# Patient Record
Sex: FEMALE | Race: WHITE | NOT HISPANIC OR LATINO | Employment: FULL TIME | URBAN - METROPOLITAN AREA
[De-identification: names, ages, dates, MRNs, and addresses within clinical notes are randomized per-mention and may not be internally consistent; named-entity substitution may affect disease eponyms.]

---

## 2017-01-30 ENCOUNTER — ALLSCRIPTS OFFICE VISIT (OUTPATIENT)
Dept: OTHER | Facility: OTHER | Age: 32
End: 2017-01-30

## 2017-03-13 ENCOUNTER — OFFICE VISIT (OUTPATIENT)
Dept: URGENT CARE | Facility: CLINIC | Age: 32
End: 2017-03-13
Payer: COMMERCIAL

## 2017-03-13 ENCOUNTER — TRANSCRIBE ORDERS (OUTPATIENT)
Dept: URGENT CARE | Facility: CLINIC | Age: 32
End: 2017-03-13

## 2017-03-13 ENCOUNTER — OFFICE VISIT (OUTPATIENT)
Dept: URGENT CARE | Facility: CLINIC | Age: 32
End: 2017-03-13
Attending: EMERGENCY MEDICINE
Payer: COMMERCIAL

## 2017-03-13 DIAGNOSIS — R10.9 ABDOMINAL PAIN, UNSPECIFIED SITE: ICD-10-CM

## 2017-03-13 DIAGNOSIS — R10.9 ABDOMINAL PAIN, UNSPECIFIED SITE: Primary | ICD-10-CM

## 2017-03-13 PROCEDURE — 93005 ELECTROCARDIOGRAM TRACING: CPT

## 2017-03-13 PROCEDURE — 99214 OFFICE O/P EST MOD 30 MIN: CPT

## 2017-03-14 LAB
ATRIAL RATE: 105 BPM
P AXIS: 44 DEGREES
PR INTERVAL: 148 MS
QRS AXIS: 70 DEGREES
QRSD INTERVAL: 96 MS
QT INTERVAL: 378 MS
QTC INTERVAL: 499 MS
T WAVE AXIS: -74 DEGREES
VENTRICULAR RATE: 105 BPM

## 2017-03-22 ENCOUNTER — GENERIC CONVERSION - ENCOUNTER (OUTPATIENT)
Dept: OTHER | Facility: OTHER | Age: 32
End: 2017-03-22

## 2017-05-12 ENCOUNTER — ALLSCRIPTS OFFICE VISIT (OUTPATIENT)
Dept: OTHER | Facility: OTHER | Age: 32
End: 2017-05-12

## 2017-05-12 DIAGNOSIS — M70.52 OTHER BURSITIS OF KNEE, LEFT KNEE: ICD-10-CM

## 2017-05-12 DIAGNOSIS — M70.42 PREPATELLAR BURSITIS OF LEFT KNEE: ICD-10-CM

## 2017-09-06 ENCOUNTER — ALLSCRIPTS OFFICE VISIT (OUTPATIENT)
Dept: OTHER | Facility: OTHER | Age: 32
End: 2017-09-06

## 2017-09-06 DIAGNOSIS — R06.02 SHORTNESS OF BREATH: ICD-10-CM

## 2017-09-06 DIAGNOSIS — R07.89 OTHER CHEST PAIN: ICD-10-CM

## 2017-09-06 DIAGNOSIS — R79.89 OTHER SPECIFIED ABNORMAL FINDINGS OF BLOOD CHEMISTRY: ICD-10-CM

## 2017-09-06 DIAGNOSIS — R06.00 DYSPNEA: ICD-10-CM

## 2017-09-06 DIAGNOSIS — R06.01 ORTHOPNEA: ICD-10-CM

## 2017-09-06 DIAGNOSIS — I10 ESSENTIAL (PRIMARY) HYPERTENSION: ICD-10-CM

## 2017-09-07 ENCOUNTER — GENERIC CONVERSION - ENCOUNTER (OUTPATIENT)
Dept: OTHER | Facility: OTHER | Age: 32
End: 2017-09-07

## 2017-09-15 ENCOUNTER — GENERIC CONVERSION - ENCOUNTER (OUTPATIENT)
Dept: OTHER | Facility: OTHER | Age: 32
End: 2017-09-15

## 2017-10-04 ENCOUNTER — GENERIC CONVERSION - ENCOUNTER (OUTPATIENT)
Dept: OTHER | Facility: OTHER | Age: 32
End: 2017-10-04

## 2017-12-15 ENCOUNTER — ALLSCRIPTS OFFICE VISIT (OUTPATIENT)
Dept: OTHER | Facility: OTHER | Age: 32
End: 2017-12-15

## 2017-12-15 DIAGNOSIS — R50.9 FEVER: ICD-10-CM

## 2017-12-15 DIAGNOSIS — R53.83 OTHER FATIGUE: ICD-10-CM

## 2018-01-13 VITALS
BODY MASS INDEX: 38.24 KG/M2 | SYSTOLIC BLOOD PRESSURE: 138 MMHG | TEMPERATURE: 98.6 F | DIASTOLIC BLOOD PRESSURE: 90 MMHG | RESPIRATION RATE: 18 BRPM | WEIGHT: 224 LBS | HEIGHT: 64 IN | HEART RATE: 90 BPM

## 2018-01-13 VITALS
WEIGHT: 241 LBS | SYSTOLIC BLOOD PRESSURE: 138 MMHG | HEIGHT: 64 IN | HEART RATE: 90 BPM | BODY MASS INDEX: 41.15 KG/M2 | DIASTOLIC BLOOD PRESSURE: 84 MMHG | RESPIRATION RATE: 18 BRPM | TEMPERATURE: 98.2 F

## 2018-01-14 VITALS
HEART RATE: 88 BPM | DIASTOLIC BLOOD PRESSURE: 116 MMHG | SYSTOLIC BLOOD PRESSURE: 162 MMHG | RESPIRATION RATE: 18 BRPM | HEIGHT: 64 IN | TEMPERATURE: 98.1 F | WEIGHT: 226 LBS | BODY MASS INDEX: 38.58 KG/M2

## 2018-01-15 ENCOUNTER — ANESTHESIA EVENT (INPATIENT)
Dept: PERIOP | Facility: HOSPITAL | Age: 33
DRG: 694 | End: 2018-01-15
Payer: COMMERCIAL

## 2018-01-15 ENCOUNTER — ANESTHESIA EVENT (OUTPATIENT)
Dept: SURGERY | Facility: HOSPITAL | Age: 33
End: 2018-01-15

## 2018-01-15 ENCOUNTER — HOSPITAL ENCOUNTER (INPATIENT)
Facility: HOSPITAL | Age: 33
LOS: 1 days | Discharge: HOME | DRG: 694 | End: 2018-01-16
Attending: INTERNAL MEDICINE | Admitting: INTERNAL MEDICINE
Payer: COMMERCIAL

## 2018-01-15 ENCOUNTER — ANESTHESIA (OUTPATIENT)
Dept: SURGERY | Facility: HOSPITAL | Age: 33
End: 2018-01-15

## 2018-01-15 ENCOUNTER — APPOINTMENT (INPATIENT)
Dept: RADIOLOGY | Facility: HOSPITAL | Age: 33
DRG: 694 | End: 2018-01-15
Payer: COMMERCIAL

## 2018-01-15 ENCOUNTER — ANESTHESIA (INPATIENT)
Dept: PERIOP | Facility: HOSPITAL | Age: 33
DRG: 694 | End: 2018-01-15
Payer: COMMERCIAL

## 2018-01-15 DIAGNOSIS — N13.2 HYDRONEPHROSIS WITH URINARY OBSTRUCTION DUE TO URETERAL CALCULUS: ICD-10-CM

## 2018-01-15 DIAGNOSIS — N20.0 KIDNEY STONE: Primary | ICD-10-CM

## 2018-01-15 PROBLEM — Z86.79 HISTORY OF CHF (CONGESTIVE HEART FAILURE): Status: ACTIVE | Noted: 2018-01-15

## 2018-01-15 PROBLEM — I10 HTN (HYPERTENSION): Status: ACTIVE | Noted: 2018-01-15

## 2018-01-15 PROBLEM — E87.6 HYPOKALEMIA: Status: ACTIVE | Noted: 2018-01-15

## 2018-01-15 PROBLEM — N13.30 HYDRONEPHROSIS: Status: ACTIVE | Noted: 2018-01-15

## 2018-01-15 LAB
ANION GAP SERPL CALCULATED.3IONS-SCNC: 9 MMOL/L (ref 4–13)
ATRIAL RATE: 93 BPM
BUN SERPL-MCNC: 15 MG/DL (ref 5–25)
CALCIUM SERPL-MCNC: 8.2 MG/DL (ref 8.3–10.1)
CHLORIDE SERPL-SCNC: 104 MMOL/L (ref 100–108)
CO2 SERPL-SCNC: 23 MMOL/L (ref 21–32)
CREAT SERPL-MCNC: 1.2 MG/DL (ref 0.6–1.3)
ERYTHROCYTE [DISTWIDTH] IN BLOOD BY AUTOMATED COUNT: 12.6 % (ref 11.6–15.1)
GFR SERPL CREATININE-BSD FRML MDRD: 60 ML/MIN/1.73SQ M
GLUCOSE SERPL-MCNC: 135 MG/DL (ref 65–140)
HCT VFR BLD AUTO: 29.5 % (ref 34.8–46.1)
HGB BLD-MCNC: 10.1 G/DL (ref 11.5–15.4)
MCH RBC QN AUTO: 28.3 PG (ref 26.8–34.3)
MCHC RBC AUTO-ENTMCNC: 34.2 G/DL (ref 31.4–37.4)
MCV RBC AUTO: 83 FL (ref 82–98)
P AXIS: 11 DEGREES
PLATELET # BLD AUTO: 208 THOUSANDS/UL (ref 149–390)
PMV BLD AUTO: 9.9 FL (ref 8.9–12.7)
POTASSIUM SERPL-SCNC: 3.9 MMOL/L (ref 3.5–5.3)
PR INTERVAL: 138 MS
QRS AXIS: 35 DEGREES
QRSD INTERVAL: 94 MS
QT INTERVAL: 408 MS
QTC INTERVAL: 507 MS
RBC # BLD AUTO: 3.57 MILLION/UL (ref 3.81–5.12)
SODIUM SERPL-SCNC: 136 MMOL/L (ref 136–145)
T WAVE AXIS: 28 DEGREES
VENTRICULAR RATE: 93 BPM
WBC # BLD AUTO: 11.02 THOUSAND/UL (ref 4.31–10.16)

## 2018-01-15 PROCEDURE — C1769 GUIDE WIRE: HCPCS | Performed by: UROLOGY

## 2018-01-15 PROCEDURE — 85027 COMPLETE CBC AUTOMATED: CPT | Performed by: INTERNAL MEDICINE

## 2018-01-15 PROCEDURE — BT1D1ZZ FLUOROSCOPY OF RIGHT KIDNEY, URETER AND BLADDER USING LOW OSMOLAR CONTRAST: ICD-10-PCS | Performed by: UROLOGY

## 2018-01-15 PROCEDURE — C2617 STENT, NON-COR, TEM W/O DEL: HCPCS | Performed by: UROLOGY

## 2018-01-15 PROCEDURE — 87086 URINE CULTURE/COLONY COUNT: CPT | Performed by: UROLOGY

## 2018-01-15 PROCEDURE — 93005 ELECTROCARDIOGRAM TRACING: CPT | Performed by: PHYSICIAN ASSISTANT

## 2018-01-15 PROCEDURE — 80048 BASIC METABOLIC PNL TOTAL CA: CPT | Performed by: INTERNAL MEDICINE

## 2018-01-15 PROCEDURE — 0T768DZ DILATION OF RIGHT URETER WITH INTRALUMINAL DEVICE, VIA NATURAL OR ARTIFICIAL OPENING ENDOSCOPIC: ICD-10-PCS | Performed by: UROLOGY

## 2018-01-15 PROCEDURE — 74450 X-RAY URETHRA/BLADDER: CPT

## 2018-01-15 DEVICE — STENT URETERAL 6 FR 26CM INLAY OPTIMA: Type: IMPLANTABLE DEVICE | Site: URETER | Status: FUNCTIONAL

## 2018-01-15 RX ORDER — SODIUM CHLORIDE 9 MG/ML
125 INJECTION, SOLUTION INTRAVENOUS CONTINUOUS
Status: DISCONTINUED | OUTPATIENT
Start: 2018-01-15 | End: 2018-01-15

## 2018-01-15 RX ORDER — MIDAZOLAM HYDROCHLORIDE 1 MG/ML
INJECTION INTRAMUSCULAR; INTRAVENOUS AS NEEDED
Status: DISCONTINUED | OUTPATIENT
Start: 2018-01-15 | End: 2018-01-15 | Stop reason: SURG

## 2018-01-15 RX ORDER — METOPROLOL TARTRATE 50 MG/1
50 TABLET, FILM COATED ORAL 2 TIMES DAILY
COMMUNITY
End: 2018-01-31 | Stop reason: SDUPTHER

## 2018-01-15 RX ORDER — ONDANSETRON 2 MG/ML
4 INJECTION INTRAMUSCULAR; INTRAVENOUS EVERY 6 HOURS PRN
Status: DISCONTINUED | OUTPATIENT
Start: 2018-01-15 | End: 2018-01-16 | Stop reason: HOSPADM

## 2018-01-15 RX ORDER — KETOROLAC TROMETHAMINE 30 MG/ML
INJECTION, SOLUTION INTRAMUSCULAR; INTRAVENOUS AS NEEDED
Status: DISCONTINUED | OUTPATIENT
Start: 2018-01-15 | End: 2018-01-15 | Stop reason: SURG

## 2018-01-15 RX ORDER — LISINOPRIL 20 MG/1
20 TABLET ORAL DAILY
COMMUNITY
End: 2018-01-31 | Stop reason: SDUPTHER

## 2018-01-15 RX ORDER — MORPHINE SULFATE 4 MG/ML
4 INJECTION, SOLUTION INTRAMUSCULAR; INTRAVENOUS EVERY 4 HOURS PRN
Status: DISCONTINUED | OUTPATIENT
Start: 2018-01-15 | End: 2018-01-16 | Stop reason: HOSPADM

## 2018-01-15 RX ORDER — LABETALOL HYDROCHLORIDE 5 MG/ML
10 INJECTION, SOLUTION INTRAVENOUS
Status: DISCONTINUED | OUTPATIENT
Start: 2018-01-15 | End: 2018-01-15 | Stop reason: HOSPADM

## 2018-01-15 RX ORDER — SODIUM CHLORIDE AND POTASSIUM CHLORIDE .9; .15 G/100ML; G/100ML
125 SOLUTION INTRAVENOUS CONTINUOUS
Status: DISCONTINUED | OUTPATIENT
Start: 2018-01-15 | End: 2018-01-16

## 2018-01-15 RX ORDER — SODIUM CHLORIDE 9 MG/ML
125 INJECTION, SOLUTION INTRAVENOUS CONTINUOUS
Status: DISCONTINUED | OUTPATIENT
Start: 2018-01-15 | End: 2018-01-16

## 2018-01-15 RX ORDER — PROMETHAZINE HYDROCHLORIDE 25 MG/ML
25 INJECTION, SOLUTION INTRAMUSCULAR; INTRAVENOUS ONCE AS NEEDED
Status: DISCONTINUED | OUTPATIENT
Start: 2018-01-15 | End: 2018-01-15 | Stop reason: HOSPADM

## 2018-01-15 RX ORDER — FUROSEMIDE 20 MG/1
TABLET ORAL AS NEEDED
COMMUNITY
End: 2019-03-24 | Stop reason: HOSPADM

## 2018-01-15 RX ORDER — ACETAMINOPHEN 325 MG/1
650 TABLET ORAL EVERY 6 HOURS PRN
Status: DISCONTINUED | OUTPATIENT
Start: 2018-01-15 | End: 2018-01-16 | Stop reason: HOSPADM

## 2018-01-15 RX ORDER — ONDANSETRON 2 MG/ML
INJECTION INTRAMUSCULAR; INTRAVENOUS AS NEEDED
Status: DISCONTINUED | OUTPATIENT
Start: 2018-01-15 | End: 2018-01-15 | Stop reason: SURG

## 2018-01-15 RX ORDER — FENTANYL CITRATE 50 UG/ML
INJECTION, SOLUTION INTRAMUSCULAR; INTRAVENOUS AS NEEDED
Status: DISCONTINUED | OUTPATIENT
Start: 2018-01-15 | End: 2018-01-15 | Stop reason: SURG

## 2018-01-15 RX ORDER — PROPOFOL 10 MG/ML
INJECTION, EMULSION INTRAVENOUS AS NEEDED
Status: DISCONTINUED | OUTPATIENT
Start: 2018-01-15 | End: 2018-01-15 | Stop reason: SURG

## 2018-01-15 RX ORDER — LISINOPRIL 20 MG/1
20 TABLET ORAL DAILY
Status: DISCONTINUED | OUTPATIENT
Start: 2018-01-15 | End: 2018-01-16 | Stop reason: HOSPADM

## 2018-01-15 RX ORDER — OXYCODONE HYDROCHLORIDE 5 MG/1
5 TABLET ORAL EVERY 4 HOURS PRN
Status: DISCONTINUED | OUTPATIENT
Start: 2018-01-15 | End: 2018-01-16 | Stop reason: HOSPADM

## 2018-01-15 RX ORDER — METOPROLOL TARTRATE 50 MG/1
50 TABLET, FILM COATED ORAL 2 TIMES DAILY
Status: DISCONTINUED | OUTPATIENT
Start: 2018-01-15 | End: 2018-01-16 | Stop reason: HOSPADM

## 2018-01-15 RX ORDER — ONDANSETRON 2 MG/ML
4 INJECTION INTRAMUSCULAR; INTRAVENOUS ONCE AS NEEDED
Status: DISCONTINUED | OUTPATIENT
Start: 2018-01-15 | End: 2018-01-15 | Stop reason: HOSPADM

## 2018-01-15 RX ORDER — CLONIDINE HYDROCHLORIDE 0.1 MG/1
0.1 TABLET ORAL 2 TIMES DAILY
COMMUNITY
End: 2019-03-24 | Stop reason: HOSPADM

## 2018-01-15 RX ORDER — SODIUM CHLORIDE 9 MG/ML
INJECTION, SOLUTION INTRAVENOUS CONTINUOUS PRN
Status: DISCONTINUED | OUTPATIENT
Start: 2018-01-15 | End: 2018-01-15 | Stop reason: SURG

## 2018-01-15 RX ORDER — MAGNESIUM HYDROXIDE 1200 MG/15ML
LIQUID ORAL AS NEEDED
Status: DISCONTINUED | OUTPATIENT
Start: 2018-01-15 | End: 2018-01-15 | Stop reason: HOSPADM

## 2018-01-15 RX ORDER — LIDOCAINE HYDROCHLORIDE 10 MG/ML
INJECTION, SOLUTION INFILTRATION; PERINEURAL AS NEEDED
Status: DISCONTINUED | OUTPATIENT
Start: 2018-01-15 | End: 2018-01-15 | Stop reason: SURG

## 2018-01-15 RX ORDER — CLONIDINE HYDROCHLORIDE 0.1 MG/1
0.1 TABLET ORAL 2 TIMES DAILY
Status: DISCONTINUED | OUTPATIENT
Start: 2018-01-15 | End: 2018-01-16 | Stop reason: HOSPADM

## 2018-01-15 RX ORDER — TAMSULOSIN HYDROCHLORIDE 0.4 MG/1
0.4 CAPSULE ORAL
Status: DISCONTINUED | OUTPATIENT
Start: 2018-01-15 | End: 2018-01-16 | Stop reason: HOSPADM

## 2018-01-15 RX ADMIN — DEXAMETHASONE SODIUM PHOSPHATE 4 MG: 10 INJECTION INTRAMUSCULAR; INTRAVENOUS at 03:33

## 2018-01-15 RX ADMIN — ONDANSETRON HYDROCHLORIDE 4 MG: 2 INJECTION, SOLUTION INTRAVENOUS at 03:33

## 2018-01-15 RX ADMIN — LISINOPRIL 20 MG: 20 TABLET ORAL at 09:21

## 2018-01-15 RX ADMIN — METOPROLOL TARTRATE 50 MG: 50 TABLET ORAL at 17:49

## 2018-01-15 RX ADMIN — SODIUM CHLORIDE AND POTASSIUM CHLORIDE 125 ML/HR: .9; .15 SOLUTION INTRAVENOUS at 15:18

## 2018-01-15 RX ADMIN — CIPROFLOXACIN 400 MG: 2 INJECTION INTRAVENOUS at 03:28

## 2018-01-15 RX ADMIN — SODIUM CHLORIDE: 0.9 INJECTION, SOLUTION INTRAVENOUS at 03:17

## 2018-01-15 RX ADMIN — OXYCODONE HYDROCHLORIDE 5 MG: 5 TABLET ORAL at 22:46

## 2018-01-15 RX ADMIN — LIDOCAINE HYDROCHLORIDE 100 MG: 10 INJECTION, SOLUTION INFILTRATION; PERINEURAL at 03:22

## 2018-01-15 RX ADMIN — OXYCODONE HYDROCHLORIDE 5 MG: 5 TABLET ORAL at 05:47

## 2018-01-15 RX ADMIN — KETOROLAC TROMETHAMINE 30 MG: 30 INJECTION, SOLUTION INTRAMUSCULAR at 03:39

## 2018-01-15 RX ADMIN — Medication 1000 MG: at 05:48

## 2018-01-15 RX ADMIN — METOPROLOL TARTRATE 50 MG: 50 TABLET ORAL at 09:21

## 2018-01-15 RX ADMIN — FENTANYL CITRATE 50 MCG: 50 INJECTION INTRAMUSCULAR; INTRAVENOUS at 03:46

## 2018-01-15 RX ADMIN — FENTANYL CITRATE 50 MCG: 50 INJECTION INTRAMUSCULAR; INTRAVENOUS at 03:18

## 2018-01-15 RX ADMIN — SODIUM CHLORIDE AND POTASSIUM CHLORIDE 125 ML/HR: .9; .15 SOLUTION INTRAVENOUS at 04:57

## 2018-01-15 RX ADMIN — CLONIDINE HYDROCHLORIDE 0.1 MG: 0.1 TABLET ORAL at 09:21

## 2018-01-15 RX ADMIN — CLONIDINE HYDROCHLORIDE 0.1 MG: 0.1 TABLET ORAL at 17:49

## 2018-01-15 RX ADMIN — OXYCODONE HYDROCHLORIDE 5 MG: 5 TABLET ORAL at 15:18

## 2018-01-15 RX ADMIN — PROPOFOL 200 MG: 10 INJECTION, EMULSION INTRAVENOUS at 03:22

## 2018-01-15 RX ADMIN — MIDAZOLAM HYDROCHLORIDE 2 MG: 1 INJECTION, SOLUTION INTRAMUSCULAR; INTRAVENOUS at 03:18

## 2018-01-15 RX ADMIN — TAMSULOSIN HYDROCHLORIDE 0.4 MG: 0.4 CAPSULE ORAL at 17:49

## 2018-01-15 RX ADMIN — OXYCODONE HYDROCHLORIDE 5 MG: 5 TABLET ORAL at 10:19

## 2018-01-15 RX ADMIN — ACETAMINOPHEN 650 MG: 325 TABLET, FILM COATED ORAL at 05:02

## 2018-01-15 NOTE — CASE MANAGEMENT
Initial Clinical Review    Admission: Date/Time/Statement: 1/15/18 @ 0155     Orders Placed This Encounter   Procedures    Inpatient Admission     Standing Status:   Standing     Number of Occurrences:   1     Order Specific Question:   Admitting Physician     Answer:   Aruna Nevarez [[id number]]     Order Specific Question:   Level of Care     Answer:   Med Surg [[id number]]     Order Specific Question:   Estimated length of stay     Answer:   More than 2 Midnights     Order Specific Question:   Certification     Answer:   I certify that inpatient services are medically necessary for this patient for a duration of greater than two midnights  See H&P and MD Progress Notes for additional information about the patient's course of treatment  ED: Date/Time/Mode of Arrival:   ED Arrival Information     Patient not seen in ED                       Chief Complaint: No chief complaint on file  History of Illness:    Gertrude Duke is a 33 y o  female who presents with right flank pain since 7am  She does have a history of kidney stone 6 years ago when she was pregnant  She required stenting at that time  She developed right flank pain this morning that got progressively worse  She did have vomiting earlier this morning   She had a fever         ED Vital Signs:   ED Triage Vitals   Temperature Pulse Respirations Blood Pressure SpO2   01/15/18 0158 01/15/18 0158 01/15/18 0158 01/15/18 0158 01/15/18 0158   (!) 96 9 °F (36 1 °C) 81 20 165/80 98 %      Temp Source Heart Rate Source Patient Position - Orthostatic VS BP Location FiO2 (%)   01/15/18 0158 -- 01/15/18 0758 01/15/18 0758 --   Tympanic  Lying Left arm       Pain Score       01/15/18 0212       6        Wt Readings from Last 1 Encounters:   01/15/18 96 9 kg (213 lb 10 oz)       Vital Signs (abnormal):    above    Abnormal Labs/Diagnostic Test Results:   H/H    10 1/29 5  WBC   11 02    ED Treatment:   Medication Administration - No Administrations Displayed (No Start Event Found)     None          Past Medical/Surgical History: Active Ambulatory Problems     Diagnosis Date Noted    No Active Ambulatory Problems     Resolved Ambulatory Problems     Diagnosis Date Noted    No Resolved Ambulatory Problems     Past Medical History:   Diagnosis Date    CHF (congestive heart failure) ([facility] [address] )     Hypertension        Admitting Diagnosis: Hydronephrosis [N13 30]    Age/Sex: 33 y o  female    · Assessment/Plan:    Kidney stone with hydronephrosis  ? Admit to med/surg  Urology planning stent tonight  Patient received levaquin in ED at 11:35 PM  Consult urology  NPO  Patient medically stable for planned procedure     Plan for Additional Problems:   · HTN- continue home blood pressure medications  · Hx CHF- takes lasix PRN at home but none recently  · Hypokalemia- IV fluids with potassium supplementation  Recheck labs in AM     VTE Prophylaxis: Pharmacologic VTE Prophylaxis contraindicated due to surgery  / sequential compression device   Code Status: full code  POLST: There is no POLST form on file for this patient (pre-hospital)    Per  Urology  Consult:  33 y o  old female with  an 8 mm right proximal ureteral calculus diagnosed on CT stent at an outside hospital she  She presented with a fever to 103  She e presented with significant hypertension          I recommended proceeding to the operating room urgently for cystoscopy and right retrograde pyelogram with ureteral stent placement  The goals of the surgery to obtain source control, bypassed the stone, and palliate her symptoms    Discussed the risks of the procedure which include but are not limited to hematuria, worsening UTI, possible ICU requirement, inability to place the stent requiring nephrostomy tube, damage to the ureter, kidney, upper urinary tract      Discussed with the patient that she will need a secondary procedure to remove the stone via ureteroscopy when she has recovered from this acute septic event        PLAN:      - we will proceed to the operating room emergently for cystoscopy, right retrograde pyelogram and ureteral stent placement  - informed consent has been obtained     right hydronephrosis  2) right ureteral calculus  3) fever  4) right subcapsular hematoma     POST-OP DIAGNOSIS:   1) right hydronephrosis  2) right ureteral calculus  3) fever  4) right subcapsular hematoma     PROCEDURES PERFORMED:  1) Cystoscopy  2) right retrograde pyelography with fluoroscopic interpretation  3) right ureteral stent placement        Anticipated Length of Stay:  Patient will be admitted on an Inpatient basis with an anticipated length of stay of  Greater than 2 midnights  Justification for Hospital Stay: patient requires urology surgery    Admission Orders:    IP    1/15  @    0208  Scheduled Meds:   cefTRIAXone 1,000 mg Intravenous Q24H   cloNIDine 0 1 mg Oral BID   lisinopril 20 mg Oral Daily   metoprolol tartrate 50 mg Oral BID   tamsulosin 0 4 mg Oral Daily With Dinner     Continuous Infusions:   sodium chloride 125 mL/hr Last Rate: Stopped (01/15/18 0436)   sodium chloride 0 9 % with KCl 20 mEq/L 125 mL/hr Last Rate: 125 mL/hr (01/15/18 0618)     PRN Meds:   acetaminophen    morphine injection    ondansetron    oxyCODONE       Cons urology  Reg diet  IV  MSO4    (  X2   1/15  Thus far)    Thank you,  [facility] in the [facility] by [facility]ach for 2017  Network Utilization Review Department  Phone: [phone]; Fax [phone]  ATTENTION: The Network Utilization Review Department is now centralized for our 7 Facilities  Make a note that we have a new phone and fax numbers for our Department  Please call with any questions or concerns to [phone] and carefully follow the prompts so that you are directed to the right person   All voicemails are confidential  Fax any determinations, approvals, denials, and requests for initial or continue stay review clinical to [phone]  Due to HIGH CALL volume, it would be easier if you could please send faxed requests to expedite your requests and in part, help us provide discharge notifications faster

## 2018-01-15 NOTE — PLAN OF CARE
DISCHARGE PLANNING     Discharge to home or other facility with appropriate resources Progressing        INFECTION - ADULT     Absence or prevention of progression during hospitalization Progressing     Absence of fever/infection during neutropenic period Progressing        Knowledge Deficit     Patient/family/caregiver demonstrates understanding of disease process, treatment plan, medications, and discharge instructions Progressing        PAIN - ADULT     Verbalizes/displays adequate comfort level or baseline comfort level Progressing        Potential for Falls     Patient will remain free of falls Progressing

## 2018-01-15 NOTE — OP NOTE
Operative Note     PATIENT:  Carter Ellis (MRN [id number])    DATE OF PROCEDURE:   1/15/2018    PRE-OP DIAGNOSIS:   1) right hydronephrosis  2) right ureteral calculus  3) fever  4) right subcapsular hematoma    POST-OP DIAGNOSIS:   1) right hydronephrosis  2) right ureteral calculus  3) fever  4) right subcapsular hematoma    PROCEDURES PERFORMED:  1) Cystoscopy  2) right retrograde pyelography with fluoroscopic interpretation  3) right ureteral stent placement     SURGEON:  Verner Squires, MD    ASSISTANTS:      NOTE:  There were no qualified teaching residents to assist with this case    ANESTHESIA: General     COMPLICATIONS:   None    ANTIBIOTICS:  Cephazolin    INTRAOPERATIVE THROMBOEMBOLISM PROPHYLAXIS:  Pneumatic compression stockings       OPERATIVE FINDINGS:    1  Successful placement of right-sided ureteral stent    INDICATIONS FOR PROCEDURE:  Carter Ellis is an 33 y o  old female who presented to an outside facility with a 4 mm right proximal ureteral calculus, hydronephrosis, a subcapsular hematoma, and fever to 103  After discussing the options, the patient elected to undergo ureteral stent placement  We discussed the procedure in detail, the alternatives, and the risks, and they signed informed consent to proceed  PROCEDURE IN DETAIL:   The patient was identified and brought to the OR  Antibiotic prophylaxis and DVT prophylaxis were administered  They were placed in the comfortable dorsal lithotomy position with care to pad all pressure points  They were prepped and draped in the usual sterile fashion using hibiclens  A surgical time out was performed with all in the room in agreement with the correct patient, procedure, indications, and laterality  A 21-Georgian rigid cystoscope was used to enter the bladder  The bladder was inspected in its entirety and there were no lesions noted  The ureteral orifices were identified in their normal orthotopic positions       The right ureteral orifice was identified and a 5 Fr open ended catheter was placed into the ureteral orifice  The stone was not visible on  fluoroscopic guidance  A retrograde pyelogram was performed with injection of 50/50 Isovue which demonstrated moderate hydroureteronephrosis  A Sensor wire was up to the kidney under fluoroscopic guidance  A  JJ stent was then passed up the wire  under fluoroscopic guidance into the   kidney with a good curl noted in the kidney and in the bladder  The bladder was drained  The patient was placed back supine, awakened from general anesthesia and brought to recovery room in stable condition  SPECIMENS:   Urine culture, right renal pelvis    IMPLANTS:     Implant Name Type Inv  Item Serial No   Lot No  LRB No  Used   URETERAL STENT 6 FR X 26 CM OPTIMA INLAY - [id number]   URETERAL STENT 6 FR X 26 CM OPTIMA INLAY   [employer] [id number] 1        COMPLICATIONS:  None    DISPOSITION: PACU     PLAN:   - patient will return to the medical haq for continued broad-spectrum antibiotics and management of febrile urinary tract infection  - I had ordered IV Rocephin empirically  - please follow up on urine cultures submitted from the operating room, and tailor antibiotics appropriately  - patient will require a 14 day course of culture directed antibiotics  - patient understands that she will require secondary procedure in the form of ureteroscopy to extract her stone once she has completed her course of antibiotics    This will be coordinated as an outpatient and I have requested follow-up with 1 of my partners in our [facility] and [address] office to coordinate this

## 2018-01-15 NOTE — ANESTHESIA PREPROCEDURE EVALUATION
Review of Systems/Medical History  Patient summary reviewed        Cardiovascular  Hypertension , CHF ,   Comment: CHF secondary to uncontrolled HTN and is now resolved ,  Pulmonary  Negative pulmonary ROS ,        GI/Hepatic  Negative GI/hepatic ROS          Kidney stones,        Endo/Other  Negative endo/other ROS      GYN  Negative gynecology ROS          Hematology  Negative hematology ROS      Musculoskeletal  Obesity ,        Neurology  Negative neurology ROS      Psychology   Negative psychology ROS            Physical Exam    Airway    Mallampati score: II  TM Distance: >3 FB  Neck ROM: full     Dental   No notable dental hx     Cardiovascular  Rhythm: regular, Rate: normal, Cardiovascular exam normal    Pulmonary  Pulmonary exam normal Breath sounds clear to auscultation,     Other Findings        Anesthesia Plan  ASA Score- 2 Emergent    Anesthesia Type- general with ASA Monitors  Additional Monitors:   Airway Plan: LMA  Plan Factors-    Induction- intravenous  Postoperative Plan- Plan for postoperative opioid use  Informed Consent- Anesthetic plan and risks discussed with patient

## 2018-01-15 NOTE — CONSULTS
UROLOGY CONSULTATION NOTE     Patient Identifiers: Donnie Le (MRN [id number])  Service Requesting Consultation:  Internal Medicine  Service Providing Consultation:  Urology, Doris Ramirez MD    Date of Service: 1/15/2018    Reason for Consultation:  Obstructing ureteral stone with fever      ASSESSMENT:     33 y o  old female with  an 8 mm right proximal ureteral calculus diagnosed on CT stent at an outside hospital she  She presented with a fever to 103  She e presented with significant hypertension         I recommended proceeding to the operating room urgently for cystoscopy and right retrograde pyelogram with ureteral stent placement  The goals of the surgery to obtain source control, bypassed the stone, and palliate her symptoms  Discussed the risks of the procedure which include but are not limited to hematuria, worsening UTI, possible ICU requirement, inability to place the stent requiring nephrostomy tube, damage to the ureter, kidney, upper urinary tract  Discussed with the patient that she will need a secondary procedure to remove the stone via ureteroscopy when she has recovered from this acute septic event      PLAN:     - we will proceed to the operating room emergently for cystoscopy, right retrograde pyelogram and ureteral stent placement  - informed consent has been obtained    I coordinated care with Dr Sharma of [facility], THE Emergency Department, [facility] transfer center internal Medicine, anesthesia, operating room, in management of this acute surgical patient    History of Present Illness:     Donnie Le is a 33 y o  old with a history of congestive heart failure, hypertension, prior history of nephrolithiasis during a pregnancy  She presented to [facility] emergency department acute right-sided flank pain  CT scan reveals a 4 mm right proximal ureteral calculus, mild hydronephrosis, perinephric stranding, as well as a 4 cm subcapsular hematoma      She was febrile to 103 as well as hypertension with systolic blood pressure of up to 200 on presentation to the hospital   I was contacted to discuss a transfer as no urologist was reported to be on-call for that facility  Accepted patient for transfer to our facility, along with internal Medicine to provide medical clearance and hospital admission given the patient's hypertension  On evaluation she remains symptomatic with right-sided flank pain  She is nontoxic in appearance    Past Medical, Past Surgical History:   No past medical history on file :    No past surgical history on file :    Medications, Allergies:     Current Facility-Administered Medications:     acetaminophen (TYLENOL) tablet 650 mg, 650 mg, Oral, Q6H PRN, Debbie Ramírez PA-C    morphine (PF) 4 mg/mL injection 4 mg, 4 mg, Intravenous, Q4H PRN, NIKA Lennon-MILY    ondansetron [facility]) injection 4 mg, 4 mg, Intravenous, Q6H PRN, Debbie Ramírez, PA-C    sodium chloride 0 9 % with KCl 20 mEq/L infusion (premix), 125 mL/hr, Intravenous, Continuous, Debbie Ramírez PA-C    Allergies:  No Known Allergies:    Social and Family History:   Social History:   Social History   Substance Use Topics    Smoking status: Not on file    Smokeless tobacco: Not on file    Alcohol use Not on file     History   Smoking Status    Not on file   Smokeless Tobacco    Not on file       Family History:  No family history on file :     Review of Systems:     General: Fever, chills, or night sweats: positive  Cardiac: Negative for chest pain  Pulmonary: Negative for shortness of breath  Gastrointestinal: Abdominal pain positive  Nausea, vomiting, or diarrhea positive,  Genitourinary: See HPI above  Patient does not have hematuria  All other systems queried were negative  Physical Exam:   General: Patient is pleasant and in NAD   Awake and alert  /80   Pulse 81   Temp (!) 96 9 °F (36 1 °C) (Tympanic)   Resp 20   Ht 5' 6" (1 676 m)   Wt 96 9 kg (213 lb 10 oz)   SpO2 98%   BMI 34 48 kg/m²   Cardiac: Peripheral edema: negative  Pulmonary: Non-labored breathing  Abdomen: Soft, non-tender, non-distended  No surgical scars  No masses, tenderness, hernias noted  Genitourinary: Negative CVA tenderness, negative suprapubic tenderness  Labs:     Lab Results   Component Value Date    HGB 14 6 09/26/2013    HCT 43 1 09/26/2013    WBC 6 3 09/26/2013     09/26/2013   ]    No results found for: NA, K, CL, CO2, BUN, CREATININE, CALCIUM, GLUCOSE]    Imaging:   I personally reviewed the images and report of the following studies, and reviewed them with the patient:    I have reviewed the report of a CT scan as described above    Thank you for allowing me to participate in this patients care  Please do not hesitate to call with any additional questions    Noel Caballero MD

## 2018-01-15 NOTE — PLAN OF CARE
Problem: Potential for Falls  Goal: Patient will remain free of falls  INTERVENTIONS:  - Assess patient frequently for physical needs  -  Identify cognitive and physical deficits and behaviors that affect risk of falls    -  [place] fall precautions as indicated by assessment   - Educate patient/family on patient safety including physical limitations  - Instruct patient to call for assistance with activity based on assessment  - Modify environment to reduce risk of injury  - Consider OT/PT consult to assist with strengthening/mobility   Outcome: Progressing      Problem: PAIN - ADULT  Goal: Verbalizes/displays adequate comfort level or baseline comfort level  Interventions:  - Encourage patient to monitor pain and request assistance  - Assess pain using appropriate pain scale  - Administer analgesics based on type and severity of pain and evaluate response  - Implement non-pharmacological measures as appropriate and evaluate response  - Consider cultural and social influences on pain and pain management  - Notify physician/advanced practitioner if interventions unsuccessful or patient reports new pain  Outcome: Progressing      Problem: INFECTION - ADULT  Goal: Absence or prevention of progression during hospitalization  INTERVENTIONS:  - Assess and monitor for signs and symptoms of infection  - Monitor lab/diagnostic results  - Monitor all insertion sites, i e  indwelling lines, tubes, and drains  - Monitor endotracheal (as able) and nasal secretions for changes in amount and color  - [place] appropriate cooling/warming therapies per order  - Administer medications as ordered  - Instruct and encourage patient and family to use good hand hygiene technique  - Identify and instruct in appropriate isolation precautions for identified infection/condition  Outcome: Progressing    Goal: Absence of fever/infection during neutropenic period  INTERVENTIONS:  - Monitor WBC    Outcome: Progressing      Problem: DISCHARGE PLANNING  Goal: Discharge to home or other facility with appropriate resources  INTERVENTIONS:  - Identify barriers to discharge w/patient and caregiver  - Arrange for needed discharge resources and transportation as appropriate  - Identify discharge learning needs (meds, wound care, etc )  - Arrange for interpretive services to assist at discharge as needed  - Refer to Case Management Department for coordinating discharge planning if the patient needs post-hospital services based on physician/advanced practitioner order or complex needs related to functional status, cognitive ability, or social support system  Outcome: Progressing      Problem: Knowledge Deficit  Goal: Patient/family/caregiver demonstrates understanding of disease process, treatment plan, medications, and discharge instructions  Complete learning assessment and assess knowledge base    Interventions:  - Provide teaching at level of understanding  - Provide teaching via preferred learning methods  Outcome: Progressing

## 2018-01-15 NOTE — H&P
History and Physical - [facility] Internal Medicine    Patient Information: Adeel Morfin 33 y o  female MRN: [id number]  Unit/Bed#: E2 -01 Encounter: [id number]  Admitting Physician: Peter Lawler PA-C  PCP: Kathleen Romo DO  Date of Admission:  01/15/18    Assessment/Plan:    Hospital Problem List:     Principal Problem:    Hydronephrosis  Active Problems:    Kidney stone    HTN (hypertension)    History of CHF (congestive heart failure)    Hypokalemia      Plan for the Primary Problem(s):  · Kidney stone with hydronephrosis  · Admit to med/surg  Urology planning stent tonight  Patient received levaquin in ED at 11:35 PM  Consult urology  NPO  Patient medically stable for planned procedure    Plan for Additional Problems:   · HTN- continue home blood pressure medications  · Hx CHF- takes lasix PRN at home but none recently  · Hypokalemia- IV fluids with potassium supplementation  Recheck labs in AM    VTE Prophylaxis: Pharmacologic VTE Prophylaxis contraindicated due to surgery  / sequential compression device   Code Status: full code  POLST: There is no POLST form on file for this patient (pre-hospital)    Anticipated Length of Stay:  Patient will be admitted on an Inpatient basis with an anticipated length of stay of  Greater than 2 midnights  Justification for Hospital Stay: patient requires urology surgery    Total Time for Visit, including Counseling / Coordination of Care: 45 minutes  Greater than 50% of this total time spent on direct patient counseling and coordination of care  Chief Complaint:   Right flank pain    History of Present Illness:    Adeel Morfin is a 33 y o  female who presents with right flank pain since 7am  She does have a history of kidney stone 6 years ago when she was pregnant  She required stenting at that time  She developed right flank pain this morning that got progressively worse  She did have vomiting earlier this morning  She had a fever       Review of Systems:    Review of Systems   Constitutional: Positive for appetite change and fever  HENT: Negative  Eyes: Negative  Respiratory: Negative  Cardiovascular: Negative  Gastrointestinal: Positive for nausea and vomiting  Endocrine: Negative  Genitourinary: Positive for flank pain  Skin: Negative  Allergic/Immunologic: Negative  Neurological: Negative  Hematological: Negative  Psychiatric/Behavioral: Negative  Past Medical and Surgical History:     No past medical history on file  No past surgical history on file  Meds/Allergies:    Prior to Admission medications    Not on File     I have reviewed home medications with patient personally  Allergies: Allergies not on file    Social History:     Marital Status: /Civil Union   Occupation: CNA  Patient Pre-hospital Living Situation: lives with  and kids  Patient Pre-hospital Level of Mobility: no restrictions  Patient Pre-hospital Diet Restrictions: none  Substance Use History:   History   Alcohol use Not on file     History   Smoking Status    Not on file   Smokeless Tobacco    Not on file     History   Drug use: Unknown       Family History:    non-contributory    Physical Exam:     Vitals:   Blood Pressure: 165/80 (01/15/18 0158)  Pulse: 81 (01/15/18 0158)  Temperature: (!) 96 9 °F (36 1 °C) (01/15/18 0158)  Temp Source: Tympanic (01/15/18 0158)  Respirations: 20 (01/15/18 0158)  Height: 5' 6" (167 6 cm) (01/15/18 0158)  Weight - Scale: 96 9 kg (213 lb 10 oz) (01/15/18 0158)  SpO2: 98 % (01/15/18 0158)    Physical Exam   Constitutional: She is oriented to person, place, and time  She appears well-developed and well-nourished  No distress  HENT:   Head: Normocephalic and atraumatic  Eyes: Conjunctivae are normal  Pupils are equal, round, and reactive to light  Neck: Normal range of motion  Neck supple  No JVD present  No tracheal deviation present  No thyromegaly present     Cardiovascular: Normal rate and regular rhythm  Pulmonary/Chest: Effort normal and breath sounds normal  No respiratory distress  She has no wheezes  Abdominal: Soft  Bowel sounds are normal  She exhibits no distension and no mass  There is no tenderness  There is no rebound and no guarding  Genitourinary:   Genitourinary Comments: Right flank pain   Musculoskeletal: Normal range of motion  She exhibits no edema, tenderness or deformity  Lymphadenopathy:     She has no cervical adenopathy  Neurological: She is alert and oriented to person, place, and time  Skin: Skin is warm and dry  No rash noted  She is not diaphoretic  No erythema  No pallor  Psychiatric: She has a normal mood and affect  Her behavior is normal    Vitals reviewed  Additional Data:     Lab Results: I have personally reviewed pertinent reports  Creatinine 1 08  K 3 3            Imaging: I have personally reviewed pertinent reports  CT right obstructing proximal ureteral stone with hydronephrosis      EKG, Pathology, and Other Studies Reviewed on Admission:   · EKG: pending    Allscripts / Epic Records Reviewed: Yes     ** Please Note: This note has been constructed using a voice recognition system   **

## 2018-01-16 VITALS
SYSTOLIC BLOOD PRESSURE: 177 MMHG | WEIGHT: 213.63 LBS | BODY MASS INDEX: 34.33 KG/M2 | TEMPERATURE: 98.6 F | OXYGEN SATURATION: 100 % | HEIGHT: 66 IN | DIASTOLIC BLOOD PRESSURE: 94 MMHG | RESPIRATION RATE: 18 BRPM | HEART RATE: 70 BPM

## 2018-01-16 PROBLEM — N13.30 HYDRONEPHROSIS: Status: RESOLVED | Noted: 2018-01-15 | Resolved: 2018-01-16

## 2018-01-16 PROBLEM — E87.6 HYPOKALEMIA: Status: RESOLVED | Noted: 2018-01-15 | Resolved: 2018-01-16

## 2018-01-16 PROBLEM — N13.2 HYDRONEPHROSIS WITH URINARY OBSTRUCTION DUE TO URETERAL CALCULUS: Status: RESOLVED | Noted: 2018-01-15 | Resolved: 2018-01-16

## 2018-01-16 PROBLEM — N20.0 KIDNEY STONE: Status: RESOLVED | Noted: 2018-01-15 | Resolved: 2018-01-16

## 2018-01-16 LAB
ANION GAP SERPL CALCULATED.3IONS-SCNC: 6 MMOL/L (ref 4–13)
BACTERIA UR CULT: NORMAL
BUN SERPL-MCNC: 15 MG/DL (ref 5–25)
CALCIUM SERPL-MCNC: 7.6 MG/DL (ref 8.3–10.1)
CHLORIDE SERPL-SCNC: 110 MMOL/L (ref 100–108)
CO2 SERPL-SCNC: 23 MMOL/L (ref 21–32)
CREAT SERPL-MCNC: 1.05 MG/DL (ref 0.6–1.3)
GFR SERPL CREATININE-BSD FRML MDRD: 71 ML/MIN/1.73SQ M
GLUCOSE SERPL-MCNC: 104 MG/DL (ref 65–140)
POTASSIUM SERPL-SCNC: 4.3 MMOL/L (ref 3.5–5.3)
SODIUM SERPL-SCNC: 139 MMOL/L (ref 136–145)

## 2018-01-16 PROCEDURE — 80048 BASIC METABOLIC PNL TOTAL CA: CPT | Performed by: INTERNAL MEDICINE

## 2018-01-16 RX ORDER — CEFUROXIME AXETIL 500 MG/1
500 TABLET ORAL EVERY 12 HOURS SCHEDULED
Qty: 14 TABLET | Refills: 0 | Status: SHIPPED | OUTPATIENT
Start: 2018-01-16 | End: 2018-01-16 | Stop reason: HOSPADM

## 2018-01-16 RX ORDER — OXYCODONE HYDROCHLORIDE AND ACETAMINOPHEN 5; 325 MG/1; MG/1
1 TABLET ORAL EVERY 6 HOURS PRN
Qty: 12 TABLET | Refills: 0 | Status: SHIPPED | OUTPATIENT
Start: 2018-01-16 | End: 2018-01-26

## 2018-01-16 RX ORDER — CEFUROXIME AXETIL 500 MG/1
500 TABLET ORAL EVERY 12 HOURS SCHEDULED
Qty: 24 TABLET | Refills: 0 | Status: SHIPPED | OUTPATIENT
Start: 2018-01-16 | End: 2018-01-28

## 2018-01-16 RX ADMIN — METOPROLOL TARTRATE 50 MG: 50 TABLET ORAL at 08:02

## 2018-01-16 RX ADMIN — SODIUM CHLORIDE AND POTASSIUM CHLORIDE 125 ML/HR: .9; .15 SOLUTION INTRAVENOUS at 00:32

## 2018-01-16 RX ADMIN — LISINOPRIL 20 MG: 20 TABLET ORAL at 08:02

## 2018-01-16 RX ADMIN — CLONIDINE HYDROCHLORIDE 0.1 MG: 0.1 TABLET ORAL at 08:02

## 2018-01-16 RX ADMIN — Medication 1000 MG: at 05:06

## 2018-01-16 RX ADMIN — OXYCODONE HYDROCHLORIDE 5 MG: 5 TABLET ORAL at 08:02

## 2018-01-16 RX ADMIN — OXYCODONE HYDROCHLORIDE 5 MG: 5 TABLET ORAL at 13:24

## 2018-01-16 NOTE — PROGRESS NOTES
Tolerating her stent quite well  No fever the past 12 hours  Cultures negative  Plan patient would like to get her stone treatment soon  We will look at the OR schedule the next week or so see if we can get this on, ureteroscopy stone laser and stent replacement

## 2018-01-16 NOTE — DISCHARGE SUMMARY
Transition of Care Discharge Summary - [facility] Internal Medicine    Patient Information: Nury Ramires 33 y o  female MRN: [id number]  Unit/Bed#: E2 -01 Encounter: [id number]    Discharging Physician / Practitioner: Jo Shepherd MD  PCP: Bryn Mondragon DO  Admission Date: 1/15/2018  Discharge Date: 01/16/18    Disposition:      Other: home    For Discharges to [facility]:   · Not Applicable to this Patient - Not Applicable to this Patient    Reason for Admission:  Right flank pain    Discharge Diagnoses:     Principal Problem (Resolved):    Hydronephrosis  Active Problems:    HTN (hypertension)    History of CHF (congestive heart failure)  Resolved Problems:    Kidney stone    Hypokalemia    Hydronephrosis with urinary obstruction due to ureteral calculus      Consultations During Hospital Stay:  · Urology    Procedures Performed:     · Cystoscopy with right ureteral stent placement    Medication Adjustments and Discharge Medications:  · Summary of Medication Adjustments made as a result of this hospitalization: none  · Medication Dosing Tapers - Please refer to Discharge Medication List for details on any medication dosing tapers (if applicable to patient)  · Medications being temporarily held (include recommended restart time): none  · Discharge Medication List: See after visit summary for reconciled discharge medications  Wound Care Recommendations:  When applicable, please see wound care section of After Visit Summary  Diet Recommendations at Discharge:  Diet -        Diet Orders            Start     Ordered    01/15/18 0456  Diet Regular; Regular House  Diet effective now     Question Answer Comment   Diet Type Regular    Regular Regular House    RD to adjust diet per protocol?  Yes        01/15/18 0456          Instructions for any Catheters / Lines Present at Discharge (including removal date, if applicable): none    Significant Findings / Test Results: · none    Incidental Findings:   · none     Test Results Pending at Discharge (will require follow up):   · none     Outpatient Tests Requested:  · none    Complications:  none    Hospital Course:     Curt Ro is a 33 y o  female patient who originally presented to the hospital on 1/15/2018 due to right flank pain  Patient has history of kidney stone 6 years ago at which point she required a stent placement  She began having right flank pain on the morning of admission and had subjective fever at home  Urology was consulted  Patient had an outpatient CT scan which revealed an 8 mm right proximal ureteral calculus  Patient was sent for cystoscopy and right ureteral stent was placed  Patient started on empiric antibiotics for her fever  She currently maintains afebrile, vitals are within normal limits, labs are within normal limits  Urine culture is contaminant  I will discharge her home on Ceftin for 12 days to complete total of 14 days as per Urology recommendation  Patient is to follow with Urology outpatient for stent removal   Patient is still complaining of pain there for will send her home on Percocet as needed  PDMP reviewed  Condition at Discharge: good     Discharge Day Visit / Exam:     Subjective:  Patient seen examined at bedside, denies any complaints  Denies any hematuria, dysuria, urinary retention  Denies any chest pain  Vitals: Blood Pressure: (!) 177/94 (01/16/18 0751)  Pulse: 70 (01/16/18 0751)  Temperature: 98 6 °F (37 °C) (01/16/18 0751)  Temp Source: Temporal (01/16/18 0751)  Respirations: 18 (01/16/18 0751)  Height: 5' 6" (167 6 cm) (01/15/18 0158)  Weight - Scale: 96 9 kg (213 lb 10 oz) (01/15/18 0158)  SpO2: 100 % (01/16/18 0751)  Exam:   Physical Exam   Constitutional: She is oriented to person, place, and time  She appears well-developed and well-nourished  HENT:   Head: Normocephalic and atraumatic     Eyes: EOM are normal  Pupils are equal, round, and reactive to light  Neck: Normal range of motion  Neck supple  Cardiovascular: Normal rate, regular rhythm and normal heart sounds  Pulmonary/Chest: Effort normal and breath sounds normal    Abdominal: Soft  Bowel sounds are normal    Musculoskeletal: Normal range of motion  Neurological: She is alert and oriented to person, place, and time  She has normal reflexes  Skin: Skin is warm and dry  Discussion with Family: yes    Goals of Care Discussions:  · Code Status at Discharge: Level 1 - Full Code  · Were there any Goals of Care Discussions during Hospitalization?: Yes  · Results of any General Goals of Care Discussions:    · POLST Completed: No   · If POLST Completed, Summary of POLST Agreement Provided Here:    · OK to Rehospitalize if Needed? Yes    Discharge instructions/Information to patient and family:   See after visit summary section titled Discharge Instructions for information provided to patient and family  Planned Readmission: no      Discharge Statement:  I spent 30 minutes discharging the patient  This time was spent on the day of discharge  I had direct contact with the patient on the day of discharge  Greater than 50% of the total time was spent examining patient, answering all patient questions, arranging and discussing plan of care with patient as well as directly providing post-discharge instructions  Additional time then spent on discharge activities      ** Please Note: This note has been constructed using a voice recognition system **

## 2018-01-16 NOTE — TREATMENT PLAN
[facility]   [facility], [address] [place] [address]  (t) [phone] (f) [phone]  18      RE:  Leigh Ann Friend 33 y o  female  : 1985    To whom it may concern,     Leigh Ann Friend was hospitalized under my care from 1/15/2018 to 18  Please excuse from all appointments and/or work and school related activities during this interim  Patient may return to work at previous activity level on/or after 18  Feel free to contact me with any questions if necessary  Sincerely,                 JAMILAH [facility]       Diplomate, [employer] Board of Internal Medicine

## 2018-01-19 ENCOUNTER — ALLSCRIPTS OFFICE VISIT (OUTPATIENT)
Dept: OTHER | Facility: OTHER | Age: 33
End: 2018-01-19

## 2018-01-19 DIAGNOSIS — R31.0 GROSS HEMATURIA: ICD-10-CM

## 2018-01-19 NOTE — H&P
HISTORY AND PHYSICAL  ? ? Patient Name: Adeel Morfin  Patient MRN: [id number]  Attending Provider: Roma Orozco MD  Service: Urology  Chief Complaint   RIGHT URETERAL STONE   HPI   Adeel Morfin is a 33 y o  female with  recent stent placement for 5 mm right ureteral stone  I plan  cysto, right ureteroscopy, laser fragmentation, stent replacement  Potential risks and complications discussed, and informed consent was given by the patient  Medications  Meds/Allergies     No current facility-administered medications for this encounter  Cannot display prior to admission medications because the patient has not been admitted in this contact  No current facility-administered medications for this encounter       Current Outpatient Prescriptions:     cefuroxime (CEFTIN) 500 mg tablet, Take 1 tablet by mouth every 12 (twelve) hours for 12 days, Disp: 24 tablet, Rfl: 0    cloNIDine (CATAPRES) 0 1 mg tablet, Take 0 1 mg by mouth 2 (two) times a day, Disp: , Rfl:     furosemide (LASIX) 20 mg tablet, Take by mouth as needed (Pt not sure of the dose amount she takes), Disp: , Rfl:     lisinopril (ZESTRIL) 20 mg tablet, Take 20 mg by mouth daily, Disp: , Rfl:     metoprolol tartrate (LOPRESSOR) 50 mg tablet, Take 50 mg by mouth 2 (two) times a day, Disp: , Rfl:     oxyCODONE-acetaminophen (PERCOCET) 5-325 mg per tablet, Take 1 tablet by mouth every 6 (six) hours as needed for moderate pain for up to 10 days Max Daily Amount: 4 tablets, Disp: 12 tablet, Rfl: 0  Review of Systems  10 point review of systems negative except as noted in HPI  Allergies  No Known Allergies  PMH  Past Medical History:   Diagnosis Date    [facility]congestive heart failure) ([facility] [address] )     Hypertension      Past surgical history  Past Surgical History:   Procedure Laterality Date    HI CYSTOURETHROSCOPY,URETER CATHETER Right 1/15/2018    Procedure: CYSTOSCOPY RETROGRADE PYELOGRAM WITH INSERTION STENT URETERAL;  Surgeon: Elena Rios MD;  Location: [facility];  Service: Urology     Social history  Social History   Substance Use Topics    Smoking status: Former Smoker     Packs/day: 0 25    Smokeless tobacco: Former User      Comment: "soc ial smoker"    Alcohol use Yes      Comment: socially     ?   Physical Exam  General appearance: alert and oriented, in no acute distress  Head: Normocephalic, without obvious abnormality, atraumatic  Neck: no adenopathy, no carotid bruit, no JVD, supple, symmetrical, trachea midline and thyroid not enlarged, symmetric, no tenderness/mass/nodules  Lungs: clear to auscultation bilaterally  Heart: regular rate and rhythm, S1, S2 normal, no murmur, click, rub or gallop  Abdomen: soft, non-tender; bowel sounds normal; no masses,  no organomegaly  Extremities: extremities normal, warm and well-perfused; no cyanosis, clubbing, or edema  Pulses: 2+ and symmetric  Neurologic: Grossly normal  Mita Bethea MD

## 2018-01-19 NOTE — CASE MANAGEMENT
Notification of Discharge  This is a Notification of Discharge from our facility [address]  Please be advised that this patient has been discharge from our facility  Below you will find the admission and discharge date and time including the patients disposition  PRESENTATION DATE: 1/15/2018  1:55 AM  IP ADMISSION DATE: 1/15/18 0155  DISCHARGE DATE: 1/16/2018  2:31 PM  DISPOSITION: Home/Self Care    [address] [facility] in the [facility] by William Roach for 2017  Network Utilization Review Department  Phone: [phone]; Fax [phone]  ATTENTION: The Network Utilization Review Department is now centralized for our 7 Facilities  Make a note that we have a new phone and fax numbers for our Department  Please call with any questions or concerns to [phone] and carefully follow the prompts so that you are directed to the right person  All voicemails are confidential  Fax any determinations, approvals, denials, and requests for initial or continue stay review clinical to [phone]  Due to HIGH CALL volume, it would be easier if you could please send faxed requests to expedite your requests and in part, help us provide discharge notifications faster

## 2018-01-22 ENCOUNTER — ANESTHESIA EVENT (OUTPATIENT)
Dept: PERIOP | Facility: HOSPITAL | Age: 33
End: 2018-01-22
Payer: COMMERCIAL

## 2018-01-22 VITALS
HEART RATE: 88 BPM | TEMPERATURE: 98.1 F | BODY MASS INDEX: 38.76 KG/M2 | WEIGHT: 227 LBS | HEIGHT: 64 IN | RESPIRATION RATE: 18 BRPM | SYSTOLIC BLOOD PRESSURE: 136 MMHG | DIASTOLIC BLOOD PRESSURE: 82 MMHG

## 2018-01-22 VITALS
BODY MASS INDEX: 37.56 KG/M2 | HEIGHT: 64 IN | TEMPERATURE: 98 F | DIASTOLIC BLOOD PRESSURE: 104 MMHG | WEIGHT: 220 LBS | SYSTOLIC BLOOD PRESSURE: 152 MMHG | RESPIRATION RATE: 20 BRPM | HEART RATE: 96 BPM

## 2018-01-22 VITALS
RESPIRATION RATE: 18 BRPM | SYSTOLIC BLOOD PRESSURE: 132 MMHG | TEMPERATURE: 98.9 F | WEIGHT: 218 LBS | HEIGHT: 64 IN | OXYGEN SATURATION: 98 % | BODY MASS INDEX: 37.22 KG/M2 | HEART RATE: 76 BPM | DIASTOLIC BLOOD PRESSURE: 80 MMHG

## 2018-01-22 NOTE — H&P
HISTORY AND PHYSICAL  ? ? Patient Name: Tawanna Grace  Patient MRN: [id number]  Attending Provider: Nemo Rios MD  Service: Urology  Chief Complaint   right ureteral calculus  HPI   Tawanna Grace is a 33 y o  female with  stent placed last week for symptomatic right ureteral stone, with hydro, suspicion of infection due to fever of 103 degrees  Culture was negative  I plan  cystoscopy, right ureteroscopy, laser fragmentation stone, stent replacement  Potential risks and complications discussed, and informed consent was given by the patient  Medications  Meds/Allergies     No current facility-administered medications for this encounter  Cannot display prior to admission medications because the patient has not been admitted in this contact  No current facility-administered medications for this encounter       Current Outpatient Prescriptions:     cefuroxime (CEFTIN) 500 mg tablet, Take 1 tablet by mouth every 12 (twelve) hours for 12 days, Disp: 24 tablet, Rfl: 0    cloNIDine (CATAPRES) 0 1 mg tablet, Take 0 1 mg by mouth 2 (two) times a day, Disp: , Rfl:     furosemide (LASIX) 20 mg tablet, Take by mouth as needed (Pt not sure of the dose amount she takes), Disp: , Rfl:     lisinopril (ZESTRIL) 20 mg tablet, Take 20 mg by mouth daily, Disp: , Rfl:     metoprolol tartrate (LOPRESSOR) 50 mg tablet, Take 50 mg by mouth 2 (two) times a day, Disp: , Rfl:     oxyCODONE-acetaminophen (PERCOCET) 5-325 mg per tablet, Take 1 tablet by mouth every 6 (six) hours as needed for moderate pain for up to 10 days Max Daily Amount: 4 tablets, Disp: 12 tablet, Rfl: 0  Review of Systems  10 point review of systems negative except as noted in HPI  Allergies  No Known Allergies  PMH  Past Medical History:   Diagnosis Date    [facility]congestive heart failure) ([facility] [address] )     Hypertension      Past surgical history  Past Surgical History:   Procedure Laterality Date    [place] CYSTOURETHROSCOPY,URETER CATHETER Right 1/15/2018    Procedure: CYSTOSCOPY RETROGRADE PYELOGRAM WITH INSERTION STENT URETERAL;  Surgeon: Rory Mar MD;  Location: [facility];  Service: Urology     Social history  Social History   Substance Use Topics    Smoking status: Former Smoker     Packs/day: 0 25    Smokeless tobacco: Former User      Comment: "soc ial smoker"    Alcohol use Yes      Comment: socially     ? Physical Exam  General appearance: alert and oriented, in no acute distress  Head: Normocephalic, without obvious abnormality, atraumatic  Neck: no adenopathy, no carotid bruit, no JVD, supple, symmetrical, trachea midline and thyroid not enlarged, symmetric, no tenderness/mass/nodules  Back: symmetric, no curvature  ROM normal  No CVA tenderness    Lungs: clear to auscultation bilaterally  Heart: regular rate and rhythm, S1, S2 normal, no murmur, click, rub or gallop  Abdomen: soft, non-tender; bowel sounds normal; no masses,  no organomegaly  Extremities: extremities normal, warm and well-perfused; no cyanosis, clubbing, or edema  Pulses: 2+ and symmetric  Lymph nodes: Cervical, supraclavicular, and axillary nodes normal   Neurologic: Grossly normal  Adrain Phalen, MD

## 2018-01-23 ENCOUNTER — ANESTHESIA (OUTPATIENT)
Dept: PERIOP | Facility: HOSPITAL | Age: 33
End: 2018-01-23
Payer: COMMERCIAL

## 2018-01-23 ENCOUNTER — HOSPITAL ENCOUNTER (OUTPATIENT)
Dept: RADIOLOGY | Facility: HOSPITAL | Age: 33
Setting detail: OUTPATIENT SURGERY
Discharge: HOME | End: 2018-01-23
Payer: COMMERCIAL

## 2018-01-23 ENCOUNTER — GENERIC CONVERSION - ENCOUNTER (OUTPATIENT)
Dept: PERIOP | Facility: HOSPITAL | Age: 33
End: 2018-01-23

## 2018-01-23 ENCOUNTER — HOSPITAL ENCOUNTER (OUTPATIENT)
Facility: HOSPITAL | Age: 33
Setting detail: OUTPATIENT SURGERY
Discharge: HOME | End: 2018-01-23
Attending: UROLOGY | Admitting: UROLOGY
Payer: COMMERCIAL

## 2018-01-23 VITALS
BODY MASS INDEX: 34.23 KG/M2 | OXYGEN SATURATION: 98 % | HEART RATE: 73 BPM | RESPIRATION RATE: 16 BRPM | HEIGHT: 66 IN | SYSTOLIC BLOOD PRESSURE: 152 MMHG | TEMPERATURE: 97.6 F | DIASTOLIC BLOOD PRESSURE: 78 MMHG | WEIGHT: 213 LBS

## 2018-01-23 VITALS
TEMPERATURE: 98.2 F | DIASTOLIC BLOOD PRESSURE: 86 MMHG | BODY MASS INDEX: 36.37 KG/M2 | HEART RATE: 80 BPM | HEIGHT: 64 IN | SYSTOLIC BLOOD PRESSURE: 146 MMHG | WEIGHT: 213 LBS | RESPIRATION RATE: 16 BRPM

## 2018-01-23 DIAGNOSIS — N20.1 CALCULUS OF URETER: ICD-10-CM

## 2018-01-23 LAB — EXT PREGNANCY TEST URINE: NEGATIVE

## 2018-01-23 PROCEDURE — 87086 URINE CULTURE/COLONY COUNT: CPT | Performed by: UROLOGY

## 2018-01-23 PROCEDURE — C2625 STENT, NON-COR, TEM W/DEL SY: HCPCS | Performed by: UROLOGY

## 2018-01-23 PROCEDURE — 82360 CALCULUS ASSAY QUANT: CPT | Performed by: UROLOGY

## 2018-01-23 PROCEDURE — 81025 URINE PREGNANCY TEST: CPT | Performed by: ANESTHESIOLOGY

## 2018-01-23 PROCEDURE — C1726 CATH, BAL DIL, NON-VASCULAR: HCPCS | Performed by: UROLOGY

## 2018-01-23 PROCEDURE — 76000 FLUOROSCOPY <1 HR PHYS/QHP: CPT

## 2018-01-23 PROCEDURE — C1769 GUIDE WIRE: HCPCS | Performed by: UROLOGY

## 2018-01-23 DEVICE — KWART RETRO-INJECT URETERAL STENT SET
Type: IMPLANTABLE DEVICE | Site: URETER | Status: FUNCTIONAL
Brand: KWART

## 2018-01-23 RX ORDER — FENTANYL CITRATE/PF 50 MCG/ML
50 SYRINGE (ML) INJECTION
Status: COMPLETED | OUTPATIENT
Start: 2018-01-23 | End: 2018-01-23

## 2018-01-23 RX ORDER — PROPOFOL 10 MG/ML
INJECTION, EMULSION INTRAVENOUS AS NEEDED
Status: DISCONTINUED | OUTPATIENT
Start: 2018-01-23 | End: 2018-01-23 | Stop reason: SURG

## 2018-01-23 RX ORDER — INDOMETHACIN 50 MG/1
50 CAPSULE ORAL
COMMUNITY
End: 2018-08-22 | Stop reason: ALTCHOICE

## 2018-01-23 RX ORDER — MORPHINE SULFATE 4 MG/ML
4 INJECTION, SOLUTION INTRAMUSCULAR; INTRAVENOUS EVERY 2 HOUR PRN
Status: DISCONTINUED | OUTPATIENT
Start: 2018-01-23 | End: 2018-01-23 | Stop reason: HOSPADM

## 2018-01-23 RX ORDER — DEXAMETHASONE SODIUM PHOSPHATE 4 MG/ML
INJECTION, SOLUTION INTRA-ARTICULAR; INTRALESIONAL; INTRAMUSCULAR; INTRAVENOUS; SOFT TISSUE AS NEEDED
Status: DISCONTINUED | OUTPATIENT
Start: 2018-01-23 | End: 2018-01-23 | Stop reason: SURG

## 2018-01-23 RX ORDER — ONDANSETRON 2 MG/ML
INJECTION INTRAMUSCULAR; INTRAVENOUS AS NEEDED
Status: DISCONTINUED | OUTPATIENT
Start: 2018-01-23 | End: 2018-01-23 | Stop reason: SURG

## 2018-01-23 RX ORDER — ONDANSETRON 2 MG/ML
4 INJECTION INTRAMUSCULAR; INTRAVENOUS ONCE AS NEEDED
Status: DISCONTINUED | OUTPATIENT
Start: 2018-01-23 | End: 2018-01-23 | Stop reason: HOSPADM

## 2018-01-23 RX ORDER — SODIUM CHLORIDE 9 MG/ML
125 INJECTION, SOLUTION INTRAVENOUS CONTINUOUS
Status: DISCONTINUED | OUTPATIENT
Start: 2018-01-23 | End: 2018-01-23 | Stop reason: HOSPADM

## 2018-01-23 RX ORDER — OXYCODONE HYDROCHLORIDE AND ACETAMINOPHEN 5; 325 MG/1; MG/1
2 TABLET ORAL EVERY 4 HOURS PRN
Status: DISCONTINUED | OUTPATIENT
Start: 2018-01-23 | End: 2018-01-23 | Stop reason: HOSPADM

## 2018-01-23 RX ORDER — FENTANYL CITRATE 50 UG/ML
INJECTION, SOLUTION INTRAMUSCULAR; INTRAVENOUS AS NEEDED
Status: DISCONTINUED | OUTPATIENT
Start: 2018-01-23 | End: 2018-01-23 | Stop reason: SURG

## 2018-01-23 RX ORDER — FENTANYL CITRATE/PF 50 MCG/ML
25 SYRINGE (ML) INJECTION
Status: DISCONTINUED | OUTPATIENT
Start: 2018-01-23 | End: 2018-01-23 | Stop reason: HOSPADM

## 2018-01-23 RX ORDER — OXYCODONE HYDROCHLORIDE AND ACETAMINOPHEN 5; 325 MG/1; MG/1
1 TABLET ORAL EVERY 4 HOURS PRN
Status: DISCONTINUED | OUTPATIENT
Start: 2018-01-23 | End: 2018-01-23 | Stop reason: HOSPADM

## 2018-01-23 RX ORDER — ACETAMINOPHEN 325 MG/1
650 TABLET ORAL EVERY 6 HOURS PRN
Status: DISCONTINUED | OUTPATIENT
Start: 2018-01-23 | End: 2018-01-23 | Stop reason: HOSPADM

## 2018-01-23 RX ORDER — MIDAZOLAM HYDROCHLORIDE 1 MG/ML
INJECTION INTRAMUSCULAR; INTRAVENOUS AS NEEDED
Status: DISCONTINUED | OUTPATIENT
Start: 2018-01-23 | End: 2018-01-23 | Stop reason: SURG

## 2018-01-23 RX ADMIN — FENTANYL CITRATE 50 MCG: 50 INJECTION, SOLUTION INTRAMUSCULAR; INTRAVENOUS at 15:08

## 2018-01-23 RX ADMIN — SODIUM CHLORIDE 125 ML/HR: 0.9 INJECTION, SOLUTION INTRAVENOUS at 15:17

## 2018-01-23 RX ADMIN — HYDROMORPHONE HYDROCHLORIDE 0.5 MG: 1 INJECTION, SOLUTION INTRAMUSCULAR; INTRAVENOUS; SUBCUTANEOUS at 15:39

## 2018-01-23 RX ADMIN — ONDANSETRON HYDROCHLORIDE 4 MG: 2 INJECTION, SOLUTION INTRAVENOUS at 14:44

## 2018-01-23 RX ADMIN — OXYCODONE HYDROCHLORIDE AND ACETAMINOPHEN 1 TABLET: 5; 325 TABLET ORAL at 16:44

## 2018-01-23 RX ADMIN — LIDOCAINE HYDROCHLORIDE 100 MG: 20 INJECTION, SOLUTION INTRAVENOUS at 14:18

## 2018-01-23 RX ADMIN — FENTANYL CITRATE 50 MCG: 50 INJECTION INTRAMUSCULAR; INTRAVENOUS at 14:27

## 2018-01-23 RX ADMIN — CEFAZOLIN SODIUM 2000 MG: 2 SOLUTION INTRAVENOUS at 14:12

## 2018-01-23 RX ADMIN — PROPOFOL 200 MG: 10 INJECTION, EMULSION INTRAVENOUS at 14:18

## 2018-01-23 RX ADMIN — MIDAZOLAM HYDROCHLORIDE 2 MG: 1 INJECTION, SOLUTION INTRAMUSCULAR; INTRAVENOUS at 14:11

## 2018-01-23 RX ADMIN — DEXAMETHASONE SODIUM PHOSPHATE 4 MG: 4 INJECTION, SOLUTION INTRAMUSCULAR; INTRAVENOUS at 14:35

## 2018-01-23 RX ADMIN — FENTANYL CITRATE 50 MCG: 50 INJECTION, SOLUTION INTRAMUSCULAR; INTRAVENOUS at 15:14

## 2018-01-23 RX ADMIN — SODIUM CHLORIDE 125 ML/HR: 0.9 INJECTION, SOLUTION INTRAVENOUS at 11:58

## 2018-01-23 NOTE — ANESTHESIA PREPROCEDURE EVALUATION
Review of Systems/Medical History  Patient summary reviewed  Chart reviewed  No history of anesthetic complications     Cardiovascular  Hypertension on > 1 medication, CHF (was secondary to acute HTN exac  nothing recurrent) ,    Pulmonary  Smoker cigarette smoker and ex-smoker  ,        GI/Hepatic  Negative GI/hepatic ROS          Kidney stones,        Endo/Other    Obesity    GYN  Negative gynecology ROS          Hematology  Negative hematology ROS      Musculoskeletal  Negative musculoskeletal ROS        Neurology  Negative neurology ROS      Psychology   Negative psychology ROS              Physical Exam    Airway    Mallampati score: II  TM Distance: >3 FB  Neck ROM: full     Dental   Comment: Poor  Broken in back,     Cardiovascular  Cardiovascular exam normal    Pulmonary  Pulmonary exam normal     Other Findings        Anesthesia Plan  ASA Score- 2     Anesthesia Type- general with ASA Monitors  Additional Monitors:   Airway Plan:         Plan Factors-    Induction- intravenous  Postoperative Plan-     Informed Consent- Anesthetic plan and risks discussed with patient

## 2018-01-23 NOTE — MISCELLANEOUS
Message  Return to work or school:   Misty Otto is under my professional care  She was seen in my office on 12/15/2017   She is able to return to work on  12/18/2017      Please excuse her for 12/12/17, 12/13/17, 12/14/17 and 12/15/17          Signatures   Electronically signed by : Loyce Libman; Dec 15 2017  3:28PM EST                       (Author)

## 2018-01-23 NOTE — PRE-PROCEDURE INSTRUCTIONS
Pt instructed to take po ceftin, clonidine, metoprolol, and indomethacin in am 1/23/18 per anesthesia

## 2018-01-23 NOTE — OP NOTE
OPERATIVE REPORT  PATIENT NAME: Aminata Payne    :  1985  MRN: [id number]  Pt Location: [facility] OR ROOM 03    SURGERY DATE: 2018    Surgeon(s) and Role:     * Thierno Farmer MD - Primary    Preop Diagnosis:  Calculus of ureter [N20 1]    Post-Op Diagnosis Codes:     * Calculus of ureter [N20 1]    Procedure(s) (LRB):  CYSTOSCOPY URETEROSCOPY, RETROGRADE PYELOGRAM AND INSERTION STENT URETERAL, RIGHT STONE EXTRACTION (Right)  Stent exchange    Specimen(s):  ID Type Source Tests Collected by Time Destination   A : RIGHT URETERAL CALCULUS Calculus Ureter, Right STONE ANALYSIS Thierno Farmer MD 2018 1443    B : URINE CULTURE Urine Urine, Clean Catch URINE CULTURE Thierno Farmer MD 2018 1448        Estimated Blood Loss:   Minimal    Drains:       Anesthesia Type:   General    Operative Indications:  Calculus of ureter [N20 1]      Operative Findings:  4 mm stone upper right ureter    Complications:   None    Procedure and Technique:    PLAN FOR STENT: REMOVE BY NURSE THIS COMING     The patient was brought into the OR, properly identified and positioned on the table  General anesthesia was induced, and she was prepped using betadine solution and draped in lithotomy position  The 22F scope was introduced in the urethra, which showed no strictures  The bladder was inspected and had no lesions, stones, or tumors  The stent in the right ureteral orifice was withdrawn and a wire placed up the ureter  The rigid ureteral  scope was introduced and carefully advanced up to the stone, which was in the upper portion of the ureter  A basket was placed thru the scope and carefully engaged the stone  The scope was withdrawn, bringing the stone out, with no trauma to the ureter  The scope was removed from the ureter, and the cystoscope was used to place a 6F Kwart stent in the ureter, with the upper coils in the renal pelvis, and the distal coil in the bladder   Retrograde pyelogram on that side confirmed good position and no extravasation of contrast      The patient was awaken from anesthesia and taken to the PACU in good condition       I was present for the entire procedure    Patient Disposition:  PACU     SIGNATURE: Gina Howe MD  DATE: January 23, 2018  TIME: 3:06 PM

## 2018-01-23 NOTE — MISCELLANEOUS
Message  Return to work or school:   Eileen Stafford is under my professional care   She was seen in my office on 1-19-18   She is able to return to work on  1-25-18            Signatures   Electronically signed by : JAMILAH Cheng ; Jan 19 2018  9:06AM EST

## 2018-01-23 NOTE — MISCELLANEOUS
Assessment    1  Nephrolithiasis (592 0) (N20 0)   2  S/P cystoscopy (V45 89) (Z98 890)   3  History of ureter stent (V45 89) (Z96 0)   4  Gross hematuria (599 71) (R31 0)   5  Hydronephrosis of right kidney (591) (N13 30)    Plan  Gross hematuria    · (1) CBC/PLT/DIFF; Status:Active; Requested VI:[id number]; Perform:[facility]; TWN:[id number];EVZODNT; For:Gross hematuria; Ordered By:Jordan Figueroa;   · (1) COMPREHENSIVE METABOLIC PANEL; Status:Active; Requested GFJ:[id number]; Perform:[facility]; GGQ:[id number];OCALQRM; For:Gross hematuria; Ordered By:Ruperto Figueroa Rued;  Gross hematuria, History of ureter stent, Hydronephrosis of right kidney, Nephrolithiasis,  S/P cystoscopy    · Indomethacin 50 MG Oral Capsule; TAKE 1 CAPSULE 3 TIMES DAILY WITH FOOD  AS NEEDED   Rx By: Tracey Shook; Dispense: 10 Days ; #:30 Capsule; Refill: 0; For: Gross hematuria, History of ureter stent, Hydronephrosis of right kidney, Nephrolithiasis, S/P cystoscopy; NICOL = N; Verified Transmission to [facility]; Last Updated By: System, SureScript[employer]; 1/19/2018 8:57:28 AM   · TraMADol HCl - 50 MG Oral Tablet; TAKE 1 TO 2 TABLETS EVERY 6 HOURS AS  NEEDED FOR PAIN   Rx By: Tracey Shook; Dispense: 4 Days ; #:30 Tablet; Refill: 0; For: Gross hematuria, History of ureter stent, Hydronephrosis of right kidney, Nephrolithiasis, S/P cystoscopy; NICOL = N; Print Rx  Hypertension    · CloNIDine HCl - 0 1 MG Oral Tablet; TAKE 1 TABLET TWICE DAILY   Rx By: Tracey Shook; Dispense: 90 Days ; #:180 Tablet; Refill: 3; For: Hypertension; NICOL = N; Sent To: [facility]; Last Updated By: Saurav Engle; 1/19/2018 8:33:05 AM    Discussion/Summary  Discussion Summary:   Stable will check labs  Will add indomethacin and ultram for pain  Keep hydrated  Finish abx  Keep f/u with urology  Keep scheduled routine     Medication SE Review and Pt Understands Tx: Possible side effects of new medications were reviewed with the patient/guardian today  The treatment plan was reviewed with the patient/guardian  The patient/guardian understands and agrees with the treatment plan      Chief Complaint  Chief Complaint Free Text Note Form: [address] [facility]  [place] Pt still c/o RLQ pain, tenderness, and slight vaginal bleeding post urination  History of Present Illness  TCM Communication [facility]: The patient is being contacted for follow-up after hospitalization and 01/19/2018  Hospital records were not available  She was hospitalized at and [facility]  The dates of hospitalization:, date of admission: 01/15/2018, date of discharge: 01/16/2018  Diagnosis: KIDNEY STONE  She was discharged to home  She scheduled a follow up appointment  Communication performed and completed by  01/18/2018   HPI: CHRIS presents for recent admission for acute onset right flank pain and fever  Found to have right hydronephrosis due to an obstructing stone  Underwent stent placement  Doing ok,but having a lot of pain and gross hematuria  No n/v, fever, or chills  Still on an abx  Had renal stone in the distant past  Has appt for lithotripsy next week  Active Problems    1  Abnormal EKG (794 31) (R94 31)   2  Atypical chest pain (786 59) (R07 89)   3  Congestive heart failure (428 0) (I50 9)   4  Elevated brain natriuretic peptide (BNP) level (790 99) (R79 89)   5  Fatigue (780 79) (R53 83)   6  Fever, unspecified fever cause (780 60) (R50 9)   7  Generalized anxiety disorder (300 02) (F41 1)   8  Headache (784 0) (R51)   9  Hypertension (401 9) (I10)   10  Infrapatellar bursitis of left knee (726 69) (M70 52)   11  Nephrolithiasis (592 0) (N20 0)   12  Noncompliance with medications (V15 81) (Z91 14)   13  Obesity (278 00) (E66 9)   14  Orthopnea (786 02) (R06 01)   15  Pain of left forearm (729 5) (M79 632)   16  Paroxysmal nocturnal dyspnea (786 09) (R06 00)   17  Pes anserinus bursitis of left knee (726 61) (M70 52)   18   Pre-eclampsia, delivered (642 41) (O14 94)   19  Prepatellar bursitis of left knee (726 65) (M70 42)   20  Proteinuria (791 0) (R80 9)   21  Shortness of breath (786 05) (R06 02)   22  Strain of left forearm, initial encounter (841 9) (S56 912A)   23  Strain of wrist, left (842 00) (S66 912A)   24  Uncontrolled hypertension (401 9) (I10)   25  Urinary tract infection (599 0) (N39 0)    Past Medical History    1  History of Abdominal pain in female (789 00) (R10 9)   2  History of Acute frontal sinusitis (461 1) (J01 10)    Surgical History    1  History of  Section   2  History of Kidney Surgery   3  History of Tubal Ligation  Surgical History Reviewed: The surgical history was reviewed and updated today  Family History  Mother    1  Family history of Hypertension (V17 49)   2  Family history of Thyroid Cancer  Father    3  Family history of Hypertension (V17 49)  Family History Reviewed: The family history was reviewed and updated today  Social History    · Being A Social Drinker   · Former smoker (Q96 95) (T19 425)   · Marital History - Single   · Tobacco use (305 1) (Z72 0)  Social History Reviewed: The social history was reviewed and updated today  The social history was reviewed and is unchanged  Current Meds   1  Cefuroxime Axetil 500 MG Oral Tablet; TAKE 1 TABLET EVERY 12 HOURS DAILY; Therapy: 36GCB7615 to (Evaluate:2018) Recorded   2  CloNIDine HCl - 0 1 MG Oral Tablet; TAKE 1 TABLET TWICE DAILY  Requested for:   10WLC0128; Last Rx:2017 Ordered   3  Furosemide 20 MG Oral Tablet; TAKE ONE TABLET BY MOUTH ONCE DAILY; Therapy: 20BIB9903 to (Josee Salas)  Requested for: 70Url5471; Last   Rx:33Nfx2890; Status: [address] [address] [facility] - Awaiting Verification   Ordered   4  Lisinopril 20 MG Oral Tablet; TAKE 1 TABLET DAILY; Therapy: 65OAG8745 to (Pallavi Farley)  Requested for: 20FLS8200; Last   Rx:89Rvo1665 Ordered   5   Metoprolol Tartrate 50 MG Oral Tablet; TAKE 1 TABLET TWICE DAILY; Therapy: 68Meq3508 to (Evaluate:71Mly9379)  Requested for: 12Fiq5492; Last   Rx:86Ady0326 Ordered   6  Naproxen 500 MG Oral Tablet; take 1 tablet by mouth twice a day; Therapy: 12JOJ5181 to (Evaluate:08Nov2017)  Requested for: 09WCM5062; Last   Rx:74Bdv9488 Ordered   7  OxyCODONE HCl - 5 MG Oral Tablet; Therapy: 92GKL2215 to Recorded   8  Potassium Chloride Sabrina ER 10 MEQ Oral Tablet Extended Release; take 1 tablet by   mouth once daily; Therapy: 34KRY8024 to (Winston Jeff)  Requested for: 41Tjr7003; Last   Rx:75Mte6389 Ordered  Medication List Reviewed: The medication list was reviewed and updated today  Allergies    1  No Known Drug Allergies    Vitals  Signs   Recorded: 37ELZ5005 08:33AM   Temperature: 98 2 F  Heart Rate: 80  Respiration: 16  Systolic: 198  Diastolic: 86  Height: 5 ft 4 in  Weight: 213 lb   BMI Calculated: 36 56  BSA Calculated: 2 01    Physical Exam    Constitutional   General appearance: No acute distress, well appearing and well nourished  Eyes   Conjunctiva and lids: No swelling, erythema or discharge  Pupils and irises: Equal, round and reactive to light  Ears, Nose, Mouth, and Throat   Oropharynx: Normal with no erythema, edema, exudate or lesions  Pulmonary   Respiratory effort: No increased work of breathing or signs of respiratory distress  Auscultation of lungs: Clear to auscultation  Cardiovascular   Auscultation of heart: Normal rate and rhythm, normal S1 and S2, without murmurs  Examination of extremities for edema and/or varicosities: Normal     Abdomen   Abdomen: Non-tender, no masses  Lymphatic   Palpation of lymph nodes in neck: No lymphadenopathy  Musculoskeletal   Gait and station: Normal     Psychiatric   Orientation to person, place, and time: Normal     Mood and affect: Normal          Future Appointments    Date/Time Provider Specialty Site   01/23/2018 08:15 AM JAMILAH Kaur   Internal Medicine [address] [facility]   01/23/2018 01:00 PM Keisha Veras MD Urology [address] [facility]   Electronically signed by : JAMILAH Lucero ; Jan 19 2018  9:03AM EST                       (Author)

## 2018-01-23 NOTE — DISCHARGE INSTRUCTIONS
Cystoscopy   WHAT YOU NEED TO KNOW:   A cystoscopy is a procedure to look inside of your urethra and bladder using a cystoscope  A cystoscope is a small tube with a light and magnifying camera on the end  The procedure is used to diagnose and treat conditions of the bladder, urethra, and prostate  The procedure is also done to remove stones or blood clots from the urethra or bladder  Your healthcare provider may do other tests, such as ureteroscopy, during a cystoscopy  DISCHARGE INSTRUCTIONS:   Call 911 if:   · You suddenly have chest pain or trouble breathing  Seek care immediately if:   · Your urine turns from pink to red, or you have clots in your urine  · You cannot urinate and your bladder feels full  · Your pain or burning becomes worse or lasts longer than 2 days  Contact your healthcare provider or urologist if:   · Your urine stays pink for longer than 3 days  · You urinate less than normal, or still feel like you have to urinate after you use the bathroom  · Your skin is itchy, swollen, or has a new rash  · You have a fever and chills  · You have questions or concerns about your condition or care  Medicines: You may  be given any of the following:  · Antibiotics  help treat or prevent a bacterial infection  · Acetaminophen  decreases pain and fever  It is available without a doctor's order  Ask how much to take and how often to take it  Follow directions  Read the labels of all other medicines you are using to see if they also contain acetaminophen, or ask your doctor or pharmacist  Acetaminophen can cause liver damage if not taken correctly  Do not use more than 4 grams (4,000 milligrams) total of acetaminophen in one day  · Take your medicine as directed  Contact your healthcare provider if you think your medicine is not helping or if you have side effects  Tell him or her if you are allergic to any medicine   Keep a list of the medicines, vitamins, and herbs you take  Include the amounts, and when and why you take them  Bring the list or the pill bottles to follow-up visits  Carry your medicine list with you in case of an emergency  Follow up with your healthcare provider as directed: You may need to have another cystoscopy  Write down your questions so you remember to ask them during your visits  Self-care:   · Drink at least 3 to 4 glasses of water daily for 2 days after your procedure  Do not drink acidic juices such as orange juice and lemonade  Drink water to help prevent blood clots from forming  It can also help decrease the amount of acid in your urine  Acid in your urine may increase the burning feeling when you urinate  · Sit in a warm tub of water  Warm water may relieve pain and bladder spasms  · Do not have sex  until your healthcare provider tells you it is okay  Sex may increase your risk for a urinary tract infection  © 2017 [address] [employer] Information is for End User's use only and may not be sold, redistributed or otherwise used for commercial purposes  All illustrations and images included in CareNotes® are the copyrighted property of [employer]  or [address]  The above information is an  only  It is not intended as medical advice for individual conditions or treatments  Talk to your doctor, nurse or pharmacist before following any medical regimen to see if it is safe and effective for you  Ureteral Stent Placement   WHAT YOU NEED TO KNOW:   Ureteral stent placement is a procedure to open a blocked or narrow ureter  The ureter is the tube that carries urine from your kidney into your bladder  A stent is a thin hollow plastic tube used to hold your ureter open and allow urine to flow  The stent may stay in for several weeks  DISCHARGE INSTRUCTIONS:   Medicines:   · Pain medicine  may be given to take away or decrease pain   Do not wait until the pain is severe before you take your medicine  · Antibiotics  help prevent infections  Your healthcare provider may prescribe these for you while your stent remains in  · Take your medicine as directed  Contact your healthcare provider if you think your medicine is not helping or if you have side effects  Tell him or her if you are allergic to any medicine  Keep a list of the medicines, vitamins, and herbs you take  Include the amounts, and when and why you take them  Bring the list or the pill bottles to follow-up visits  Carry your medicine list with you in case of an emergency  Follow up with your urologist as directed: You will need regular follow-up visits with your urologist as long as the stent remains in  He will check to make sure the stent is working properly  He may do urine cultures to check for infection  Write down your questions so you remember to ask them during your visits  Self-care:   · Drink liquids  as directed  Ask your healthcare provider how much liquid to drink each day and which liquids are best for you  Fluids such as cranberry or apple juice may be especially helpful to prevent urinary infections  · Return to normal activities  the day after your stent placement or as directed by your healthcare provider  · You may take a shower  the day after your stent placement if your healthcare provider says it is okay  Contact your healthcare provider or urologist if:   · You have a fever or chills  · You feel like you need to urinate often  · You have pain when you urinate or pain around your bladder or kidney  · You see blood in your urine or it looks cloudy  · You have questions or concerns about your condition or care  Seek care immediately or call 911 if:   · You urinate little or not at all  · You have severe pain in your abdomen  © 2017 [employer] Information is for End User's use only and may not be sold, redistributed or otherwise used for commercial purposes   All illustrations and images included in CareNotes® are the copyrighted property of [employer]  or [address]  The above information is an  only  It is not intended as medical advice for individual conditions or treatments  Talk to your doctor, nurse or pharmacist before following any medical regimen to see if it is safe and effective for you

## 2018-01-23 NOTE — DISCHARGE SUMMARY
Discharge Summary - Guerita Otto 33 y o  female MRN: [id number]    Admission Date: 1/23/2018     Admitting Diagnosis: Calculus of ureter [N20 1]    Procedures Performed:  Right ureteroscopy stone extraction stent exchange    Patient underwent planned outpt surgery and recovered without complication  Discharged in good condition  Medications were prescribed  Pt knows to call the office for followup in three days

## 2018-01-23 NOTE — DISCHARGE INSTR - AVS FIRST PAGE
ALL YOUR  PREVIOUS MEDS ARE THE SAME  BE CAREFUL NOT TO PULL THE STRING  EXPECT SOME BLOOD IN URINE, BURNING, FREQUENT URINATION

## 2018-01-24 LAB — BACTERIA UR CULT: NORMAL

## 2018-01-24 NOTE — MISCELLANEOUS
Provider Comments  Provider Comments:   NO SHOW NO CALL APPT        Signatures   Electronically signed by : Sarah Ruiz, ; Jan 23 2018  8:32AM EST                       (Author)

## 2018-01-26 ENCOUNTER — DOCUMENTATION (OUTPATIENT)
Dept: UROLOGY | Facility: MEDICAL CENTER | Age: 33
End: 2018-01-26

## 2018-01-26 ENCOUNTER — CLINICAL SUPPORT (OUTPATIENT)
Dept: UROLOGY | Facility: MEDICAL CENTER | Age: 33
End: 2018-01-26
Payer: COMMERCIAL

## 2018-01-26 VITALS
DIASTOLIC BLOOD PRESSURE: 80 MMHG | HEIGHT: 66 IN | SYSTOLIC BLOOD PRESSURE: 126 MMHG | WEIGHT: 216 LBS | BODY MASS INDEX: 34.72 KG/M2

## 2018-01-26 DIAGNOSIS — N20.1 CALCULUS OF URETER: Primary | ICD-10-CM

## 2018-01-26 PROCEDURE — 99211 OFF/OP EST MAY X REQ PHY/QHP: CPT | Performed by: UROLOGY

## 2018-01-26 PROCEDURE — 50384 REMOVE URETER STENT PERCUT: CPT

## 2018-01-26 RX ORDER — FUROSEMIDE 20 MG/1
1 TABLET ORAL DAILY
COMMUNITY
Start: 2017-09-06 | End: 2018-01-31 | Stop reason: SDUPTHER

## 2018-01-26 RX ORDER — LISINOPRIL 20 MG/1
1 TABLET ORAL DAILY
COMMUNITY
Start: 2017-09-06 | End: 2018-08-31 | Stop reason: SDUPTHER

## 2018-01-26 RX ORDER — OXYCODONE HYDROCHLORIDE AND ACETAMINOPHEN 5; 325 MG/1; MG/1
TABLET ORAL
COMMUNITY
Start: 2018-01-19 | End: 2018-06-23 | Stop reason: HOSPADM

## 2018-01-26 RX ORDER — METOPROLOL TARTRATE 50 MG/1
1 TABLET, FILM COATED ORAL 2 TIMES DAILY
Status: ON HOLD | COMMUNITY
Start: 2017-09-15 | End: 2018-06-23

## 2018-01-26 RX ORDER — INDOMETHACIN 50 MG/1
1 CAPSULE ORAL EVERY 8 HOURS
COMMUNITY
Start: 2018-01-19 | End: 2018-06-23 | Stop reason: HOSPADM

## 2018-01-26 RX ORDER — TRAMADOL HYDROCHLORIDE 50 MG/1
2 TABLET ORAL EVERY 6 HOURS PRN
COMMUNITY
Start: 2018-01-19 | End: 2018-06-23 | Stop reason: HOSPADM

## 2018-01-26 RX ORDER — CEFUROXIME AXETIL 500 MG/1
1 TABLET ORAL EVERY 12 HOURS
COMMUNITY
Start: 2018-01-19 | End: 2018-06-23 | Stop reason: HOSPADM

## 2018-01-26 RX ORDER — POTASSIUM CHLORIDE 750 MG/1
1 TABLET, EXTENDED RELEASE ORAL DAILY
Status: ON HOLD | COMMUNITY
Start: 2017-09-06 | End: 2018-06-23 | Stop reason: ALTCHOICE

## 2018-01-26 RX ORDER — NAPROXEN 500 MG/1
1 TABLET ORAL 2 TIMES DAILY
COMMUNITY
Start: 2017-05-12 | End: 2018-06-23 | Stop reason: HOSPADM

## 2018-01-26 RX ORDER — CLONIDINE HYDROCHLORIDE 0.1 MG/1
1 TABLET ORAL 2 TIMES DAILY
COMMUNITY
End: 2018-01-31 | Stop reason: SDUPTHER

## 2018-01-26 NOTE — PROGRESS NOTES
Pt  PRESENTS FOR A STENT REMOVAL WITH STRING, WHICH THE Pt  TOLERATED WELL  THERE WAS NO REDNESS OR IRRITATION, Pt  WAS INSTRUCTED TO DRINK PLENTY OF FLUIDS  Pt  DENIES PAIN, FEVER, AND CHILLS, AND IS SCHEDULED FOR A 4 WEEK POST OP WITH Dr Casanova Bees

## 2018-01-30 LAB
CA PHOS MFR STONE: 30 %
CALCIUM OXALATE DIHYDRATE MFR STONE IR: 5 %
COLOR STONE: NORMAL
COM MFR STONE: 65 %
COMMENT-STONE3: NORMAL
COMPOSITION: NORMAL
LABORATORY COMMENT REPORT: NORMAL
NIDUS STONE QL: NORMAL
PHOTO: NORMAL
SIZE STONE: NORMAL MM
STONE ANALYSIS-IMP: NORMAL
SURFACE CRYSTALS: NORMAL
WT STONE: 43.5 MG

## 2018-01-31 ENCOUNTER — OFFICE VISIT (OUTPATIENT)
Dept: URGENT CARE | Facility: CLINIC | Age: 33
End: 2018-01-31
Payer: COMMERCIAL

## 2018-01-31 VITALS
DIASTOLIC BLOOD PRESSURE: 118 MMHG | HEART RATE: 92 BPM | TEMPERATURE: 98.3 F | OXYGEN SATURATION: 100 % | SYSTOLIC BLOOD PRESSURE: 194 MMHG

## 2018-01-31 DIAGNOSIS — R39.89 URINE DISCOLORATION: Primary | ICD-10-CM

## 2018-01-31 DIAGNOSIS — R10.9 FLANK PAIN: ICD-10-CM

## 2018-01-31 LAB
SL AMB  POCT GLUCOSE, UA: ABNORMAL
SL AMB LEUKOCYTE ESTERASE,UA: ABNORMAL
SL AMB POCT BILIRUBIN,UA: ABNORMAL
SL AMB POCT BLOOD,UA: ABNORMAL
SL AMB POCT CLARITY,UA: ABNORMAL
SL AMB POCT COLOR,UA: ABNORMAL
SL AMB POCT KETONES,UA: ABNORMAL
SL AMB POCT NITRITE,UA: ABNORMAL
SL AMB POCT PH,UA: 6.5
SL AMB POCT SPECIFIC GRAVITY,UA: 1

## 2018-01-31 PROCEDURE — 87086 URINE CULTURE/COLONY COUNT: CPT | Performed by: NURSE PRACTITIONER

## 2018-01-31 PROCEDURE — 99203 OFFICE O/P NEW LOW 30 MIN: CPT | Performed by: NURSE PRACTITIONER

## 2018-01-31 NOTE — PROGRESS NOTES
Assessment/Plan:      Diagnoses and all orders for this visit:    Urine discoloration  -     POCT urine dip  -     Urine culture    Flank pain  -     Urine culture          Subjective:     Patient ID: Renae Petersen is a 33 y o  female  Pt had stent in right ureter for kidney stone  The stone and the stent were removed 5 days ago  She started running a fever 2 days ago and around the same time, began experiencing pain in her right flank area  Denies urinary burning, urgency, frequency  Flank Pain   Episode onset: lat few days  The quality of the pain is described as cramping  The pain does not radiate  The pain is at a severity of 5/10  Associated symptoms include a fever  Pertinent negatives include no dysuria  Review of Systems   Constitutional: Positive for fever  HENT: Negative  Respiratory: Negative  Cardiovascular: Negative  Gastrointestinal: Negative  Genitourinary: Positive for flank pain  Negative for dysuria, frequency and urgency  Psychiatric/Behavioral: Negative  Objective:  BP (!) 194/118   Pulse 92   Temp 98 3 °F (36 8 °C)   LMP 01/18/2018   SpO2 100%      Physical Exam   Constitutional: She is oriented to person, place, and time  She appears well-developed and well-nourished  HENT:   Head: Normocephalic and atraumatic  Right Ear: External ear normal    Left Ear: External ear normal    Mouth/Throat: Oropharynx is clear and moist    Eyes: Conjunctivae and EOM are normal  Pupils are equal, round, and reactive to light  Neck: Normal range of motion  Neck supple  Cardiovascular: Normal rate, regular rhythm and normal heart sounds  Pulmonary/Chest: Effort normal and breath sounds normal    Abdominal: Soft  Bowel sounds are normal  There is CVA tenderness (right)  Lymphadenopathy:     She has no cervical adenopathy  Neurological: She is alert and oriented to person, place, and time  She has normal reflexes  Skin: Skin is warm  No rash noted  Psychiatric: She has a normal mood and affect  Nursing note and vitals reviewed  Patient Instructions   Given her recent history of stent removal 5 days ago, fever and CVA tenderness, she was advised to go to ER for further evaluation  She was agreeable

## 2018-01-31 NOTE — PATIENT INSTRUCTIONS
Given her recent history of stent removal 5 days ago, fever and CVA tenderness, she was advised to go to ER for further evaluation  She was agreeable

## 2018-01-31 NOTE — PROGRESS NOTES
Urinary stents placed 10 days ago due to kidney stone blockage   Did not take hypertensive medications today

## 2018-02-02 LAB — BACTERIA UR CULT: NORMAL

## 2018-02-19 DIAGNOSIS — N20.1 CALCULUS OF URETER: Primary | ICD-10-CM

## 2018-06-22 ENCOUNTER — HOSPITAL ENCOUNTER (OUTPATIENT)
Dept: OTHER | Facility: HOSPITAL | Age: 33
Setting detail: OBSERVATION
Discharge: HOME | End: 2018-06-23
Admitting: INTERNAL MEDICINE
Payer: COMMERCIAL

## 2018-06-22 DIAGNOSIS — G45.9 TIA (TRANSIENT ISCHEMIC ATTACK): Primary | ICD-10-CM

## 2018-06-22 DIAGNOSIS — I16.1 HYPERTENSIVE EMERGENCY WITHOUT CONGESTIVE HEART FAILURE: ICD-10-CM

## 2018-06-22 LAB
ALBUMIN SERPL BCP-MCNC: 3.5 G/DL (ref 3.5–5.7)
ALP SERPL-CCNC: 44 IU/L (ref 40–150)
ALT SERPL W P-5'-P-CCNC: 10 IU/L (ref 0–50)
AMPHETAMINES (HISTORICAL): NEGATIVE
ANION GAP SERPL CALCULATED.3IONS-SCNC: 7.8 MM/L
APTT PPP: 30.9 SEC (ref 24.4–37.6)
AST SERPL W P-5'-P-CCNC: 11 U/L (ref 7–26)
BACTERIA UR QL AUTO: ABNORMAL
BARBITURATES (HISTORICAL): NEGATIVE
BASOPHILS # BLD AUTO: 0 X3/UL (ref 0–0.3)
BASOPHILS # BLD AUTO: 0.7 % (ref 0–2)
BENZODIAZEPINES (HISTORICAL): NEGATIVE
BILIRUB SERPL-MCNC: 0.5 MG/DL (ref 0.3–1)
BILIRUB UR QL STRIP: NEGATIVE
BUN SERPL-MCNC: 20 MG/DL (ref 7–25)
CALCIUM SERPL-MCNC: 9 MG/DL (ref 8.6–10.5)
CANNABINOIDS (HISTORICAL): POSITIVE
CHLORIDE SERPL-SCNC: 102 MM/L (ref 98–107)
CLARITY UR: CLEAR
CO2 SERPL-SCNC: 30 MM/L (ref 21–31)
COCAINE (HISTORICAL): NEGATIVE
COLOR UR: YELLOW
CREAT SERPL-MCNC: 0.9 MG/DL (ref 0.6–1.2)
DEPRECATED RDW RBC AUTO: 15.4 % (ref 11.5–14.5)
EGFR (HISTORICAL): > 60 GFR
EGFR AFRICAN AMERICAN (HISTORICAL): > 60 GFR
EOSINOPHIL # BLD AUTO: 0.1 X3/UL (ref 0–0.5)
EOSINOPHIL NFR BLD AUTO: 2.4 % (ref 0–5)
GLUCOSE (HISTORICAL): 122 MG/DL (ref 65–99)
GLUCOSE UR STRIP-MCNC: NEGATIVE MG/DL
HCG, QUALITATIVE (HISTORICAL): NEGATIVE
HCT VFR BLD AUTO: 42.2 % (ref 37–47)
HGB BLD-MCNC: 14.1 G/DL (ref 12–16)
HGB UR QL STRIP.AUTO: NEGATIVE
INR PPP: 1.15 (ref 0.9–1.5)
KETONES UR STRIP-MCNC: NEGATIVE MG/DL
LDL CHOLESTEROL DIRECT (HISTORICAL): 54.3 MG/DL
LEUKOCYTE ESTERASE UR QL STRIP: NEGATIVE
LYMPHOCYTES # BLD AUTO: 1.1 X3/UL (ref 1.2–4.2)
LYMPHOCYTES NFR BLD AUTO: 17.8 % (ref 20.5–51.1)
MCH RBC QN AUTO: 27.1 PG (ref 26–34)
MCHC RBC AUTO-ENTMCNC: 33.4 G/DL (ref 31–36)
MCV RBC AUTO: 81.3 FL (ref 81–99)
METHADONE (HISTORICAL): NEGATIVE
MONOCYTES # BLD AUTO: 0.3 X3/UL (ref 0–1)
MONOCYTES NFR BLD AUTO: 5.7 % (ref 1.7–12)
MUCUS THREADS (HISTORICAL): PRESENT /HPF
NEUTROPHILS # BLD AUTO: 4.4 X3/UL (ref 1.4–6.5)
NEUTS SEG NFR BLD AUTO: 73.4 % (ref 42.2–75.2)
NITRITE UR QL STRIP: NEGATIVE
NON-SQ EPI CELLS URNS QL MICRO: ABNORMAL /HPF
OPIATES (HISTORICAL): NEGATIVE
OSMOLALITY, SERUM (HISTORICAL): 276 MOSM (ref 262–291)
OXYCODONE (HISTORICAL): NEGATIVE
PH UR STRIP.AUTO: 7 [PH] (ref 4.5–8)
PHENCYCLIDINE URINE (HISTORICAL): NEGATIVE
PLATELET # BLD AUTO: 186 X3/UL (ref 130–400)
PLEASE NOTE (HISTORICAL): NORMAL
PMV BLD AUTO: 9 FL (ref 8.6–11.7)
POTASSIUM SERPL-SCNC: 3.8 MM/L (ref 3.5–5.5)
PROPOXYPHENE (HISTORICAL): NEGATIVE
PROT UR STRIP-MCNC: ABNORMAL MG/DL
PROTHROMBIN TIME (HISTORICAL): 13.4 SEC (ref 10.1–12.9)
RBC # BLD AUTO: 5.19 X6/UL (ref 3.9–5.2)
RBC #/AREA URNS AUTO: ABNORMAL /HPF
SODIUM SERPL-SCNC: 136 MM/L (ref 134–143)
SP GR UR STRIP.AUTO: <= 1.005 (ref 1–1.03)
TOTAL PROTEIN (HISTORICAL): 5.8 G/DL (ref 6.4–8.9)
TROPONIN I SERPL-MCNC: 0.04 NG/ML
TSH SERPL DL<=0.05 MIU/L-ACNC: 1.42 UIU/M (ref 0.45–5.33)
UROBILINOGEN UR QL STRIP.AUTO: 0.2 EU/DL (ref 0.2–8)
WBC # BLD AUTO: 5.9 X3/UL (ref 4.8–10.8)
WBC #/AREA URNS AUTO: ABNORMAL /HPF

## 2018-06-22 PROCEDURE — 9990: Performed by: INTERNAL MEDICINE

## 2018-06-22 PROCEDURE — 85730 THROMBOPLASTIN TIME PARTIAL: CPT | Performed by: INTERNAL MEDICINE

## 2018-06-22 PROCEDURE — 93306 TTE W/DOPPLER COMPLETE: CPT | Performed by: INTERNAL MEDICINE

## 2018-06-22 PROCEDURE — 83721 ASSAY OF BLOOD LIPOPROTEIN: CPT | Performed by: INTERNAL MEDICINE

## 2018-06-22 PROCEDURE — 87070 CULTURE OTHR SPECIMN AEROBIC: CPT | Performed by: INTERNAL MEDICINE

## 2018-06-22 PROCEDURE — G0378 HOSPITAL OBSERVATION PER HR: HCPCS | Performed by: INTERNAL MEDICINE

## 2018-06-22 PROCEDURE — 70498 CT ANGIOGRAPHY NECK: CPT | Performed by: INTERNAL MEDICINE

## 2018-06-22 PROCEDURE — 70551 MRI BRAIN STEM W/O DYE: CPT | Performed by: INTERNAL MEDICINE

## 2018-06-22 PROCEDURE — 70450 CT HEAD/BRAIN W/O DYE: CPT | Performed by: INTERNAL MEDICINE

## 2018-06-22 PROCEDURE — 70496 CT ANGIOGRAPHY HEAD: CPT | Performed by: INTERNAL MEDICINE

## 2018-06-22 PROCEDURE — 96375 TX/PRO/DX INJ NEW DRUG ADDON: CPT | Performed by: INTERNAL MEDICINE

## 2018-06-22 PROCEDURE — 9990 LAB CHEMISTRY: Performed by: INTERNAL MEDICINE

## 2018-06-22 PROCEDURE — 71045 X-RAY EXAM CHEST 1 VIEW: CPT | Performed by: INTERNAL MEDICINE

## 2018-06-22 PROCEDURE — 93005 ELECTROCARDIOGRAM TRACING: CPT | Performed by: INTERNAL MEDICINE

## 2018-06-22 PROCEDURE — 80307 DRUG TEST PRSMV CHEM ANLYZR: CPT | Performed by: INTERNAL MEDICINE

## 2018-06-22 PROCEDURE — 84443 ASSAY THYROID STIM HORMONE: CPT | Performed by: INTERNAL MEDICINE

## 2018-06-22 PROCEDURE — 84703 CHORIONIC GONADOTROPIN ASSAY: CPT | Performed by: INTERNAL MEDICINE

## 2018-06-22 PROCEDURE — 96361 HYDRATE IV INFUSION ADD-ON: CPT | Performed by: INTERNAL MEDICINE

## 2018-06-22 PROCEDURE — 85610 PROTHROMBIN TIME: CPT | Performed by: INTERNAL MEDICINE

## 2018-06-22 PROCEDURE — 85025 COMPLETE CBC W/AUTO DIFF WBC: CPT | Performed by: INTERNAL MEDICINE

## 2018-06-22 PROCEDURE — 87081 CULTURE SCREEN ONLY: CPT | Performed by: INTERNAL MEDICINE

## 2018-06-22 PROCEDURE — 84484 ASSAY OF TROPONIN QUANT: CPT | Performed by: INTERNAL MEDICINE

## 2018-06-22 PROCEDURE — 96365 THER/PROPH/DIAG IV INF INIT: CPT | Performed by: INTERNAL MEDICINE

## 2018-06-22 PROCEDURE — 82948 REAGENT STRIP/BLOOD GLUCOSE: CPT | Performed by: INTERNAL MEDICINE

## 2018-06-22 PROCEDURE — 81003 URINALYSIS AUTO W/O SCOPE: CPT | Performed by: INTERNAL MEDICINE

## 2018-06-22 PROCEDURE — 82550 ASSAY OF CK (CPK): CPT | Performed by: INTERNAL MEDICINE

## 2018-06-22 PROCEDURE — 80053 COMPREHEN METABOLIC PANEL: CPT | Performed by: INTERNAL MEDICINE

## 2018-06-22 PROCEDURE — 9990 MRI BRAIN: Performed by: INTERNAL MEDICINE

## 2018-06-22 RX ORDER — CLONIDINE HYDROCHLORIDE 0.1 MG/1
0.1 TABLET ORAL EVERY 12 HOURS SCHEDULED
Status: DISCONTINUED | OUTPATIENT
Start: 2018-06-23 | End: 2018-06-23 | Stop reason: HOSPADM

## 2018-06-22 RX ORDER — PANTOPRAZOLE SODIUM 40 MG/1
40 TABLET, DELAYED RELEASE ORAL
Status: DISCONTINUED | OUTPATIENT
Start: 2018-06-23 | End: 2018-06-23 | Stop reason: HOSPADM

## 2018-06-22 RX ORDER — DIPHENHYDRAMINE HCL 25 MG
25 TABLET ORAL EVERY 6 HOURS PRN
Status: DISCONTINUED | OUTPATIENT
Start: 2018-06-22 | End: 2018-06-22

## 2018-06-22 RX ORDER — KETOROLAC TROMETHAMINE 30 MG/ML
30 INJECTION, SOLUTION INTRAMUSCULAR; INTRAVENOUS EVERY 6 HOURS PRN
Status: DISCONTINUED | OUTPATIENT
Start: 2018-06-22 | End: 2018-06-22

## 2018-06-22 RX ORDER — DIPHENHYDRAMINE HCL 25 MG
25 TABLET ORAL EVERY 6 HOURS PRN
Status: DISCONTINUED | OUTPATIENT
Start: 2018-06-23 | End: 2018-06-23 | Stop reason: HOSPADM

## 2018-06-22 RX ORDER — KETOROLAC TROMETHAMINE 30 MG/ML
30 INJECTION, SOLUTION INTRAMUSCULAR; INTRAVENOUS EVERY 6 HOURS PRN
Status: DISCONTINUED | OUTPATIENT
Start: 2018-06-23 | End: 2018-06-23 | Stop reason: HOSPADM

## 2018-06-22 RX ORDER — ONDANSETRON 2 MG/ML
4 INJECTION INTRAMUSCULAR; INTRAVENOUS EVERY 6 HOURS PRN
Status: DISCONTINUED | OUTPATIENT
Start: 2018-06-23 | End: 2018-06-23 | Stop reason: HOSPADM

## 2018-06-22 RX ORDER — METOPROLOL SUCCINATE 50 MG/1
50 TABLET, EXTENDED RELEASE ORAL DAILY
Status: DISCONTINUED | OUTPATIENT
Start: 2018-06-23 | End: 2018-06-23 | Stop reason: HOSPADM

## 2018-06-22 RX ORDER — ATORVASTATIN CALCIUM 40 MG/1
40 TABLET, FILM COATED ORAL
Status: DISCONTINUED | OUTPATIENT
Start: 2018-06-23 | End: 2018-06-23 | Stop reason: HOSPADM

## 2018-06-22 RX ORDER — LISINOPRIL 20 MG/1
20 TABLET ORAL DAILY
Status: DISCONTINUED | OUTPATIENT
Start: 2018-06-23 | End: 2018-06-23 | Stop reason: HOSPADM

## 2018-06-22 RX ORDER — ACETAMINOPHEN 325 MG/1
650 TABLET ORAL EVERY 4 HOURS PRN
Status: DISCONTINUED | OUTPATIENT
Start: 2018-06-23 | End: 2018-06-23 | Stop reason: HOSPADM

## 2018-06-22 RX ORDER — ASPIRIN 81 MG/1
81 TABLET ORAL DAILY
Status: DISCONTINUED | OUTPATIENT
Start: 2018-06-23 | End: 2018-06-23 | Stop reason: HOSPADM

## 2018-06-23 VITALS
HEART RATE: 76 BPM | OXYGEN SATURATION: 95 % | SYSTOLIC BLOOD PRESSURE: 170 MMHG | DIASTOLIC BLOOD PRESSURE: 103 MMHG | RESPIRATION RATE: 30 BRPM | TEMPERATURE: 98.1 F

## 2018-06-23 PROBLEM — G45.9 TRANSIENT ISCHEMIC ATTACK (TIA): Status: RESOLVED | Noted: 2018-06-23 | Resolved: 2018-06-23

## 2018-06-23 PROBLEM — E87.6 HYPOKALEMIA: Status: RESOLVED | Noted: 2018-06-23 | Resolved: 2018-06-23

## 2018-06-23 PROBLEM — I16.1 HYPERTENSIVE EMERGENCY WITHOUT CONGESTIVE HEART FAILURE: Status: RESOLVED | Noted: 2018-06-23 | Resolved: 2018-06-23

## 2018-06-23 PROBLEM — G45.9 TRANSIENT ISCHEMIC ATTACK (TIA): Status: ACTIVE | Noted: 2018-06-23

## 2018-06-23 PROBLEM — I16.1 HYPERTENSIVE EMERGENCY WITHOUT CONGESTIVE HEART FAILURE: Status: ACTIVE | Noted: 2018-06-23

## 2018-06-23 PROBLEM — E87.6 HYPOKALEMIA: Status: ACTIVE | Noted: 2018-06-23

## 2018-06-23 LAB
ALBUMIN SERPL BCP-MCNC: 3.5 G/DL (ref 3.5–5.7)
ALP SERPL-CCNC: 42 U/L (ref 40–150)
ALT SERPL W P-5'-P-CCNC: 9 U/L (ref 7–52)
ANION GAP SERPL CALCULATED.3IONS-SCNC: 7 MMOL/L (ref 4–13)
APTT PPP: 29 SECONDS (ref 24–36)
AST SERPL W P-5'-P-CCNC: 12 U/L (ref 13–39)
BASOPHILS # BLD AUTO: 0 THOUSANDS/ΜL (ref 0–0.1)
BASOPHILS NFR BLD AUTO: 0 % (ref 0–2)
BILIRUB SERPL-MCNC: 0.5 MG/DL (ref 0.2–1)
BUN SERPL-MCNC: 18 MG/DL (ref 7–25)
CALCIUM SERPL-MCNC: 8.9 MG/DL (ref 8.6–10.5)
CHLORIDE SERPL-SCNC: 104 MMOL/L (ref 98–107)
CO2 SERPL-SCNC: 27 MMOL/L (ref 21–31)
CREAT SERPL-MCNC: 1.06 MG/DL (ref 0.6–1.2)
EOSINOPHIL # BLD AUTO: 0.1 THOUSAND/ΜL (ref 0–0.61)
EOSINOPHIL NFR BLD AUTO: 2 % (ref 0–5)
ERYTHROCYTE [DISTWIDTH] IN BLOOD BY AUTOMATED COUNT: 15.3 % (ref 11.5–14.5)
GFR SERPL CREATININE-BSD FRML MDRD: 70 ML/MIN/1.73SQ M
GLUCOSE SERPL-MCNC: 96 MG/DL (ref 65–99)
HCT VFR BLD AUTO: 41.4 % (ref 34.8–46.1)
HGB BLD-MCNC: 13.9 G/DL (ref 12–16)
INR PPP: 1.19 (ref 0.9–1.5)
LYMPHOCYTES # BLD AUTO: 1.7 THOUSANDS/ΜL (ref 0.6–4.47)
LYMPHOCYTES NFR BLD AUTO: 22 % (ref 21–51)
MCH RBC QN AUTO: 27.4 PG (ref 26–34)
MCHC RBC AUTO-ENTMCNC: 33.5 G/DL (ref 31–37)
MCV RBC AUTO: 82 FL (ref 81–99)
MONOCYTES # BLD AUTO: 0.5 THOUSAND/ΜL (ref 0.17–1.22)
MONOCYTES NFR BLD AUTO: 7 % (ref 2–12)
NEUTROPHILS # BLD AUTO: 5.4 THOUSANDS/ΜL (ref 1.4–6.5)
NEUTS SEG NFR BLD AUTO: 70 % (ref 42–75)
NRBC BLD AUTO-RTO: 0 /100 WBCS
PLATELET # BLD AUTO: 191 THOUSANDS/UL (ref 149–390)
PMV BLD AUTO: 9.5 FL (ref 8.6–11.7)
POTASSIUM SERPL-SCNC: 3.2 MMOL/L (ref 3.5–5.5)
PROT SERPL-MCNC: 6 G/DL (ref 6.4–8.9)
PROTHROMBIN TIME: 13.9 SECONDS (ref 10.1–12.9)
RBC # BLD AUTO: 5.07 MILLION/UL (ref 3.9–5.2)
SODIUM SERPL-SCNC: 138 MMOL/L (ref 134–143)
WBC # BLD AUTO: 7.7 THOUSAND/UL (ref 4.8–10.8)

## 2018-06-23 PROCEDURE — 85025 COMPLETE CBC W/AUTO DIFF WBC: CPT | Performed by: INTERNAL MEDICINE

## 2018-06-23 PROCEDURE — 99217 PR OBSERVATION CARE DISCHARGE MANAGEMENT: CPT | Performed by: INTERNAL MEDICINE

## 2018-06-23 PROCEDURE — 85730 THROMBOPLASTIN TIME PARTIAL: CPT | Performed by: INTERNAL MEDICINE

## 2018-06-23 PROCEDURE — 80053 COMPREHEN METABOLIC PANEL: CPT | Performed by: INTERNAL MEDICINE

## 2018-06-23 PROCEDURE — 85610 PROTHROMBIN TIME: CPT | Performed by: INTERNAL MEDICINE

## 2018-06-23 RX ORDER — METOPROLOL TARTRATE 50 MG/1
75 TABLET, FILM COATED ORAL 2 TIMES DAILY
Qty: 60 TABLET | Refills: 0 | Status: SHIPPED | OUTPATIENT
Start: 2018-06-23 | End: 2018-12-21 | Stop reason: SDUPTHER

## 2018-06-23 RX ORDER — ASPIRIN 81 MG/1
81 TABLET ORAL DAILY
Qty: 30 TABLET | Refills: 0 | Status: SHIPPED | OUTPATIENT
Start: 2018-06-24 | End: 2018-08-23 | Stop reason: ALTCHOICE

## 2018-06-23 RX ORDER — ATORVASTATIN CALCIUM 40 MG/1
40 TABLET, FILM COATED ORAL
Qty: 30 TABLET | Refills: 0 | Status: SHIPPED | OUTPATIENT
Start: 2018-06-23 | End: 2018-08-31

## 2018-06-23 RX ORDER — POTASSIUM CHLORIDE 20 MEQ/1
40 TABLET, EXTENDED RELEASE ORAL ONCE
Status: COMPLETED | OUTPATIENT
Start: 2018-06-23 | End: 2018-06-23

## 2018-06-23 RX ORDER — METOPROLOL TARTRATE 50 MG/1
50 TABLET, FILM COATED ORAL 2 TIMES DAILY
Qty: 60 TABLET | Refills: 0 | Status: SHIPPED | OUTPATIENT
Start: 2018-06-23 | End: 2018-06-23

## 2018-06-23 RX ADMIN — DIPHENHYDRAMINE HCL 25 MG: 25 TABLET ORAL at 08:14

## 2018-06-23 RX ADMIN — METOPROLOL SUCCINATE 50 MG: 50 TABLET, EXTENDED RELEASE ORAL at 08:13

## 2018-06-23 RX ADMIN — CLONIDINE HYDROCHLORIDE 0.1 MG: 0.1 TABLET ORAL at 08:13

## 2018-06-23 RX ADMIN — PANTOPRAZOLE SODIUM 40 MG: 40 TABLET, DELAYED RELEASE ORAL at 06:32

## 2018-06-23 RX ADMIN — LISINOPRIL 20 MG: 20 TABLET ORAL at 08:12

## 2018-06-23 RX ADMIN — ASPIRIN 81 MG: 81 TABLET, COATED ORAL at 08:12

## 2018-06-23 RX ADMIN — POTASSIUM CHLORIDE 40 MEQ: 1500 TABLET, EXTENDED RELEASE ORAL at 10:19

## 2018-06-23 NOTE — NURSING NOTE
Discharge instructions reviewed with patient  Eduction provided to patient about chronic high blood pressure, low sodium diet, Lipitor, and Aspirin  Paper scripts given  Patient verbally expresses understanding and has no questions

## 2018-06-23 NOTE — ASSESSMENT & PLAN NOTE
Patient was seen in consultation by telestroke neurology  We will be prescribing aspirin and statin and recommend close outpatient follow-up along with better blood pressure control  I stressed compliance to the patient, she voiced understanding of instructions

## 2018-06-23 NOTE — DISCHARGE INSTRUCTIONS
Chronic Hypertension   WHAT YOU NEED TO KNOW:   What is chronic hypertension? Hypertension is high blood pressure (BP)  Your BP is the force of your blood moving against the walls of your arteries  Normal BP is less than 120/80  Prehypertension is between 120/80 and 139/89  Hypertension is 140/90 or higher  Hypertension causes your BP to get so high that your heart has to work much harder than normal  This can damage your heart  Chronic hypertension is a long-term condition that you can control with a healthy lifestyle or medicines  A controlled blood pressure helps protect your organs, such as your heart, lungs, brain, and kidneys  What increases my risk for chronic hypertension? · Age older than 54 years (men)    · Age older than 72 years (women)    · A family history of hypertension or heart disease     · Obesity or lack of exercise     · Eating too much sodium (salt)    · Stress     · Use of tobacco, alcohol, or illegal drugs     · A medical condition, such as diabetes, high cholesterol, or kidney disease    · Medicines, such as steroids or birth control pills  What are the signs and symptoms of chronic hypertension? You may have no signs or symptoms, or you may have any of the following:  · Headache     · Blurred vision    · Chest pain     · Dizziness or weakness     · Trouble breathing     · Nosebleeds  How is chronic hypertension diagnosed? Your healthcare provider will ask about your symptoms and the medicines you take  He will also ask if you have a family history of high blood pressure and about any health conditions you have  He will also check your blood pressure and weight and examine your heart, lungs, and eyes  You may need any of the following tests:  · Blood tests  may help healthcare providers find the cause of your hypertension  Blood tests can also help find other health problems caused by hypertension  · Urine tests  will be done to check your kidneys    How is chronic hypertension treated? Your healthcare provider will recommend lifestyle changes to lower your BP  You may also need the following medicines:  · Medicine  may be used to help lower your B  You may need more than one type of medicine  Take the medicine exactly as directed  · Diuretics  help decrease extra fluid that collects in your body  This will help lower your BP  You may urinate more often while you take this medicine  · Cholesterol medicine  helps lower your cholesterol level  A low cholesterol level helps prevent heart disease and makes it easier to control your blood pressure  What can I do to manage chronic hypertension? Talk with your healthcare provider about these and other ways to manage chronic hypertension:  · Take your BP at home  Sit and rest for 5 minutes before you take your BP  Extend your arm and support it on a flat surface  Your arm should be at the same level as your heart  Follow the directions that came with your BP monitor  If possible, take at least 2 BP readings each time  Take your BP at least twice a day at the same times each day, such as morning and evening  Keep a record of your BP readings and bring it to your follow-up visits  Ask your healthcare provider what your blood pressure should be  · Limit sodium (salt) as directed  Too much sodium can affect your fluid balance  Check labels to find low-sodium or no-salt-added foods  Some low-sodium foods use potassium salts for flavor  Too much potassium can also cause health problems  Your healthcare provider will tell you how much sodium and potassium are safe for you to have in a day  He or she may recommend that you limit sodium to 2,300 mg a day  · Follow the meal plan recommended by your healthcare provider  A dietitian or your provider can give you more information on low-sodium plans or the DASH (Dietary Approaches to Stop Hypertension) eating plan   The DASH plan is low in sodium, unhealthy fats, and total fat  It is high in potassium, calcium, and fiber  · Exercise to maintain a healthy weight  Exercise at least 30 minutes per day, on most days of the week  This will help decrease your blood pressure  Ask about the best exercise plan for you  · Decrease stress  This may help lower your BP  Learn ways to relax, such as deep breathing or listening to music  · Limit alcohol  Women should limit alcohol to 1 drink a day  Men should limit alcohol to 2 drinks a day  A drink of alcohol is 12 ounces of beer, 5 ounces of wine, or 1½ ounces of liquor  · Do not smoke  Nicotine and other chemicals in cigarettes and cigars can increase your BP and also cause lung damage  Ask your healthcare provider for information if you currently smoke and need help to quit  E-cigarettes or smokeless tobacco still contain nicotine  Talk to your healthcare provider before you use these products  Call 911 for any of the following:   · You have discomfort in your chest that feels like squeezing, pressure, fullness, or pain  · You become confused or have difficulty speaking  · You suddenly feel lightheaded or have trouble breathing  · You have pain or discomfort in your back, neck, jaw, stomach, or arm  When should I seek immediate care? · You have a severe headache or vision loss  · You have weakness in an arm or leg  When should I contact my healthcare provider? · You feel faint, dizzy, confused, or drowsy  · You have been taking your BP medicine and your BP is still higher than your healthcare provider says it should be  · You have questions or concerns about your condition or care  CARE AGREEMENT:   You have the right to help plan your care  Learn about your health condition and how it may be treated  Discuss treatment options with your caregivers to decide what care you want to receive  You always have the right to refuse treatment  The above information is an  only  It is not intended as medical advice for individual conditions or treatments  Talk to your doctor, nurse or pharmacist before following any medical regimen to see if it is safe and effective for you  © 2017 [employer]  Information is for End User's use only and may not be sold, redistributed or otherwise used for commercial purposes  All illustrations and images included in CareNotes® are the copyrighted property of [employer]  or [employer]  Low-Sodium Diet   WHAT YOU NEED TO KNOW:   A low-sodium diet limits foods that are high in sodium (salt)  You will need to follow a low-sodium diet if you have high blood pressure, kidney disease, or heart failure  You may also need to follow this diet if you have a condition that is causing your body to retain (hold) extra fluid  You may need to limit the amount of sodium you eat to 1,500 mg  Ask your healthcare provider how much sodium you can have each day  DISCHARGE INSTRUCTIONS:   How to use food labels to choose foods that are low in sodium:  Read food labels to find the amount of sodium they contain  The amount of sodium is listed in milligrams (mg)  The % Daily Value (DV) column tells you how much of your daily needs are met by 1 serving of the food for each nutrient listed  Choose foods that have less than 5% of the DV of sodium  These foods are considered low in sodium  Foods that have 20% or more of the DV of sodium are considered high in sodium  Some food labels may also list any of the following terms that tell you about the sodium content in the food:  · Sodium-free:  Less than 5 mg in each serving    · Very low sodium:  35 mg of sodium or less in each serving    · Low sodium:  140 mg of sodium or less in each serving    · Reduced sodium:   At least 25% less sodium in each serving than the regular type    · Light in sodium:  50% less sodium in each serving    · Unsalted or no added salt:  No extra salt is added during processing (the food may still contain sodium)  Foods to avoid:  Salty foods are high in sodium  You should avoid the following:  · Processed foods:      ¨ Mixes for cornbread, biscuits, cake, and pudding     ¨ Instant foods, such as potatoes, cereals, noodles, and rice     ¨ Packaged foods, such as bread stuffing, rice and pasta mixes, snack dip mixes, and macaroni and cheese     ¨ Canned foods, such as canned vegetables, soups, broths, sauces, and vegetable or tomato juice    ¨ Snack foods, such as salted chips, popcorn, pretzels, pork rinds, salted crackers, and salted nuts    ¨ Frozen foods, such as dinners, entrees, vegetables with sauces, and breaded meats    ¨ Sauerkraut, pickled vegetables, and other foods prepared in brine    · Meats and cheeses:      ¨ Smoked or cured meat, such as corned beef, [place], ham, hot dogs, and sausage    ¨ Canned meats or spreads, such as potted meats, sardines, anchovies, and imitation seafood    ¨ Deli or lunch meats, such as bologna, ham, turkey, and roast beef    ¨ Processed cheese, such as American cheese and cheese spreads    · Condiments, sauces, and seasonings:      ¨ Salt (¼ teaspoon of salt contains 575 mg of sodium)    ¨ Seasonings made with salt, such as garlic salt, celery salt, onion salt, and seasoned salt    ¨ Regular soy sauce, barbecue sauce, teriyaki sauce, steak sauce, Worcestershire sauce, and most flavored vinegars    ¨ Canned gravy and mixes     ¨ Regular condiments, such as mustard, ketchup, and salad dressings    ¨ Pickles and olives    ¨ Meat tenderizers and monosodium glutamate (MSG)  Foods to include:  Read the food label to find the amount of sodium in each serving  · Bread and cereal:  Try to choose breads with less than 80 mg of sodium per serving  ¨ Bread, roll, chasidy, tortilla, or unsalted crackers      ¨ Ready-to-eat cereals with less than 5% DV of sodium (examples include shredded wheat and puffed rice)    ¨ Pasta    · Vegetables and fruits: ¨ Unsalted fresh, frozen, or canned vegetables    ¨ Fresh, frozen, or canned fruits    ¨ Fruit juice    · Dairy:  One serving has about 150 mg of sodium  ¨ Milk, all types    ¨ Yogurt    ¨ Hard cheese, such as cheddar, Swiss, [employer], or mozzarella    · Meat and other protein foods:  Some raw meats may have added sodium  ¨ Plain meats, fish, and poultry     ¨ Egg    · Other foods:      ¨ Homemade pudding    ¨ Unsalted nuts, popcorn, or pretzels    ¨ Unsalted butter or margarine  Ways to decrease sodium:   · Add spices and herbs to foods instead of salt during cooking  Use salt-free seasonings to add flavor to foods  Examples include onion powder, garlic powder, basil, roa powder, paprika, and parsley  Try lemon or lime juice or vinegar to give foods a tart flavor  Use hot peppers, pepper, or cayenne pepper to add a spicy flavor to foods  · Do not keep a salt shaker at your kitchen table  This may help keep you from adding salt to food at the table  It may take time to get used to enjoying the natural flavor of food instead of adding salt  Talk to your healthcare provider before you use salt substitutes  Some salt substitutes have a high amount of potassium and need to be avoided if you have kidney disease  · Choose low-sodium foods at restaurants  Meals from restaurants are often high in sodium  Some restaurants have nutrition information on the menu that tells you the amount of sodium in their foods  If possible, ask for your food to be prepared with less, or no salt  · Shop for unsalted or low-sodium foods and snacks at the grocery store  Examples include unsalted or low-sodium broths, soups, and canned vegetables  Choose fresh or frozen vegetables instead  Choose unsalted nuts or seeds or fresh fruits or vegetables as snacks  Read food labels and choose salt-free, very low-sodium, or low-sodium foods    © 2017 [employer] Information is for End User's use only and may not be sold, redistributed or otherwise used for commercial purposes  All illustrations and images included in CareNotes® are the copyrighted property of [employer] M [employer] Inc  or [employer]  The above information is an  only  It is not intended as medical advice for individual conditions or treatments  Talk to your doctor, nurse or pharmacist before following any medical regimen to see if it is safe and effective for you  Atorvastatin (By mouth)   Atorvastatin (a-tor-va-STAT-in)  Treats high cholesterol and triglyceride levels  Reduces the risk of angina, stroke, heart attack, or certain heart and blood vessel problems  This medicine is a statin  Brand Name(s): Lipitor   There may be other brand names for this medicine  When This Medicine Should Not Be Used: This medicine is not right for everyone  Do not use it if you had an allergic reaction to atorvastatin, if you have active liver disease, or if you are pregnant or breastfeeding  How to Use This Medicine:   Tablet  · Take your medicine as directed  Your dose may need to be changed several times to find what works best for you  · Take this medicine at the same time each day  · Swallow the tablet whole  Do not break it  · Missed dose: Take a dose as soon as you remember  If it is less than 12 hours until your next dose, skip the missed dose and take the next dose at the regular time  Do not take 2 doses of this medicine within 12 hours  · Read and follow the patient instructions that come with this medicine  Talk to your doctor or pharmacist if you have any questions  · Store the medicine in a closed container at room temperature, away from heat, moisture, and direct light  Drugs and Foods to Avoid:   Ask your doctor or pharmacist before using any other medicine, including over-the-counter medicines, vitamins, and herbal products  · Some medicines can affect how atorvastatin works   Tell your doctor if you also use birth control pills, boceprevir, cimetidine, colchicine, cyclosporine, digoxin, niacin, rifampin, spironolactone, telaprevir, medicine to treat an infection, or medicine to treat HIV/AIDS  Warnings While Using This Medicine:   · It is not safe to take this medicine during pregnancy  It could harm an unborn baby  Tell your doctor right away if you become pregnant  · Tell your doctor if you have kidney disease, diabetes, muscle pain or weakness, thyroid problems, have recently had a stroke or TIA (transient ischemic attack), or have a history of liver disease  Tell your doctor if you drink grapefruit juice or drink alcohol regularly  · This medicine can cause muscle problems, which can lead to kidney problems  · Tell any doctor or dentist who treats you that you use this medicine  You may need to stop using it if you have surgery, have an injury, or develop serious health problems  · Your doctor will do lab tests at regular visits to check on the effects of this medicine  Keep all appointments  · Keep all medicine out of the reach of children  Never share your medicine with anyone  Possible Side Effects While Using This Medicine:   Call your doctor right away if you notice any of these side effects:  · Allergic reaction: Itching or hives, swelling in your face or hands, swelling or tingling in your mouth or throat, chest tightness, trouble breathing  · Blistering, peeling, red skin rash  · Change in how much or how often you urinate  · Dark urine or pale stools, nausea, vomiting, loss of appetite, stomach pain, yellow skin or eyes  · Fever  · Muscle pain, tenderness, or weakness  · Unusual tiredness  If you notice these less serious side effects, talk with your doctor:   · Diarrhea  · Joint pain  If you notice other side effects that you think are caused by this medicine, tell your doctor  Call your doctor for medical advice about side effects   You may report side effects to FDA at 9-093-RLQ-2463  © 2017 [facility] [address] is for End User's use only and may not be sold, redistributed or otherwise used for commercial purposes  The above information is an  only  It is not intended as medical advice for individual conditions or treatments  Talk to your doctor, nurse or pharmacist before following any medical regimen to see if it is safe and effective for you  Aspirin (By mouth)   Aspirin (AS-pir-in)  Treats pain, fever, and inflammation  May lower risk of heart attack and stroke  Brand Name(s): Ascriptin Regular Strength, Aspergum, Aspir Low, Aspirin Adult Low Dose, Aspirin Low Dose, Ismael Aspirin Children's, Ismael Aspirin Regimen, Ismael Extra Strength, Ismael Genuine Aspirin, Bufferin, Bufferin Low Dose, Durlaza, Ecotrin, Ecpirin, Enteric Aspirin   There may be other brand names for this medicine  When This Medicine Should Not Be Used: This medicine is not right for everyone  Do not use it if you had an allergic reaction to aspirin or other NSAIDs, or if you have a history of asthma with nasal polyps and rhinitis  How to Use This Medicine:   Delayed Release Capsule, Long Acting Capsule, Gum, Tablet, Chewable Tablet, Fizzy Tablet, Coated Tablet, Long Acting Tablet, 24 Hour Capsule  · Your doctor will tell you how much medicine to use  Do not use more than directed  · It is best to take this medicine with food or milk  · Capsule, tablet, or coated tablet: Swallow whole  Do not crush, break, or chew it  · Chewable tablet: You may chew it completely or swallow it whole  · Gum: Chew completely to make sure you get as much medicine as possible  Drink a full glass (8 ounces) of water after chewing the gum  · Swallow the extended-release capsule whole  Do not crush, break, or chew it  Take the capsule with a full glass of water at the same time each day  · Follow the instructions on the medicine label if you are using this medicine without a prescription  · Missed dose:  If you miss a dose of Durlaza, skip the missed dose and go back to your regular dosing schedule  Do not take extra medicine to make up for a missed dose  · Store the medicine in a closed container at room temperature, away from heat, moisture, and direct light  Drugs and Foods to Avoid:   Ask your doctor or pharmacist before using any other medicine, including over-the-counter medicines, vitamins, and herbal products  · Some foods and medicines can affect how aspirin works  Tell your doctor if you are using any of the following:  ¨ Dipyridamole, methotrexate, probenecid, sulfinpyrazone, ticlopidine  ¨ Blood thinner (including clopidogrel, prasugrel, ticagrelor, warfarin)  ¨ Blood pressure medicine  ¨ Medicine to treat seizures (including phenytoin, valproic acid)  ¨ NSAID pain or arthritis medicine (including celecoxib, diclofenac, ibuprofen, naproxen)  ¨ Steroid medicine (including dexamethasone, hydrocortisone, methylprednisolone, prednisolone, prednisone)  · Do not take Durlaza 2 hours before or 1 hour after you drink alcohol or take medicines that contain alcohol  Warnings While Using This Medicine:   · Tell your doctor if you are pregnant or breastfeeding  Do not use this medicine during the later part of a pregnancy unless your doctor tells you to  · Tell your doctor if you have kidney disease, liver disease, high blood pressure, heart disease, or a history of stomach bleeding or ulcers  · This medicine may increase your risk for bleeding, including stomach ulcers  · Do not give aspirin to a child or teenager who has chickenpox or flu symptoms, unless the doctor says it is okay  Aspirin can cause a life-threatening reaction called Reye syndrome  · Tell any doctor or dentist who treats you that you are using this medicine  This medicine may affect certain medical test results  · Keep all medicine out of the reach of children  Never share your medicine with anyone    Possible Side Effects While Using This Medicine:   Call your doctor right away if you notice any of these side effects:  · Allergic reaction: Itching or hives, swelling in your face or hands, swelling or tingling in your mouth or throat, chest tightness, trouble breathing  · Bloody or black stools, bloody vomit or vomit that looks like coffee grounds  · Chest tightness, wheezing  · Ringing in the ears  · Severe stomach pain  · Unusual bleeding, bruising, or weakness  If you notice other side effects that you think are caused by this medicine, tell your doctor  Call your doctor for medical advice about side effects  You may report side effects to FDA at 6-819-FDA-6408  © 2017 [address] [employer] Information is for End User's use only and may not be sold, redistributed or otherwise used for commercial purposes  The above information is an  only  It is not intended as medical advice for individual conditions or treatments  Talk to your doctor, nurse or pharmacist before following any medical regimen to see if it is safe and effective for you

## 2018-06-23 NOTE — ASSESSMENT & PLAN NOTE
Patient was admitted to the hospital for hypertensive emergency  She was briefly on Cardene and her blood pressure subsequently improved  Her home medications were resumed  Her beta-blocker dose was increased her blood pressure was significantly improved, and she was deemed stable for discharge  We spoke at length about medication compliance and close outpatient follow-up  Patient voiced understanding of instructions

## 2018-06-23 NOTE — DISCHARGE SUMMARY
Progress Note - Carlos Alvarado 1985, 29 y o  female MRN: [id number]    Unit/Bed#: ICU 01 Encounter: [id number]    Primary Care Provider: Sandro Araya DO   Date and time admitted to hospital: 6/22/2018  2:20 PM        Transient ischemic attack (TIA)   Assessment & Plan    Patient was seen in consultation by telestroke neurology  We will be prescribing aspirin and statin and recommend close outpatient follow-up along with better blood pressure control  I stressed compliance to the patient, she voiced understanding of instructions  Hypokalemia   Assessment & Plan    Potassium was repleted        * Hypertensive emergency without congestive heart failure   Assessment & Plan    Patient was admitted to the hospital for hypertensive emergency  She was briefly on Cardene and her blood pressure subsequently improved  Her home medications were resumed  Her beta-blocker dose was increased her blood pressure was significantly improved, and she was deemed stable for discharge  We spoke at length about medication compliance and close outpatient follow-up  Patient voiced understanding of instructions  Discharging Physician / Practitioner: Vickie Durbin MD  PCP: Sandro Araya DO  Admission Date:   Discharge Date: 06/23/18    Resolved Problems  Date Reviewed: 1/31/2018    None          Consultations During Hospital Stay:  · Neurology    Procedures Performed:     · None    Significant Findings / Test Results:     · None    Incidental Findings:   · None     Test Results Pending at Discharge (will require follow up): · None     Outpatient Tests Requested:  · Stress test    Complications:  None    Reason for Admission:  Hypertensive emergency    Hospital Course:     Carlos Alvarado is a 29 y o  female patient who originally presented to the hospital on 6/22/2018 due to numbness and tingling on the right side  Patient's symptoms began on the morning of admission    She has a longstanding history of uncontrolled hypertension and questionable compliance  Her blood pressure was noted to be 240/160 in the emergency department  She was given a dose of labetalol with complete resolution of her symptoms  She had a CT and CTA of the head and neck which were unremarkable  She had an MRI of the brain as well which was also unremarkable  She was seen in consultation by a Neurology who recommended appropriate  blood pressure control, aspirin, and monitoring the intensive care unit  Patient was briefly on a Cardene drip for blood pressure control  She was subsequently titrated off  Her beta-blocker dose was increased, and she was instructed follow up with her primary care physician as an outpatient  Her blood pressure on discharge was 170/90 and all of her symptoms had resolved  Plan is for follow-up with her primary care physician and Cardiology as an outpatient  Please see above list of diagnoses and related plan for additional information  Condition at Discharge: good     Discharge Day Visit / Exam:     Subjective:  Patient was seen examined  No acute events were noted  Vitals: Blood Pressure: (!) 170/103 (06/23/18 0813)  Pulse: 76 (06/23/18 0813)  Temperature: 98 1 °F (36 7 °C) (06/23/18 0800)  Temp Source: Tympanic (06/23/18 0800)  Respirations: (!) 30 (06/23/18 0800)  SpO2: 95 % (06/23/18 0800)  Exam:   Physical Exam   Constitutional: She is oriented to person, place, and time  She appears well-developed and well-nourished  HENT:   Head: Normocephalic and atraumatic  Eyes: Pupils are equal, round, and reactive to light  Neck: Neck supple  Cardiovascular: Normal rate, regular rhythm and normal heart sounds  Pulmonary/Chest: Effort normal and breath sounds normal    Abdominal: Soft  Bowel sounds are normal    obese   Musculoskeletal: Normal range of motion  Neurological: She is alert and oriented to person, place, and time  Skin: Skin is warm  No rash noted     Psychiatric: She has a normal mood and affect  Her behavior is normal        Discussion with Family:  Mother at bedside    Discharge instructions/Information to patient and family:   See after visit summary for information provided to patient and family  Provisions for Follow-Up Care:  See after visit summary for information related to follow-up care and any pertinent home health orders  Disposition:     Home    For Discharges to [facility] SNF:   · Not Applicable to this Patient - Not Applicable to this Patient    Planned Readmission: none     Discharge Statement:  I spent 35 minutes discharging the patient  This time was spent on the day of discharge  I had direct contact with the patient on the day of discharge  Greater than 50% of the total time was spent examining patient, answering all patient questions, arranging and discussing plan of care with patient as well as directly providing post-discharge instructions  Additional time then spent on discharge activities  Discharge Medications:  See after visit summary for reconciled discharge medications provided to patient and family        ** Please Note: This note has been constructed using a voice recognition system **

## 2018-06-23 NOTE — CASE MANAGEMENT
[facility] in the [employer] by William Roach for 2017  Network Utilization Review Department  Phone: [phone]; Fax [phone]  ATTENTION: The Network Utilization Review Department is now centralized for our 7 Facilities  Please call with any questions or concerns to [phone] and carefully follow the prompts so that you are directed to the right person  All voicemails are confidential  Fax any determinations, approvals, denials, and requests for initial or continue stay review clinical to [phone]  Due to HIGH CALL volume, it would be easier if you could please send faxed requests to expedite your requests and in part, help us provide discharge notifications faster   /////////////////////////////////////////////////////////////////////////////////////////////////////////////////    Initial Clinical Review    Admission: Date/Time/Statement: 6/22 @ 2711      History of Illness:     55-year-old woman with a past medical history of hypertension (on 3-4 medications at home) who presents with breakthrough hypertension and left face and arm numbness  Subsequent to her initial imaging the sensory symptoms completely resolved  Reportedly her initial systolic blood pressures were up in the 200s  There is no description of an encephalopathy/depressed level of consciousness      I personally reviewed her head CT as well as CT angiogram    There is no current intercerebral hemorrhage or hemodynamically significant stenosis in the head or neck       Note that the patient is reportedly completely asymptomatic at this point in time  She is not currently a candidate for IV alteplase because her symptoms have completely resolved, however does reset CVA lot therefore if she were to become suddenly symptomatic I would consider to be a new event          06/23/18 0813  76  --   170/103  --  --  --   06/23/18 0812  --  --   170/103  --  --  --   06/23/18 0700  69   23 175/94  125  --  --   06/23/18 0600  71  16  167/97  126  96 %  None (Room air)   98 kg (216 lb)    Abnormal Labs/Diagnostic Test Results:   K  3 2   AST  12    Past Medical/Surgical History: Active Ambulatory Problems     Diagnosis Date Noted    HTN (hypertension) 01/15/2018    History of CHF (congestive heart failure) 01/15/2018     Resolved Ambulatory Problems     Diagnosis Date Noted    Hydronephrosis 01/15/2018    Kidney stone 01/15/2018    Hypokalemia 01/15/2018    Hydronephrosis with urinary obstruction due to ureteral calculus 01/15/2018     Past Medical History:   Diagnosis Date    CHF (congestive heart failure) (HCC)     Hypertension     Kidney stone        Admitting Diagnosis: Transient ischemic attack [G45 9]    Assessment/Plan:    Patient is a 52-year-old woman with severe hypertension presents with transient unilateral sensory changes as well as increase in her baseline blood pressure  This is most consistent with an acute ischemic stroke versus transient ischemic attack  Hypertensive encephalopathy is felt to be less likely possible    A complicated migraine also remains in differential although the patient was not initially described as having a significant history of migraine more significant headache accompanying recurrent symptoms      Plan:  -admit along the stroke pathway  -dysphagia screen prior to any oral intake, that the patient should receive a full-dose aspirin   -high-dose statin  -permissive blood pressure times 24 hr to maximum to 20/110  -2D echocardiogram  -MRI brain  -frequent vital signs/neuro checks (suggest q 1 hour x6 hours and then q 4 hours)  -considering patient's age and that she is asymptomatic she likely is not in need of PT/OT/SP the services  -telemetry monitoring  -considering her young age I would suggest checking a urine pregnancy test as well as a urine drug screen      Reviewed the case with the internal medicine as well as emergency room team   If the patient does experience any sudden neurologic changes please consider a noncontrast head CT immediately and contact Neurology via admission access Center      Admission Orders:  Admit ICU  Consult neuro  Echo  Activity as tolerated  Dysphagia assessment - if ok - cardiac diet    Scheduled Meds:   Current Facility-Administered Medications:  acetaminophen 650 mg Oral Q4H PRN Provider Cutover   aspirin 81 mg Oral Daily Provider Cutover   atorvastatin 40 mg Oral Daily With Dinner Provider Cutover   cloNIDine 0 1 mg Oral Q12H [address] Provider Cutover   diphenhydrAMINE 25 mg Oral Q6H PRN Brian Ackerman PA-C   ketorolac 30 mg Intravenous Q6H PRN Brian Ackerman PA-C   lisinopril 20 mg Oral Daily Provider Cutover   metoprolol succinate 50 mg Oral Daily Provider Cutover   niCARdipine (CARDENE) 25 mg (STANDARD CONCENTRATION) in sodium chloride 0 9% 250 mL 1-15 mg/hr Intravenous Titrated Brian Ackerman PA-C   ondansetron 4 mg Intravenous Q6H PRN Provider Cutover   pantoprazole 40 mg Oral Early Morning Provider Cutover     6/23/2018  GCS 15   Tele =  SR   sbp >170

## 2018-06-24 LAB
ACCESSION NUMBER (HISTORICAL): 173
COLLECTION DATE (HISTORICAL): NORMAL
CULTURE STATUS (HISTORICAL): NORMAL
MRSA SCREEN (HISTORICAL): NEGATIVE
SPECIMEN SOURCE (HISTORICAL): NORMAL
SPECIMEN TYPE RECEIVED: (HISTORICAL): NORMAL
TEST INFORMATION (HISTORICAL): NORMAL

## 2018-06-26 ENCOUNTER — OFFICE VISIT (OUTPATIENT)
Dept: URGENT CARE | Facility: CLINIC | Age: 33
End: 2018-06-26
Payer: COMMERCIAL

## 2018-06-26 VITALS
TEMPERATURE: 97.6 F | DIASTOLIC BLOOD PRESSURE: 110 MMHG | BODY MASS INDEX: 34.72 KG/M2 | HEART RATE: 86 BPM | HEIGHT: 66 IN | SYSTOLIC BLOOD PRESSURE: 230 MMHG | RESPIRATION RATE: 16 BRPM | WEIGHT: 216.05 LBS | OXYGEN SATURATION: 100 %

## 2018-06-26 DIAGNOSIS — L23.9 ALLERGIC CONTACT DERMATITIS, UNSPECIFIED TRIGGER: Primary | ICD-10-CM

## 2018-06-26 PROCEDURE — 99213 OFFICE O/P EST LOW 20 MIN: CPT | Performed by: NURSE PRACTITIONER

## 2018-06-26 RX ORDER — PREDNISONE 20 MG/1
20 TABLET ORAL 2 TIMES DAILY WITH MEALS
Qty: 10 TABLET | Refills: 0 | Status: SHIPPED | OUTPATIENT
Start: 2018-06-26 | End: 2018-07-01

## 2018-06-26 NOTE — PATIENT INSTRUCTIONS
Prednisone as directed  Get Claritin or Zyrtec- take daily until rash clears up  Continue to monitor and seek treatment for BP- make appt with Dr Master Hemphill  Call or return for problems/concerns

## 2018-06-26 NOTE — PROGRESS NOTES
[facility]        NAME: Cara Samaniego is a 29 y o  female  : 1985    MRN: [id number]  DATE: 2018  TIME: 1:32 PM    Assessment and Plan   Allergic contact dermatitis, unspecified trigger [L23 9]  1  Allergic contact dermatitis, unspecified trigger  predniSONE 20 mg tablet         Patient Instructions     Patient Instructions   Prednisone as directed  Get Claritin or Zyrtec- take daily until rash clears up  Continue to monitor and seek treatment for BP- make appt with Dr Rosalba Haas  Call or return for problems/concerns    Follow up with PCP in 3-5 days  Proceed to  ER if symptoms worsen  Chief Complaint     Chief Complaint   Patient presents with    Rash     pt c/o of having a rash for 4 days         History of Present Illness       Itchy rash on b/l thighs and b/l forearms since Friday  [place] the beach all [place]-   Pt  Aware BP is elevated- took her Metoprolol while in room  Pt  States it is always high and refuses to go to ER, denies symptoms        Review of Systems   Review of Systems   Constitutional: Negative for activity change, diaphoresis, fatigue and fever  HENT: Negative for congestion, facial swelling, hearing loss, rhinorrhea, sinus pain, sinus pressure, sneezing, sore throat and voice change  Eyes: Negative for discharge and visual disturbance  Respiratory: Negative for cough, choking, chest tightness, shortness of breath, wheezing and stridor  Cardiovascular: Negative for chest pain, palpitations and leg swelling  Gastrointestinal: Negative for abdominal distention, abdominal pain, constipation, diarrhea, nausea and vomiting  Endocrine: Negative for polydipsia, polyphagia and polyuria  Genitourinary: Negative for difficulty urinating, dysuria, frequency and urgency  Musculoskeletal: Negative for arthralgias, back pain, gait problem, joint swelling, myalgias, neck pain and neck stiffness  Skin: Positive for rash   Negative for color change and wound    Neurological: Negative for dizziness, syncope, speech difficulty, weakness, light-headedness and headaches  Hematological: Negative for adenopathy  Does not bruise/bleed easily  Psychiatric/Behavioral: Negative for agitation, behavioral problems, confusion, hallucinations, sleep disturbance and suicidal ideas  The patient is not nervous/anxious            Current Medications       Current Outpatient Prescriptions:     cloNIDine (CATAPRES) 0 1 mg tablet, Take 0 1 mg by mouth 2 (two) times a day, Disp: , Rfl:     lisinopril (ZESTRIL) 20 mg tablet, Take 1 tablet by mouth daily, Disp: , Rfl:     metoprolol tartrate (LOPRESSOR) 50 mg tablet, Take 1 5 tablets (75 mg total) by mouth 2 (two) times a day, Disp: 60 tablet, Rfl: 0    aspirin (ECOTRIN LOW STRENGTH) 81 mg EC tablet, Take 1 tablet (81 mg total) by mouth daily, Disp: 30 tablet, Rfl: 0    atorvastatin (LIPITOR) 40 mg tablet, Take 1 tablet (40 mg total) by mouth daily with dinner, Disp: 30 tablet, Rfl: 0    furosemide (LASIX) 20 mg tablet, Take by mouth as needed (Pt not sure of the dose amount she takes), Disp: , Rfl:     indomethacin (INDOCIN) 50 mg capsule, Take 50 mg by mouth 3 (three) times a day with meals, Disp: , Rfl:     predniSONE 20 mg tablet, Take 1 tablet (20 mg total) by mouth 2 (two) times a day with meals for 5 days, Disp: 10 tablet, Rfl: 0    Current Allergies     Allergies as of 2018    (No Known Allergies)            The following portions of the patient's history were reviewed and updated as appropriate: allergies, current medications, past family history, past medical history, past social history, past surgical history and problem list      Past Medical History:   Diagnosis Date    CHF (congestive heart failure) ([facility] [address] )     Hypertension     Kidney stone        Past Surgical History:   Procedure Laterality Date     SECTION      x 2    KNEE ARTHROSCOPY Left     acl/meniscus repair    OK CYSTO/URETERO W/LITHOTRIPSY &INDWELL STENT INSRT Right 1/23/2018    Procedure: CYSTOSCOPY URETEROSCOPY, RETROGRADE PYELOGRAM AND INSERTION STENT URETERAL, RIGHT STONE EXTRACTION;  Surgeon: Howard Nam MD;  Location: [facility];  Service: Urology    CA CYSTOURETHROSCOPY,URETER CATHETER Right 1/15/2018    Procedure: CYSTOSCOPY RETROGRADE PYELOGRAM WITH INSERTION STENT URETERAL;  Surgeon: Charlyne Lefort, MD;  Location: [facility];  Service: Urology    TUBAL LIGATION         No family history on file  Medications have been verified  Objective   BP (!) 230/110   Pulse 86   Temp 97 6 °F (36 4 °C)   Resp 16   Ht 5' 6" (1 676 m)   Wt 98 kg (216 lb 0 8 oz)   SpO2 100%   BMI 34 87 kg/m²        Physical Exam     Physical Exam   Constitutional: She is oriented to person, place, and time  Vital signs are normal  She appears well-developed and well-nourished  She is active and cooperative  No distress  Eyes: EOM are normal    Cardiovascular: Normal rate, regular rhythm and normal heart sounds  No murmur heard  Pulmonary/Chest: Effort normal and breath sounds normal  No respiratory distress  She has no wheezes  Neurological: She is alert and oriented to person, place, and time  Skin: Skin is warm and dry  Rash (b/l anterior thighs and b/l anterior forearms- erythematic, no pustules/no macules/not vesicular) noted  Rash is urticarial  She is not diaphoretic  No erythema  Psychiatric: She has a normal mood and affect  Her behavior is normal  Judgment and thought content normal    Nursing note and vitals reviewed

## 2018-08-23 ENCOUNTER — DOCUMENTATION (OUTPATIENT)
Dept: FAMILY MEDICINE CLINIC | Facility: CLINIC | Age: 33
End: 2018-08-23

## 2018-08-23 ENCOUNTER — OFFICE VISIT (OUTPATIENT)
Dept: FAMILY MEDICINE CLINIC | Facility: CLINIC | Age: 33
End: 2018-08-23
Payer: COMMERCIAL

## 2018-08-23 VITALS
DIASTOLIC BLOOD PRESSURE: 110 MMHG | RESPIRATION RATE: 16 BRPM | HEART RATE: 64 BPM | TEMPERATURE: 98 F | WEIGHT: 205.2 LBS | SYSTOLIC BLOOD PRESSURE: 170 MMHG | BODY MASS INDEX: 32.98 KG/M2 | HEIGHT: 66 IN

## 2018-08-23 DIAGNOSIS — M25.551 RIGHT HIP PAIN: Primary | ICD-10-CM

## 2018-08-23 DIAGNOSIS — I10 ESSENTIAL HYPERTENSION: ICD-10-CM

## 2018-08-23 DIAGNOSIS — R26.9 GAIT ABNORMALITY: ICD-10-CM

## 2018-08-23 DIAGNOSIS — R80.9 PROTEINURIA, UNSPECIFIED TYPE: ICD-10-CM

## 2018-08-23 PROBLEM — I50.9 CONGESTIVE HEART FAILURE (HCC): Status: ACTIVE | Noted: 2017-09-15

## 2018-08-23 LAB
SL AMB  POCT GLUCOSE, UA: ABNORMAL
SL AMB LEUKOCYTE ESTERASE,UA: ABNORMAL
SL AMB POCT BILIRUBIN,UA: ABNORMAL
SL AMB POCT BLOOD,UA: ABNORMAL
SL AMB POCT CLARITY,UA: ABNORMAL
SL AMB POCT COLOR,UA: YELLOW
SL AMB POCT KETONES,UA: ABNORMAL
SL AMB POCT NITRITE,UA: ABNORMAL
SL AMB POCT PH,UA: 6
SL AMB POCT SPECIFIC GRAVITY,UA: 1.02
SL AMB POCT URINE PROTEIN: ABNORMAL
SL AMB POCT UROBILINOGEN: NORMAL

## 2018-08-23 PROCEDURE — 96372 THER/PROPH/DIAG INJ SC/IM: CPT | Performed by: INTERNAL MEDICINE

## 2018-08-23 PROCEDURE — 99213 OFFICE O/P EST LOW 20 MIN: CPT | Performed by: INTERNAL MEDICINE

## 2018-08-23 PROCEDURE — 81002 URINALYSIS NONAUTO W/O SCOPE: CPT | Performed by: INTERNAL MEDICINE

## 2018-08-23 RX ORDER — MELOXICAM 15 MG/1
15 TABLET ORAL DAILY
Qty: 30 TABLET | Refills: 0 | Status: SHIPPED | OUTPATIENT
Start: 2018-08-23 | End: 2018-12-02 | Stop reason: ALTCHOICE

## 2018-08-23 RX ORDER — KETOROLAC TROMETHAMINE 30 MG/ML
60 INJECTION, SOLUTION INTRAMUSCULAR; INTRAVENOUS ONCE
Status: COMPLETED | OUTPATIENT
Start: 2018-08-23 | End: 2018-08-23

## 2018-08-23 RX ADMIN — KETOROLAC TROMETHAMINE 60 MG: 30 INJECTION, SOLUTION INTRAMUSCULAR; INTRAVENOUS at 08:28

## 2018-08-23 NOTE — PROGRESS NOTES
Assessment/Plan:    No problem-specific Assessment & Plan notes found for this encounter  Diagnoses and all orders for this visit:    Right hip pain  -     XR hip/pelv 2-3 vws right if performed; Future  -     POCT urine dip  -     meloxicam (MOBIC) 15 mg tablet; Take 1 tablet (15 mg total) by mouth daily  -     ketorolac (TORADOL) 60 mg/2 mL IM injection 60 mg; Inject 2 mL (60 mg total) into a muscle once     Gait abnormality  -     XR hip/pelv 2-3 vws right if performed; Future  -     ketorolac (TORADOL) 60 mg/2 mL IM injection 60 mg; Inject 2 mL (60 mg total) into a muscle once     Essential hypertension    Proteinuria, unspecified type  -     POCT urine dip      A/P: Urine dip with some leuko and protein, but normal otherwise  ??cause  Degree of pain out of proportion for a bursitis  Will check an Xray and caustiously try NSIAD's  Pt to go home and take her meds  RTC one week for f/u or to the ER with any s/s of increase BP  May need meds adjustment as well  Subjective:      Patient ID: Ani Dougherty is a 29 y o  female  WF presents with acute onset of right hip pain two days ago  Difficulty walking  No known trauma and no similar issues in the past  Needs to help move her leg at times  No LBP  No changes in bowel or bladder habits  No fever or chills  BP way up today, but reports missing some of her meds this AM and not checking BP due to her equipment malfunctioning  The following portions of the patient's history were reviewed and updated as appropriate:   She  has a past medical history of CHF (congestive heart failure) ([address] ); Hypertension; and Kidney stone    She   Patient Active Problem List    Diagnosis Date Noted    Nephrolithiasis 01/15/2018    Hypertension 01/15/2018    History of CHF (congestive heart failure) 01/15/2018    Congestive heart failure ([address] ) 09/15/2017    Generalized anxiety disorder 09/30/2013    Proteinuria 09/30/2013    Obesity 02/04/2013     She  has a past surgical history that includes pr cystourethroscopy,ureter catheter (Right, 1/15/2018); Knee arthroscopy (Left);  section; Tubal ligation; and pr cysto/uretero w/lithotripsy &indwell stent insrt (Right, 2018)  Her family history includes Cancer in her mother; Hypertension in her father and mother  She  reports that she has been smoking  She has been smoking about 0 25 packs per day  She has never used smokeless tobacco  She reports that she does not drink alcohol or use drugs    Current Outpatient Prescriptions   Medication Sig Dispense Refill    atorvastatin (LIPITOR) 40 mg tablet Take 1 tablet (40 mg total) by mouth daily with dinner 30 tablet 0    cloNIDine (CATAPRES) 0 1 mg tablet Take 0 1 mg by mouth 2 (two) times a day      furosemide (LASIX) 20 mg tablet Take by mouth as needed (Pt not sure of the dose amount she takes)      lisinopril (ZESTRIL) 20 mg tablet Take 1 tablet by mouth daily      metoprolol tartrate (LOPRESSOR) 50 mg tablet Take 1 5 tablets (75 mg total) by mouth 2 (two) times a day 60 tablet 0    meloxicam (MOBIC) 15 mg tablet Take 1 tablet (15 mg total) by mouth daily 30 tablet 0     Current Facility-Administered Medications   Medication Dose Route Frequency Provider Last Rate Last Dose    ketorolac (TORADOL) 60 mg/2 mL IM injection 60 mg  60 mg Intramuscular Once Stanford Gill DO         Current Outpatient Prescriptions on File Prior to Visit   Medication Sig    atorvastatin (LIPITOR) 40 mg tablet Take 1 tablet (40 mg total) by mouth daily with dinner    cloNIDine (CATAPRES) 0 1 mg tablet Take 0 1 mg by mouth 2 (two) times a day    furosemide (LASIX) 20 mg tablet Take by mouth as needed (Pt not sure of the dose amount she takes)    lisinopril (ZESTRIL) 20 mg tablet Take 1 tablet by mouth daily    metoprolol tartrate (LOPRESSOR) 50 mg tablet Take 1 5 tablets (75 mg total) by mouth 2 (two) times a day    [DISCONTINUED] aspirin (ECOTRIN LOW STRENGTH) 81 mg EC tablet Take 1 tablet (81 mg total) by mouth daily     No current facility-administered medications on file prior to visit  She has No Known Allergies       Review of Systems   Constitutional: Negative for activity change, chills, diaphoresis, fatigue and fever  Respiratory: Negative for cough, chest tightness, shortness of breath and wheezing  Cardiovascular: Negative for chest pain, palpitations and leg swelling  Gastrointestinal: Negative for abdominal pain, constipation, diarrhea, nausea and vomiting  Genitourinary: Negative for difficulty urinating, dysuria and frequency  Musculoskeletal: Positive for arthralgias and gait problem  Negative for myalgias  Neurological: Negative for dizziness, seizures, facial asymmetry, speech difficulty, weakness, light-headedness, numbness and headaches  Psychiatric/Behavioral: Negative for confusion and dysphoric mood  The patient is not nervous/anxious  Objective:      BP (!) 170/110 (BP Location: Left arm, Patient Position: Sitting, Cuff Size: Large)   Pulse 64   Temp 98 °F (36 7 °C) (Tympanic)   Resp 16   Ht 5' 6" (1 676 m)   Wt 93 1 kg (205 lb 3 2 oz)   LMP 08/07/2018 (Approximate)   BMI 33 12 kg/m²          Physical Exam   Constitutional: She is oriented to person, place, and time  She appears well-developed and well-nourished  No distress  HENT:   Head: Normocephalic and atraumatic  Mouth/Throat: Oropharynx is clear and moist    Eyes: Conjunctivae and EOM are normal  Pupils are equal, round, and reactive to light  Cardiovascular: Normal rate and regular rhythm  No murmur heard  Pulmonary/Chest: Effort normal and breath sounds normal  No respiratory distress  She has no wheezes  Musculoskeletal: She exhibits tenderness  She exhibits no edema or deformity  Right hip w/o gross deformity, increase temp, erythema, swelling or lesion  Tenderness over the greater trochanteric bursa and femoral head, more so over the ASIS   ROM decreased all planes by 50%   Neurological: She is alert and oriented to person, place, and time  Psychiatric: She has a normal mood and affect  Her behavior is normal  Judgment and thought content normal    Nursing note and vitals reviewed

## 2018-08-25 DIAGNOSIS — G89.4 CHRONIC PAIN SYNDROME: Primary | ICD-10-CM

## 2018-08-26 RX ORDER — NAPROXEN 500 MG/1
TABLET ORAL
Qty: 60 TABLET | Refills: 5 | Status: SHIPPED | OUTPATIENT
Start: 2018-08-26 | End: 2018-08-31

## 2018-08-27 ENCOUNTER — TRANSCRIBE ORDERS (OUTPATIENT)
Dept: ADMINISTRATIVE | Facility: HOSPITAL | Age: 33
End: 2018-08-27

## 2018-08-27 ENCOUNTER — HOSPITAL ENCOUNTER (OUTPATIENT)
Dept: RADIOLOGY | Facility: HOSPITAL | Age: 33
Discharge: HOME | End: 2018-08-27
Payer: COMMERCIAL

## 2018-08-27 ENCOUNTER — TELEPHONE (OUTPATIENT)
Dept: INTERNAL MEDICINE CLINIC | Facility: CLINIC | Age: 33
End: 2018-08-27

## 2018-08-27 DIAGNOSIS — R26.9 GAIT ABNORMALITY: ICD-10-CM

## 2018-08-27 DIAGNOSIS — M25.551 RIGHT HIP PAIN: ICD-10-CM

## 2018-08-27 PROCEDURE — 73502 X-RAY EXAM HIP UNI 2-3 VIEWS: CPT

## 2018-08-31 ENCOUNTER — OFFICE VISIT (OUTPATIENT)
Dept: INTERNAL MEDICINE CLINIC | Facility: CLINIC | Age: 33
End: 2018-08-31
Payer: COMMERCIAL

## 2018-08-31 VITALS
HEIGHT: 66 IN | BODY MASS INDEX: 34.39 KG/M2 | HEART RATE: 82 BPM | WEIGHT: 214 LBS | TEMPERATURE: 98.6 F | RESPIRATION RATE: 16 BRPM | SYSTOLIC BLOOD PRESSURE: 178 MMHG | DIASTOLIC BLOOD PRESSURE: 102 MMHG

## 2018-08-31 DIAGNOSIS — R93.89 ABNORMAL X-RAY: ICD-10-CM

## 2018-08-31 DIAGNOSIS — I10 ESSENTIAL HYPERTENSION: Primary | ICD-10-CM

## 2018-08-31 DIAGNOSIS — M25.551 RIGHT HIP PAIN: ICD-10-CM

## 2018-08-31 DIAGNOSIS — R26.9 GAIT ABNORMALITY: ICD-10-CM

## 2018-08-31 PROCEDURE — 99213 OFFICE O/P EST LOW 20 MIN: CPT | Performed by: INTERNAL MEDICINE

## 2018-08-31 PROCEDURE — 20610 DRAIN/INJ JOINT/BURSA W/O US: CPT | Performed by: INTERNAL MEDICINE

## 2018-08-31 PROCEDURE — 3008F BODY MASS INDEX DOCD: CPT | Performed by: INTERNAL MEDICINE

## 2018-08-31 RX ORDER — LISINOPRIL 20 MG/1
20 TABLET ORAL DAILY
Qty: 30 TABLET | Refills: 5 | Status: SHIPPED | OUTPATIENT
Start: 2018-08-31 | End: 2018-12-21

## 2018-08-31 NOTE — PATIENT INSTRUCTIONS

## 2018-08-31 NOTE — PROGRESS NOTES
Assessment/Plan:    No problem-specific Assessment & Plan notes found for this encounter  Diagnoses and all orders for this visit:    Essential hypertension  -     lisinopril (ZESTRIL) 20 mg tablet; Take 1 tablet (20 mg total) by mouth daily    Right hip pain  -     MRI hip right wo contrast; Future  -     Inj Trigger Point Single/Multiple; Future    Gait abnormality    Abnormal x-ray  -     MRI hip right wo contrast; Future  -     Inj Trigger Point Single/Multiple; Future    Other orders  -     Large joint arthrocentesis        A/P:  A little better, but still with considerable pain and dysfunction  Will order MRI per radiology  Hold on PT  Injected and will see if this helps and if not, ??back related  Continue NSAID's for now  Pt to restart her ACEI  RTC two weeks  Subjective:      Patient ID: Bridgette Pizarro is a 29 y o  female  [facility] RTC for f/u acute onset moderate right hip pain of unknown etiology  No trauma  No prior issues  Pain radiates down the right leg  Has back issues in the past, but this is different  Started on NSAID's and slight improvement  Difficulty walking, but slightly better  Xray with "abnormal" calcifications and MRI recommended  Pt's BP remains very high, but pt is asymptomatic  Now admits to not taking her ACEI  No new c/o's  The following portions of the patient's history were reviewed and updated as appropriate:   She  has a past medical history of CHF (congestive heart failure) ([address] ); Hypertension; and Kidney stone  She   Patient Active Problem List    Diagnosis Date Noted    Nephrolithiasis 01/15/2018    Hypertension 01/15/2018    History of CHF (congestive heart failure) 01/15/2018    Congestive heart failure ([address] ) 09/15/2017    Generalized anxiety disorder 09/30/2013    Proteinuria 09/30/2013    Obesity 02/04/2013     She  has a past surgical history that includes pr cystourethroscopy,ureter catheter (Right, 1/15/2018);  Knee arthroscopy (Left);  section; Tubal ligation; and pr cysto/uretero w/lithotripsy &indwell stent insrt (Right, 2018)  Her family history includes Cancer in her mother; Hypertension in her father and mother  She  reports that she has been smoking  She has been smoking about 0 25 packs per day  She has never used smokeless tobacco  She reports that she does not drink alcohol or use drugs  Current Outpatient Prescriptions   Medication Sig Dispense Refill    cloNIDine (CATAPRES) 0 1 mg tablet Take 0 1 mg by mouth 2 (two) times a day      furosemide (LASIX) 20 mg tablet Take by mouth as needed (Pt not sure of the dose amount she takes)      lisinopril (ZESTRIL) 20 mg tablet Take 1 tablet (20 mg total) by mouth daily 30 tablet 5    meloxicam (MOBIC) 15 mg tablet Take 1 tablet (15 mg total) by mouth daily 30 tablet 0    metoprolol tartrate (LOPRESSOR) 50 mg tablet Take 1 5 tablets (75 mg total) by mouth 2 (two) times a day 60 tablet 0     No current facility-administered medications for this visit  Current Outpatient Prescriptions on File Prior to Visit   Medication Sig    cloNIDine (CATAPRES) 0 1 mg tablet Take 0 1 mg by mouth 2 (two) times a day    furosemide (LASIX) 20 mg tablet Take by mouth as needed (Pt not sure of the dose amount she takes)    meloxicam (MOBIC) 15 mg tablet Take 1 tablet (15 mg total) by mouth daily    metoprolol tartrate (LOPRESSOR) 50 mg tablet Take 1 5 tablets (75 mg total) by mouth 2 (two) times a day    [DISCONTINUED] lisinopril (ZESTRIL) 20 mg tablet Take 1 tablet by mouth daily    [DISCONTINUED] atorvastatin (LIPITOR) 40 mg tablet Take 1 tablet (40 mg total) by mouth daily with dinner (Patient not taking: Reported on 2018 )    [DISCONTINUED] naproxen (NAPROSYN) 500 mg tablet TAKE ONE TABLET BY MOUTH TWICE DAILY (Patient not taking: Reported on 2018)     No current facility-administered medications on file prior to visit  She has No Known Allergies       Review of Systems   Constitutional: Negative for activity change, chills, diaphoresis, fatigue and fever  Respiratory: Negative for cough, chest tightness, shortness of breath and wheezing  Cardiovascular: Negative for chest pain, palpitations and leg swelling  Gastrointestinal: Negative for abdominal pain, constipation, diarrhea, nausea and vomiting  Genitourinary: Negative for difficulty urinating, dysuria and frequency  Musculoskeletal: Positive for arthralgias and gait problem  Negative for back pain and myalgias  Neurological: Negative for light-headedness and headaches  Psychiatric/Behavioral: Negative for confusion  The patient is not nervous/anxious  Objective:      BP (!) 178/102   Pulse 82   Temp 98 6 °F (37 °C) (Tympanic)   Resp 16   Ht 5' 6" (1 676 m)   Wt 97 1 kg (214 lb)   LMP 08/07/2018 (Approximate)   BMI 34 54 kg/m²          Physical Exam   Constitutional: She is oriented to person, place, and time  She appears well-developed and well-nourished  No distress  HENT:   Head: Normocephalic and atraumatic  Mouth/Throat: Oropharynx is clear and moist    Eyes: Conjunctivae and EOM are normal  Pupils are equal, round, and reactive to light  Cardiovascular: Normal rate, regular rhythm and normal heart sounds  Pulmonary/Chest: Effort normal and breath sounds normal  No respiratory distress  She has no wheezes  Musculoskeletal: She exhibits tenderness  She exhibits no edema or deformity  Right hip w/o gross deformity, increase temp, erythema, or swelling  No lesions  ROM decrease in flexion and rotation by 50% due to pain  Pain over the trochanteric bursa and into the groin  Neurological: She is alert and oriented to person, place, and time  Psychiatric: She has a normal mood and affect  Her behavior is normal  Judgment and thought content normal    Nursing note and vitals reviewed        Large joint arthrocentesis  Date/Time: 8/31/2018 8:23 AM  Consent given by: patient  Supporting Documentation  Indications: pain and diagnostic evaluation   Procedure Details  Location: hip - R greater trochanteric bursa  Needle size: 25 G  Ultrasound guidance: no  Approach: lateral  Medication group details: decadron 80mg/sensorcaine 0 5% one ml, xylocaine 25 w/o epi one ml      Patient tolerance: patient tolerated the procedure well with no immediate complications  Dressing:  Sterile dressing applied

## 2018-09-11 ENCOUNTER — HOSPITAL ENCOUNTER (OUTPATIENT)
Dept: MRI IMAGING | Facility: HOSPITAL | Age: 33
Discharge: HOME | End: 2018-09-11
Payer: COMMERCIAL

## 2018-09-11 DIAGNOSIS — R93.89 ABNORMAL X-RAY: ICD-10-CM

## 2018-09-11 DIAGNOSIS — M25.551 RIGHT HIP PAIN: ICD-10-CM

## 2018-09-11 PROCEDURE — 73721 MRI JNT OF LWR EXTRE W/O DYE: CPT

## 2018-12-02 ENCOUNTER — APPOINTMENT (EMERGENCY)
Dept: CT IMAGING | Facility: HOSPITAL | Age: 33
End: 2018-12-02
Payer: COMMERCIAL

## 2018-12-02 ENCOUNTER — HOSPITAL ENCOUNTER (EMERGENCY)
Facility: HOSPITAL | Age: 33
Discharge: HOME | End: 2018-12-02
Payer: COMMERCIAL

## 2018-12-02 DIAGNOSIS — R51.9 CEPHALALGIA: Primary | ICD-10-CM

## 2018-12-02 LAB
ALBUMIN SERPL BCP-MCNC: 3.7 G/DL (ref 3.5–5.7)
ALP SERPL-CCNC: 45 U/L (ref 40–150)
ALT SERPL W P-5'-P-CCNC: 19 U/L (ref 7–52)
ANION GAP SERPL CALCULATED.3IONS-SCNC: 6 MMOL/L (ref 4–13)
AST SERPL W P-5'-P-CCNC: 25 U/L (ref 13–39)
BASOPHILS # BLD AUTO: 0 THOUSANDS/ΜL (ref 0–0.1)
BASOPHILS NFR BLD AUTO: 0 % (ref 0–2)
BILIRUB SERPL-MCNC: 0.4 MG/DL (ref 0.2–1)
BUN SERPL-MCNC: 24 MG/DL (ref 7–25)
CALCIUM SERPL-MCNC: 9.1 MG/DL (ref 8.6–10.5)
CHLORIDE SERPL-SCNC: 104 MMOL/L (ref 98–107)
CO2 SERPL-SCNC: 27 MMOL/L (ref 21–31)
CREAT SERPL-MCNC: 1.14 MG/DL (ref 0.6–1.2)
EOSINOPHIL # BLD AUTO: 0.1 THOUSAND/ΜL (ref 0–0.61)
EOSINOPHIL NFR BLD AUTO: 1 % (ref 0–5)
ERYTHROCYTE [DISTWIDTH] IN BLOOD BY AUTOMATED COUNT: 13.8 % (ref 11.5–14.5)
GFR SERPL CREATININE-BSD FRML MDRD: 64 ML/MIN/1.73SQ M
GLUCOSE SERPL-MCNC: 96 MG/DL (ref 65–99)
HCG SERPL QL: NEGATIVE
HCT VFR BLD AUTO: 41 % (ref 34.8–46.1)
HGB BLD-MCNC: 13.7 G/DL (ref 12–16)
LYMPHOCYTES # BLD AUTO: 1.5 THOUSANDS/ΜL (ref 0.6–4.47)
LYMPHOCYTES NFR BLD AUTO: 15 % (ref 21–51)
MCH RBC QN AUTO: 28.4 PG (ref 26–34)
MCHC RBC AUTO-ENTMCNC: 33.4 G/DL (ref 31–37)
MCV RBC AUTO: 85 FL (ref 81–99)
MONOCYTES # BLD AUTO: 0.6 THOUSAND/ΜL (ref 0.17–1.22)
MONOCYTES NFR BLD AUTO: 6 % (ref 2–12)
NEUTROPHILS # BLD AUTO: 7.8 THOUSANDS/ΜL (ref 1.4–6.5)
NEUTS SEG NFR BLD AUTO: 78 % (ref 42–75)
NRBC BLD AUTO-RTO: 0 /100 WBCS
PLATELET # BLD AUTO: 187 THOUSANDS/UL (ref 149–390)
PMV BLD AUTO: 9.6 FL (ref 8.6–11.7)
POTASSIUM SERPL-SCNC: 4 MMOL/L (ref 3.5–5.5)
PROT SERPL-MCNC: 6.1 G/DL (ref 6.4–8.9)
RBC # BLD AUTO: 4.82 MILLION/UL (ref 3.9–5.2)
SODIUM SERPL-SCNC: 137 MMOL/L (ref 134–143)
WBC # BLD AUTO: 10 THOUSAND/UL (ref 4.8–10.8)

## 2018-12-02 PROCEDURE — 96374 THER/PROPH/DIAG INJ IV PUSH: CPT

## 2018-12-02 PROCEDURE — 36415 COLL VENOUS BLD VENIPUNCTURE: CPT

## 2018-12-02 PROCEDURE — 99284 EMERGENCY DEPT VISIT MOD MDM: CPT

## 2018-12-02 PROCEDURE — 96375 TX/PRO/DX INJ NEW DRUG ADDON: CPT

## 2018-12-02 PROCEDURE — 96361 HYDRATE IV INFUSION ADD-ON: CPT

## 2018-12-02 PROCEDURE — 70450 CT HEAD/BRAIN W/O DYE: CPT

## 2018-12-02 PROCEDURE — 80053 COMPREHEN METABOLIC PANEL: CPT

## 2018-12-02 PROCEDURE — 96376 TX/PRO/DX INJ SAME DRUG ADON: CPT

## 2018-12-02 PROCEDURE — 85025 COMPLETE CBC W/AUTO DIFF WBC: CPT

## 2018-12-02 PROCEDURE — 84703 CHORIONIC GONADOTROPIN ASSAY: CPT

## 2018-12-02 RX ORDER — KETOROLAC TROMETHAMINE 30 MG/ML
30 INJECTION, SOLUTION INTRAMUSCULAR; INTRAVENOUS ONCE
Status: COMPLETED | OUTPATIENT
Start: 2018-12-02 | End: 2018-12-02

## 2018-12-02 RX ORDER — METOCLOPRAMIDE 10 MG/1
10 TABLET ORAL EVERY 6 HOURS
Qty: 12 TABLET | Refills: 0 | Status: SHIPPED | OUTPATIENT
Start: 2018-12-02 | End: 2018-12-05

## 2018-12-02 RX ORDER — HYDRALAZINE HYDROCHLORIDE 20 MG/ML
10 INJECTION INTRAMUSCULAR; INTRAVENOUS ONCE
Status: COMPLETED | OUTPATIENT
Start: 2018-12-02 | End: 2018-12-02

## 2018-12-02 RX ORDER — DIPHENHYDRAMINE HYDROCHLORIDE 50 MG/ML
50 INJECTION INTRAMUSCULAR; INTRAVENOUS ONCE
Status: COMPLETED | OUTPATIENT
Start: 2018-12-02 | End: 2018-12-02

## 2018-12-02 RX ORDER — METOCLOPRAMIDE HYDROCHLORIDE 5 MG/ML
10 INJECTION INTRAMUSCULAR; INTRAVENOUS ONCE
Status: COMPLETED | OUTPATIENT
Start: 2018-12-02 | End: 2018-12-02

## 2018-12-02 RX ADMIN — DIPHENHYDRAMINE HYDROCHLORIDE 50 MG: 50 INJECTION, SOLUTION INTRAMUSCULAR; INTRAVENOUS at 14:46

## 2018-12-02 RX ADMIN — HYDRALAZINE HYDROCHLORIDE 10 MG: 20 INJECTION INTRAMUSCULAR; INTRAVENOUS at 16:43

## 2018-12-02 RX ADMIN — KETOROLAC TROMETHAMINE 30 MG: 30 INJECTION, SOLUTION INTRAMUSCULAR at 14:44

## 2018-12-02 RX ADMIN — HYDRALAZINE HYDROCHLORIDE 10 MG: 20 INJECTION INTRAMUSCULAR; INTRAVENOUS at 16:05

## 2018-12-02 RX ADMIN — METOCLOPRAMIDE 10 MG: 5 INJECTION, SOLUTION INTRAMUSCULAR; INTRAVENOUS at 14:47

## 2018-12-02 RX ADMIN — SODIUM CHLORIDE 1000 ML: 0.9 INJECTION, SOLUTION INTRAVENOUS at 14:38

## 2018-12-02 NOTE — DISCHARGE INSTRUCTIONS
Acute Headache   WHAT YOU NEED TO KNOW:   An acute headache is pain or discomfort that starts suddenly and gets worse quickly  You may have an acute headache only when you feel stress or eat certain foods  Other acute headache pain can happen every day, and sometimes several times a day  DISCHARGE INSTRUCTIONS:   Return to the emergency department if:   · You have severe pain  · You have numbness or weakness on one side of your face or body  · You have a headache that occurs after a blow to the head, a fall, or other trauma  · You have a headache, are forgetful or confused, or have trouble speaking  · You have a headache, stiff neck, and a fever  Contact your healthcare provider if:   · You have a constant headache and are vomiting  · You have a headache each day that does not get better, even after treatment  · You have changes in your headaches, or new symptoms that occur when you have a headache  · You have questions or concerns about your condition or care  Medicines: You may need any of the following:  · Prescription pain medicine  may be given  The medicine your healthcare provider recommends will depend on the kind of headaches you have  You will need to take prescription headache medicines as directed to prevent a problem called rebound headache  These headaches happen with regular use of pain relievers for headache disorders  · NSAIDs , such as ibuprofen, help decrease swelling, pain, and fever  This medicine is available with or without a doctor's order  NSAIDs can cause stomach bleeding or kidney problems in certain people  If you take blood thinner medicine, always ask your healthcare provider if NSAIDs are safe for you  Always read the medicine label and follow directions  · Acetaminophen  decreases pain and fever  It is available without a doctor's order  Ask how much to take and how often to take it  Follow directions   Read the labels of all other medicines you are using to see if they also contain acetaminophen, or ask your doctor or pharmacist  Acetaminophen can cause liver damage if not taken correctly  Do not use more than 3 grams (3,000 milligrams) total of acetaminophen in one day  · Antidepressants  may be given for some kinds of headaches  · Take your medicine as directed  Contact your healthcare provider if you think your medicine is not helping or if you have side effects  Tell him or her if you are allergic to any medicine  Keep a list of the medicines, vitamins, and herbs you take  Include the amounts, and when and why you take them  Bring the list or the pill bottles to follow-up visits  Carry your medicine list with you in case of an emergency  Manage your symptoms:   · Apply heat or ice  on the headache area  Use a heat or ice pack  For an ice pack, you can also put crushed ice in a plastic bag  Cover the pack or bag with a towel before you apply it to your skin  Ice and heat both help decrease pain, and heat also helps decrease muscle spasms  Apply heat for 20 to 30 minutes every 2 hours  Apply ice for 15 to 20 minutes every hour  Apply heat or ice for as long and for as many days as directed  You may alternate heat and ice  · Relax your muscles  Lie down in a comfortable position and close your eyes  Relax your muscles slowly  Start at your toes and work your way up your body  · Keep a record of your headaches  Write down when your headaches start and stop  Include your symptoms and what you were doing when the headache began  Record what you ate or drank for 24 hours before the headache started  Describe the pain and where it hurts  Keep track of what you did to treat your headache and if it worked  Prevent an acute headache:   · Avoid anything that triggers an acute headache  Examples include exposure to chemicals, going to high altitude, or not getting enough sleep  Create a regular sleep routine   Go to sleep at the same time and wake up at the same time each day  Do not use electronic devices before bedtime  These may trigger a headache or prevent you from sleeping well  · Do not smoke  Nicotine and other chemicals in cigarettes and cigars can trigger an acute headache or make it worse  Ask your healthcare provider for information if you currently smoke and need help to quit  E-cigarettes or smokeless tobacco still contain nicotine  Talk to your healthcare provider before you use these products  · Limit alcohol as directed  Alcohol can trigger an acute headache or make it worse  If you have cluster headaches, do not drink alcohol during an episode  For other types of headaches, ask your healthcare provider if it is safe for you to drink alcohol  Ask how much is safe for you to drink, and how often  · Exercise as directed  Exercise can reduce tension and help with headache pain  Aim for 30 minutes of physical activity on most days of the week  Your healthcare provider can help you create an exercise plan  · Eat a variety of healthy foods  Healthy foods include fruits, vegetables, low-fat dairy products, lean meats, fish, whole grains, and cooked beans  Your healthcare provider or dietitian can help you create meals plans if you need to avoid foods that trigger headaches  Follow up with your healthcare provider as directed:  Bring your headache record with you when you see your healthcare provider  Write down your questions so you remember to ask them during your visits  © 2017 [facility] Information is for End User's use only and may not be sold, redistributed or otherwise used for commercial purposes  All illustrations and images included in CareNotes® are the copyrighted property of [employer]  or [employer]  The above information is an  only  It is not intended as medical advice for individual conditions or treatments   Talk to your doctor, nurse or pharmacist before following any medical regimen to see if it is safe and effective for you  Acute Headache   WHAT YOU NEED TO KNOW:   What is an acute headache? An acute headache is pain or discomfort that starts suddenly and gets worse quickly  You may have an acute headache only when you feel stress or eat certain foods  Other acute headache pain can happen every day, and sometimes several times a day  What are the most common types of acute headache? · Tension headache  is the most common type of headache  These headaches typically occur in the late afternoon and go away by evening  The pain is usually mild or moderate  You may have problems tolerating bright light or loud noise  The pain is usually across the forehead or in the back of the head, often only on one side  These headaches may occur every day  · Migraine headaches  cause moderate or severe pain  The headache generally lasts from 1 to 3 days and tends to come back  Pain is usually on only one side, but it may change sides  Migraines often occur in the temple, the back of the head, or behind the eye  The pain may throb or be sharp and steady  · A migraine with aura  means you see or feel something before a migraine  You may see a small spot surrounded by bright zigzag lines  Other signs or symptoms may follow the aura  · Cluster headache  pain is usually only on one side  It often causes severe pain, and can last for 30 minutes to 2 hours  These headaches may occur 1 or 2 times each day, more often at night  The pain may wake you  What causes acute headaches? The cause of your headache may not be known   The following conditions can trigger a headache:  · Stress or tension, hours or even days after stressful events    · Fatigue, a lack of sleep or changes in your usual sleep pattern, or a nap during the day    · Menstruation, especially after pregnancy, or use of birth control pills or hormone replacement therapy    · Food such as cured meats, artificial sweeteners, alcohol, dark chocolate, and MSG     · Suddenly not having caffeine if you usually have larger amounts    · A medical problem, such as an infection, tooth pain, neck or sinus pain, thyroid problems, or a tumor    · A head injury  How is the type of acute headache diagnosed and treated? Your healthcare provider will ask you to describe your pain and rate it on a scale from 1 to 10  Tell the provider how often you have headaches and how long they last  Also describe any other symptoms you have along with headaches, such as dizziness or blurred vision  · Medicines  may be given to manage or prevent headaches  The medicine will depend on the type of acute headache you have  Do not wait until the pain is severe before you take your medicine  You may be able to take over-the-counter pain medicines as needed  Examples include NSAIDs and acetaminophen  Ask your healthcare provider which medicine is right for you  Ask how much to take and when to take it  Follow directions  These medicines can cause stomach bleeding or kidney or liver damage if not taken correctly  · Biofeedback  may be used to help you manage stress  Electrodes (wires) are placed on your body and attached to a monitor  You will learn how to change stress reactions  For example, you learn to slow your heart rate when you become upset  · Cognitive behavior therapy,  or stress management, may be used with other therapies to prevent headaches  What can I do to manage my symptoms? · Apply heat or ice  on the headache area  Use a heat or ice pack  For an ice pack, you can also put crushed ice in a plastic bag  Cover the pack or bag with a towel before you apply it to your skin  Ice and heat both help decrease pain, and heat also helps decrease muscle spasms  Apply heat for 20 to 30 minutes every 2 hours  Apply ice for 15 to 20 minutes every hour  Apply heat or ice for as long and for as many days as directed   You may alternate heat and ice     · Relax your muscles  Lie down in a comfortable position and close your eyes  Relax your muscles slowly  Start at your toes and work your way up your body  · Keep a record of your headaches  Write down when your headaches start and stop  Include your symptoms and what you were doing when the headache began  Record what you ate or drank for 24 hours before the headache started  Describe the pain and where it hurts  Keep track of what you did to treat your headache and if it worked  What can I do to prevent an acute headache? · Avoid anything that triggers an acute headache  Examples include exposure to chemicals, going to high altitude, or not getting enough sleep  Create a regular sleep routine  Go to sleep at the same time and wake up at the same time each day  Do not use electronic devices before bedtime  These may trigger a headache or prevent you from sleeping well  · Do not smoke  Nicotine and other chemicals in cigarettes and cigars can trigger an acute headache or make it worse  Ask your healthcare provider for information if you currently smoke and need help to quit  E-cigarettes or smokeless tobacco still contain nicotine  Talk to your healthcare provider before you use these products  · Limit alcohol as directed  Alcohol can trigger an acute headache or make it worse  If you have cluster headaches, do not drink alcohol during an episode  For other types of headaches, ask your healthcare provider if it is safe for you to drink alcohol  Ask how much is safe for you to drink, and how often  · Exercise as directed  Exercise can reduce tension and help with headache pain  Aim for 30 minutes of physical activity on most days of the week  Your healthcare provider can help you create an exercise plan  · Eat a variety of healthy foods  Healthy foods include fruits, vegetables, low-fat dairy products, lean meats, fish, whole grains, and cooked beans   Your healthcare provider or dietitian can help you create meals plans if you need to avoid foods that trigger headaches  When should I seek immediate care? · You have severe pain  · You have numbness or weakness on one side of your face or body  · You have a headache that occurs after a blow to the head, a fall, or other trauma  · You have a headache, are forgetful or confused, or have trouble speaking  · You have a headache, stiff neck, and a fever  When should I contact my healthcare provider? · You have a constant headache and are vomiting  · You have a headache each day that does not get better, even after treatment  · You have changes in your headaches, or new symptoms that occur when you have a headache  · You have questions or concerns about your condition or care  CARE AGREEMENT:   You have the right to help plan your care  Learn about your health condition and how it may be treated  Discuss treatment options with your caregivers to decide what care you want to receive  You always have the right to refuse treatment  The above information is an  only  It is not intended as medical advice for individual conditions or treatments  Talk to your doctor, nurse or pharmacist before following any medical regimen to see if it is safe and effective for you  © 2017 [address] [employer]  Information is for End User's use only and may not be sold, redistributed or otherwise used for commercial purposes  All illustrations and images included in CareNotes® are the copyrighted property of [employer]  or [employer]ach

## 2018-12-02 NOTE — ED PROVIDER NOTES
History  Chief Complaint   Patient presents with    Headache     Pt dev numbness left hand @ 0915 today while making breakfast, Then radiated to left leg  and face, then numbness went to right side hand, arm and leg and face  then all numbness went away after pt took BP meds  then pt began with Headache @ noon   Pt spouse  called Karen Watson is a 29year old female who was brought to the emergency department by ambulance crew due to headache which started about 4 hours prior to arrival correct redness as having photophobia and some numbness and on the hands and nausea  Patient denies loss of consciousness  No loss of visual blurring of vision  Denies weakness on upper or lower extremities    She denies fever chills        History provided by:  Patient and EMS personnel   used: No    Headache   Pain location:  Generalized  Quality:  Dull  Radiates to:  Does not radiate  Severity currently:  10/10  Severity at highest:  10/10  Onset quality:  Sudden  Duration:  4 hours  Timing:  Constant  Progression:  Unchanged  Chronicity:  Recurrent  Similar to prior headaches: yes    Context: bright light and loud noise    Context: not activity, not caffeine, not coughing, not defecating, not eating, not stress, not exposure to cold air, not intercourse and not straining    Relieved by:  Nothing  Worsened by:  Nothing  Ineffective treatments:  None tried  Associated symptoms: no abdominal pain, no back pain, no blurred vision, no congestion, no cough, no diarrhea, no dizziness, no drainage, no ear pain, no eye pain, no facial pain, no fatigue, no fever, no focal weakness, no hearing loss, no loss of balance, no myalgias, no nausea, no near-syncope, no neck pain, no neck stiffness, no numbness, no paresthesias, no photophobia, no seizures, no sinus pressure, no sore throat, no swollen glands, no syncope, no tingling, no URI, no visual change, no vomiting and no weakness        Prior to Admission Medications   Prescriptions Last Dose Informant Patient Reported? Taking? cloNIDine (CATAPRES) 0 1 mg tablet 2018 at Unknown time Self Yes Yes   Sig: Take 0 1 mg by mouth 2 (two) times a day   furosemide (LASIX) 20 mg tablet 2018 at Unknown time Self Yes Yes   Sig: Take by mouth as needed (Pt not sure of the dose amount she takes)   lisinopril (ZESTRIL) 20 mg tablet 2018 at Unknown time  No Yes   Sig: Take 1 tablet (20 mg total) by mouth daily   metoprolol tartrate (LOPRESSOR) 50 mg tablet 2018 at Unknown time  No Yes   Sig: Take 1 5 tablets (75 mg total) by mouth 2 (two) times a day      Facility-Administered Medications: None       Past Medical History:   Diagnosis Date    CHF (congestive heart failure) (HCC)     Generalized anxiety disorder     Hypertension     Kidney stone        Past Surgical History:   Procedure Laterality Date     SECTION      x 2    KNEE ARTHROSCOPY Left     acl/meniscus repair    CO CYSTO/URETERO W/LITHOTRIPSY &INDWELL STENT INSRT Right 2018    Procedure: CYSTOSCOPY URETEROSCOPY, RETROGRADE PYELOGRAM AND INSERTION STENT URETERAL, RIGHT STONE EXTRACTION;  Surgeon: Vivian Liu MD;  Location: [facility];  Service: Urology    CO CYSTOURETHROSCOPY,URETER CATHETER Right 1/15/2018    Procedure: CYSTOSCOPY RETROGRADE PYELOGRAM WITH INSERTION STENT URETERAL;  Surgeon: Camilo Guerin MD;  Location: [facility];  Service: Urology    TUBAL LIGATION         Family History   Problem Relation Age of Onset    Hypertension Mother     Cancer Mother         Thyroid    Hypertension Father      I have reviewed and agree with the history as documented  Social History   Substance Use Topics    Smoking status: Current Some Day Smoker     Packs/day: 0 25    Smokeless tobacco: Never Used      Comment: "soc ial smoker"    Alcohol use No        Review of Systems   Constitutional: Negative for fatigue and fever     HENT: Negative for congestion, ear pain, hearing loss, postnasal drip, sinus pressure and sore throat  Eyes: Negative for blurred vision, photophobia and pain  Respiratory: Negative for cough  Cardiovascular: Negative for syncope and near-syncope  Gastrointestinal: Negative for abdominal pain, diarrhea, nausea and vomiting  Endocrine: Negative  Genitourinary: Negative  Musculoskeletal: Negative for back pain, myalgias, neck pain and neck stiffness  Skin: Negative  Allergic/Immunologic: Negative  Neurological: Positive for headaches  Negative for dizziness, focal weakness, seizures, weakness, numbness, paresthesias and loss of balance  Hematological: Negative  Psychiatric/Behavioral: Negative  Physical Exam  Physical Exam   Constitutional: She is oriented to person, place, and time  She appears well-developed and well-nourished  No distress  HENT:   Head: Normocephalic and atraumatic  Right Ear: External ear normal    Left Ear: External ear normal    Nose: Nose normal    Mouth/Throat: Oropharynx is clear and moist  No oropharyngeal exudate  Eyes: Pupils are equal, round, and reactive to light  Conjunctivae and EOM are normal  Right eye exhibits no discharge  Left eye exhibits no discharge  No scleral icterus  Neck: Normal range of motion  Neck supple  No tracheal deviation present  No thyromegaly present  Cardiovascular: Normal rate, regular rhythm, normal heart sounds and intact distal pulses  Pulmonary/Chest: Effort normal and breath sounds normal  No respiratory distress  Abdominal: Soft  Bowel sounds are normal  She exhibits no distension  Musculoskeletal: Normal range of motion  She exhibits no edema, tenderness or deformity  Lymphadenopathy:     She has no cervical adenopathy  Neurological: She is alert and oriented to person, place, and time  No cranial nerve deficit or sensory deficit  She exhibits normal muscle tone  Coordination normal    Skin: Skin is warm and dry  No rash noted  She is not diaphoretic  No erythema  No pallor  Psychiatric: She has a normal mood and affect  Her behavior is normal  Judgment and thought content normal    Nursing note and vitals reviewed        Vital Signs  ED Triage Vitals   Temp Pulse Respirations Blood Pressure SpO2   -- 12/02/18 1515 12/02/18 1515 12/02/18 1515 12/02/18 1515    70 20 (!) 185/128 99 %      Temp src Heart Rate Source Patient Position - Orthostatic VS BP Location FiO2 (%)   -- -- 12/02/18 1635 12/02/18 1635 --     Lying Left arm       Pain Score       12/02/18 1444       7           Vitals:    12/02/18 1615 12/02/18 1630 12/02/18 1635 12/02/18 1645   BP:   (!) 172/110 (!) 176/100   Pulse: 68 74  75   Patient Position - Orthostatic VS:   Lying        Visual Acuity  Visual Acuity      Most Recent Value   L Pupil Size (mm)  5   R Pupil Size (mm)  5          ED Medications  Medications   sodium chloride 0 9 % bolus 1,000 mL (1,000 mL Intravenous New Bag 12/2/18 1438)   ketorolac (TORADOL) injection 30 mg (30 mg Intravenous Given 12/2/18 1444)   diphenhydrAMINE (BENADRYL) injection 50 mg (50 mg Intravenous Given 12/2/18 1446)   metoclopramide (REGLAN) injection 10 mg (10 mg Intravenous Given 12/2/18 1447)   hydrALAZINE (APRESOLINE) injection 10 mg (10 mg Intravenous Given 12/2/18 1605)   hydrALAZINE (APRESOLINE) injection 10 mg (10 mg Intravenous Given 12/2/18 1643)       Diagnostic Studies  Results Reviewed     Procedure Component Value Units Date/Time    hCG, qualitative pregnancy [[id number]]  (Normal) Collected:  12/02/18 1606    Lab Status:  Final result Specimen:  Blood from Arm, Right Updated:  12/02/18 1640     Preg, Serum Negative    Comprehensive metabolic panel [[id number]]  (Abnormal) Collected:  12/02/18 1606    Lab Status:  Final result Specimen:  Blood from Arm, Right Updated:  12/02/18 1634     Sodium 137 mmol/L      Potassium 4 0 mmol/L      Chloride 104 mmol/L      CO2 27 mmol/L      ANION GAP 6 mmol/L      BUN 24 mg/dL Creatinine 1 14 mg/dL      Glucose 96 mg/dL      Calcium 9 1 mg/dL      AST 25 U/L      ALT 19 U/L      Alkaline Phosphatase 45 U/L      Total Protein 6 1 (L) g/dL      Albumin 3 7 g/dL      Total Bilirubin 0 40 mg/dL      eGFR 64 ml/min/1 73sq m     Narrative:         National Kidney Disease Education Program recommendations are as follows:  GFR calculation is accurate only with a steady state creatinine  Chronic Kidney disease less than 60 ml/min/1 73 sq  meters  Kidney failure less than 15 ml/min/1 73 sq  meters  CBC and differential [[id number]]  (Abnormal) Collected:  12/02/18 1606    Lab Status:  Final result Specimen:  Blood from Arm, Right Updated:  12/02/18 1614     WBC 10 00 Thousand/uL      RBC 4 82 Million/uL      Hemoglobin 13 7 g/dL      Hematocrit 41 0 %      MCV 85 fL      MCH 28 4 pg      MCHC 33 4 g/dL      RDW 13 8 %      MPV 9 6 fL      Platelets 432 Thousands/uL      nRBC 0 /100 WBCs      Neutrophils Relative 78 (H) %      Lymphocytes Relative 15 (L) %      Monocytes Relative 6 %      Eosinophils Relative 1 %      Basophils Relative 0 %      Neutrophils Absolute 7 80 (H) Thousands/µL      Lymphocytes Absolute 1 50 Thousands/µL      Monocytes Absolute 0 60 Thousand/µL      Eosinophils Absolute 0 10 Thousand/µL      Basophils Absolute 0 00 Thousands/µL                  CT head without contrast   Final Result by Autumn Barajas (12/02 1742)   No change  No acute intracranial process  Signed by Nga Monge MD                 Procedures  Procedures       Phone Contacts  ED Phone Contact    ED Course  ED Course as of Dec 02 1757   Sun Dec 02, 2018   1543 Patient is feeling better but her headache is not completely relieved  [phone] Patient is pain-free and would like to go home  I discussed with her the results of her blood tests and CT scan of the head which showed negative results                                  MDM  Number of Diagnoses or Management Options  Cephalalgia: new and requires workup     Amount and/or Complexity of Data Reviewed  Clinical lab tests: ordered and reviewed  Tests in the radiology section of CPT®: ordered and reviewed  Tests in the medicine section of CPT®: ordered and reviewed  Independent visualization of images, tracings, or specimens: yes    Risk of Complications, Morbidity, and/or Mortality  Presenting problems: low  Diagnostic procedures: low  Management options: low    Patient Progress  Patient progress: stable    CritCare Time    Disposition  Final diagnoses:   Cephalalgia     Time reflects when diagnosis was documented in both MDM as applicable and the Disposition within this note     Time User Action Codes Description Comment    12/2/2018  5:55 PM SUZE [place]ak  [address]       ED Disposition     ED Disposition Condition Comment    Discharge  [address] discharge to home/self care  Condition at discharge: Good        Follow-up Information     Follow up With Specialties Details Why Contact Info    Teri Houston DO Internal Medicine In 3 days  [address]  [phone]            Patient's Medications   Discharge Prescriptions    METOCLOPRAMIDE (REGLAN) 10 MG TABLET    Take 1 tablet (10 mg total) by mouth every 6 (six) hours for 12 doses As needed for nausea and/or vomiting  Start Date: 12/2/2018 End Date: 12/5/2018       Order Dose: 10 mg       Quantity: 12 tablet    Refills: 0     No discharge procedures on file      ED Provider  Electronically Signed by           Apurva Mcnamara MD  12/02/18 2841

## 2018-12-03 VITALS
DIASTOLIC BLOOD PRESSURE: 88 MMHG | OXYGEN SATURATION: 97 % | SYSTOLIC BLOOD PRESSURE: 168 MMHG | WEIGHT: 214 LBS | RESPIRATION RATE: 20 BRPM | HEART RATE: 82 BPM | BODY MASS INDEX: 34.54 KG/M2

## 2018-12-18 ENCOUNTER — HOSPITAL ENCOUNTER (EMERGENCY)
Facility: HOSPITAL | Age: 33
Discharge: HOME | End: 2018-12-18
Attending: EMERGENCY MEDICINE | Admitting: EMERGENCY MEDICINE
Payer: OTHER MISCELLANEOUS

## 2018-12-18 ENCOUNTER — APPOINTMENT (EMERGENCY)
Dept: RADIOLOGY | Facility: HOSPITAL | Age: 33
End: 2018-12-18
Payer: OTHER MISCELLANEOUS

## 2018-12-18 VITALS
TEMPERATURE: 97.9 F | SYSTOLIC BLOOD PRESSURE: 159 MMHG | WEIGHT: 214.07 LBS | DIASTOLIC BLOOD PRESSURE: 71 MMHG | HEIGHT: 66 IN | HEART RATE: 87 BPM | RESPIRATION RATE: 20 BRPM | OXYGEN SATURATION: 97 % | BODY MASS INDEX: 34.4 KG/M2

## 2018-12-18 DIAGNOSIS — S23.9XXA THORACIC BACK SPRAIN, INITIAL ENCOUNTER: Primary | ICD-10-CM

## 2018-12-18 LAB — EXT PREG TEST URINE: NEGATIVE

## 2018-12-18 PROCEDURE — 96374 THER/PROPH/DIAG INJ IV PUSH: CPT

## 2018-12-18 PROCEDURE — 81025 URINE PREGNANCY TEST: CPT | Performed by: PHYSICIAN ASSISTANT

## 2018-12-18 PROCEDURE — 99284 EMERGENCY DEPT VISIT MOD MDM: CPT

## 2018-12-18 PROCEDURE — 72072 X-RAY EXAM THORAC SPINE 3VWS: CPT

## 2018-12-18 PROCEDURE — 96372 THER/PROPH/DIAG INJ SC/IM: CPT

## 2018-12-18 RX ORDER — HYDRALAZINE HYDROCHLORIDE 20 MG/ML
10 INJECTION INTRAMUSCULAR; INTRAVENOUS ONCE
Status: COMPLETED | OUTPATIENT
Start: 2018-12-18 | End: 2018-12-18

## 2018-12-18 RX ORDER — HYDROCODONE BITARTRATE AND ACETAMINOPHEN 5; 325 MG/1; MG/1
2 TABLET ORAL ONCE
Status: COMPLETED | OUTPATIENT
Start: 2018-12-18 | End: 2018-12-18

## 2018-12-18 RX ORDER — KETOROLAC TROMETHAMINE 30 MG/ML
30 INJECTION, SOLUTION INTRAMUSCULAR; INTRAVENOUS ONCE
Status: COMPLETED | OUTPATIENT
Start: 2018-12-18 | End: 2018-12-18

## 2018-12-18 RX ORDER — TRAMADOL HYDROCHLORIDE 50 MG/1
50 TABLET ORAL ONCE
Status: COMPLETED | OUTPATIENT
Start: 2018-12-18 | End: 2018-12-18

## 2018-12-18 RX ORDER — CYCLOBENZAPRINE HCL 10 MG
10 TABLET ORAL ONCE
Status: COMPLETED | OUTPATIENT
Start: 2018-12-18 | End: 2018-12-18

## 2018-12-18 RX ORDER — ACETAMINOPHEN 325 MG/1
975 TABLET ORAL ONCE
Status: COMPLETED | OUTPATIENT
Start: 2018-12-18 | End: 2018-12-18

## 2018-12-18 RX ADMIN — TRAMADOL HYDROCHLORIDE 50 MG: 50 TABLET, COATED ORAL at 18:35

## 2018-12-18 RX ADMIN — KETOROLAC TROMETHAMINE 30 MG: 30 INJECTION, SOLUTION INTRAMUSCULAR at 18:36

## 2018-12-18 RX ADMIN — HYDRALAZINE HYDROCHLORIDE 10 MG: 20 INJECTION INTRAMUSCULAR; INTRAVENOUS at 19:37

## 2018-12-18 RX ADMIN — ACETAMINOPHEN 975 MG: 325 TABLET ORAL at 20:14

## 2018-12-18 RX ADMIN — HYDROCODONE BITARTRATE AND ACETAMINOPHEN 2 TABLET: 5; 325 TABLET ORAL at 20:39

## 2018-12-18 RX ADMIN — HYDRALAZINE HYDROCHLORIDE 10 MG: 20 INJECTION INTRAMUSCULAR; INTRAVENOUS at 19:56

## 2018-12-18 RX ADMIN — CYCLOBENZAPRINE HYDROCHLORIDE 10 MG: 10 TABLET, FILM COATED ORAL at 20:38

## 2018-12-19 ENCOUNTER — TELEPHONE (OUTPATIENT)
Dept: EMERGENCY DEPT | Facility: HOSPITAL | Age: 33
End: 2018-12-19

## 2018-12-19 NOTE — DISCHARGE INSTRUCTIONS
Patient to follow up with workman's comp doctor for back injury  Patient to return immediately to the emergency department if numbness or weakness in arms legs or loss of bowel or bladder control or numbness in the groin  Patient to take the medication as directed  Patient is not to drive or use machinery while taking this medication  Patient was given 3 days off to be able to see her workman's comp doctor

## 2018-12-19 NOTE — ED PROVIDER NOTES
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 Taking?    cloNIDine (CATAPRES) 0 1 mg tablet 2018 at Unknown time Self Yes Yes   Sig: Take 0 1 mg by mouth 2 (two) times a day   furosemide (LASIX) 20 mg tablet 2018 at Unknown time Self Yes Yes   Sig: Take by mouth as needed (Pt not sure of the dose amount she takes)   lisinopril (ZESTRIL) 20 mg tablet Past Week at Unknown time  No Yes   Sig: Take 1 tablet (20 mg total) by mouth daily   metoprolol tartrate (LOPRESSOR) 50 mg tablet 2018 at Unknown time  No Yes   Sig: Take 1 5 tablets (75 mg total) by mouth 2 (two) times a day      Facility-Administered Medications: None       Past Medical History:   Diagnosis Date    CHF (congestive heart failure) (HCC)     Generalized anxiety disorder     Hypertension     Kidney stone        Past Surgical History:   Procedure Laterality Date     SECTION      x 2    KNEE ARTHROSCOPY Left     acl/meniscus repair    ID CYSTO/URETERO W/LITHOTRIPSY &INDWELL STENT INSRT Right 2018    Procedure: CYSTOSCOPY URETEROSCOPY, RETROGRADE PYELOGRAM AND INSERTION STENT URETERAL, RIGHT STONE EXTRACTION;  Surgeon: Thierno Farmer MD;  Location: [facility] OR;  Service: Urology    ID CYSTOURETHROSCOPY,URETER CATHETER Right 1/15/2018    Procedure: CYSTOSCOPY RETROGRADE PYELOGRAM WITH INSERTION STENT URETERAL;  Surgeon: Verner Squires, MD;  Location: [facility];  Service: Urology    TUBAL LIGATION         Family History   Problem Relation Age of Onset    Hypertension Mother     Cancer Mother         Thyroid    Hypertension Father      I have reviewed and agree with the history as documented  Social History   Substance Use Topics    Smoking status: Current Some Day Smoker     Packs/day: 0 25    Smokeless tobacco: Never Used      Comment: "soc ial smoker"    Alcohol use No        Review of Systems   Constitutional: Positive for activity change  Negative for chills, fatigue and fever  HENT: Negative for congestion, drooling, ear discharge and ear pain  Eyes: Negative for pain, discharge, redness and itching  Respiratory: Negative for cough, choking, chest tightness and shortness of breath  Cardiovascular: Negative for chest pain, palpitations and leg swelling  Gastrointestinal: Negative for abdominal distention, abdominal pain, constipation and diarrhea  Endocrine: Negative for cold intolerance, heat intolerance and polyuria  Genitourinary: Negative for difficulty urinating, flank pain and urgency  Musculoskeletal: Positive for back pain  Negative for arthralgias, gait problem, joint swelling and myalgias  Skin: Negative for color change, pallor, rash and wound  Neurological: Negative for dizziness, facial asymmetry and headaches  Hematological: Negative for adenopathy  Does not bruise/bleed easily  Psychiatric/Behavioral: Negative for agitation, behavioral problems and confusion  Physical Exam  Physical Exam   Constitutional: She is oriented to person, place, and time  She appears well-developed and well-nourished  HENT:   Head: Normocephalic and atraumatic  Right Ear: External ear normal    Left Ear: External ear normal    Nose: Nose normal    Mouth/Throat: Oropharynx is clear and moist    Eyes: Pupils are equal, round, and reactive to light   Conjunctivae and EOM are normal    Neck: Normal range of motion  Neck supple  Cardiovascular: Normal rate, regular rhythm, normal heart sounds and intact distal pulses  Pulmonary/Chest: Effort normal and breath sounds normal    Abdominal: Soft  Bowel sounds are normal    Genitourinary: Vagina normal and uterus normal    Musculoskeletal: She exhibits tenderness  Neurological: She is alert and oriented to person, place, and time  Skin: Skin is warm and dry  Capillary refill takes less than 2 seconds  No rash noted  No erythema  No pallor  Psychiatric: She has a normal mood and affect  Her behavior is normal  Judgment and thought content normal        Vital Signs  ED Triage Vitals [12/18/18 1814]   Temperature Pulse Respirations Blood Pressure SpO2   97 9 °F (36 6 °C) 76 20 (!) 217/143 100 %      Temp src Heart Rate Source Patient Position - Orthostatic VS BP Location FiO2 (%)   -- -- Sitting Left arm --      Pain Score       9           Vitals:    12/18/18 1814 12/18/18 1815   BP: (!) 217/143 (!) 217/143   Pulse: 76 75   Patient Position - Orthostatic VS: Sitting        Visual Acuity      ED Medications  Medications   traMADol (ULTRAM) tablet 50 mg (50 mg Oral Given 12/18/18 1835)   ketorolac (TORADOL) injection 30 mg (30 mg Intramuscular Given 12/18/18 1836)       Diagnostic Studies  Results Reviewed     Procedure Component Value Units Date/Time    POCT pregnancy, urine [[id number]]  (Normal) Resulted:  12/18/18 1837    Lab Status:  Final result Updated:  12/18/18 1837     EXT PREG TEST UR (Ref: Negative) negative                 XR spine thoracic 3 views    (Results Pending)              Procedures  Procedures       Phone Contacts  ED Phone Contact    ED Course                               [facility]    Disposition  Final diagnoses:   None     ED Disposition     None      Follow-up Information    None         Patient's Medications   Discharge Prescriptions    No medications on file     No discharge procedures on file      ED Provider  Electronically Signed by           Jennyfer Meng PA-C  12/18/18 35111 Partha Lauren PA-C  12/18/18 4077

## 2018-12-20 ENCOUNTER — APPOINTMENT (OUTPATIENT)
Dept: URGENT CARE | Facility: CLINIC | Age: 33
End: 2018-12-20
Payer: OTHER MISCELLANEOUS

## 2018-12-20 ENCOUNTER — TELEPHONE (OUTPATIENT)
Dept: INTERNAL MEDICINE CLINIC | Facility: CLINIC | Age: 33
End: 2018-12-20

## 2018-12-20 PROCEDURE — 99213 OFFICE O/P EST LOW 20 MIN: CPT

## 2018-12-21 ENCOUNTER — TELEPHONE (OUTPATIENT)
Dept: INTERNAL MEDICINE CLINIC | Facility: CLINIC | Age: 33
End: 2018-12-21

## 2018-12-21 ENCOUNTER — OFFICE VISIT (OUTPATIENT)
Dept: INTERNAL MEDICINE CLINIC | Facility: CLINIC | Age: 33
End: 2018-12-21
Payer: COMMERCIAL

## 2018-12-21 VITALS
WEIGHT: 210 LBS | TEMPERATURE: 98.2 F | DIASTOLIC BLOOD PRESSURE: 120 MMHG | HEIGHT: 66 IN | BODY MASS INDEX: 33.75 KG/M2 | SYSTOLIC BLOOD PRESSURE: 182 MMHG | RESPIRATION RATE: 16 BRPM | HEART RATE: 76 BPM

## 2018-12-21 DIAGNOSIS — F41.1 GENERALIZED ANXIETY DISORDER: ICD-10-CM

## 2018-12-21 DIAGNOSIS — I10 ESSENTIAL HYPERTENSION: Primary | ICD-10-CM

## 2018-12-21 DIAGNOSIS — R20.2 PARESTHESIAS: ICD-10-CM

## 2018-12-21 DIAGNOSIS — M54.5 ACUTE LOW BACK PAIN, UNSPECIFIED BACK PAIN LATERALITY, WITH SCIATICA PRESENCE UNSPECIFIED: ICD-10-CM

## 2018-12-21 DIAGNOSIS — R00.2 PALPITATIONS: ICD-10-CM

## 2018-12-21 DIAGNOSIS — I10 ESSENTIAL HYPERTENSION: ICD-10-CM

## 2018-12-21 DIAGNOSIS — I16.1 HYPERTENSIVE EMERGENCY WITHOUT CONGESTIVE HEART FAILURE: ICD-10-CM

## 2018-12-21 DIAGNOSIS — Z78.9 DRUG INTOLERANCE: ICD-10-CM

## 2018-12-21 PROCEDURE — 3008F BODY MASS INDEX DOCD: CPT | Performed by: INTERNAL MEDICINE

## 2018-12-21 PROCEDURE — 99214 OFFICE O/P EST MOD 30 MIN: CPT | Performed by: INTERNAL MEDICINE

## 2018-12-21 PROCEDURE — 93000 ELECTROCARDIOGRAM COMPLETE: CPT | Performed by: INTERNAL MEDICINE

## 2018-12-21 RX ORDER — CLONIDINE HYDROCHLORIDE 0.1 MG/1
0.1 TABLET ORAL 2 TIMES DAILY
Refills: 0 | Status: CANCELLED | OUTPATIENT
Start: 2018-12-21

## 2018-12-21 RX ORDER — METOPROLOL TARTRATE 50 MG/1
100 TABLET, FILM COATED ORAL 2 TIMES DAILY
Qty: 60 TABLET | Refills: 0
Start: 2018-12-21 | End: 2019-03-24 | Stop reason: HOSPADM

## 2018-12-21 RX ORDER — AMLODIPINE BESYLATE 5 MG/1
5 TABLET ORAL DAILY
Qty: 90 TABLET | Refills: 0 | Status: SHIPPED | OUTPATIENT
Start: 2018-12-21 | End: 2018-12-21 | Stop reason: SDUPTHER

## 2018-12-21 RX ORDER — AMLODIPINE BESYLATE 5 MG/1
5 TABLET ORAL DAILY
Qty: 90 TABLET | Refills: 0 | Status: SHIPPED | OUTPATIENT
Start: 2018-12-21 | End: 2019-03-24 | Stop reason: HOSPADM

## 2018-12-21 NOTE — PATIENT INSTRUCTIONS

## 2018-12-21 NOTE — TELEPHONE ENCOUNTER
Called office stating she was in earlier today and her Rx's were sent to the wrong pharmacy  She would like for them to go to [facility]  I did take [facility] out and [facility] now retail pharmacy  Could you please resend to [facility]?  Thanks

## 2018-12-24 ENCOUNTER — OCCMED (OUTPATIENT)
Dept: URGENT CARE | Facility: CLINIC | Age: 33
End: 2018-12-24
Payer: OTHER MISCELLANEOUS

## 2018-12-24 DIAGNOSIS — Z02.6 ENCOUNTER RELATED TO WORKER'S COMPENSATION CLAIM: Primary | ICD-10-CM

## 2018-12-24 PROCEDURE — 99213 OFFICE O/P EST LOW 20 MIN: CPT

## 2019-01-04 ENCOUNTER — APPOINTMENT (OUTPATIENT)
Dept: URGENT CARE | Facility: CLINIC | Age: 34
End: 2019-01-04
Payer: OTHER MISCELLANEOUS

## 2019-01-04 ENCOUNTER — EVALUATION (OUTPATIENT)
Dept: PHYSICAL THERAPY | Facility: CLINIC | Age: 34
End: 2019-01-04
Payer: OTHER MISCELLANEOUS

## 2019-01-04 DIAGNOSIS — S29.012D STRAIN OF LATISSIMUS DORSI MUSCLE, SUBSEQUENT ENCOUNTER: Primary | ICD-10-CM

## 2019-01-04 PROCEDURE — 97140 MANUAL THERAPY 1/> REGIONS: CPT | Performed by: PHYSICAL THERAPIST

## 2019-01-04 PROCEDURE — 97014 ELECTRIC STIMULATION THERAPY: CPT | Performed by: PHYSICAL THERAPIST

## 2019-01-04 PROCEDURE — 97162 PT EVAL MOD COMPLEX 30 MIN: CPT | Performed by: PHYSICAL THERAPIST

## 2019-01-04 NOTE — PROGRESS NOTES
PT Evaluation     Today's date: 2019  Patient name: Denece Riedel  : 1985  MRN: [id number]  Referring provider: Taylor Garcia PA-C  Dx:   Encounter Diagnosis     ICD-10-CM    1  Strain of latissimus dorsi muscle, subsequent encounter S29 012D      Assessment  Assessment details: Denece Riedel is a 29 y o  female presenting to outpatient physical therapy with diagnosis of strain of latissimus dorsi muscle  Patient presents with pain, reduced range of motion, reduced strength, reduced postural awareness, and reduced functional activity tolerance  She has been placed on light duty at this point  Patient to benefit from skilled outpatient physical therapy to reduce pain, maximize pain free range of motion, increase strength and stability, and improve functional mobility/functional activity in order to maximize return to prior level of function with reduced limitations  Thank you for your referral     Impairments: abnormal muscle tone, abnormal or restricted ROM, activity intolerance, impaired physical strength, lacks appropriate home exercise program, pain with function, scapular dyskinesis and poor posture   Understanding of Dx/Px/POC: good   Prognosis: good    Goals  STGs to be achieved in 4 weeks:  1  Pt to demonstrate reduced subjective pain rating "at worst" by at least 2-3 points from Initial Eval in order to allow for reduced pain with ADLs and improved functional activity tolerance  2  Pt to demonstrate increased AROM of lumbar spine by at least 5-10 degrees in order to allow for greater ease and independence with ADLs and functional mobility  3  Pt to demonstrate full strength of bilateral UEs in order to maximize joint mobility and function and allow for progression of exercise program and achievement of goals  4  Pt to demonstrate increased MMT of bilateral LEs by at least 1/2-1 grade in order to improve safety and stability with ADLs and functional mobility     5  Pt to demonstrate ability to lift up to 5 lbs from waist to shoulder height without LOB or symptoms  6  Pt to demonstrate ability to lift up to 10 lbs from floor to waist without LOB or symptom reproduction    LTGs to be achieved in 6-8 weeks:  1  Pt will be I with HEP in order to continue to improve quality of life and independence and reduce risk for re-injury  2  Pt to demonstrate return to full duty work without limitations or restrictions  3  Pt to demonstrate improved function as noted by achieving or exceeding predicted score on FOTO outcomes assessment tool  Plan  Patient would benefit from: skilled physical therapy  Other planned modality interventions: Modalities prn for symptom management  Planned therapy interventions: manual therapy, neuromuscular re-education, Plaza taping, therapeutic exercise and home exercise program  Frequency: 3x week  Duration in weeks: 4  Treatment plan discussed with: patient        Subjective Evaluation    History of Present Illness  Date of onset: 2018  Mechanism of injury: Patient was boosting a patient in bed, larger patient with assist of 3  She felt immediate pain, finished her shift  Went to the ED later that evening  The ED referred to Urgent Care for follow up on 18  She is presently on light duty  She has been sitting more and doing more desk/paperwork  Feels more of a burn  She has her next follow up coming up today  She has been taking a muscle relaxer  She notes that side effects make her sleepy  She has been taking Tylenol as needed  Patient did just get finished with her work day, has not slept yet  Has not slept well in the past few days  Not a recurrent problem   Quality of life: good    Pain  Current pain ratin  At best pain ratin  At worst pain ratin  Location: Mid-back  Relieving factors: medications  Symptom course: Shoulder improved, back no better      Social Support  2  Interior stairs assessed: 3 full flights  Lives in: multiple-level home  Lives with: spouse    Employment status: working  Hand dominance: right      Diagnostic Tests  X-ray: normal (Spurring noted, see results for details)  Treatments  Previous treatment: medication  Patient Goals  Patient goals for therapy: decreased pain, increased motion, increased strength, independence with ADLs/IADLs, return to sport/leisure activities and return to work          Objective     Special Questions  Positive for disturbed sleep and history of trauma  Negative for night pain, bladder dysfunction, bowel dysfunction and history of cancer    Additional Special Questions  Sleeps during the day      Postural Observations  Seated posture: fair  Standing posture: fair  Correction of posture: has no consistent effect        Tenderness     Additional Tenderness Details  TTP left thoracic PVMs  Hypomobility to T8-L1  Guarded throughout scapular region left > right    Active Range of Motion     Lumbar   Flexion: 60 degrees   Extension: 10 degrees   Left lateral flexion: 10 degrees   Right lateral flexion: 8 degrees     Additional Active Range of Motion Details  Cervical ROM WFL all planes, mildly limited bilateral rotation at end range 2/2 pulling into scapular region  Rotation limited by 50% bilaterally      Strength/Myotome Testing     Left Hip   Planes of Motion   Flexion: 4  Extension: 4  Abduction: 4  Adduction: 4    Right Hip   Planes of Motion   Flexion: 4  Extension: 4  Abduction: 4  Adduction: 4    Left Knee   Flexion: 4  Extension: 4    Right Knee   Flexion: 4  Extension: 4    Left Ankle/Foot   Dorsiflexion: 4  Plantar flexion: 4    Right Ankle/Foot   Dorsiflexion: 4  Plantar flexion: 4    Additional Strength Details  Grossly WFL bilaterally, mildly limited due to scapular discomfort    Precautions: Falls  Re-eval Date: 2/3/19    Date 1/4       Visit Count 1       FOTO        Pain In See IE       Pain Out See IE             Daily Treatment Diary     Manual 10 mins                       Gentle mobs thoracic spine AP, grade II in prone  X hand distraction T-L junction    Date        HS stretch        Prayer stretch 60" x 3 ways       Hip flexor/quad stretch        LTR        SKTC/DKTC        Piriformis stretch        AH        AH with hip abd/add        AH with Alt UE/LEs        Dead bugs         Bridges         Bridge with abd/add        Modified planks         Tband Anti Trunk rotation          Prone alt UEs/Les         Clamshells         90/90 hip abd SL         Prayer stretch for home this weekend along with MH        Modalities         Thermal 10 mins       Estim 10 mins                 Estim: 15 mins IFC to thoracic spine with MH  Patient positioned for comfort with pillow under abdomen  Skin intact pre and post application with no immediate adverse effects noted

## 2019-01-07 ENCOUNTER — OFFICE VISIT (OUTPATIENT)
Dept: PHYSICAL THERAPY | Facility: CLINIC | Age: 34
End: 2019-01-07
Payer: OTHER MISCELLANEOUS

## 2019-01-07 DIAGNOSIS — S29.012D STRAIN OF LATISSIMUS DORSI MUSCLE, SUBSEQUENT ENCOUNTER: Primary | ICD-10-CM

## 2019-01-07 PROCEDURE — 97014 ELECTRIC STIMULATION THERAPY: CPT

## 2019-01-07 PROCEDURE — 97110 THERAPEUTIC EXERCISES: CPT

## 2019-01-08 ENCOUNTER — APPOINTMENT (OUTPATIENT)
Dept: PHYSICAL THERAPY | Facility: CLINIC | Age: 34
End: 2019-01-08
Payer: OTHER MISCELLANEOUS

## 2019-01-10 ENCOUNTER — OFFICE VISIT (OUTPATIENT)
Dept: PHYSICAL THERAPY | Facility: CLINIC | Age: 34
End: 2019-01-10
Payer: OTHER MISCELLANEOUS

## 2019-01-14 ENCOUNTER — OFFICE VISIT (OUTPATIENT)
Dept: PHYSICAL THERAPY | Facility: CLINIC | Age: 34
End: 2019-01-14
Payer: OTHER MISCELLANEOUS

## 2019-01-14 DIAGNOSIS — S29.012D STRAIN OF LATISSIMUS DORSI MUSCLE, SUBSEQUENT ENCOUNTER: Primary | ICD-10-CM

## 2019-01-14 PROCEDURE — 97110 THERAPEUTIC EXERCISES: CPT | Performed by: PHYSICAL THERAPIST

## 2019-01-14 PROCEDURE — 97140 MANUAL THERAPY 1/> REGIONS: CPT | Performed by: PHYSICAL THERAPIST

## 2019-01-14 PROCEDURE — 97014 ELECTRIC STIMULATION THERAPY: CPT | Performed by: PHYSICAL THERAPIST

## 2019-01-14 NOTE — PROGRESS NOTES
Daily Note     Today's date: 2019  Patient name: Lashaun Cohen  : 1985  MRN: [id number]  Referring provider: Xena Bunch PA-C  Dx:   Encounter Diagnosis     ICD-10-CM    1  Strain of latissimus dorsi muscle, subsequent encounter S29 012D      Precautions: Falls  Re-eval Date: 2/3/19    Date      Visit Count 1 2 3     FOTO        Pain In See IE 6 9     Pain Out See IE 4 6       Subjective: I'm really sore today  I think not making PT because I was sick  I was having really bad spasms in my back  Objective: See treatment diary below      Assessment: Tolerated treatment well  Patient demonstrated fatigue post treatment and would benefit from continued PT  Motivated to continue and continue with HEP  Plan: Continue per plan of care  Manual          10 mins resume 10 mins                     Gentle mobs thoracic spine AP, grade II in prone  X hand distraction T-L junction    Date        Aerobic   10 mins  Nustep  L2     HS stretch        Prayer stretch 60" x 3 ways 60" x 3 ways 60" x 3 ways     Hip flexor/quad stretch  4x30" 4x30"     LTR  10x10" 20x10"     SKTC/DKTC  10x10" ea 10x10"     Piriformis stretch  4x30" ea 4x30"     AH  20x/5" 20x/5"     AH with hip abd/add  20x/5"  Ball/blue 20x/5"  Ball/Blue     AH with Alt UE/LEs  UE x20  LE x20 UE x20  LE x20     Dead bugs         Bridges         Bridge with abd/add        Modified planks         Tband Anti Trunk rotation          Prone alt UEs/Les         Clamshells         90/90 hip abd SL         Prayer stretch for home this weekend along with MH        Modalities         Thermal 10 mins 10 mins      Estim 10 mins 10 mins                Estim: 15 mins IFC to thoracic spine with MH  Patient positioned for comfort with pillow under abdomen  Skin intact pre and post application with no immediate adverse effects noted

## 2019-01-16 ENCOUNTER — APPOINTMENT (OUTPATIENT)
Dept: URGENT CARE | Facility: CLINIC | Age: 34
End: 2019-01-16
Payer: OTHER MISCELLANEOUS

## 2019-01-16 ENCOUNTER — OFFICE VISIT (OUTPATIENT)
Dept: PHYSICAL THERAPY | Facility: CLINIC | Age: 34
End: 2019-01-16
Payer: OTHER MISCELLANEOUS

## 2019-01-16 ENCOUNTER — TRANSCRIBE ORDERS (OUTPATIENT)
Dept: ADMINISTRATIVE | Facility: HOSPITAL | Age: 34
End: 2019-01-16

## 2019-01-16 DIAGNOSIS — M54.6 THORACIC BACK PAIN, UNSPECIFIED BACK PAIN LATERALITY, UNSPECIFIED CHRONICITY: Primary | ICD-10-CM

## 2019-01-16 DIAGNOSIS — S29.012D STRAIN OF LATISSIMUS DORSI MUSCLE, SUBSEQUENT ENCOUNTER: Primary | ICD-10-CM

## 2019-01-16 PROCEDURE — 99213 OFFICE O/P EST LOW 20 MIN: CPT

## 2019-01-16 PROCEDURE — 97110 THERAPEUTIC EXERCISES: CPT

## 2019-01-16 NOTE — PROGRESS NOTES
Daily Note     Today's date: 2019  Patient name: Miguel Gandara  : 1985  MRN: [id number]  Referring provider: Ashvin Benitez PA-C  Dx:   Encounter Diagnosis     ICD-10-CM    1  Strain of latissimus dorsi muscle, subsequent encounter S29 012D                   Precautions: Falls  Re-eval Date: 2/3/19    Date     Visit Count 1 2 3 4    FOTO        Pain In See IE 6 9 9    Pain Out See IE 4 6 9      Subjective: I have my first apt with workers comp dr this morning later today      Objective: See treatment diary below  Pt educated and issue Home TENS for LBP management  Pt able to [place]/doff independently with all questions addressed prior to leaving PT department      Assessment: Tolerated treatment fair  Pt encourage performing TE submax as pt indicates elevated pain today  Pt denying any increase pain with TE performed this date  Resume full POC as tolerance  Patient would benefit from continued PT      Plan: Continue per plan of care         Manual          10 mins resume 10 mins Resume NV                    Gentle mobs thoracic spine AP, grade II in prone  X hand distraction T-L junction    Date    Modified  Elevated LBP    Aerobic   10 mins  Nustep  L2 Held    HS stretch        Prayer stretch 60" x 3 ways 60" x 3 ways 60" x 3 ways resume    Hip flexor/quad stretch  4x30" 4x30" 4x30"    LTR  10x10" 20x10" 20x10"    SKTC/DKTC  10x10" ea 10x10" 20x 10"    Piriformis stretch  4x30" ea 4x30" 4x 30"    AH  20x/5" 20x/5" 20x / 5"  PPT 20x 5"    AH with hip abd/add  20x/5"  Ball/blue 20x/5"  Ball/Blue 30x/5"  Ball/Blue    AH with Alt UE/LEs  UE x20  LE x20 UE x20  LE x20UE x20  LE x20     Dead bugs         Bridges         Bridge with abd/add        Modified planks         Tband Anti Trunk rotation          Prone alt UEs/Les         Clamshells         90/90 hip abd SL         Prayer stretch for home this weekend along with MH        Modalities         Thermal 10 mins 10 mins  Pt def Estim 10 mins 10 mins  Issued Home TENS              Estim: 15 mins IFC to thoracic spine with MH  Patient positioned for comfort with pillow under abdomen  Skin intact pre and post application with no immediate adverse effects noted

## 2019-01-17 ENCOUNTER — HOSPITAL ENCOUNTER (OUTPATIENT)
Dept: MRI IMAGING | Facility: HOSPITAL | Age: 34
Discharge: HOME | End: 2019-01-17
Payer: OTHER MISCELLANEOUS

## 2019-01-17 DIAGNOSIS — M54.6 THORACIC BACK PAIN, UNSPECIFIED BACK PAIN LATERALITY, UNSPECIFIED CHRONICITY: ICD-10-CM

## 2019-01-17 PROCEDURE — 72146 MRI CHEST SPINE W/O DYE: CPT

## 2019-01-21 ENCOUNTER — OFFICE VISIT (OUTPATIENT)
Dept: PHYSICAL THERAPY | Facility: CLINIC | Age: 34
End: 2019-01-21
Payer: OTHER MISCELLANEOUS

## 2019-01-21 DIAGNOSIS — S29.012D STRAIN OF LATISSIMUS DORSI MUSCLE, SUBSEQUENT ENCOUNTER: Primary | ICD-10-CM

## 2019-01-21 PROCEDURE — 97110 THERAPEUTIC EXERCISES: CPT

## 2019-01-21 NOTE — PROGRESS NOTES
Daily Note     Today's date: 2019  Patient name: Opal Villanueva  : 1985  MRN: [id number]  Referring provider: Reyna Gibbs PA-C  Dx:   Encounter Diagnosis     ICD-10-CM    1  Strain of latissimus dorsi muscle, subsequent encounter S29 012D        Start Time: 8388  Stop Time: 1020  Total time in clinic (min): 65 minutes  Precautions: Falls  Re-eval Date: 2/3/19    Date    Visit Count 1 2 3 4 5   FOTO        Pain In See IE 6 9 9 8   Pain Out See IE 4 6 9 7     Subjective: "I had to shovel out my car this morning  My back is hurting "      Objective: See treatment diary below      Assessment: Tolerated treatment well  Pt able to complete all exercises despite high pain levels  No increase in pain noted  Patient demonstrated fatigue post treatment and would benefit from continued PT      Plan: Continue per plan of care  Progress treatment as tolerated          Manual          10 mins resume 10 mins Resume NV                    Gentle mobs thoracic spine AP, grade II in prone  X hand distraction T-L junction    Date    Modified  Elevated LBP    Aerobic   10 mins  Nustep  L2 Held 10 mins  Nustep  L2   HS stretch        Prayer stretch 60" x 3 ways 60" x 3 ways 60" x 3 ways resume 4x30"   Hip flexor/quad stretch  4x30" 4x30" 4x30"    LTR  10x10" 20x10" 20x10" 20x10"   SKTC/DKTC  10x10" ea 10x10" 20x 10" 20x 10"   Piriformis stretch  4x30" ea 4x30" 4x 30" 4x30"   AH  20x/5" 20x/5" 20x / 5"  PPT 20x 5" PPT 20x 5"   AH with hip abd/add  20x/5"  Ball/blue 20x/5"  Ball/Blue 30x/5"  Ball/Blue 30x/5"  Ball/Blue   AH with Alt UE/LEs  UE x20  LE x20 UE x20  LE x20UE x20  LE x20  UE x20  LE x20   Dead bugs         Bridges         Bridge with abd/add        Modified planks         Tband Anti Trunk rotation          Prone alt UEs/Les         Clamshells         90/90 hip abd SL         Prayer stretch for home this weekend along with MH        Modalities         Thermal 10 mins 10 mins  Pt def 10'   Estim 10 mins 10 mins  Issued Home TENS              Estim: 15 mins IFC to thoracic spine with MH  Patient positioned for comfort with pillow under abdomen  Skin intact pre and post application with no immediate adverse effects noted

## 2019-01-25 ENCOUNTER — OFFICE VISIT (OUTPATIENT)
Dept: PHYSICAL THERAPY | Facility: CLINIC | Age: 34
End: 2019-01-25
Payer: OTHER MISCELLANEOUS

## 2019-01-25 DIAGNOSIS — S29.012D STRAIN OF LATISSIMUS DORSI MUSCLE, SUBSEQUENT ENCOUNTER: Primary | ICD-10-CM

## 2019-01-25 PROCEDURE — 97140 MANUAL THERAPY 1/> REGIONS: CPT

## 2019-01-25 PROCEDURE — 97110 THERAPEUTIC EXERCISES: CPT

## 2019-01-25 NOTE — PROGRESS NOTES
Daily Note     Today's date: 2019  Patient name: Terri Ashley  : 1985  MRN: [id number]  Referring provider: Jazmyne Foster PA-C  Dx:   Encounter Diagnosis     ICD-10-CM    1  Strain of latissimus dorsi muscle, subsequent encounter S29 012D        Start Time: 0900  Stop Time: 1010  Total time in clinic (min): 70 minutes  Precautions: Falls  Re-eval Date: 2/3/19    Date        Visit Count 6       FOTO        Pain In 8       Pain Out 6         Subjective: "I try wearing the unit at work but the pads aren't sticking and the wires are getting caught on things  I am having more pain today "      Objective: See treatment diary below      Assessment: Tolerated treatment well  Pt able to complete all exercises despite high pain levels  Pt awaiting MRI results  Decreased pain levels are therapy and MT  Patient demonstrated fatigue post treatment and would benefit from continued PT      Plan: Continue per plan of care  Progress treatment as tolerated  Manual          10 mins                       Gentle mobs thoracic spine AP, grade II in prone  X hand distraction T-L junction    Date        Aerobic Resume/  unavail       HS stretch        Prayer stretch 4x30"       Hip flexor/quad stretch        LTR 20x10"       SKTC/DKTC 20x10"       Piriformis stretch 4x30"       AH PPT 20x 5"       AH with hip abd/add 30x/5"  Ball/Blue       AH with Alt UE/LEs UE x20  LE x20       Dead bugs         Bridges         Bridge with abd/add        Modified planks         Tband Anti Trunk rotation          Prone alt UEs/Les         Clamshells         90/90 hip abd SL         Prayer stretch for home this weekend along with         Modalities         Thermal 10 mins       Estim                  Estim: 15 mins IFC to thoracic spine with MH  Patient positioned for comfort with pillow under abdomen  Skin intact pre and post application with no immediate adverse effects noted

## 2019-01-28 ENCOUNTER — OFFICE VISIT (OUTPATIENT)
Dept: PHYSICAL THERAPY | Facility: CLINIC | Age: 34
End: 2019-01-28
Payer: OTHER MISCELLANEOUS

## 2019-01-28 DIAGNOSIS — S29.012D STRAIN OF LATISSIMUS DORSI MUSCLE, SUBSEQUENT ENCOUNTER: Primary | ICD-10-CM

## 2019-01-28 PROCEDURE — 97140 MANUAL THERAPY 1/> REGIONS: CPT | Performed by: PHYSICAL THERAPIST

## 2019-01-28 PROCEDURE — 97110 THERAPEUTIC EXERCISES: CPT | Performed by: PHYSICAL THERAPIST

## 2019-01-28 NOTE — PROGRESS NOTES
Daily Note     Today's date: 2019  Patient name: Ziggy Menchaca  : 1985  MRN: [id number]  Referring provider: Tyler Cummings PA-C  Dx:   Encounter Diagnosis     ICD-10-CM    1  Strain of latissimus dorsi muscle, subsequent encounter S29 012D      Precautions: Falls  Re-eval Date: 2/3/19    Date       Visit Count 6 7      FOTO        Pain In 8 7      Pain Out 6 5        Subjective: I'm doing okay  I had my MRI and I have to get the results  Objective: See treatment diary below      Assessment: Tolerated treatment well  Patient demonstrated fatigue post treatment and would benefit from continued PT  Ongoing TTP throughout PVMs of lower (junctional) T-L/Spine  Responded well to manual therapy  Plan: Continue per plan of care  Manual          10 mins 10 mins                      Gentle mobs thoracic spine AP, grade II-III in prone  X hand distraction T-L junction in prone    Date        Aerobic Resume/  unavail Nu-Step  L4  10 mins      HS stretch        Prayer stretch 4x30" 4x30"      Hip flexor/quad stretch        LTR 20x10" 20x10"      SKTC/DKTC 20x10" 20x10"      Piriformis stretch 4x30" 4x30"      AH PPT 20x 5" PPT 20x 5"      AH with hip abd/add 30x/5"  Ball/Blue 30x/5"  Ball/Blue      AH with Alt UE/LEs UE x20  LE x20 UE x20  LE x20      Dead bugs         Bridges         Bridge with abd/add   Begin this session     Modified planks         Tband Anti Trunk rotation          Prone alt UEs/Les    Begin this session     Clamshells         90/90 hip abd SL         Foam roller (Red)  2 mins      Prayer stretch for home this weekend along with MH        Modalities         Thermal 10 mins Deferred, time constraints      Estim                  Estim: 15 mins IFC to thoracic spine with MH  Patient positioned for comfort with pillow under abdomen  Skin intact pre and post application with no immediate adverse effects noted

## 2019-01-30 ENCOUNTER — APPOINTMENT (OUTPATIENT)
Dept: URGENT CARE | Facility: CLINIC | Age: 34
End: 2019-01-30
Payer: OTHER MISCELLANEOUS

## 2019-01-30 ENCOUNTER — APPOINTMENT (OUTPATIENT)
Dept: PHYSICAL THERAPY | Facility: CLINIC | Age: 34
End: 2019-01-30
Payer: OTHER MISCELLANEOUS

## 2019-01-30 PROCEDURE — 99213 OFFICE O/P EST LOW 20 MIN: CPT

## 2019-02-01 ENCOUNTER — OFFICE VISIT (OUTPATIENT)
Dept: PHYSICAL THERAPY | Facility: CLINIC | Age: 34
End: 2019-02-01
Payer: OTHER MISCELLANEOUS

## 2019-02-01 DIAGNOSIS — S29.012D STRAIN OF LATISSIMUS DORSI MUSCLE, SUBSEQUENT ENCOUNTER: Primary | ICD-10-CM

## 2019-02-01 PROCEDURE — 97110 THERAPEUTIC EXERCISES: CPT

## 2019-02-01 PROCEDURE — 97140 MANUAL THERAPY 1/> REGIONS: CPT

## 2019-02-01 NOTE — PROGRESS NOTES
Daily Note     Today's date: 2019  Patient name: Mariana Iyer  : 1985  MRN: [id number]  Referring provider: Risa Campos PA-C  Dx:   Encounter Diagnosis     ICD-10-CM    1  Strain of latissimus dorsi muscle, subsequent encounter S29 012D                   Precautions: Falls  Re-eval Date: 2/3/19    Date      Visit Count 6 7 8     FOTO        Pain In 8 7 9     Pain Out 6 5 5       Subjective: I am really hurting today  Nothing that I am aware of that contributes to increase pain today  I know I need to keep moving  Saw  yesterday told me just to continue with PT      Objective: See treatment diary below      Assessment: Tolerated treatment fairly well with initial trial of GISTM  Pt encourage to veralize increase pain/sx's during MT  Pt reported good tolerance with reduce mm tig  Patient would benefit from continued PT      Plan: Continue per plan of care  Manual          10 mins 10 mins 15       Gentle mobs thoracic spine AP, grade II-III in prone - NP  X hand distraction T-L junction in prone- NP    Pt positioned in seated, utilizing instrument(s) # 4-5 to T/Lspine region using sweeping/fanningpatient tolerance to  Patient encouraged to hydrate post GISTM session, educated potential bruising and increase temporary muscle discomfort      Date        Aerobic Resume/  unavail Nu-Step  L4  10 mins Nustep  L1-3  10 min     HS stretch        Prayer stretch 4x30" 4x30" 4x30"  pball     Hip flexor/quad stretch        LTR 20x10" 20x10" 3x30"     SKTC/DKTC 20x10" 20x10" 3x30" ea     Piriformis stretch 4x30" 4x30" 3x30"     AH PPT 20x 5" PPT 20x 5"      AH with hip abd/add 30x/5"  Ball/Blue 30x/5"  Ball/Blue      AH with Alt UE/LEs UE x20  LE x20 UE x20  LE x20      Dead bugs         Bridges         Bridge with abd/add   Begin this session *    Modified planks         Tband Anti Trunk rotation          Prone alt UEs/Les    Prone I's  5x5"     Clamshells         90/90 hip abd SL LTP/MTP   Yellow  2x10             Foam roller (Red)  2 mins      Prayer stretch for home this weekend along with MH        Modalities         Thermal 10 mins Deferred, time constraints 10 mins     Estim                  Estim: 15 mins IFC to thoracic spine with MH  Patient positioned for comfort with pillow under abdomen  Skin intact pre and post application with no immediate adverse effects noted

## 2019-02-04 ENCOUNTER — OFFICE VISIT (OUTPATIENT)
Dept: PHYSICAL THERAPY | Facility: CLINIC | Age: 34
End: 2019-02-04
Payer: OTHER MISCELLANEOUS

## 2019-02-04 DIAGNOSIS — S29.012D STRAIN OF LATISSIMUS DORSI MUSCLE, SUBSEQUENT ENCOUNTER: Primary | ICD-10-CM

## 2019-02-04 PROCEDURE — 97140 MANUAL THERAPY 1/> REGIONS: CPT | Performed by: PHYSICAL THERAPIST

## 2019-02-04 PROCEDURE — 97110 THERAPEUTIC EXERCISES: CPT | Performed by: PHYSICAL THERAPIST

## 2019-02-04 NOTE — PROGRESS NOTES
Daily Note     Today's date: 2019  Patient name: Akilah Mireles  : 1985  MRN: [id number]  Referring provider: Sammy Lund PA-C  Dx:   Encounter Diagnosis     ICD-10-CM    1  Strain of latissimus dorsi muscle, subsequent encounter S29 012D           Precautions: Falls  Re-eval Date: 2/3/19    Date     Visit Count 6 7 8 9    FOTO        Pain In 8 7 9 6    Pain Out 6 5 5       Subjective: I felt really good after last PT session, decrease overall LBP      Objective: See treatment diary below      Assessment: Tolerated treatment fair  Patient would benefit from continued PT  Teena Martin reported improvement in pain post treatment  She reported muscle fatigue with progression of scapular stabilization there-ex  Plan: Continue per plan of care  Manual          10 mins 10 mins 15 15 min      Gentle mobs thoracic spine AP, grade II-III in prone   X hand distraction T-L junction in prone- NP    Pt positioned in seated, utilizing instrument(s) Rockblade to T/Lspine region using sweeping/fanningpatient tolerance to  Patient encouraged to hydrate post GISTM session, educated potential bruising and increase temporary muscle discomfort      Date        Aerobic Resume/  unavail Nu-Step  L4  10 mins Nustep  L1-3  10 min Nustep  L1-3  10 min    HS stretch        Prayer stretch 4x30" 4x30" 4x30"  pball 4x30"  pball    Hip flexor/quad stretch        LTR 20x10" 20x10" 3x30"     SKTC/DKTC 20x10" 20x10" 3x30" ea 3x30" ea    Piriformis stretch 4x30" 4x30" 3x30"     AH PPT 20x 5" PPT 20x 5"      AH with hip abd/add 30x/5"  Ball/Blue 30x/5"  Ball/Blue      AH with Alt UE/LEs UE x20  LE x20 UE x20  LE x20      Dead bugs         Bridges         Bridge with abd/add   Begin this session 2x10 add (ball)     Modified planks         Tband Anti Trunk rotation          Prone alt UEs/Les    Prone I's  5x5" Prone I's 20x3", prone T's 20x3"    Clamshells         90/90 hip abd SL         LTP/MTP   Yellow  2x10 Foam roller (Red)  2 mins      Prayer stretch for home this weekend along with MH        Modalities         Thermal 10 mins Deferred, time constraints 10 mins 12 mins    Estim                  Estim: NP this visit

## 2019-02-06 ENCOUNTER — EVALUATION (OUTPATIENT)
Dept: PHYSICAL THERAPY | Facility: CLINIC | Age: 34
End: 2019-02-06
Payer: OTHER MISCELLANEOUS

## 2019-02-06 DIAGNOSIS — S29.012D STRAIN OF LATISSIMUS DORSI MUSCLE, SUBSEQUENT ENCOUNTER: Primary | ICD-10-CM

## 2019-02-06 PROCEDURE — 97164 PT RE-EVAL EST PLAN CARE: CPT | Performed by: PHYSICAL THERAPIST

## 2019-02-06 PROCEDURE — 97110 THERAPEUTIC EXERCISES: CPT

## 2019-02-06 PROCEDURE — 97140 MANUAL THERAPY 1/> REGIONS: CPT

## 2019-02-06 NOTE — PROGRESS NOTES
Daily Note     Today's date: 2019  Patient name: Piter Granados  : 1985  MRN: [id number]  Referring provider: Caroline Garzon PA-C  Dx:   Encounter Diagnosis     ICD-10-CM    1  Strain of latissimus dorsi muscle, subsequent encounter S29 012D                   Precautions: Falls  Re-eval Date: 2/3/19    RTD     Date    Visit Count 6 7 8 9 10   FOTO        Pain In 8 7 9 6 3   Pain Out 6 5 5  0     Subjective: I was doing a lot of blood work yesterday and my back felt ok  I was surprised it didn't bother me this morning    Objective: See treatment diary below      Assessment: Tolerated treatment well  Pt with noted decrease mid thoracic mm discomfort during MT/GISTM  Pt provided min cues for proper form/tech   Patient would benefit from continued PT to improve scap stability  SEE RA for additional findings    Plan: Continue per plan of care  Manual          10 mins 10 mins 15 15 min 30 min     Gentle mobs thoracic spine AP, grade II-III in prone - NP  X hand distraction T-L junction in prone- NP    Pt positioned in seated, utilizing instrument(s) GISTM #4 to T/Lspine region using sweeping/fanning to pt tolerance  Patient encouraged to hydrate post GISTM session, educated potential bruising and increase temporary muscle discomfort      Date        Aerobic Resume/  unavail Nu-Step  L4  10 mins Nustep  L1-3  10 min Nustep  L1-3  10 min TM 2 0 mph  5 min  UBE 90 RPM 5 min retro   HS stretch        Prayer stretch 4x30" 4x30" 4x30"  pball 4x30"  pball 3x1 min  pball   Hip flexor/quad stretch        LTR 20x10" 20x10" 3x30"  4x30"   SKTC/DKTC 20x10" 20x10" 3x30" ea 3x30" ea 1x1 min ea   Piriformis stretch 4x30" 4x30" 3x30"  1x 1 min BL   AH PPT 20x 5" PPT 20x 5"      AH with hip abd/add 30x/5"  Ball/Blue 30x/5"  Ball/Blue      AH with Alt UE/LEs UE x20  LE x20 UE x20  LE x20      Dead bugs         Bridges         Bridge with abd/add   Begin this session 2x10 add (ball)     Modified planks         Tband Anti Trunk rotation          Prone alt UEs/Les    Prone I's  5x5" Prone I's 20x3", prone T's 20x3"    Clamshells         90/90 hip abd SL         LTP/MTP   Yellow  2x10  Claudia  5# 10x, 5" ea  7# 10x 5" ea           Foam roller (Red)  2 mins      Clock TB     10x   horizontal  "X" ea dir   Prayer stretch for home this weekend along with MH        Modalities         Thermal 10 mins Deferred, time constraints 10 mins 12 mins 10 min   Estim                  Estim: NP this visit

## 2019-02-07 NOTE — PROGRESS NOTES
PT Re-Evaluation     Today's date: 2019  Patient name: Radha Michaels  : 1985  MRN: [id number]  Referring provider: Solitario Tang PA-C  Dx:   Encounter Diagnosis     ICD-10-CM    1  Strain of latissimus dorsi muscle, subsequent encounter S29 012D      Assessment  Assessment details: Radha Michaels is a 29 y o  female presenting to outpatient physical therapy with diagnosis of strain of latissimus dorsi muscle  Patient presents with improving pain, improving flexibility and strength, improving postural awareness with self correction, and reduced functional activity tolerance resulting in ongoing light duty assignments  Patient to benefit from skilled outpatient physical therapy to reduce pain, maximize pain free range of motion, increase strength and stability, and improve functional mobility/functional activity in order to maximize return to prior level of function with reduced limitations  Now that pain is improving, will begin with strengthening and return to work simulations  Thank you for your referral     Impairments: abnormal muscle tone, abnormal or restricted ROM, activity intolerance, impaired physical strength, lacks appropriate home exercise program, pain with function, scapular dyskinesis and poor posture   Understanding of Dx/Px/POC: good   Prognosis: good    Goals  STGs to be achieved in 4 weeks:  1  Pt to demonstrate reduced subjective pain rating "at worst" by at least 2-3 points from Initial Eval in order to allow for reduced pain with ADLs and improved functional activity tolerance - met  2  Pt to demonstrate increased AROM of lumbar spine by at least 5-10 degrees in order to allow for greater ease and independence with ADLs and functional mobility -  met  3  Pt to demonstrate full strength of bilateral UEs in order to maximize joint mobility and function and allow for progression of exercise program and achievement of goals - met     4  Pt to demonstrate increased MMT of bilateral LEs by at least 1/2-1 grade in order to improve safety and stability with ADLs and functional mobility - met  5  Pt to demonstrate ability to lift up to 5 lbs from waist to shoulder height without LOB or symptoms - met  6  Pt to demonstrate ability to lift up to 10 lbs from floor to waist without LOB or symptom reproduction - partially met    LTGs to be achieved in 6-8 weeks: ONGOING  1  Pt will be I with HEP in order to continue to improve quality of life and independence and reduce risk for re-injury  2  Pt to demonstrate return to full duty work without limitations or restrictions  3  Pt to demonstrate improved function as noted by achieving or exceeding predicted score on FOTO outcomes assessment tool  Plan  Patient would benefit from: skilled physical therapy  Other planned modality interventions: Modalities prn for symptom management  Planned therapy interventions: manual therapy, neuromuscular re-education, Plaza taping, therapeutic exercise and home exercise program  Frequency: 3x week (2-3x/wk pending work schedule)  Duration in weeks: 6  Treatment plan discussed with: patient        Subjective Evaluation    History of Present Illness  Date of onset: 12/18/2018  Mechanism of injury: UPDATE:  Overall feeling better  She does have ongoing burning/stinging at her bra line and stiffness in her LB  She has been on light duty at 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 at this point  She has a 25 lb lifting restriction  She has an upcoming follow up with [facility] physician  She has been feeling better since she started with instrument assisted soft tissue mobilization  Feels like her spine is moving easier  Has avoided lifting much and notes that mostly she has been lifting 5-7 lbs at work  She is able to sit and stand longer  Only taking OTC meds  Using home TENS  Patient was boosting a patient in bed, larger patient with assist of 3    She felt immediate pain, finished her shift  Went to the ED later that evening  The ED referred to Urgent Care for follow up on 18  She is presently on light duty  She has been sitting more and doing more desk/paperwork  Feels more of a burn  She has her next follow up coming up today  She has been taking a muscle relaxer  She notes that side effects make her sleepy  She has been taking Tylenol as needed  Patient did just get finished with her work day, has not slept yet  Has not slept well in the past few days  Not a recurrent problem   Quality of life: good    Pain  Current pain ratin (After treatment)  At best pain ratin  At worst pain ratin  Location: Mid-back  Relieving factors: medications  Symptom course: Shoulder improved, back no better  Social Support  2  Interior stairs assessed: 3 full flights  Lives in: multiple-level home  Lives with: spouse    Employment status: working  Hand dominance: right      Diagnostic Tests  X-ray: normal (Spurring noted, see results for details)  Treatments  Previous treatment: medication  Patient Goals  Patient goals for therapy: decreased pain, increased motion, increased strength, independence with ADLs/IADLs, return to sport/leisure activities and return to work          Objective     Concurrent Complaints  Positive for disturbed sleep and history of trauma  Negative for night pain, bladder dysfunction, bowel dysfunction and history of cancer    Additional Special Questions  Sleeps during the day      Postural Observations  Seated posture: good  Standing posture: good        Tenderness     Additional Tenderness Details  TTP left thoracic PVMs - improved from [facility]  Hypomobility to T8-L1 - improved, but ongoing  Guarded throughout scapular region left > right - improved from [facility]    Active Range of Motion     Lumbar   Flexion: 60 degrees   Extension: 30 degrees   Left lateral flexion: 35 degrees       Right lateral flexion: 25 degrees     Additional Active Range of Motion Details  Cervical ROM WFL all planes, mildly limited bilateral rotation at end range 2/2 pulling into scapular region - improved from [facility]    Rotation limited by 25% bilaterally      Strength/Myotome Testing     Left Hip   Planes of Motion   Flexion: 4+  Extension: 4+  Abduction: 4+  Adduction: 4+    Right Hip   Planes of Motion   Flexion: 4+  Extension: 4+  Abduction: 4+  Adduction: 4+    Left Knee   Flexion: 4+  Extension: 5    Right Knee   Flexion: 4+  Extension: 5    Left Ankle/Foot   Dorsiflexion: 5  Plantar flexion: 5    Right Ankle/Foot   Dorsiflexion: 5  Plantar flexion: 5    Additional Strength Details  Grossly WFL bilaterally, mildly limited due to scapular discomfort  UE strength 4+/5 at GHJ bilaterally

## 2019-02-08 ENCOUNTER — OFFICE VISIT (OUTPATIENT)
Dept: PHYSICAL THERAPY | Facility: CLINIC | Age: 34
End: 2019-02-08
Payer: OTHER MISCELLANEOUS

## 2019-02-08 DIAGNOSIS — S29.012D STRAIN OF LATISSIMUS DORSI MUSCLE, SUBSEQUENT ENCOUNTER: Primary | ICD-10-CM

## 2019-02-08 PROCEDURE — 97140 MANUAL THERAPY 1/> REGIONS: CPT

## 2019-02-08 NOTE — PROGRESS NOTES
Daily Note     Today's date: 2019  Patient name: Radha Michaels  : 1985  MRN: [id number]  Referring provider: Solitario Tang PA-C  Dx:   Encounter Diagnosis     ICD-10-CM    1  Strain of latissimus dorsi muscle, subsequent encounter S29 012D                   Precautions: Falls  Re-eval Date: 3/5/19    RTD     Date        Visit Count 11       FOTO NV       Pain In 5       Pain Out 3         Subjective:  I only have time for a brief PT session today with another personal commitment  Having HA's past 2 days  Feels like it is coming from my neck    Objective: See treatment diary below    Pt not on PT schedule/conflict with SL boubacar      Assessment: Tolerated treatment fairly well with pt  Demonstrating increase vasomotor response/petechia with GISTM  Pt reported reduce mm tightness/discomfort end rx session  Resume full TE upcoming Patient would benefit from continued PT  To maximize return to PLOF    Plan: Continue per plan of care  Manual  25 min                 Gentle mobs thoracic spine AP, grade II-III in prone - NP  X hand distraction T-L junction in prone- NP    Pt positioned in seated, utilizing instrument(s) GISTM #4 to T/Lspine region using sweeping/fanning to pt tolerance  Patient encouraged to hydrate post GISTM session, educated potential bruising and increase temporary muscle discomfort      TE Held TE x1 visit / resume NV       Aerobic        HS stretch        Prayer stretch        Hip flexor/quad stretch        LTR        SKTC/DKTC        Piriformis stretch        AH        AH with hip abd/add        AH with Alt UE/LEs        Dead bugs         Bridges         Bridge with abd/add        Modified planks         Tband Anti Trunk rotation          Prone alt UEs/Les         Clamshells         90/90 hip abd SL         LTP/MTP Claudia  7#  MTP  10x               Foam roller (Red)        Clock TB        Prayer stretch for home this weekend along with   Rhomberg stretch doorway 1x1 min 2/8/19      Modalities         Thermal 10 mins  Prior to MT       Estim                  Estim: NP this visit

## 2019-02-11 ENCOUNTER — OFFICE VISIT (OUTPATIENT)
Dept: PHYSICAL THERAPY | Facility: CLINIC | Age: 34
End: 2019-02-11
Payer: OTHER MISCELLANEOUS

## 2019-02-11 DIAGNOSIS — S29.012D STRAIN OF LATISSIMUS DORSI MUSCLE, SUBSEQUENT ENCOUNTER: Primary | ICD-10-CM

## 2019-02-11 PROCEDURE — 97110 THERAPEUTIC EXERCISES: CPT

## 2019-02-11 PROCEDURE — 97140 MANUAL THERAPY 1/> REGIONS: CPT

## 2019-02-11 NOTE — PROGRESS NOTES
Daily Note     Today's date: 2019  Patient name: Tawanna Grace  : 1985  MRN: [id number]  Referring provider: Gemini Quintero PA-C  Dx:   Encounter Diagnosis     ICD-10-CM    1  Strain of latissimus dorsi muscle, subsequent encounter S29 012D                   Precautions: Falls  Re-eval Date: 3/5/19    RTD     Date       Visit Count 11 12      FOTO NV Completed      Pain In 5 6      Pain Out 3 2        Subjective: I was ok after last PT session but later in the day I started to have more discomfort mid Tspine  I had a lot I needed to get done that day      Objective: See treatment diary below      Assessment: Tolerated treatment well  Good tolerance with MT/GISTM with pt indicating reduce mm tightness/discomfort  Noted mm tightness BL mid tspine L>R  Patient would benefit from continued PT  Increase core/lumbar and thoracic stability / increase  mobility    Plan: Continue per plan of care  Manual  25 min 30 min                Gentle mobs thoracic spine AP, grade II-III in prone - NP  X hand distraction T-L junction in prone- NP    Pt positioned in seated, utilizing instrument(s) GISTM #4 to T/Lspine region using sweeping/fanning to pt tolerance  Patient encouraged to hydrate post GISTM session, educated potential bruising and increase temporary muscle discomfort      TE Held TE x1 visit / resume NV       Aerobic  Nustep  L1 10 min      HS stretch        Prayer stretch  P ball  Fwd  3x 1 min      Hip flexor/quad stretch        LTR  1x 1min      SKTC/DKTC  1x 1 min ea      Piriformis stretch  1x 1 min ea      AH        AH with hip abd/add        AH with Alt UE/LEs        Dead bugs         Bridges         Bridge with abd/add  Ball/Green  15x      Modified planks         Tband Anti Trunk rotation    Green 10x ea dir      Prone alt UEs/Les   10x ea  UE  LE  Opp UE/LE      Clamshells         90/90 hip abd SL         LTP/MTP Claudia  7#  MTP  10x               Foam roller (Red)        Clock TB        Prayer stretch for home this weekend along with BRUNILDA  Rhomberg stretch doorway 1x1 min 2/8/19      Modalities         Thermal 10 mins  Prior to MT 10 min with supine self stretches      Estim                  Estim: NP this visit

## 2019-02-13 ENCOUNTER — APPOINTMENT (OUTPATIENT)
Dept: URGENT CARE | Facility: CLINIC | Age: 34
End: 2019-02-13
Payer: OTHER MISCELLANEOUS

## 2019-02-13 PROCEDURE — 99213 OFFICE O/P EST LOW 20 MIN: CPT

## 2019-02-20 ENCOUNTER — OFFICE VISIT (OUTPATIENT)
Dept: URGENT CARE | Facility: CLINIC | Age: 34
End: 2019-02-20
Payer: COMMERCIAL

## 2019-02-20 ENCOUNTER — APPOINTMENT (OUTPATIENT)
Dept: RADIOLOGY | Facility: CLINIC | Age: 34
End: 2019-02-20
Payer: COMMERCIAL

## 2019-02-20 VITALS
DIASTOLIC BLOOD PRESSURE: 100 MMHG | OXYGEN SATURATION: 100 % | TEMPERATURE: 98.1 F | SYSTOLIC BLOOD PRESSURE: 204 MMHG | HEART RATE: 80 BPM | RESPIRATION RATE: 18 BRPM

## 2019-02-20 DIAGNOSIS — M54.2 CERVICALGIA: ICD-10-CM

## 2019-02-20 DIAGNOSIS — M54.2 CERVICALGIA: Primary | ICD-10-CM

## 2019-02-20 PROCEDURE — 96372 THER/PROPH/DIAG INJ SC/IM: CPT | Performed by: NURSE PRACTITIONER

## 2019-02-20 PROCEDURE — 99213 OFFICE O/P EST LOW 20 MIN: CPT | Performed by: NURSE PRACTITIONER

## 2019-02-20 PROCEDURE — 72040 X-RAY EXAM NECK SPINE 2-3 VW: CPT

## 2019-02-20 RX ORDER — KETOROLAC TROMETHAMINE 30 MG/ML
30 INJECTION, SOLUTION INTRAMUSCULAR; INTRAVENOUS ONCE
Status: COMPLETED | OUTPATIENT
Start: 2019-02-20 | End: 2019-02-20

## 2019-02-20 RX ADMIN — KETOROLAC TROMETHAMINE 30 MG: 30 INJECTION, SOLUTION INTRAMUSCULAR; INTRAVENOUS at 12:18

## 2019-02-20 NOTE — PATIENT INSTRUCTIONS
Neck Pain   WHAT YOU NEED TO KNOW:   You may have sudden neck pain that increases quickly  You may instead feel pain build slowly over time  Neck pain may go away in a few days or weeks, or it may continue for months  The pain may come and go, or be worse with certain movements  The pain may be only in your neck, or it may move to your arms, back, or shoulders  You may also have pain that starts in another body area and moves to your neck  Some types of neck pain are permanent  DISCHARGE INSTRUCTIONS:   Return to the emergency department if:   · You have an injury that causes neck pain and shooting pain down your arms or legs  · Your neck pain suddenly becomes severe  · You have neck pain along with numbness, tingling, or weakness in your arms or legs  · You have a stiff neck, a headache, and a fever  Contact your healthcare provider if:   · You have new or worsening symptoms  · Your symptoms continue even after treatment  · You have questions or concerns about your condition or care  Medicines: You may need any of the following:  · NSAIDs  , such as ibuprofen, help decrease swelling, pain, and fever  This medicine is available without a doctor's order  Ask your healthcare provider which medicine to take and how often to take it  Follow directions  NSAIDs can cause stomach bleeding or kidney problems if not taken correctly  If you take blood thinner medicine, always ask if NSAIDs are safe for you  · Acetaminophen  helps decrease pain and fever  Ask your healthcare provider how much to take and how often to take it  Follow directions  Acetaminophen can cause liver damage if not taken correctly  · Steroid medicine  may be used to reduce inflammation  This can help relieve pain caused by swelling  · Take your medicine as directed  Contact your healthcare provider if you think your medicine is not helping or if you have side effects  Tell him or her if you are allergic to any medicine  Keep a list of the medicines, vitamins, and herbs you take  Include the amounts, and when and why you take them  Bring the list or the pill bottles to follow-up visits  Carry your medicine list with you in case of an emergency  Manage or prevent neck pain:   · Rest your neck as directed  Do not make sudden movements, such as turning your head quickly  Your healthcare provider may recommend you wear a cervical collar for a short time  The collar will prevent you from moving your head  This will give your neck time to heal if an injury is causing your neck pain  Ask your healthcare provider when you can return to sports or other normal daily activities  · Apply heat as directed  Heat helps relieve pain and swelling  Use a heat wrap, or soak a small towel in warm water  Wring out the extra water  Apply the heat wrap or towel for 20 minutes every hour, or as directed  · Apply ice as directed  Ice helps relieve pain and swelling, and can help prevent tissue damage  Use an ice pack, or put ice in a bag  Cover the ice pack or back with a towel before you apply it to your neck  Apply the ice pack or ice for 15 minutes every hour, or as directed  Your healthcare provider can tell you how often to apply ice  · Do neck exercises as directed  Neck exercises help strengthen the muscles and increase range of motion  Your healthcare provider will tell you which exercises are right for you  He may give you instructions, or he may recommend that you work with a physical therapist  Your healthcare provider or therapist can make sure you are doing the exercises correctly  · Maintain good posture  Try to keep your head and shoulders lifted when you sit  If you work in front of a computer, make sure the monitor is at the right level  You should not need to look up down to see the screen  You should also not have to lean forward to be able to read what is on the screen   Make sure your keyboard, mouse, and other computer items are placed where you do not have to extend your shoulder to reach them  Get up often if you work in front of a computer or sit for long periods of time  Stretch or walk around to keep your neck muscles loose  Follow up with your healthcare provider as directed: Your healthcare provider may refer you to a specialist if your pain does not get better with treatment  Write down your questions so you remember to ask them during your visits  © 2017 [facility] Information is for End User's use only and may not be sold, redistributed or otherwise used for commercial purposes  All illustrations and images included in CareNotes® are the copyrighted property of A D A M [employer] Inc  or [employer]  The above information is an  only  It is not intended as medical advice for individual conditions or treatments  Talk to your doctor, nurse or pharmacist before following any medical regimen to see if it is safe and effective for you
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
## 2019-02-25 ENCOUNTER — OFFICE VISIT (OUTPATIENT)
Dept: INTERNAL MEDICINE CLINIC | Facility: CLINIC | Age: 34
End: 2019-02-25
Payer: COMMERCIAL

## 2019-02-25 VITALS
RESPIRATION RATE: 18 BRPM | HEIGHT: 66 IN | OXYGEN SATURATION: 98 % | SYSTOLIC BLOOD PRESSURE: 190 MMHG | DIASTOLIC BLOOD PRESSURE: 100 MMHG | WEIGHT: 230 LBS | BODY MASS INDEX: 36.96 KG/M2 | TEMPERATURE: 98 F | HEART RATE: 71 BPM

## 2019-02-25 DIAGNOSIS — I10 ESSENTIAL HYPERTENSION: ICD-10-CM
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
## 2019-02-25 DIAGNOSIS — M54.2 CERVICALGIA: ICD-10-CM

## 2019-02-25 PROCEDURE — 99213 OFFICE O/P EST LOW 20 MIN: CPT | Performed by: INTERNAL MEDICINE

## 2019-02-25 PROCEDURE — 3008F BODY MASS INDEX DOCD: CPT | Performed by: INTERNAL MEDICINE

## 2019-02-25 NOTE — PATIENT INSTRUCTIONS
Cervical Strain   AMBULATORY CARE:   A cervical strain  is a stretched or torn muscle or tendon in your neck  Tendons are strong tissues that connect muscles to bones  Common causes of cervical strains include a car accident, a fall, or a sports injury  Seek care immediately if:   · You have pain or numbness from your shoulder down to your hand  · You have problems with your vision, hearing, or balance  · You feel confused or cannot concentrate  · You have problems with movement and strength  Contact your healthcare provider if:   · You have increased swelling or pain in your neck  · You have questions or concerns about your condition or care  Treatment for a cervical strain  may include any of the following:  · Acetaminophen  decreases pain and fever  It is available without a doctor's order  Ask how much to take and how often to take it  Follow directions  Read the labels of all other medicines you are using to see if they also contain acetaminophen, or ask your doctor or pharmacist  Acetaminophen can cause liver damage if not taken correctly  Do not use more than 4 grams (4,000 milligrams) total of acetaminophen in one day  · NSAIDs , such as ibuprofen, help decrease swelling, pain, and fever  This medicine is available with or without a doctor's order  NSAIDs can cause stomach bleeding or kidney problems in certain people  If you take blood thinner medicine, always ask your healthcare provider if NSAIDs are safe for you  Always read the medicine label and follow directions  · Muscle relaxers  help decrease pain and muscle spasms  · Prescription pain medicine  may be given  Ask your healthcare provider how to take this medicine safely  Some prescription pain medicines contain acetaminophen  Do not take other medicines that contain acetaminophen without talking to your healthcare provider  Too much acetaminophen may cause liver damage  Prescription pain medicine may cause constipation  Ask your healthcare provider how to prevent or treat constipation  · Take your medicine as directed  Contact your healthcare provider if you think your medicine is not helping or if you have side effects  Tell him or her if you are allergic to any medicine  Keep a list of the medicines, vitamins, and herbs you take  Include the amounts, and when and why you take them  Bring the list or the pill bottles to follow-up visits  Carry your medicine list with you in case of an emergency  Manage your symptoms:   · Apply heat  on your neck for 15 to 20 minutes, 4 to 6 times a day or as directed  Heat helps decrease pain, stiffness, and muscle spasms  · Begin gentle neck exercises  as soon as you can move your neck without pain  Exercises will help decrease stiffness and improve the strength and movement of your neck  Ask your healthcare provider what kind of exercises you should do  · Gradually return to your usual activities as directed  Stop if you have pain  Avoid activities that can cause more damage to your neck, such as heavy lifting or strenuous exercise  · Sleep without a pillow  to help decrease pain  Instead, roll a small towel tightly and place it under your neck  · Go to physical therapy as directed  A physical therapist teaches you exercises to help improve movement and strength, and to decrease pain  Prevent neck injury:   · Drive safely  Make sure everyone in your car wears a seatbelt  A seatbelt can save your life if you are in an accident  Do not use your cell phone when you are driving  This could distract you and cause an accident  Pull over if you need to make a call or send a text message  · Wear helmets, lifejackets, and protective gear  Always wear a helmet when you ride a bike or motorcycle, go skiing, or play sports that could cause a head injury  Wear protective equipment when you play sports  Wear a lifejacket when you are on a boat or doing water sports    Follow up with your healthcare provider as directed: You may be referred to an orthopedist or physical therapies  Write down your questions so you remember to ask them during your visits  © 2017 [employer] Information is for End User's use only and may not be sold, redistributed or otherwise used for commercial purposes  All illustrations and images included in CareNotes® are the copyrighted property of A [employer] M [employer]  or William Roach  The above information is an  only  It is not intended as medical advice for individual conditions or treatments  Talk to your doctor, nurse or pharmacist before following any medical regimen to see if it is safe and effective for you
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
## 2019-03-15 ENCOUNTER — HOSPITAL ENCOUNTER (EMERGENCY)
Facility: HOSPITAL | Age: 34
Discharge: HOME | End: 2019-03-15
Attending: EMERGENCY MEDICINE | Admitting: EMERGENCY MEDICINE
Payer: COMMERCIAL

## 2019-03-15 VITALS
HEART RATE: 86 BPM | DIASTOLIC BLOOD PRESSURE: 122 MMHG | SYSTOLIC BLOOD PRESSURE: 205 MMHG | BODY MASS INDEX: 36.16 KG/M2 | HEIGHT: 66 IN | RESPIRATION RATE: 18 BRPM | TEMPERATURE: 97.4 F | WEIGHT: 225 LBS | OXYGEN SATURATION: 96 %

## 2019-03-15 DIAGNOSIS — I10 HYPERTENSION: ICD-10-CM

## 2019-03-15 DIAGNOSIS — G43.909 MIGRAINE: Primary | ICD-10-CM

## 2019-03-15 LAB
ALBUMIN SERPL BCP-MCNC: 4 G/DL (ref 3.5–5.7)
ALP SERPL-CCNC: 41 U/L (ref 40–150)
ALT SERPL W P-5'-P-CCNC: 13 U/L (ref 7–52)
ANION GAP SERPL CALCULATED.3IONS-SCNC: 9 MMOL/L (ref 4–13)
AST SERPL W P-5'-P-CCNC: 16 U/L (ref 13–39)
BASOPHILS # BLD AUTO: 0.1 THOUSANDS/ΜL (ref 0–0.1)
BASOPHILS NFR BLD AUTO: 1 % (ref 0–2)
BILIRUB SERPL-MCNC: 0.7 MG/DL (ref 0.2–1)
BUN SERPL-MCNC: 24 MG/DL (ref 7–25)
CALCIUM SERPL-MCNC: 9.7 MG/DL (ref 8.6–10.5)
CHLORIDE SERPL-SCNC: 102 MMOL/L (ref 98–107)
CO2 SERPL-SCNC: 28 MMOL/L (ref 21–31)
CREAT SERPL-MCNC: 1.15 MG/DL (ref 0.6–1.2)
EOSINOPHIL # BLD AUTO: 0.2 THOUSAND/ΜL (ref 0–0.61)
EOSINOPHIL NFR BLD AUTO: 2 % (ref 0–5)
ERYTHROCYTE [DISTWIDTH] IN BLOOD BY AUTOMATED COUNT: 13.8 % (ref 11.5–14.5)
GFR SERPL CREATININE-BSD FRML MDRD: 63 ML/MIN/1.73SQ M
GLUCOSE SERPL-MCNC: 106 MG/DL (ref 65–99)
HCT VFR BLD AUTO: 44 % (ref 42–47)
HGB BLD-MCNC: 14.8 G/DL (ref 12–16)
LYMPHOCYTES # BLD AUTO: 2 THOUSANDS/ΜL (ref 0.6–4.47)
LYMPHOCYTES NFR BLD AUTO: 22 % (ref 21–51)
MCH RBC QN AUTO: 28.4 PG (ref 26–34)
MCHC RBC AUTO-ENTMCNC: 33.6 G/DL (ref 31–37)
MCV RBC AUTO: 85 FL (ref 81–99)
MONOCYTES # BLD AUTO: 0.7 THOUSAND/ΜL (ref 0.17–1.22)
MONOCYTES NFR BLD AUTO: 8 % (ref 2–12)
NEUTROPHILS # BLD AUTO: 6.1 THOUSANDS/ΜL (ref 1.4–6.5)
NEUTS SEG NFR BLD AUTO: 68 % (ref 42–75)
NRBC BLD AUTO-RTO: 0 /100 WBCS
PLATELET # BLD AUTO: 201 THOUSANDS/UL (ref 149–390)
PMV BLD AUTO: 9.2 FL (ref 8.6–11.7)
POTASSIUM SERPL-SCNC: 3.4 MMOL/L (ref 3.5–5.5)
PROT SERPL-MCNC: 6.8 G/DL (ref 6.4–8.9)
RBC # BLD AUTO: 5.2 MILLION/UL (ref 3.9–5.2)
SODIUM SERPL-SCNC: 139 MMOL/L (ref 134–143)
WBC # BLD AUTO: 9.1 THOUSAND/UL (ref 4.8–10.8)

## 2019-03-15 PROCEDURE — 36415 COLL VENOUS BLD VENIPUNCTURE: CPT | Performed by: EMERGENCY MEDICINE

## 2019-03-15 PROCEDURE — 93005 ELECTROCARDIOGRAM TRACING: CPT

## 2019-03-15 PROCEDURE — 80053 COMPREHEN METABOLIC PANEL: CPT | Performed by: EMERGENCY MEDICINE

## 2019-03-15 PROCEDURE — 96374 THER/PROPH/DIAG INJ IV PUSH: CPT

## 2019-03-15 PROCEDURE — 99284 EMERGENCY DEPT VISIT MOD MDM: CPT

## 2019-03-15 PROCEDURE — 96375 TX/PRO/DX INJ NEW DRUG ADDON: CPT

## 2019-03-15 PROCEDURE — 85025 COMPLETE CBC W/AUTO DIFF WBC: CPT | Performed by: EMERGENCY MEDICINE

## 2019-03-15 RX ORDER — KETOROLAC TROMETHAMINE 30 MG/ML
30 INJECTION, SOLUTION INTRAMUSCULAR; INTRAVENOUS ONCE
Status: COMPLETED | OUTPATIENT
Start: 2019-03-15 | End: 2019-03-15

## 2019-03-15 RX ORDER — METOCLOPRAMIDE HYDROCHLORIDE 5 MG/ML
10 INJECTION INTRAMUSCULAR; INTRAVENOUS ONCE
Status: COMPLETED | OUTPATIENT
Start: 2019-03-15 | End: 2019-03-15

## 2019-03-15 RX ORDER — DIPHENHYDRAMINE HYDROCHLORIDE 50 MG/ML
50 INJECTION INTRAMUSCULAR; INTRAVENOUS ONCE
Status: COMPLETED | OUTPATIENT
Start: 2019-03-15 | End: 2019-03-15

## 2019-03-15 RX ORDER — HYDRALAZINE HYDROCHLORIDE 20 MG/ML
20 INJECTION INTRAMUSCULAR; INTRAVENOUS ONCE
Status: COMPLETED | OUTPATIENT
Start: 2019-03-15 | End: 2019-03-15

## 2019-03-15 RX ORDER — LABETALOL 20 MG/4 ML (5 MG/ML) INTRAVENOUS SYRINGE
20 ONCE
Status: COMPLETED | OUTPATIENT
Start: 2019-03-15 | End: 2019-03-15

## 2019-03-15 RX ADMIN — METOCLOPRAMIDE 10 MG: 5 INJECTION, SOLUTION INTRAMUSCULAR; INTRAVENOUS at 19:38

## 2019-03-15 RX ADMIN — HYDRALAZINE HYDROCHLORIDE 20 MG: 20 INJECTION INTRAMUSCULAR; INTRAVENOUS at 19:32

## 2019-03-15 RX ADMIN — KETOROLAC TROMETHAMINE 30 MG: 30 INJECTION, SOLUTION INTRAMUSCULAR; INTRAVENOUS at 19:38

## 2019-03-15 RX ADMIN — LABETALOL 20 MG/4 ML (5 MG/ML) INTRAVENOUS SYRINGE 20 MG: at 20:28

## 2019-03-15 RX ADMIN — DIPHENHYDRAMINE HYDROCHLORIDE 50 MG: 50 INJECTION, SOLUTION INTRAMUSCULAR; INTRAVENOUS at 19:39

## 2019-03-15 NOTE — ED PROVIDER NOTES
History  Chief Complaint   Patient presents with    Migraine     most symptoms resolved at time of triage but pt  states that 30 minutes ago pt  started with excruciating headache pain, "silver vision" described as not being able to see anything, dizziness, and nausea  migraine pain still persists at this time     Patient is a 21-year-old female with history of migraine headaches and history of very refractory hypertension she reports that she has taken all her home medications but her blood pressure spiked this evening and she developed a migraine headache she reports that she frequently gets migraine headaches when her blood pressures running elevated patient reports that her neurologic symptoms associated with the headache and visual aura have since subsided but she still has a headache similar to prior migraines  History provided by:  Patient  Headache   Pain location:  Generalized  Onset quality:  Gradual  Duration:  1 day  Timing:  Constant  Progression:  Worsening  Chronicity:  Recurrent  Similar to prior headaches: yes    Associated symptoms: no abdominal pain, no congestion, no cough, no diarrhea, no dizziness, no ear pain, no fatigue, no fever, no myalgias, no nausea, no numbness, no sore throat, no vomiting and no weakness        Prior to Admission Medications   Prescriptions Last Dose Informant Patient Reported? Taking?    amLODIPine (NORVASC) 5 mg tablet 3/15/2019 at Unknown time  No Yes   Sig: Take 1 tablet (5 mg total) by mouth daily   cloNIDine (CATAPRES) 0 1 mg tablet 3/15/2019 at Unknown time Self Yes Yes   Sig: Take 0 1 mg by mouth 2 (two) times a day   furosemide (LASIX) 20 mg tablet Not Taking at Unknown time Self Yes No   Sig: Take by mouth as needed (Pt not sure of the dose amount she takes)   metoprolol tartrate (LOPRESSOR) 50 mg tablet 3/15/2019 at Unknown time  No Yes   Sig: Take 2 tablets (100 mg total) by mouth 2 (two) times a day   sertraline (ZOLOFT) 50 mg tablet More than a month at Unknown time  No No   Sig: Take 1 tablet (50 mg total) by mouth daily      Facility-Administered Medications: None       Past Medical History:   Diagnosis Date    CHF (congestive heart failure) (HCC)     Generalized anxiety disorder     Hypertension     Kidney stone        Past Surgical History:   Procedure Laterality Date     SECTION      x 2    KNEE ARTHROSCOPY Left     acl/meniscus repair    AL CYSTO/URETERO W/LITHOTRIPSY &INDWELL STENT INSRT Right 2018    Procedure: CYSTOSCOPY URETEROSCOPY, RETROGRADE PYELOGRAM AND INSERTION STENT URETERAL, RIGHT STONE EXTRACTION;  Surgeon: Disha Buitrago MD;  Location: [facility];  Service: Urology    AL CYSTOURETHROSCOPY,URETER CATHETER Right 1/15/2018    Procedure: CYSTOSCOPY RETROGRADE PYELOGRAM WITH INSERTION STENT URETERAL;  Surgeon: Arjun Bermeo MD;  Location: [facility];  Service: Urology    TUBAL LIGATION         Family History   Problem Relation Age of Onset    Hypertension Mother     Cancer Mother         Thyroid    Hypertension Father      I have reviewed and agree with the history as documented  Social History     Tobacco Use    Smoking status: Former Smoker     Packs/day: 0 00    Smokeless tobacco: Never Used   Substance Use Topics    Alcohol use: No    Drug use: No        Review of Systems   Constitutional: Negative for activity change, appetite change, chills, fatigue and fever  HENT: Negative for congestion, ear pain, rhinorrhea and sore throat  Eyes: Positive for visual disturbance  Negative for discharge and redness  Respiratory: Negative for cough, chest tightness, shortness of breath and wheezing  Cardiovascular: Negative for chest pain and palpitations  Gastrointestinal: Negative for abdominal pain, constipation, diarrhea, nausea and vomiting  Endocrine: Negative for polydipsia and polyuria  Genitourinary: Negative for difficulty urinating, dysuria, frequency, hematuria and urgency  Musculoskeletal: Negative for arthralgias and myalgias  Skin: Negative for color change, pallor and rash  Neurological: Positive for headaches  Negative for dizziness, weakness, light-headedness and numbness  Hematological: Negative for adenopathy  Does not bruise/bleed easily  All other systems reviewed and are negative  Physical Exam  Physical Exam   Constitutional: She is oriented to person, place, and time  She appears well-developed and well-nourished  HENT:   Head: Normocephalic and atraumatic  Right Ear: External ear normal    Left Ear: External ear normal    Nose: Nose normal    Mouth/Throat: Oropharynx is clear and moist    Eyes: Pupils are equal, round, and reactive to light  Conjunctivae and EOM are normal    Neck: Normal range of motion  Neck supple  Cardiovascular: Normal rate, regular rhythm, normal heart sounds and intact distal pulses  Pulmonary/Chest: Effort normal and breath sounds normal  No respiratory distress  She has no wheezes  She has no rales  She exhibits no tenderness  Abdominal: Soft  Bowel sounds are normal  She exhibits no distension  There is no tenderness  There is no guarding  Musculoskeletal: Normal range of motion  Neurological: She is alert and oriented to person, place, and time  No cranial nerve deficit or sensory deficit  Skin: Skin is warm and dry  Psychiatric: She has a normal mood and affect  Nursing note and vitals reviewed        Vital Signs  ED Triage Vitals [03/15/19 1909]   Temperature Pulse Respirations Blood Pressure SpO2   (!) 97 4 °F (36 3 °C) 87 18 (!) 255/155 99 %      Temp src Heart Rate Source Patient Position - Orthostatic VS BP Location FiO2 (%)   -- -- Lying Left arm --      Pain Score       8           Vitals:    03/15/19 2000 03/15/19 2045 03/15/19 2115 03/15/19 2130   BP: (!) 221/121 (!) 208/112 (!) 211/119 (!) 205/122   Pulse: 90 86     Patient Position - Orthostatic VS: Sitting  Sitting        qSOFA     Row Name 03/15/19 2130 03/15/19 2115 03/15/19 2045 03/15/19 2000 03/15/19 1930    Altered mental status GCS < 15              Respiratory Rate > / =22        0      Systolic BP < / =865  0  0  0  0  0    Q Sofa Score  0  0  0  0  0    Row Name 03/15/19 1909                Altered mental status GCS < 15          Respiratory Rate > / =43  0        Systolic BP < / =403  0        Q Sofa Score  0              Visual Acuity      ED Medications  Medications   ketorolac (TORADOL) injection 30 mg (30 mg Intravenous Given 3/15/19 1938)   diphenhydrAMINE (BENADRYL) injection 50 mg (50 mg Intravenous Given 3/15/19 1939)   metoclopramide (REGLAN) injection 10 mg (10 mg Intravenous Given 3/15/19 1938)   hydrALAZINE (APRESOLINE) injection 20 mg (20 mg Intravenous Given 3/15/19 1932)   Labetalol HCl (NORMODYNE) injection 20 mg (20 mg Intravenous Given 3/15/19 2028)       Diagnostic Studies  Results Reviewed     Procedure Component Value Units Date/Time    Comprehensive metabolic panel [[id number]]  (Abnormal) Collected:  03/15/19 1929    Lab Status:  Final result Specimen:  Blood from Arm, Left Updated:  03/15/19 2014     Sodium 139 mmol/L      Potassium 3 4 mmol/L      Chloride 102 mmol/L      CO2 28 mmol/L      ANION GAP 9 mmol/L      BUN 24 mg/dL      Creatinine 1 15 mg/dL      Glucose 106 mg/dL      Calcium 9 7 mg/dL      AST 16 U/L      ALT 13 U/L      Alkaline Phosphatase 41 U/L      Total Protein 6 8 g/dL      Albumin 4 0 g/dL      Total Bilirubin 0 70 mg/dL      eGFR 63 ml/min/1 73sq m     Narrative:       National Kidney Disease Education Program recommendations are as follows:  GFR calculation is accurate only with a steady state creatinine  Chronic Kidney disease less than 60 ml/min/1 73 sq  meters  Kidney failure less than 15 ml/min/1 73 sq  meters      CBC and differential [[id number]] Collected:  03/15/19 1929    Lab Status:  Final result Specimen:  Blood from Arm, Left Updated:  03/15/19 1937     WBC 9 10 Thousand/uL RBC 5 20 Million/uL      Hemoglobin 14 8 g/dL      Hematocrit 44 0 %      MCV 85 fL      MCH 28 4 pg      MCHC 33 6 g/dL      RDW 13 8 %      MPV 9 2 fL      Platelets 810 Thousands/uL      nRBC 0 /100 WBCs      Neutrophils Relative 68 %      Lymphocytes Relative 22 %      Monocytes Relative 8 %      Eosinophils Relative 2 %      Basophils Relative 1 %      Neutrophils Absolute 6 10 Thousands/µL      Lymphocytes Absolute 2 00 Thousands/µL      Monocytes Absolute 0 70 Thousand/µL      Eosinophils Absolute 0 20 Thousand/µL      Basophils Absolute 0 10 Thousands/µL                  No orders to display              Procedures  ECG 12 Lead Documentation  Date/Time: 3/15/2019 7:43 PM  Performed by: Laly Mcconnell DO  Authorized by: Laly Mcconnell DO     ECG reviewed by me, the ED Provider: yes    Patient location:  ED  Previous ECG:     Previous ECG:  Compared to current    Comparison ECG info:  No acute changes    Similarity:  No change    Comparison to cardiac monitor: Yes    Rate:     ECG rate:  81    ECG rate assessment: normal    Rhythm:     Rhythm: sinus rhythm    QRS:     QRS axis:  Normal  Conduction:     Conduction normal: LVH  ST segments:     ST segments:  Non-specific  T waves:     T waves: non-specific and inverted             Phone Contacts  ECG 12 Lead Documentation  Date/Time: 3/15/2019 7:43 PM  Performed by: Laly Mcconnell DO  Authorized by: Laly Mcconnell DO     ECG reviewed by me, the ED Provider: yes    Patient location:  ED  Previous ECG:     Previous ECG:  Compared to current    Comparison ECG info:  No acute changes    Similarity:  No change    Comparison to cardiac monitor: Yes    Rate:     ECG rate:  81    ECG rate assessment: normal    Rhythm:     Rhythm: sinus rhythm    QRS:     QRS axis:  Normal  Conduction:     Conduction normal: LVH      ST segments:     ST segments:  Non-specific  T waves:     T waves: non-specific and inverted          ED Course                               MDM  Number of Diagnoses or Management Options  Hypertension: new and requires workup  Migraine: new and requires workup  Diagnosis management comments: Headache is nearly entirely resolved after rest and medications given in the emergency department clearly migraine cocktail and blood pressure control with IV medications of blood pressure is more well controlled now although still elevated the patient does have chronically elevated and difficult to control hypertension  There is no evidence of acute hypertensive emergency her urgency no evidence of any end-organ damage EKG is unchanged from prior  Patient feels better and wishes to return home advised continue all her home antihypertension medications and follow up promptly with her primary physician for further evaluation and continued monitoring and treatment return precautions and anticipatory guidance discussed  Amount and/or Complexity of Data Reviewed  Clinical lab tests: ordered and reviewed  Tests in the medicine section of CPT®: ordered and reviewed  Decide to obtain previous medical records or to obtain history from someone other than the patient: yes  Review and summarize past medical records: yes    Risk of Complications, Morbidity, and/or Mortality  Presenting problems: moderate  Management options: moderate    Patient Progress  Patient progress: stable      Disposition  Final diagnoses:   Migraine   Hypertension     Time reflects when diagnosis was documented in both MDM as applicable and the Disposition within this note     Time User Action Codes Description Comment    3/15/2019  9:17 PM Son Del Real Add [G43 909] Migraine     3/15/2019  9:17 PM Giovana, [address] [facility] Hypertension       ED Disposition     ED Disposition Condition Date/Time Comment    Discharge Stable Fri Mar 15, 2019  9:17 PM Eva Bhat discharge to home/self care              Follow-up Information     Follow up With Specialties Details Why Contact Info    Shanon Mendoza DO Internal Medicine Schedule an appointment as soon as possible for a visit in 2 days  [address]  [phone]            Discharge Medication List as of 3/15/2019  9:17 PM      CONTINUE these medications which have NOT CHANGED    Details   amLODIPine (NORVASC) 5 mg tablet Take 1 tablet (5 mg total) by mouth daily, Starting Fri 12/21/2018, Normal      cloNIDine (CATAPRES) 0 1 mg tablet Take 0 1 mg by mouth 2 (two) times a day, Historical Med      metoprolol tartrate (LOPRESSOR) 50 mg tablet Take 2 tablets (100 mg total) by mouth 2 (two) times a day, Starting Fri 12/21/2018, No Print      furosemide (LASIX) 20 mg tablet Take by mouth as needed (Pt not sure of the dose amount she takes), Historical Med      sertraline (ZOLOFT) 50 mg tablet Take 1 tablet (50 mg total) by mouth daily, Starting Fri 12/21/2018, Normal           No discharge procedures on file      ED Provider  Electronically Signed by           Orlin Roht DO  03/15/19 4916

## 2019-03-15 NOTE — PROGRESS NOTES
Allirosi Álvarez will be discharged from outpatient physical therapy care due to expiration of plan of care  No objective measures updated, Allirosi Álvarez is not present at time of discharge

## 2019-03-16 LAB
ATRIAL RATE: 81 BPM
P AXIS: 56 DEGREES
PR INTERVAL: 152 MS
QRS AXIS: 63 DEGREES
QRSD INTERVAL: 104 MS
QT INTERVAL: 460 MS
QTC INTERVAL: 534 MS
T WAVE AXIS: 249 DEGREES
VENTRICULAR RATE: 81 BPM

## 2019-03-16 PROCEDURE — 93010 ELECTROCARDIOGRAM REPORT: CPT | Performed by: INTERNAL MEDICINE

## 2019-03-18 ENCOUNTER — HOSPITAL ENCOUNTER (EMERGENCY)
Facility: HOSPITAL | Age: 34
Discharge: HOME | End: 2019-03-19
Attending: EMERGENCY MEDICINE | Admitting: EMERGENCY MEDICINE
Payer: COMMERCIAL

## 2019-03-18 DIAGNOSIS — I10 ESSENTIAL HYPERTENSION: Primary | ICD-10-CM

## 2019-03-18 LAB
ANION GAP SERPL CALCULATED.3IONS-SCNC: 5 MMOL/L (ref 4–13)
BASOPHILS # BLD AUTO: 0 THOUSANDS/ΜL (ref 0–0.1)
BASOPHILS NFR BLD AUTO: 1 % (ref 0–2)
BUN SERPL-MCNC: 25 MG/DL (ref 7–25)
CALCIUM SERPL-MCNC: 9.1 MG/DL (ref 8.6–10.5)
CHLORIDE SERPL-SCNC: 104 MMOL/L (ref 98–107)
CO2 SERPL-SCNC: 26 MMOL/L (ref 21–31)
CREAT SERPL-MCNC: 1.41 MG/DL (ref 0.6–1.2)
EOSINOPHIL # BLD AUTO: 0.1 THOUSAND/ΜL (ref 0–0.61)
EOSINOPHIL NFR BLD AUTO: 2 % (ref 0–5)
ERYTHROCYTE [DISTWIDTH] IN BLOOD BY AUTOMATED COUNT: 13.9 % (ref 11.5–14.5)
GFR SERPL CREATININE-BSD FRML MDRD: 49 ML/MIN/1.73SQ M
GLUCOSE SERPL-MCNC: 115 MG/DL (ref 65–99)
HCT VFR BLD AUTO: 43.1 % (ref 42–47)
HGB BLD-MCNC: 14.4 G/DL (ref 12–16)
LYMPHOCYTES # BLD AUTO: 1.4 THOUSANDS/ΜL (ref 0.6–4.47)
LYMPHOCYTES NFR BLD AUTO: 18 % (ref 21–51)
MCH RBC QN AUTO: 28.5 PG (ref 26–34)
MCHC RBC AUTO-ENTMCNC: 33.3 G/DL (ref 31–37)
MCV RBC AUTO: 86 FL (ref 81–99)
MONOCYTES # BLD AUTO: 0.6 THOUSAND/ΜL (ref 0.17–1.22)
MONOCYTES NFR BLD AUTO: 8 % (ref 2–12)
NEUTROPHILS # BLD AUTO: 5.7 THOUSANDS/ΜL (ref 1.4–6.5)
NEUTS SEG NFR BLD AUTO: 73 % (ref 42–75)
NRBC BLD AUTO-RTO: 0 /100 WBCS
PLATELET # BLD AUTO: 187 THOUSANDS/UL (ref 149–390)
PMV BLD AUTO: 9.1 FL (ref 8.6–11.7)
POTASSIUM SERPL-SCNC: 4 MMOL/L (ref 3.5–5.5)
RBC # BLD AUTO: 5.03 MILLION/UL (ref 3.9–5.2)
SODIUM SERPL-SCNC: 135 MMOL/L (ref 134–143)
TROPONIN I SERPL-MCNC: 0.03 NG/ML
WBC # BLD AUTO: 7.9 THOUSAND/UL (ref 4.8–10.8)

## 2019-03-18 PROCEDURE — 96374 THER/PROPH/DIAG INJ IV PUSH: CPT

## 2019-03-18 PROCEDURE — 93005 ELECTROCARDIOGRAM TRACING: CPT

## 2019-03-18 PROCEDURE — 99285 EMERGENCY DEPT VISIT HI MDM: CPT

## 2019-03-18 PROCEDURE — 80048 BASIC METABOLIC PNL TOTAL CA: CPT | Performed by: EMERGENCY MEDICINE

## 2019-03-18 PROCEDURE — 36415 COLL VENOUS BLD VENIPUNCTURE: CPT | Performed by: EMERGENCY MEDICINE

## 2019-03-18 PROCEDURE — 85025 COMPLETE CBC W/AUTO DIFF WBC: CPT | Performed by: EMERGENCY MEDICINE

## 2019-03-18 PROCEDURE — 84484 ASSAY OF TROPONIN QUANT: CPT | Performed by: EMERGENCY MEDICINE

## 2019-03-18 RX ORDER — CLONIDINE HYDROCHLORIDE 0.1 MG/1
0.2 TABLET ORAL ONCE
Status: COMPLETED | OUTPATIENT
Start: 2019-03-18 | End: 2019-03-18

## 2019-03-18 RX ORDER — LABETALOL 20 MG/4 ML (5 MG/ML) INTRAVENOUS SYRINGE
20 ONCE
Status: COMPLETED | OUTPATIENT
Start: 2019-03-18 | End: 2019-03-18

## 2019-03-18 RX ADMIN — CLONIDINE HYDROCHLORIDE 0.2 MG: 0.1 TABLET ORAL at 21:56

## 2019-03-18 RX ADMIN — LABETALOL 20 MG/4 ML (5 MG/ML) INTRAVENOUS SYRINGE 20 MG: at 22:48

## 2019-03-19 VITALS
DIASTOLIC BLOOD PRESSURE: 104 MMHG | SYSTOLIC BLOOD PRESSURE: 182 MMHG | HEART RATE: 69 BPM | HEIGHT: 66 IN | BODY MASS INDEX: 36.16 KG/M2 | OXYGEN SATURATION: 97 % | WEIGHT: 225 LBS | TEMPERATURE: 96.3 F | RESPIRATION RATE: 18 BRPM

## 2019-03-19 LAB
ATRIAL RATE: 65 BPM
P AXIS: 50 DEGREES
PR INTERVAL: 156 MS
QRS AXIS: 87 DEGREES
QRSD INTERVAL: 92 MS
QT INTERVAL: 478 MS
QTC INTERVAL: 497 MS
T WAVE AXIS: 85 DEGREES
VENTRICULAR RATE: 65 BPM

## 2019-03-19 PROCEDURE — 93010 ELECTROCARDIOGRAM REPORT: CPT | Performed by: INTERNAL MEDICINE

## 2019-03-19 RX ORDER — CLONIDINE HYDROCHLORIDE 0.1 MG/1
0.2 TABLET ORAL ONCE
Status: COMPLETED | OUTPATIENT
Start: 2019-03-19 | End: 2019-03-19

## 2019-03-19 RX ADMIN — CLONIDINE HYDROCHLORIDE 0.2 MG: 0.1 TABLET ORAL at 00:19

## 2019-03-19 NOTE — ED NOTES
Patient arrived via private vehicle in ambulance bay  patient was unable to walk and complaining of chest pain and shortness of breath  Stated that she was recently put on blood pressure medication, and after taking in has began to feel dizzy  States that today she took less than a normal dosage, felt dizzy all day and then began having shortness of breath and chest pain  Characterizes the pain as heaviness  Patient also states that she believes that she lost consciousness but is not certain         Boyd Mosley RN  03/18/19 6583

## 2019-03-19 NOTE — ED PROVIDER NOTES
History  Chief Complaint   Patient presents with    Dizziness    Chest Pain    Shortness of Breath    High Blood Pressure     A 25-year-old female with a history of hypertension presents to the emergency department with a chest tightness associated with some shortness of breath and elevated blood pressure which she noted from home monitoring  She denies any headache or visual deficits she does allude to some vague discomfort which is difficulty describing  She reports no sense of fatigue or weakness and no diaphoresis  Denies any belching or burping  She reports no change in the presenting symptoms with activity or rest           Prior to Admission Medications   Prescriptions Last Dose Informant Patient Reported? Taking?    amLODIPine (NORVASC) 5 mg tablet   No No   Sig: Take 1 tablet (5 mg total) by mouth daily   cloNIDine (CATAPRES) 0 1 mg tablet  Self Yes No   Sig: Take 0 1 mg by mouth 2 (two) times a day   furosemide (LASIX) 20 mg tablet  Self Yes No   Sig: Take by mouth as needed (Pt not sure of the dose amount she takes)   metoprolol tartrate (LOPRESSOR) 50 mg tablet   No No   Sig: Take 2 tablets (100 mg total) by mouth 2 (two) times a day   sertraline (ZOLOFT) 50 mg tablet   No No   Sig: Take 1 tablet (50 mg total) by mouth daily      Facility-Administered Medications: None       Past Medical History:   Diagnosis Date    CHF (congestive heart failure) (HCC)     Generalized anxiety disorder     Hypertension     Kidney stone        Past Surgical History:   Procedure Laterality Date     SECTION      x 2    KNEE ARTHROSCOPY Left     acl/meniscus repair    [place] CYSTO/URETERO W/LITHOTRIPSY &INDWELL STENT INSRT Right 2018    Procedure: CYSTOSCOPY URETEROSCOPY, RETROGRADE PYELOGRAM AND INSERTION STENT URETERAL, RIGHT STONE EXTRACTION;  Surgeon: Disha Buitrago MD;  Location: [facility] OR;  Service: Urology    [place] CYSTOURETHROSCOPY,URETER CATHETER Right 1/15/2018    Procedure: CYSTOSCOPY RETROGRADE PYELOGRAM WITH INSERTION STENT URETERAL;  Surgeon: Ajay Loya MD;  [facility];  Service: Urology    TUBAL LIGATION         Family History   Problem Relation Age of Onset    Hypertension Mother     Cancer Mother         Thyroid    Hypertension Father      I have reviewed and agree with the history as documented  Social History     Tobacco Use    Smoking status: Former Smoker     Packs/day: 0 00    Smokeless tobacco: Never Used   Substance Use Topics    Alcohol use: No    Drug use: No        Review of Systems   Constitutional: Negative for chills and fever  HENT: Negative for ear pain, rhinorrhea and sore throat  Eyes: Negative for pain, redness and visual disturbance  Respiratory: Negative for cough and shortness of breath  Cardiovascular: Negative for chest pain and leg swelling  Gastrointestinal: Negative for abdominal pain, diarrhea, nausea and vomiting  Genitourinary: Negative for dysuria, flank pain, frequency and urgency  Musculoskeletal: Negative for back pain, myalgias and neck pain  Skin: Negative for rash  Neurological: Negative for dizziness, weakness, light-headedness and headaches  Hematological: Negative  Psychiatric/Behavioral: Negative for agitation, confusion and suicidal ideas  The patient is not nervous/anxious  All other systems reviewed and are negative  Physical Exam  Physical Exam   Constitutional: She is oriented to person, place, and time  She appears well-developed and well-nourished  HENT:   Nose: Nose normal    Mouth/Throat: Oropharynx is clear and moist  No oropharyngeal exudate  Eyes: Pupils are equal, round, and reactive to light  Conjunctivae and EOM are normal  No scleral icterus  Neck: Normal range of motion  Neck supple  No JVD present  No tracheal deviation present  Cardiovascular: Normal rate, regular rhythm and normal heart sounds  No murmur heard    Pulmonary/Chest: Effort normal and breath sounds normal  No respiratory distress  She has no wheezes  She has no rales  Abdominal: Soft  Bowel sounds are normal  There is no tenderness  There is no guarding  Musculoskeletal: Normal range of motion  She exhibits no edema or tenderness  Neurological: She is alert and oriented to person, place, and time  No cranial nerve deficit or sensory deficit  She exhibits normal muscle tone  5/5 motor, nl sens   Skin: Skin is warm and dry  Psychiatric: She has a normal mood and affect  Her behavior is normal    Nursing note and vitals reviewed  Vital Signs  ED Triage Vitals [03/18/19 2046]   Temperature Pulse Resp Blood Pressure SpO2   (!) 96 3 °F (35 7 °C) 85 -- (!) 273/157 99 %      Temp Source Heart Rate Source Patient Position - Orthostatic VS BP Location FiO2 (%)   Temporal Monitor Sitting Left arm --      Pain Score       7           Vitals:    03/18/19 2046   BP: (!) 273/157   Pulse: 85   Patient Position - Orthostatic VS: Sitting       qSOFA     Row Name 03/18/19 2046                Altered mental status GCS < 15          Respiratory Rate > / =93          Systolic BP < / =295  0        Q Sofa Score                Visual Acuity      ED Medications  Medications - No data to display    Diagnostic Studies  Results Reviewed     None                 No orders to display              Procedures  Procedures       Phone Contacts  ED Phone Contact    ED Course                               MDM  Number of Diagnoses or Management Options  Diagnosis management comments: AT 10:45 PM, THE PATIENT WAS COMFORTABLE  BP WAS 22/110 PENDING LABETOLOL [address] RETURNED UNREMARKABLE      Disposition  Final diagnoses:   None     ED Disposition     None      Follow-up Information    None         Patient's Medications   Discharge Prescriptions    No medications on file     No discharge procedures on file      ED Provider  Electronically Signed by           Javier Ugalde MD  03/18/19 210       [facility] Joan Orozco MD  03/18/19 5566

## 2019-03-19 NOTE — ED PROCEDURE NOTE
PROCEDURE  ECG 12 Lead Documentation  Date/Time: 3/18/2019 9:02 PM  Performed by: Nixon Valera MD  Authorized by: Nixon Valera MD     ECG reviewed by me, the ED Provider: yes    Patient location:  ED  Previous ECG:     Previous ECG:  Unavailable    Comparison to cardiac monitor: No    Interpretation:     Interpretation: abnormal    Rate:     ECG rate:  65    ECG rate assessment: normal    Rhythm:     Rhythm: sinus rhythm    Ectopy:     Ectopy: none    QRS:     QRS axis:  Normal    QRS intervals:  Normal  Other findings:     Other findings: prolonged qTc interval           Nixon Valera MD  03/18/19 5752

## 2019-03-20 ENCOUNTER — OFFICE VISIT (OUTPATIENT)
Dept: CARDIOLOGY CLINIC | Facility: CLINIC | Age: 34
End: 2019-03-20
Payer: COMMERCIAL

## 2019-03-20 ENCOUNTER — HOSPITAL ENCOUNTER (INPATIENT)
Facility: HOSPITAL | Age: 34
LOS: 4 days | Discharge: HOME | DRG: 305 | End: 2019-03-24
Attending: EMERGENCY MEDICINE | Admitting: INTERNAL MEDICINE
Payer: COMMERCIAL

## 2019-03-20 VITALS
WEIGHT: 233 LBS | BODY MASS INDEX: 37.45 KG/M2 | HEART RATE: 64 BPM | DIASTOLIC BLOOD PRESSURE: 110 MMHG | HEIGHT: 66 IN | SYSTOLIC BLOOD PRESSURE: 170 MMHG

## 2019-03-20 DIAGNOSIS — I50.9 OTHER CONGESTIVE HEART FAILURE (HCC): ICD-10-CM

## 2019-03-20 DIAGNOSIS — I10 HTN (HYPERTENSION), MALIGNANT: ICD-10-CM

## 2019-03-20 DIAGNOSIS — I10 HTN (HYPERTENSION), MALIGNANT: Primary | ICD-10-CM

## 2019-03-20 DIAGNOSIS — I10 UNCONTROLLED HYPERTENSION: Primary | ICD-10-CM

## 2019-03-20 LAB
ALBUMIN SERPL BCP-MCNC: 3.4 G/DL (ref 3.5–5)
ALP SERPL-CCNC: 51 U/L (ref 46–116)
ALT SERPL W P-5'-P-CCNC: 25 U/L (ref 12–78)
ANION GAP SERPL CALCULATED.3IONS-SCNC: 6 MMOL/L (ref 4–13)
AST SERPL W P-5'-P-CCNC: 20 U/L (ref 5–45)
BACTERIA UR QL AUTO: ABNORMAL /HPF
BASOPHILS # BLD AUTO: 0.04 THOUSANDS/ΜL (ref 0–0.1)
BASOPHILS NFR BLD AUTO: 1 % (ref 0–1)
BILIRUB SERPL-MCNC: 0.42 MG/DL (ref 0.2–1)
BILIRUB UR QL STRIP: NEGATIVE
BUN SERPL-MCNC: 18 MG/DL (ref 5–25)
CALCIUM SERPL-MCNC: 8.6 MG/DL (ref 8.3–10.1)
CHLORIDE SERPL-SCNC: 110 MMOL/L (ref 100–108)
CLARITY UR: CLEAR
CO2 SERPL-SCNC: 22 MMOL/L (ref 21–32)
COLOR UR: YELLOW
CREAT SERPL-MCNC: 1.09 MG/DL (ref 0.6–1.3)
EOSINOPHIL # BLD AUTO: 0.1 THOUSAND/ΜL (ref 0–0.61)
EOSINOPHIL NFR BLD AUTO: 1 % (ref 0–6)
ERYTHROCYTE [DISTWIDTH] IN BLOOD BY AUTOMATED COUNT: 13 % (ref 11.6–15.1)
GFR SERPL CREATININE-BSD FRML MDRD: 67 ML/MIN/1.73SQ M
GLUCOSE SERPL-MCNC: 100 MG/DL (ref 65–140)
GLUCOSE UR STRIP-MCNC: NEGATIVE MG/DL
HCT VFR BLD AUTO: 44.5 % (ref 34.8–46.1)
HGB BLD-MCNC: 14.4 G/DL (ref 11.5–15.4)
HGB UR QL STRIP.AUTO: NEGATIVE
HYALINE CASTS #/AREA URNS LPF: ABNORMAL /LPF
IMM GRANULOCYTES # BLD AUTO: 0.02 THOUSAND/UL (ref 0–0.2)
IMM GRANULOCYTES NFR BLD AUTO: 0 % (ref 0–2)
KETONES UR STRIP-MCNC: NEGATIVE MG/DL
LEUKOCYTE ESTERASE UR QL STRIP: NEGATIVE
LYMPHOCYTES # BLD AUTO: 1.75 THOUSANDS/ΜL (ref 0.6–4.47)
LYMPHOCYTES NFR BLD AUTO: 22 % (ref 14–44)
MCH RBC QN AUTO: 28.3 PG (ref 26.8–34.3)
MCHC RBC AUTO-ENTMCNC: 32.4 G/DL (ref 31.4–37.4)
MCV RBC AUTO: 88 FL (ref 82–98)
MONOCYTES # BLD AUTO: 0.43 THOUSAND/ΜL (ref 0.17–1.22)
MONOCYTES NFR BLD AUTO: 5 % (ref 4–12)
NEUTROPHILS # BLD AUTO: 5.75 THOUSANDS/ΜL (ref 1.85–7.62)
NEUTS SEG NFR BLD AUTO: 71 % (ref 43–75)
NITRITE UR QL STRIP: NEGATIVE
NON-SQ EPI CELLS URNS QL MICRO: ABNORMAL /HPF
NRBC BLD AUTO-RTO: 0 /100 WBCS
PH UR STRIP.AUTO: 7 [PH] (ref 4.5–8)
PLATELET # BLD AUTO: 195 THOUSANDS/UL (ref 149–390)
PMV BLD AUTO: 11.5 FL (ref 8.9–12.7)
POTASSIUM SERPL-SCNC: 4 MMOL/L (ref 3.5–5.3)
PROT SERPL-MCNC: 6.9 G/DL (ref 6.4–8.2)
PROT UR STRIP-MCNC: ABNORMAL MG/DL
RBC # BLD AUTO: 5.08 MILLION/UL (ref 3.81–5.12)
RBC #/AREA URNS AUTO: ABNORMAL /HPF
SODIUM SERPL-SCNC: 138 MMOL/L (ref 136–145)
SP GR UR STRIP.AUTO: 1.02 (ref 1–1.03)
T4 FREE SERPL-MCNC: 1.17 NG/DL (ref 0.76–1.46)
TROPONIN I SERPL-MCNC: 0.04 NG/ML
TSH SERPL DL<=0.05 MIU/L-ACNC: 3.92 UIU/ML (ref 0.36–3.74)
UROBILINOGEN UR QL STRIP.AUTO: 1 E.U./DL
WBC # BLD AUTO: 8.09 THOUSAND/UL (ref 4.31–10.16)
WBC #/AREA URNS AUTO: ABNORMAL /HPF

## 2019-03-20 PROCEDURE — 93005 ELECTROCARDIOGRAM TRACING: CPT

## 2019-03-20 PROCEDURE — 99253 IP/OBS CNSLTJ NEW/EST LOW 45: CPT | Performed by: EMERGENCY MEDICINE

## 2019-03-20 PROCEDURE — 84484 ASSAY OF TROPONIN QUANT: CPT | Performed by: EMERGENCY MEDICINE

## 2019-03-20 PROCEDURE — 80053 COMPREHEN METABOLIC PANEL: CPT | Performed by: EMERGENCY MEDICINE

## 2019-03-20 PROCEDURE — 36415 COLL VENOUS BLD VENIPUNCTURE: CPT | Performed by: EMERGENCY MEDICINE

## 2019-03-20 PROCEDURE — 85025 COMPLETE CBC W/AUTO DIFF WBC: CPT | Performed by: EMERGENCY MEDICINE

## 2019-03-20 PROCEDURE — 99244 OFF/OP CNSLTJ NEW/EST MOD 40: CPT | Performed by: INTERNAL MEDICINE

## 2019-03-20 PROCEDURE — 84439 ASSAY OF FREE THYROXINE: CPT | Performed by: EMERGENCY MEDICINE

## 2019-03-20 PROCEDURE — 84443 ASSAY THYROID STIM HORMONE: CPT | Performed by: EMERGENCY MEDICINE

## 2019-03-20 PROCEDURE — 99284 EMERGENCY DEPT VISIT MOD MDM: CPT

## 2019-03-20 PROCEDURE — 99223 1ST HOSP IP/OBS HIGH 75: CPT | Performed by: INTERNAL MEDICINE

## 2019-03-20 PROCEDURE — 81001 URINALYSIS AUTO W/SCOPE: CPT

## 2019-03-20 PROCEDURE — 81003 URINALYSIS AUTO W/O SCOPE: CPT

## 2019-03-20 RX ORDER — CALCIUM CARBONATE 200(500)MG
1000 TABLET,CHEWABLE ORAL DAILY PRN
Status: DISCONTINUED | OUTPATIENT
Start: 2019-03-20 | End: 2019-03-24 | Stop reason: HOSPADM

## 2019-03-20 RX ORDER — SPIRONOLACTONE 25 MG/1
25 TABLET ORAL DAILY
Qty: 90 TABLET | Refills: 3 | Status: SHIPPED | OUTPATIENT
Start: 2019-03-20 | End: 2019-03-24 | Stop reason: HOSPADM

## 2019-03-20 RX ORDER — AMLODIPINE BESYLATE 5 MG/1
5 TABLET ORAL DAILY
Status: DISCONTINUED | OUTPATIENT
Start: 2019-03-21 | End: 2019-03-21

## 2019-03-20 RX ORDER — LABETALOL 200 MG/1
200 TABLET, FILM COATED ORAL 2 TIMES DAILY
Status: DISCONTINUED | OUTPATIENT
Start: 2019-03-20 | End: 2019-03-21

## 2019-03-20 RX ORDER — FUROSEMIDE 20 MG/1
20 TABLET ORAL DAILY
Status: DISCONTINUED | OUTPATIENT
Start: 2019-03-21 | End: 2019-03-21

## 2019-03-20 RX ORDER — DEXAMETHASONE 2 MG/1
1 TABLET ORAL ONCE
Status: COMPLETED | OUTPATIENT
Start: 2019-03-20 | End: 2019-03-20

## 2019-03-20 RX ORDER — LABETALOL 200 MG/1
200 TABLET, FILM COATED ORAL 2 TIMES DAILY
Qty: 180 TABLET | Refills: 3 | Status: SHIPPED | OUTPATIENT
Start: 2019-03-20 | End: 2019-03-24 | Stop reason: HOSPADM

## 2019-03-20 RX ORDER — METOPROLOL TARTRATE 50 MG/1
50 TABLET, FILM COATED ORAL EVERY 12 HOURS SCHEDULED
Status: DISCONTINUED | OUTPATIENT
Start: 2019-03-20 | End: 2019-03-21

## 2019-03-20 RX ORDER — LORAZEPAM 2 MG/ML
0.5 INJECTION INTRAMUSCULAR DAILY PRN
Status: DISCONTINUED | OUTPATIENT
Start: 2019-03-20 | End: 2019-03-24 | Stop reason: HOSPADM

## 2019-03-20 RX ORDER — ONDANSETRON 2 MG/ML
4 INJECTION INTRAMUSCULAR; INTRAVENOUS EVERY 6 HOURS PRN
Status: DISCONTINUED | OUTPATIENT
Start: 2019-03-20 | End: 2019-03-24 | Stop reason: HOSPADM

## 2019-03-20 RX ORDER — CLONIDINE HYDROCHLORIDE 0.1 MG/1
0.1 TABLET ORAL 2 TIMES DAILY
Status: DISCONTINUED | OUTPATIENT
Start: 2019-03-20 | End: 2019-03-22

## 2019-03-20 RX ORDER — ACETAMINOPHEN 325 MG/1
650 TABLET ORAL EVERY 6 HOURS PRN
Status: DISCONTINUED | OUTPATIENT
Start: 2019-03-20 | End: 2019-03-24 | Stop reason: HOSPADM

## 2019-03-20 RX ADMIN — SODIUM CHLORIDE 2.5 MG/HR: 0.9 INJECTION, SOLUTION INTRAVENOUS at 20:08

## 2019-03-20 RX ADMIN — LABETALOL HCL 200 MG: 200 TABLET, FILM COATED ORAL at 22:00

## 2019-03-20 RX ADMIN — METOPROLOL TARTRATE 50 MG: 50 TABLET, FILM COATED ORAL at 22:00

## 2019-03-20 RX ADMIN — DEXAMETHASONE 1 MG: 2 TABLET ORAL at 23:03

## 2019-03-20 RX ADMIN — CLONIDINE HYDROCHLORIDE 0.1 MG: 0.1 TABLET ORAL at 22:00

## 2019-03-20 NOTE — ED PROVIDER NOTES
History  Chief Complaint   Patient presents with    Hypertension     Pt reports having high bp for years and was sent by PCP to "get it fixed " Pt reports dizziness, denies cp, sob     13-year-old female presents for evaluation of asymptomatic hypertension  Patient was evaluated by cardiologist earlier today who sent her to the emergency department for further assessment  Patient states she is on 5 antihypertensives and has been compliant with her medications and was seen by cardiologist for the 1st time today  She reports feeling lightheaded intermittently but currently asymptomatic  Denies chest pain, shortness of breath nausea, vomiting fevers, urinary symptoms, change in vision or headache  Prior to Admission Medications   Prescriptions Last Dose Informant Patient Reported? Taking?    amLODIPine (NORVASC) 5 mg tablet  Self No Yes   Sig: Take 1 tablet (5 mg total) by mouth daily   cloNIDine (CATAPRES) 0 1 mg tablet  Self Yes Yes   Sig: Take 0 1 mg by mouth 2 (two) times a day   furosemide (LASIX) 20 mg tablet  Self Yes Yes   Sig: Take by mouth as needed (Pt not sure of the dose amount she takes)   labetalol (NORMODYNE) 200 mg tablet   No Yes   Sig: Take 1 tablet (200 mg total) by mouth 2 (two) times a day   metoprolol tartrate (LOPRESSOR) 50 mg tablet  Self No Yes   Sig: Take 2 tablets (100 mg total) by mouth 2 (two) times a day   spironolactone (ALDACTONE) 25 mg tablet   No Yes   Sig: Take 1 tablet (25 mg total) by mouth daily      Facility-Administered Medications: None       Past Medical History:   Diagnosis Date    CHF (congestive heart failure) (HCC)     Generalized anxiety disorder     Hypertension     Kidney stone        Past Surgical History:   Procedure Laterality Date     SECTION      x 2    KNEE ARTHROSCOPY Left     acl/meniscus repair    [place] CYSTO/URETERO W/LITHOTRIPSY &INDWELL STENT INSRT Right 2018    Procedure: CYSTOSCOPY URETEROSCOPY, RETROGRADE PYELOGRAM AND INSERTION STENT URETERAL, RIGHT STONE EXTRACTION;  Surgeon: Lety Xie MD;  Location[facility];  Service: Urology    [place] CYSTOURETHROSCOPY,URETER CATHETER Right 1/15/2018    Procedure: CYSTOSCOPY RETROGRADE PYELOGRAM WITH INSERTION STENT URETERAL;  Surgeon: Ray Cai MD;  [facility];  Service: Urology    TUBAL LIGATION         Family History   Problem Relation Age of Onset    Hypertension Mother     Cancer Mother         Thyroid    Hypertension Father      I have reviewed and agree with the history as documented  Social History     Tobacco Use    Smoking status: Former Smoker     Packs/day: 0 00     Types: Cigarettes     Last attempt to quit: 2014     Years since quittin 2    Smokeless tobacco: Never Used   Substance Use Topics    Alcohol use: No    Drug use: No        Review of Systems   Constitutional: Negative for chills, diaphoresis and fever  HENT: Negative for congestion and rhinorrhea  Eyes: Negative for pain and visual disturbance  Respiratory: Negative for cough, shortness of breath and wheezing  Cardiovascular: Negative for chest pain and leg swelling  Gastrointestinal: Negative for abdominal pain, diarrhea, nausea and vomiting  Genitourinary: Negative for difficulty urinating, dysuria, frequency and urgency  Musculoskeletal: Negative for back pain and neck pain  Skin: Negative for color change and rash  Neurological: Positive for light-headedness  Negative for syncope, numbness and headaches  All other systems reviewed and are negative        Physical Exam  ED Triage Vitals [19 1715]   Temperature Pulse Respirations Blood Pressure SpO2   98 1 °F (36 7 °C) 83 18 (!) 266/139 100 %      Temp Source Heart Rate Source Patient Position - Orthostatic VS BP Location FiO2 (%)   Tympanic Monitor Lying Right arm --      Pain Score       No Pain             Orthostatic Vital Signs  Vitals:    19 1800 19 1830 19 1900 19 1930 BP: (!) 238/114 (!) 213/99 (!) 218/105 (!) 241/120   Pulse: 74 72 74 82   Patient Position - Orthostatic VS:           Physical Exam   Constitutional: She is oriented to person, place, and time  She appears well-developed and well-nourished  No distress  HENT:   Head: Normocephalic and atraumatic  Eyes: Conjunctivae and EOM are normal    Neck: Normal range of motion  Neck supple  Cardiovascular: Normal rate and regular rhythm  Pulmonary/Chest: Effort normal and breath sounds normal  No respiratory distress  She has no wheezes  She has no rales  Abdominal: Soft  Bowel sounds are normal  There is no tenderness  There is no guarding  Musculoskeletal: Normal range of motion  She exhibits no edema or tenderness  Neurological: She is alert and oriented to person, place, and time  No cranial nerve deficit  Skin: Skin is warm  She is not diaphoretic  No erythema  Psychiatric: She has a normal mood and affect  Her behavior is normal    Nursing note and vitals reviewed        ED Medications  Medications   niCARdipine (CARDENE) 25 mg (STANDARD CONCENTRATION) in sodium chloride 0 9% 250 mL (has no administration in time range)       Diagnostic Studies  Results Reviewed     Procedure Component Value Units Date/Time    Urine Microscopic [[id number]]  (Abnormal) Collected:  03/20/19 1930    Lab Status:  Final result Specimen:  Urine, Clean Catch Updated:  03/20/19 1942     RBC, UA None Seen /hpf      WBC, UA 4-10 /hpf      Epithelial Cells None Seen /hpf      Bacteria, UA Occasional /hpf      Hyaline Casts, UA None Seen /lpf     POCT urinalysis dipstick [[id number]]  (Normal) Resulted:  03/20/19 1930    Lab Status:  Final result Updated:  03/20/19 1931    ED Urine Macroscopic [[id number]]  (Abnormal) Collected:  03/20/19 1930    Lab Status:  Final result Specimen:  Urine Updated:  03/20/19 1930     Color, UA Yellow     Clarity, UA Clear     pH, UA 7 0     Leukocytes, UA Negative     Nitrite, UA Negative Protein, UA 30 (1+) mg/dl      Glucose, UA Negative mg/dl      Ketones, UA Negative mg/dl      Urobilinogen, UA 1 0 E U /dl      Bilirubin, UA Negative     Blood, UA Negative     Specific Gravity, UA 1 025    Narrative:       CLINITEK RESULT    TSH, 3rd generation with Free T4 reflex [[id number]]  (Abnormal) Collected:  03/20/19 1739    Lab Status:  Final result Specimen:  Blood from Arm, Right Updated:  03/20/19 1829     TSH 3RD GENERATON 3 920 uIU/mL     Narrative:       Patients undergoing fluorescein dye angiography may retain small amounts of fluorescein in the body for 48-72 hours post procedure  Samples containing fluorescein can produce falsely depressed TSH values  If the patient had this procedure,a specimen should be resubmitted post fluorescein clearance  T4, free [[id number]] Collected:  03/20/19 1739    Lab Status: In process Specimen:  Blood from Arm, Right Updated:  03/20/19 1829    Troponin I [[id number]]  (Normal) Collected:  03/20/19 1739    Lab Status:  Final result Specimen:  Blood from Arm, Right Updated:  03/20/19 1825     Troponin I 0 04 ng/mL     Comprehensive metabolic panel [[id number]]  (Abnormal) Collected:  03/20/19 1739    Lab Status:  Final result Specimen:  Blood from Arm, Right Updated:  03/20/19 1820     Sodium 138 mmol/L      Potassium 4 0 mmol/L      Chloride 110 mmol/L      CO2 22 mmol/L      ANION GAP 6 mmol/L      BUN 18 mg/dL      Creatinine 1 09 mg/dL      Glucose 100 mg/dL      Calcium 8 6 mg/dL      AST 20 U/L      ALT 25 U/L      Alkaline Phosphatase 51 U/L      Total Protein 6 9 g/dL      Albumin 3 4 g/dL      Total Bilirubin 0 42 mg/dL      eGFR 67 ml/min/1 73sq m     Narrative:       National Kidney Disease Education Program recommendations are as follows:  GFR calculation is accurate only with a steady state creatinine  Chronic Kidney disease less than 60 ml/min/1 73 sq  meters  Kidney failure less than 15 ml/min/1 73 sq  meters      CBC and differential [[id number]] Collected:  03/20/19 1739    Lab Status:  Final result Specimen:  Blood from Arm, Right Updated:  03/20/19 1755     WBC 8 09 Thousand/uL      RBC 5 08 Million/uL      Hemoglobin 14 4 g/dL      Hematocrit 44 5 %      MCV 88 fL      MCH 28 3 pg      MCHC 32 4 g/dL      RDW 13 0 %      MPV 11 5 fL      Platelets 815 Thousands/uL      nRBC 0 /100 WBCs      Neutrophils Relative 71 %      Immat GRANS % 0 %      Lymphocytes Relative 22 %      Monocytes Relative 5 %      Eosinophils Relative 1 %      Basophils Relative 1 %      Neutrophils Absolute 5 75 Thousands/µL      Immature Grans Absolute 0 02 Thousand/uL      Lymphocytes Absolute 1 75 Thousands/µL      Monocytes Absolute 0 43 Thousand/µL      Eosinophils Absolute 0 10 Thousand/µL      Basophils Absolute 0 04 Thousands/µL                  No orders to display         Procedures  Procedures      Phone Consults  ED Phone Contact    ED Course  ED Course as of Mar 20 1956   Wed Mar 20, 2019   6201 Procedure Note: EKG  Date/Time: 03/20/19 5:48 PM   Performed by: Claudine Montalvo  Authorized by: Claudine Montalvo  ECG interpreted by me, the ED Provider: yes   The EKG demonstrates:  Rate 86  Rhythm NSR  QTc 449  No ST elevations/depressions        1923 D/w cardiology who advised to start IV agent to lower BP                                    MDM  Number of Diagnoses or Management Options  Uncontrolled hypertension:   Diagnosis management comments: 71-year-old female presenting with asymptomatic hypertension  Will obtain labs to assess for end-organ damage  Admit to Medicine for further evaluation  Discussed case with admitting team who advised starting cardene gtt  Cardiology also agreed  ICU made aware  Admitted to SD1         Disposition  Final diagnoses:   Uncontrolled hypertension     Time reflects when diagnosis was documented in both MDM as applicable and the Disposition within this note     Time User Action Codes Description Comment    3/20/2019  7:25 PM Claudine Montalvo Add [I16 0] Hypertensive urgency     3/20/2019  7:25 PM Chandler Bobby Remove [I16 0] Hypertensive urgency     3/20/2019  7:25 PM Chandler Bobby Add [I10] Uncontrolled hypertension       ED Disposition     ED Disposition Condition Date/Time Comment    Admit Stable Wed Mar 20, 2019  7:25 PM Case was discussed with SANDI and the patient's admission status was agreed to be Admission Status: inpatient status to the service of Dr Marlene Donald   Follow-up Information    None         Patient's Medications   Discharge Prescriptions    No medications on file     No discharge procedures on file  ED Provider  Attending physically available and evaluated Lindsay Alberto I managed the patient along with the ED Attending      Electronically Signed by         Carlos Juan DO  03/20/19 1956

## 2019-03-20 NOTE — ED ATTENDING ATTESTATION
Mellissa Francis DO, saw and evaluated the patient  I have discussed the patient with the resident/non-physician practitioner and agree with the resident's/non-physician practitioner's findings, Plan of Care, and MDM as documented in the resident's/non-physician practitioner's note, except where noted  All available labs and Radiology studies were reviewed  I was present for key portions of any procedure(s) performed by the resident/non-physician practitioner and I was immediately available to provide assistance  At this point I agree with the current assessment done in the Emergency Department  I have conducted an independent evaluation of this patient a history and physical is as follows:    37 yo female with hx of severe uncontrolled HTN referred by cardiology to ED for evaluation and admission for severe uncontrolled HTN  Fortunately, at this time pt is asymptomatic  She has been having symptoms of lightheadedness, and L sided numbness intermittently over the past few weeks  She is on multiple antihypertensives and is compliant with these  She has been admitted a few times for uncontrolled HTN, gets IV antihypertensives and sometimes gets corrected too quickly and becomes very symptomatic  It is unclear why pt is so refractory to treatment  It seems that she needs to undergo more focused workup, possibly including renal vascular u/s, metanephrines, aldosterone and ACE levels, etc     Imp: uncontrolled severe HTN, currently asymptomatic plan: ECG, BMP  As pt is asymptomatic at this time, would defer any additional antihypertensives in the ED, especially as she has been corrected too quickly in the past and became severely symptomatic   This practice is supported by ACEP clinical guidelines 2015 for asymptomatic HTN, as well as ACC/AHA guidelines 2017 Kimberly WILD et al  See below flow chart from ACC/AHA guidelines 2017:              Critical Care Time  Procedures

## 2019-03-21 ENCOUNTER — APPOINTMENT (INPATIENT)
Dept: NON INVASIVE DIAGNOSTICS | Facility: HOSPITAL | Age: 34
DRG: 305 | End: 2019-03-21
Payer: COMMERCIAL

## 2019-03-21 PROBLEM — Z86.79 HISTORY OF CHF (CONGESTIVE HEART FAILURE): Status: RESOLVED | Noted: 2018-01-15 | Resolved: 2019-03-21

## 2019-03-21 PROBLEM — R51.9 CHRONIC HEADACHES: Status: ACTIVE | Noted: 2019-03-21

## 2019-03-21 PROBLEM — G89.29 CHRONIC HEADACHES: Status: ACTIVE | Noted: 2019-03-21

## 2019-03-21 LAB
ALBUMIN SERPL BCP-MCNC: 3.7 G/DL (ref 3.5–5)
ALP SERPL-CCNC: 52 U/L (ref 46–116)
ALT SERPL W P-5'-P-CCNC: 27 U/L (ref 12–78)
ANION GAP SERPL CALCULATED.3IONS-SCNC: 5 MMOL/L (ref 4–13)
AST SERPL W P-5'-P-CCNC: 13 U/L (ref 5–45)
ATRIAL RATE: 86 BPM
BASOPHILS # BLD AUTO: 0.03 THOUSANDS/ΜL (ref 0–0.1)
BASOPHILS NFR BLD AUTO: 0 % (ref 0–1)
BILIRUB SERPL-MCNC: 0.54 MG/DL (ref 0.2–1)
BUN SERPL-MCNC: 20 MG/DL (ref 5–25)
CALCIUM SERPL-MCNC: 8.5 MG/DL (ref 8.3–10.1)
CHLORIDE SERPL-SCNC: 110 MMOL/L (ref 100–108)
CO2 SERPL-SCNC: 22 MMOL/L (ref 21–32)
CORTIS SERPL-MCNC: 0.8 UG/DL
CREAT SERPL-MCNC: 1.04 MG/DL (ref 0.6–1.3)
EOSINOPHIL # BLD AUTO: 0.02 THOUSAND/ΜL (ref 0–0.61)
EOSINOPHIL NFR BLD AUTO: 0 % (ref 0–6)
ERYTHROCYTE [DISTWIDTH] IN BLOOD BY AUTOMATED COUNT: 13.1 % (ref 11.6–15.1)
GFR SERPL CREATININE-BSD FRML MDRD: 71 ML/MIN/1.73SQ M
GLUCOSE SERPL-MCNC: 98 MG/DL (ref 65–140)
HCG SERPL QL: NEGATIVE
HCT VFR BLD AUTO: 46.9 % (ref 34.8–46.1)
HGB BLD-MCNC: 15.2 G/DL (ref 11.5–15.4)
IMM GRANULOCYTES # BLD AUTO: 0.01 THOUSAND/UL (ref 0–0.2)
IMM GRANULOCYTES NFR BLD AUTO: 0 % (ref 0–2)
LYMPHOCYTES # BLD AUTO: 0.72 THOUSANDS/ΜL (ref 0.6–4.47)
LYMPHOCYTES NFR BLD AUTO: 10 % (ref 14–44)
MCH RBC QN AUTO: 28.3 PG (ref 26.8–34.3)
MCHC RBC AUTO-ENTMCNC: 32.4 G/DL (ref 31.4–37.4)
MCV RBC AUTO: 87 FL (ref 82–98)
MONOCYTES # BLD AUTO: 0.25 THOUSAND/ΜL (ref 0.17–1.22)
MONOCYTES NFR BLD AUTO: 4 % (ref 4–12)
NEUTROPHILS # BLD AUTO: 6.08 THOUSANDS/ΜL (ref 1.85–7.62)
NEUTS SEG NFR BLD AUTO: 86 % (ref 43–75)
NRBC BLD AUTO-RTO: 0 /100 WBCS
P AXIS: 55 DEGREES
PLATELET # BLD AUTO: 225 THOUSANDS/UL (ref 149–390)
PMV BLD AUTO: 11.9 FL (ref 8.9–12.7)
POTASSIUM SERPL-SCNC: 4.1 MMOL/L (ref 3.5–5.3)
PR INTERVAL: 140 MS
PROT SERPL-MCNC: 7.5 G/DL (ref 6.4–8.2)
QRS AXIS: 77 DEGREES
QRSD INTERVAL: 98 MS
QT INTERVAL: 376 MS
QTC INTERVAL: 449 MS
RBC # BLD AUTO: 5.37 MILLION/UL (ref 3.81–5.12)
SODIUM SERPL-SCNC: 137 MMOL/L (ref 136–145)
T WAVE AXIS: 253 DEGREES
TSH SERPL DL<=0.05 MIU/L-ACNC: 2.96 UIU/ML (ref 0.36–3.74)
VENTRICULAR RATE: 86 BPM
WBC # BLD AUTO: 7.11 THOUSAND/UL (ref 4.31–10.16)

## 2019-03-21 PROCEDURE — 82533 TOTAL CORTISOL: CPT | Performed by: INTERNAL MEDICINE

## 2019-03-21 PROCEDURE — 93010 ELECTROCARDIOGRAM REPORT: CPT | Performed by: INTERNAL MEDICINE

## 2019-03-21 PROCEDURE — 84244 ASSAY OF RENIN: CPT | Performed by: INTERNAL MEDICINE

## 2019-03-21 PROCEDURE — 93306 TTE W/DOPPLER COMPLETE: CPT | Performed by: INTERNAL MEDICINE

## 2019-03-21 PROCEDURE — 82088 ASSAY OF ALDOSTERONE: CPT | Performed by: INTERNAL MEDICINE

## 2019-03-21 PROCEDURE — 84703 CHORIONIC GONADOTROPIN ASSAY: CPT | Performed by: INTERNAL MEDICINE

## 2019-03-21 PROCEDURE — 84443 ASSAY THYROID STIM HORMONE: CPT | Performed by: INTERNAL MEDICINE

## 2019-03-21 PROCEDURE — 99232 SBSQ HOSP IP/OBS MODERATE 35: CPT | Performed by: INTERNAL MEDICINE

## 2019-03-21 PROCEDURE — 93306 TTE W/DOPPLER COMPLETE: CPT

## 2019-03-21 PROCEDURE — 83835 ASSAY OF METANEPHRINES: CPT | Performed by: INTERNAL MEDICINE

## 2019-03-21 PROCEDURE — 99254 IP/OBS CNSLTJ NEW/EST MOD 60: CPT | Performed by: INTERNAL MEDICINE

## 2019-03-21 PROCEDURE — 80053 COMPREHEN METABOLIC PANEL: CPT | Performed by: INTERNAL MEDICINE

## 2019-03-21 PROCEDURE — 85025 COMPLETE CBC W/AUTO DIFF WBC: CPT | Performed by: INTERNAL MEDICINE

## 2019-03-21 RX ORDER — CHLORTHALIDONE 25 MG/1
12.5 TABLET ORAL DAILY
Status: DISCONTINUED | OUTPATIENT
Start: 2019-03-22 | End: 2019-03-22

## 2019-03-21 RX ORDER — AMLODIPINE BESYLATE 5 MG/1
5 TABLET ORAL 2 TIMES DAILY
Status: DISCONTINUED | OUTPATIENT
Start: 2019-03-21 | End: 2019-03-22

## 2019-03-21 RX ORDER — CHLORTHALIDONE 25 MG/1
25 TABLET ORAL DAILY
Status: DISCONTINUED | OUTPATIENT
Start: 2019-03-21 | End: 2019-03-21

## 2019-03-21 RX ORDER — LABETALOL 100 MG/1
100 TABLET, FILM COATED ORAL 3 TIMES DAILY
Status: DISCONTINUED | OUTPATIENT
Start: 2019-03-21 | End: 2019-03-22

## 2019-03-21 RX ADMIN — AMLODIPINE BESYLATE 5 MG: 5 TABLET ORAL at 17:45

## 2019-03-21 RX ADMIN — LABETALOL HCL 200 MG: 200 TABLET, FILM COATED ORAL at 08:08

## 2019-03-21 RX ADMIN — AMLODIPINE BESYLATE 5 MG: 5 TABLET ORAL at 08:09

## 2019-03-21 RX ADMIN — LABETALOL HYDROCHLORIDE 100 MG: 100 TABLET, FILM COATED ORAL at 21:40

## 2019-03-21 RX ADMIN — METOPROLOL TARTRATE 50 MG: 50 TABLET, FILM COATED ORAL at 08:09

## 2019-03-21 RX ADMIN — CLONIDINE HYDROCHLORIDE 0.1 MG: 0.1 TABLET ORAL at 08:08

## 2019-03-21 RX ADMIN — ACETAMINOPHEN 650 MG: 325 TABLET ORAL at 11:53

## 2019-03-21 RX ADMIN — LABETALOL HYDROCHLORIDE 100 MG: 100 TABLET, FILM COATED ORAL at 17:45

## 2019-03-21 RX ADMIN — CLONIDINE HYDROCHLORIDE 0.1 MG: 0.1 TABLET ORAL at 17:45

## 2019-03-21 RX ADMIN — FUROSEMIDE 20 MG: 20 TABLET ORAL at 08:08

## 2019-03-21 NOTE — UTILIZATION REVIEW
Notification of Inpatient Admission/Inpatient Authorization Request  This is a Notification of Inpatient Admission/Request for Inpatient Authorization for our facility [place] [address]  Be advised that this patient was admitted to our facility under Inpatient Status  Please contact the Utilization Review Department where the patient is receiving care services for additional admission information  Place of Service Code: 24   Place of Service Name: Inpatient Hospital  Presentation Date & Time: 3/20/2019  5:18 PM  Inpatient Admission Date & Time: 3/20/19 1926  Discharge Date & Time: No discharge date for patient encounter  Discharge Disposition (if discharged): Home/Self Care  Attending Physician: Mary Hernandez Md [[id number]]  Admission Orders (From admission, onward)    Ordered        03/20/19 1926  Inpatient Admission  Once     Order ID Start Status   [id number] 03/20/19 1927 Completed            Facility: [address])  [facility] Utilization Review Department  Phone: [phone]; Fax [phone]  [email or web address]  ATTENTION: Please call with any questions or concerns to [phone]  and carefully listen to the prompts so that you are directed to the right person  Send all requests for admission clinical reviews, approved or denied determinations and any other requests to fax [phone]   All voicemails are confidential

## 2019-03-21 NOTE — ASSESSMENT & PLAN NOTE
· Continue cardene drip to maintain -180 (avoid over-correction)  · Resume home amlodipine 5mg daily, labetalol 200mg q12h, and clonidine 0 1mg q12h  · Patient is also on metoprolol 100mg q12h, but patient states that this is a new medication  Will start at 50mg q12h to avoid bradycardia   Increase if tolerated  · Hold home spironolactone 25mg daily for now  · Check renin/aldosterone ratio, low-dose dexamethasone suppression test, serum metanephrines as ordered by [facility]  · Agree with MRA of renal arteries  · Cardiology consulted   · Echo was ordered by outpatient cardiology, but will place order for inpatient echo  · Please call critical care at  with any questions or concerns

## 2019-03-21 NOTE — ASSESSMENT & PLAN NOTE
- presented w/ a BP of 266/139 w/ progressive improvement on a Cardene drip (now discontinued)   - counseled on lifestyle/diet modifications including salt/caffeine avoidance   - acknowledges intermittent noncompliance to various BP meds at home  - echocardiogram reveals normal EF 55% with marked LV hypertrophy, grade-2 diastolic dysfunction, mild MR, trace TR, but no evidence of wall motion abnormalities per reading cardiologist  - await MRA of kidneys - cardiology added MRA of carotids and MRA/V of head/neck  - free T4 within normal limits - renin/aldosterone assays ordered - plasma free metanephrines ordered  - underwent dexamethasone suppression test with adequate AM cortisol suppression today   - continue Clonidine/Labetalol/Norvasc/Chlorthalidone regimen - counseled on compliance to medication regimen (especially clonidine due to adverse effect of rebound hypertension if noncompliance)   - continue to monitor serial BP   - likely contributory to intermittent headaches (see plan below)

## 2019-03-21 NOTE — PLAN OF CARE
Problem: CARDIOVASCULAR - ADULT  Goal: Maintains optimal cardiac output and hemodynamic stability  Description  INTERVENTIONS:  - Monitor I/O, vital signs and rhythm  - Monitor for S/S and trends of decreased cardiac output i e  bleeding, hypotension  - Administer and titrate ordered vasoactive medications to optimize hemodynamic stability  - Assess quality of pulses, skin color and temperature  - Assess for signs of decreased coronary artery perfusion - ex  Angina  - Instruct patient to report change in severity of symptoms  Outcome: Progressing  Goal: Absence of cardiac dysrhythmias or at baseline rhythm  Description  INTERVENTIONS:  - Continuous cardiac monitoring, monitor vital signs, obtain 12 lead EKG if indicated  - Administer antiarrhythmic and heart rate control medications as ordered  - Monitor electrolytes and administer replacement therapy as ordered  Outcome: Progressing     Problem: SAFETY ADULT  Goal: Patient will remain free of falls  Description  INTERVENTIONS:  - Assess patient frequently for physical needs  -  Identify cognitive and physical deficits and behaviors that affect risk of falls    -  [place] fall precautions as indicated by assessment   - Educate patient/family on patient safety including physical limitations  - Instruct patient to call for assistance with activity based on assessment  - Modify environment to reduce risk of injury  - Consider OT/PT consult to assist with strengthening/mobility  Outcome: Progressing  Goal: Maintain or return to baseline ADL function  Description  INTERVENTIONS:  -  Assess patient's ability to carry out ADLs; assess patient's baseline for ADL function and identify physical deficits which impact ability to perform ADLs (bathing, care of mouth/teeth, toileting, grooming, dressing, etc )  - Assess/evaluate cause of self-care deficits   - Assess range of motion  - Assess patient's mobility; develop plan if impaired  - Assess patient's need for assistive devices and provide as appropriate  - Encourage maximum independence but intervene and supervise when necessary  ¯ Involve family in performance of ADLs  ¯ Assess for home care needs following discharge   ¯ Request OT consult to assist with ADL evaluation and planning for discharge  ¯ Provide patient education as appropriate  Outcome: Progressing  Goal: Maintain or return mobility status to optimal level  Description  INTERVENTIONS:  - Assess patient's baseline mobility status (ambulation, transfers, stairs, etc )    - Identify cognitive and physical deficits and behaviors that affect mobility  - Identify mobility aids required to assist with transfers and/or ambulation (gait belt, sit-to-stand, lift, walker, cane, etc )  - [place] fall precautions as indicated by assessment  - Record patient progress and toleration of activity level on Mobility SBAR; progress patient to next Phase/Stage  - Instruct patient to call for assistance with activity based on assessment  - Request Rehabilitation consult to assist with strengthening/weightbearing, etc   Outcome: Progressing

## 2019-03-21 NOTE — ED NOTES
Pt being transported to the floor by RN Sedonia Gilford on the monitor at this time        Shaneka Durant RN  03/20/19 9530

## 2019-03-21 NOTE — UTILIZATION REVIEW
Initial Clinical Review  Admission: Date/Time/Statement: 3/20/19 @ 1926   Orders Placed This Encounter   Procedures    Inpatient Admission     Standing Status:   Standing     Number of Occurrences:   1     Order Specific Question:   Admitting Physician     Answer:   Veronika Correa [[id number]]     Order Specific Question:   Level of Care     Answer:   Level 1 Stepdown [13]     Order Specific Question:   Estimated length of stay     Answer:   More than 2 Midnights     Order Specific Question:   Certification     Answer:   I certify that inpatient services are medically necessary for this patient for a duration of greater than two midnights  See H&P and MD Progress Notes for additional information about the patient's course of treatment  ED: Date/Time/Mode of Arrival:   ED Arrival Information     Expected Arrival Acuity Means of Arrival Escorted By Service Admission Type    3/20/2019 14:20 3/20/2019 17:06 Urgent Walk-In Self Hospitalist Urgent    Arrival Complaint    htn        Chief Complaint:   Chief Complaint   Patient presents with    Hypertension     Pt reports having high bp for years and was sent by PCP to "get it fixed " Pt reports dizziness, denies cp, sob     Assessment/Plan: Garth Coronado is a 36 y o  female who presents with uncontrolled hypertension  Patient states her symptoms have been present for approximately 7-8 years  She does note a history of hyper tension her family but it is well controlled and her parents  She states that despite multiple medications her pressure has not been uncontrolled  She does report use of Excedrin migraine, energy drinks (which she has recently discontinued), she denies use of any other stimulants  She does report that she occasionally does 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     * HTN (hypertension), malignant  Referred for admission from Cardiology office for secondary hypertension evaluation and titration of blood pressure medications  Placed on nicardipine infusion with a goal SBP below 180  Restart home medications with the exception of spironolactone  Check renin/aldosterone ratio  Check low-dose dexamethasone suppression test   Check serum metanephrines  Check MRA of renal arteries  Cardiology consultation   Proteinuria  Likely due to uncontrolled hypertension  Avoid Ace inhibitors or ARB use this is will likely interfere with the workup for secondary hypertension      Anticipated Length of Stay is > 2 midnights  ED Vital Signs:   ED Triage Vitals [03/20/19 1715]   Temperature Pulse Respirations Blood Pressure SpO2   98 1 °F (36 7 °C) 83 18 (!) 266/139 100 %      Temp Source Heart Rate Source Patient Position - Orthostatic VS BP Location FiO2 (%)   Tympanic Monitor Lying Right arm --      Pain Score       No Pain        Wt Readings from Last 1 Encounters:   03/20/19 106 kg (233 lb 11 oz)     03/20/19 2015 -- 74 24 185/99 97 % -- GP   03/20/19 2007 -- 71 20 188/105 97 % -- GP   03/20/19 1930 -- 82 22 241/120 96 % -- GP   03/20/19 1900 -- 74 26 218/105 99 % -- GP   03/20/19 1830 -- 72 24 213/99 99 % -- GP   03/20/19 1800 -- 74 20 238/114 97 % -- GP   03/20/19 1715 98 1 °F (36 7 °C) 83 18 266/139 100 % 106 kg (233 lb 11 oz)    03/20/19 1329 -- 64 -- 170/110 -- 106 kg (233 lb)    Pertinent Labs/Diagnostic Test Results:   ECG:  NSR  TROP 0 04  SODIUM mmol/L 138   POTASSIUM mmol/L 4 0   CHLORIDE mmol/L 110*   CO2 mmol/L 22   ANION GAP mmol/L 6   BUN mg/dL 18   CREATININE mg/dL 1 09   CALCIUM mg/dL 8 6   ALT U/L 25   AST U/L 20   ALK PHOS U/L 51   ALBUMIN g/dL 3 4*   TOTAL BILIRUBIN mg/dL 0 42   WBC 8 09  Hgl 14 4 / Hct 44 5  Platelets 852  ED Treatment:   Medication Administration from 03/20/2019 1420 to 03/20/2019 2116       Date/Time Order Dose Route Action Action by Comments     03/20/2019 2113 niCARdipine (CARDENE) 25 mg (STANDARD CONCENTRATION) in sodium chloride 0 9% 250 mL 5 mg/hr Intravenous Restarted Randi Gregg, RN 03/20/2019 2051 niCARdipine (CARDENE) 25 mg (STANDARD CONCENTRATION) in sodium chloride 0 9% 250 mL 0 mg/hr Intravenous Stopped Shaneka Durant RN Per request from admitting to stop and re-start if needed  03/20/2019 2008 niCARdipine (CARDENE) 25 mg (STANDARD CONCENTRATION) in sodium chloride 0 9% 250 mL 2 5 mg/hr Intravenous New Bag Kendy Viera RN         Past Medical/Surgical History:    Active Ambulatory Problems     Diagnosis Date Noted    Nephrolithiasis 01/15/2018    Hypertension 01/15/2018    Congestive heart failure ([address] ) 09/15/2017    Generalized anxiety disorder 09/30/2013    Obesity 02/04/2013    Proteinuria 09/30/2013     Resolved Ambulatory Problems     Diagnosis Date Noted    Hydronephrosis 01/15/2018    History of CHF (congestive heart failure) 01/15/2018    Hypokalemia 01/15/2018    Hydronephrosis with urinary obstruction due to ureteral calculus 01/15/2018    Hypertensive emergency without congestive heart failure 06/23/2018    Hypokalemia 06/23/2018    Transient ischemic attack (TIA) 06/23/2018     Past Medical History:   Diagnosis Date    CHF (congestive heart failure) (HCC)     Generalized anxiety disorder     Hypertension     Kidney stone      Admitting Diagnosis: Hypertension, malignant [I10]  Uncontrolled hypertension [I10]  Age/Sex: 36 y o  female  Admission Orders:  Scheduled Meds:   Current Facility-Administered Medications:  acetaminophen 650 mg Oral Q6H PRN Ottoniel Randall MD    amLODIPine 5 mg Oral BID Alessandra Mina MD    calcium carbonate 1,000 mg Oral Daily PRN Ottoniel Randall MD    [START ON 3/22/2019] chlorthalidone 12 5 mg Oral Daily Kip Jose, DO    cloNIDine 0 1 mg Oral BID Ottoniel Randall MD    labetalol 100 mg Oral TID Champ Braun MD    LORazepam 0 5 mg Intravenous Daily PRN Ottoniel Randall MD    niCARdipine 1-15 mg/hr Intravenous Titrated TANGELA Garcia Last Rate: Stopped (03/21/19 0215)   ondansetron 4 mg Intravenous Q6H POLO Lugo MD      Continuous Infusions:   niCARdipine 1-15 mg/hr Last Rate: Stopped (03/21/19 0215)   mineral restriction, 4 GM NA, LISSETTE, no cafffeine diet  Up and OOB as tolerated  ECHO: pending  MRA abd: pending  MRA / MRI head& neck:  Pending  TELJAMILAH Houser SCDs    Network Utilization Review Department  Phone: [phone]; Fax [phone]  [email or web address]  ATTENTION: Please call with any questions or concerns to [phone]  and carefully listen to the prompts so that you are directed to the right person  Send all requests for admission clinical reviews, approved or denied determinations and any other requests to fax [phone]   All voicemails are confidential

## 2019-03-21 NOTE — PROGRESS NOTES
Pt's cardene on hold since 0215  Orders to keep -180 w/ concerns for mental status changes if too low  Pt's BP currently 133/63  Pt asymptomatic  Per pt, her BP is lower while she is sleeping, but jumps up when she is awake and moving  Spoke to Leana Gonzalez with [employer]  Okay with BP for now  Will continue to monitor and check AM labs

## 2019-03-21 NOTE — SOCIAL WORK
CM met with patient to complete a general SW assessment and discuss discharge needs  CM introduced herself, provided extension, and explained her role in the discharge planning  Patient resides in a multi-level house with her  Shabana Mora [phone]  Patient identifies her family as their primary support system  Patient is independent with ADLs and functional mobility  Patient denies using any DMEs and declined needing any at discharge  Patient drives; reports her  is available to transport at discharge  Patient uses [address] [facility] in [facility]; Jaci Danielle informed patient of their access to [employer] at discharge  Patient denied history with VNA services; declined the need for services at discharge  PCP identified as Dr Nilesh De Guzman  No history of Mental health/ D&A reported  Patient/caregiver received discharge checklist   Content reviewed  Patient/caregiver encouraged to participate in discharge plan of care prior to discharge home  CM reviewed d/c planning process including the following: identifying help at home, patient preference for d/c planning needs, Discharge Lounge, [facility] Meds to Bed program, availability of treatment team to discuss questions or concerns patient and/or family may have regarding understanding medications and recognizing signs and symptoms once discharged  CM also encouraged patient to follow up with all recommended appointments after discharge  Patient advised of importance for patient and family to participate in managing patients medical well being

## 2019-03-21 NOTE — CONSULTS
Consultation - Cardiology   Curt Ro 36 y o  female MRN: [id number]  Unit/Bed#: [facility] 428-01 Encounter: [id number]    Assessment/Plan     1  Uncontrolled hypertension  50-year-old female with 7 year history of hypertension with poor medication compliance up until the last year  As of now it appears that she does not meet the criteria for true resistant hypertension since she is not on a diuretic at this time, however given her age, how elevated her blood pressures were, and that she is symptomatic she requires workup  She is obese but this may not explain the severity of pathology  - noted proteinuria on urinalysis  - dex suppression test negative - appropriate decrease in cortisol  - aldosterone, renin assay, fractionated free plasma metanephrines pending  - MRA abdomen to assess renal arteries has been ordered  - check echocardiogram to assess for damage secondary to uncontrolled hypertension; patient does have a history of cardiomyopathy  - safe to restart cardene gtt at this time; since BP has been >171 systolic would recommend decreasing to about 160 over an hour and maintaining around there for the next 6 hours    History of Present Illness   Physician Requesting Consult: Ottoniel Randall MD  Reason for Consult / Principal Problem: Uncontrolled hypertension  HPI: Curt Ro is a 36y o  year old female who presents with acute on chronic uncontrolled hypertension  She was admitted to the hospital after being sent from her cardiologist's office (Dr Stephanie Horne, seeing him for the first time) for persistently elevated symptomatic hypertension  She reports that she was 1st diagnosed with hypertension 7 years ago following her 2nd pregnancy  She was pregnant with twins and had preeclampsia during pregnancy  She states that for several years, she was not very compliant with her medications  However, for the past year she has been adherent    She states that her current antihypertensives are amlodipine 5 mg, clonidine 0 1 mg b i d , and metoprolol tartrate 100 mg b i d  She used to be on Lasix but is not any longer  She was recently prescribed labetalol and Aldactone but had not started these yet  Patient has had 2 recent ED visits secondary to symptoms of hypertension  She reports that her typical symptoms are numbness, tingling, and hand and or foot weakness on her right side  She also sometimes gets a headache, and sometimes her speech becomes slurred  This is essentially what she was experiencing for her 1st ED visit  On Monday when she presented for the 2nd time, she felt more symptomatic when she ever has  She reports that she had numbness, blurry vision, and pain and discomfort that radiated to her chest   This was the 1st time she had ever had chest discomfort associated with symptoms  She was very anxious about this and states that she blacked out for part of her  driving her to the ED, saying that she does not really remember the car ride there  Upon presentation to the ED yesterday, her blood pressure was 266/139  Since admission, she has been maintained on a Cardene drip to cautiously lower her blood pressure  The drip has now been on hold since about 2:15 am as her pressures did get as low as 140/83 while patient was lying down  Since that time she has had a few readings in the 067B and 188 systolic but most recently pressure was back up to 212/112  Most recent pulse is 65  At the time of exam she is mostly asymptomatic with the exception of a slight headache  She is a former smoker  She denies alcohol use  She admits occasional marijuana use  Her last echocardiogram was in June 2018 and demonstrated moderate to severe left ventricular concentric hypertrophy with EF of 50%  There was also mild left atrial dilation and mild pulmonary hypertension  There was mild to moderate mitral insufficiency    Right ventricular function was normal          Inpatient consult to Cardiology     Performed by  Erasmo Cade DO     Authorized by Antonino Del Toro MD              Review of Systems   Constitutional: Negative for chills and fever  HENT: Negative  Eyes: Negative  Respiratory: Negative for shortness of breath  Cardiovascular: Negative for palpitations and leg swelling  Gastrointestinal: Negative  Endocrine: Negative  Genitourinary: Negative  Musculoskeletal: Negative  Skin: Negative  Allergic/Immunologic: Negative  Neurological: Positive for speech difficulty, weakness and numbness  Hematological: Negative  Psychiatric/Behavioral: Negative          Historical Information   Past Medical History:   Diagnosis Date    CHF (congestive heart failure) ([address] )     Generalized anxiety disorder     Hypertension     Kidney stone      Past Surgical History:   Procedure Laterality Date     SECTION      x 2    KNEE ARTHROSCOPY Left     acl/meniscus repair    CA CYSTO/URETERO W/LITHOTRIPSY &INDWELL STENT INSRT Right 2018    Procedure: CYSTOSCOPY URETEROSCOPY, RETROGRADE PYELOGRAM AND INSERTION STENT URETERAL, RIGHT STONE EXTRACTION;  Surgeon: Nemo Rios MD;  Location: [facility];  Service: Urology    CA CYSTOURETHROSCOPY,URETER CATHETER Right 1/15/2018    Procedure: CYSTOSCOPY RETROGRADE PYELOGRAM WITH INSERTION STENT URETERAL;  Surgeon: Dylon Sherman MD;  Location: [facility];  Service: Urology    TUBAL LIGATION       Social History     Substance and Sexual Activity   Alcohol Use Not Currently    Frequency: Never     Social History     Substance and Sexual Activity   Drug Use No     Social History     Tobacco Use   Smoking Status Former Smoker    Packs/day: 0 00    Types: Cigarettes    Last attempt to quit:     Years since quittin 2   Smokeless Tobacco Never Used     Family K2blhofq: hypertension in both parents, controlled with one agent    Meds/Allergies   all current active meds have been reviewed  No Known Allergies    Objective   Vitals: Blood pressure 138/83, pulse 63, temperature 97 7 °F (36 5 °C), temperature source Oral, resp  rate 18, height 5' 6" (1 676 m), weight 106 kg (233 lb 11 oz), last menstrual period 03/08/2019, SpO2 98 %, not currently breastfeeding  Orthostatic Blood Pressures      Most Recent Value   Blood Pressure  138/83 [Map 106] filed at 03/21/2019 0708   Patient Position - Orthostatic VS  Sitting filed at 03/21/2019 0708            Intake/Output Summary (Last 24 hours) at 3/21/2019 4095  Last data filed at 3/21/2019 0701  Gross per 24 hour   Intake 1215 42 ml   Output 1400 ml   Net -184 58 ml       Invasive Devices     Peripheral Intravenous Line            Peripheral IV 03/20/19 Right Forearm less than 1 day                Physical Exam   Constitutional: She is oriented to person, place, and time  She appears well-developed and well-nourished  HENT:   Head: Normocephalic and atraumatic  Eyes: Conjunctivae are normal  No scleral icterus  Cardiovascular: Normal rate, regular rhythm and normal heart sounds  No murmur heard  Pulmonary/Chest: Effort normal and breath sounds normal  She has no wheezes  She has no rales  Abdominal: Soft  Bowel sounds are normal  She exhibits no distension  There is no tenderness  There is no guarding  obese   Musculoskeletal: She exhibits no edema, tenderness or deformity  Neurological: She is alert and oriented to person, place, and time  Skin: Skin is warm and dry  No erythema  Psychiatric: She has a normal mood and affect   Her behavior is normal        Lab Results:   CMP:   Results from last 7 days   Lab Units 03/21/19  0447   SODIUM mmol/L 137   POTASSIUM mmol/L 4 1   CHLORIDE mmol/L 110*   CO2 mmol/L 22   BUN mg/dL 20   CREATININE mg/dL 1 04   CALCIUM mg/dL 8 5   AST U/L 13   ALT U/L 27   ALK PHOS U/L 52   EGFR ml/min/1 73sq m 71     Troponin:   0   Lab Value Date/Time    TROPONINI 0 04 03/20/2019 1739    TROPONINI 0 03 03/18/2019 2150 TROPONINI 0 04 (H) 06/22/2018 1848     TSH:   Results from last 7 days   Lab Units 03/21/19  0447   TSH 3RD GENERATON uIU/mL 2 960     Imaging: I have personally reviewed pertinent reports  EKG:  Normal sinus rhythm  T-wave inversions in anterior leads which appear on some prior EKGs but not all  Possible left ventricular hypertrophy    VTE Prophylaxis: Reason for no pharmacologic prophylaxis low risk    Code Status: Level 1 - Full Code  Advance Directive and Living Will:      Power of :    POLST:

## 2019-03-21 NOTE — ASSESSMENT & PLAN NOTE
Likely due to uncontrolled hypertension  Avoid Ace inhibitors or ARB use this is will likely interfere with the workup for secondary hypertension

## 2019-03-21 NOTE — PROGRESS NOTES
Consultation - Cardiology   Teena Veronica Che 36 y o  female MRN: [id number]  Unit/Bed#:  Encounter: [id number]  Physician Requesting Consult: No att  providers found  Reason for Consult / Principal Problem: malignant HTN    Assessment:  1  Malignant HTN  2  Hx of CHF    Plan:  Her BP has never been adequately controlled and evaluated and with her BP so high today, I have recommended that she go the [facility] ER for admission, treatment, and evaluation  She is agreeable  History of Present Illness     HPI: Akilah Mireles is a 36y o  year old female who is seen in the office for markedly elevated BP which has been going on since the birth of her twins 7 years ago  She has had multiple ER visits and has been hospitalized for HTN  She has been treated unsuccessfully with medications  BP runs higher in the L arm than the R  She can get readings of 250/170 mmHg  She gets associated dizziness, facial numbness, headaches  She had her first child 12 years ago and had no problems with HTN  She developed pre-eclampsia with her twins 7 years ago and that is when she started taking BP medications  She had not had any work up for secondary causes of HTN  She does say that she had CHF in the past although I do not have any of these details  BP today     L arm  218 / 150                     R arm 238 / 150    R radial pulse is stronger than the L    There is a delay in her lower extremity pulses compared to her upper extremity pulses        Review of Systems:    Alert awake oriented, comfortable, denies any complaints  No fevers chills nausea vomiting  No weakness, dizziness, seizures  no cough, shortness of breath, or wheezing  Denies any palpitations, chest pain, diaphoresis  Denies leg edema, pain or paresthesias  Denies any skin rashes  Denies abdominal pain, bloody stools, masses  Denies any depression or suicidal ideations      Historical Information   Past Medical History:   Diagnosis Date    CHF (congestive heart failure) (HCC)     Generalized anxiety disorder     Hypertension     Kidney stone      Past Surgical History:   Procedure Laterality Date     SECTION      x 2    KNEE ARTHROSCOPY Left     acl/meniscus repair    MN CYSTO/URETERO W/LITHOTRIPSY &INDWELL STENT INSRT Right 2018    Procedure: CYSTOSCOPY URETEROSCOPY, RETROGRADE PYELOGRAM AND INSERTION STENT URETERAL, RIGHT STONE EXTRACTION;  Surgeon: Frankie Zarco MD;  [facility];  Service: Urology    MN CYSTOURETHROSCOPY,URETER CATHETER Right 1/15/2018    Procedure: CYSTOSCOPY RETROGRADE PYELOGRAM WITH INSERTION STENT URETERAL;  Surgeon: Saintclair Lah, MD;  [facility];  Service: Urology    TUBAL LIGATION       Social History     Substance and Sexual Activity   Alcohol Use Not Currently    Frequency: Never     Social History     Substance and Sexual Activity   Drug Use No     Social History     Tobacco Use   Smoking Status Former Smoker    Packs/day: 0 00    Types: Cigarettes    Last attempt to quit:     Years since quittin 2   Smokeless Tobacco Never Used     Family History: non-contributory    Meds/Allergies   all current active meds have been reviewed  No Known Allergies    Objective   Vitals: Blood pressure (!) 170/110, pulse 64, height 5' 6" (1 676 m), weight 106 kg (233 lb), last menstrual period 2019, not currently breastfeeding , Body mass index is 37 61 kg/m²  ,   Weight (last 2 days)     Date/Time   Weight    19 1329   106 (233)                        Physical Exam:  GEN: Alphonso Gr appears well, alert and oriented x 3, pleasant and cooperative   HEENT: pupils equal, round, and reactive to light; extraocular muscles intact  NECK: supple, no carotid bruits   HEART: regular rhythm, normal S1 and S2, no murmurs, clicks, gallops or rubs   LUNGS: clear to auscultation bilaterally; no wheezes, rales, or rhonchi   ABDOMEN: normal bowel sounds, soft, no tenderness, no distention  EXTREMITIES: peripheral pulses normal; no clubbing, cyanosis, or edema  NEURO: no focal findings   SKIN: normal without suspicious lesions on exposed skin    Lab Results:   Admission on 03/20/2019   Component Date Value Ref Range Status    WBC 03/20/2019 8 09  4 31 - 10 16 Thousand/uL Final    RBC 03/20/2019 5 08  3 81 - 5 12 Million/uL Final    Hemoglobin 03/20/2019 14 4  11 5 - 15 4 g/dL Final    Hematocrit 03/20/2019 44 5  34 8 - 46 1 % Final    MCV 03/20/2019 88  82 - 98 fL Final    MCH 03/20/2019 28 3  26 8 - 34 3 pg Final    MCHC 03/20/2019 32 4  31 4 - 37 4 g/dL Final    RDW 03/20/2019 13 0  11 6 - 15 1 % Final    MPV 03/20/2019 11 5  8 9 - 12 7 fL Final    Platelets 07/20/2068 195  149 - 390 Thousands/uL Final    nRBC 03/20/2019 0  /100 WBCs Final    Neutrophils Relative 03/20/2019 71  43 - 75 % Final    Immat GRANS % 03/20/2019 0  0 - 2 % Final    Lymphocytes Relative 03/20/2019 22  14 - 44 % Final    Monocytes Relative 03/20/2019 5  4 - 12 % Final    Eosinophils Relative 03/20/2019 1  0 - 6 % Final    Basophils Relative 03/20/2019 1  0 - 1 % Final    Neutrophils Absolute 03/20/2019 5 75  1 85 - 7 62 Thousands/µL Final    Immature Grans Absolute 03/20/2019 0 02  0 00 - 0 20 Thousand/uL Final    Lymphocytes Absolute 03/20/2019 1 75  0 60 - 4 47 Thousands/µL Final    Monocytes Absolute 03/20/2019 0 43  0 17 - 1 22 Thousand/µL Final    Eosinophils Absolute 03/20/2019 0 10  0 00 - 0 61 Thousand/µL Final    Basophils Absolute 03/20/2019 0 04  0 00 - 0 10 Thousands/µL Final    Troponin I 03/20/2019 0 04  <=0 04 ng/mL Final      [employer] Chemistry analyzer 99% cutoff is > 0 04 ng/mL in network labs     o cTnI 99% cutoff is useful only when applied to patients in the clinical setting of myocardial ischemia   o cTnI 99% cutoff should be interpreted in the context of clinical history, ECG findings and possibly cardiac imaging to establish correct diagnosis     o cTnI 99% cutoff may be suggestive but clearly not indicative of a coronary event without the clinical setting of myocardial ischemia   Sodium 03/20/2019 138  136 - 145 mmol/L Final    Potassium 03/20/2019 4 0  3 5 - 5 3 mmol/L Final    Slightly Hemolyzed; Results May be Affected    Chloride 03/20/2019 110* 100 - 108 mmol/L Final    CO2 03/20/2019 22  21 - 32 mmol/L Final    ANION GAP 03/20/2019 6  4 - 13 mmol/L Final    BUN 03/20/2019 18  5 - 25 mg/dL Final    Creatinine 03/20/2019 1 09  0 60 - 1 30 mg/dL Final    Standardized to IDMS reference method    Glucose 03/20/2019 100  65 - 140 mg/dL Final      If the patient is fasting, the ADA then defines impaired fasting glucose as > 100 mg/dL and diabetes as > or equal to 123 mg/dL  Specimen collection should occur prior to Sulfasalazine administration due to the potential for falsely depressed results  Specimen collection should occur prior to Sulfapyridine administration due to the potential for falsely elevated results   Calcium 03/20/2019 8 6  8 3 - 10 1 mg/dL Final    AST 03/20/2019 20  5 - 45 U/L Final    Slightly Hemolyzed; Results May be Affected  Specimen collection should occur prior to Sulfasalazine administration due to the potential for falsely depressed results   ALT 03/20/2019 25  12 - 78 U/L Final      Specimen collection should occur prior to Sulfasalazine and/or Sulfapyridine administration due to the potential for falsely depressed results       Alkaline Phosphatase 03/20/2019 51  46 - 116 U/L Final    Total Protein 03/20/2019 6 9  6 4 - 8 2 g/dL Final    Albumin 03/20/2019 3 4* 3 5 - 5 0 g/dL Final    Total Bilirubin 03/20/2019 0 42  0 20 - 1 00 mg/dL Final    eGFR 03/20/2019 67  ml/min/1 73sq m Final    TSH 3RD GENERATON 03/20/2019 3 920* 0 358 - 3 740 uIU/mL Final      The recommended reference ranges for TSH during pregnancy are as follows:   First trimester 0 1 to 2 5 uIU/mL   Second trimester  0 2 to 3 0 uIU/mL   Third trimester 0 3 to 3 0 uIU/m    Note: Normal ranges may not apply to patients who are transgender, non-binary, or whose legal sex, sex at birth, and gender identity differ  Using supplements with high doses of biotin 20 to more than 300 times greater than the adequate daily intake for adults of 30 mcg/day as established by the [employer], can cause falsely depress results   Free T4 03/20/2019 1 17  0 76 - 1 46 ng/dL Final      Specimen collection should occur prior to Sulfasalazine administration due to the potential for falsely elevated results   Color, UA 03/20/2019 Yellow   Final    Clarity, UA 03/20/2019 Clear   Final    pH, UA 03/20/2019 7 0  4 5 - 8 0 Final    Leukocytes, UA 03/20/2019 Negative  Negative Final    Nitrite, UA 03/20/2019 Negative  Negative Final    Protein, UA 03/20/2019 30 (1+)* Negative mg/dl Final    Glucose, UA 03/20/2019 Negative  Negative mg/dl Final    Ketones, UA 03/20/2019 Negative  Negative mg/dl Final    Urobilinogen, UA 03/20/2019 1 0  0 2, 1 0 E U /dl E U /dl Final    Bilirubin, UA 03/20/2019 Negative  Negative Final    Blood, UA 03/20/2019 Negative  Negative Final    Specific Gravity, UA 03/20/2019 1 025  1 003 - 1 030 Final    RBC, UA 03/20/2019 None Seen  None Seen, 0-5 /hpf Final    WBC, UA 03/20/2019 4-10* None Seen, 0-5, 5-55, 5-65 /hpf Final    Epithelial Cells 03/20/2019 None Seen  None Seen, Occasional /hpf Final    Bacteria, UA 03/20/2019 Occasional  None Seen, Occasional /hpf Final    Hyaline Casts, UA 03/20/2019 None Seen  None Seen /lpf Final    TSH 3RD GENERATON 03/21/2019 2 960  0 358 - 3 740 uIU/mL Final      The recommended reference ranges for TSH during pregnancy are as follows:   First trimester 0 1 to 2 5 uIU/mL   Second trimester  0 2 to 3 0 uIU/mL   Third trimester 0 3 to 3 0 uIU/m    Note: Normal ranges may not apply to patients who are transgender, non-binary, or whose legal sex, sex at birth, and gender identity differ    Using supplements with high doses of biotin 20 to more than 300 times greater than the adequate daily intake for adults of 30 mcg/day as established by the [employer], can cause falsely depress results   Sodium 03/21/2019 137  136 - 145 mmol/L Final    Potassium 03/21/2019 4 1  3 5 - 5 3 mmol/L Final    Chloride 03/21/2019 110* 100 - 108 mmol/L Final    CO2 03/21/2019 22  21 - 32 mmol/L Final    ANION GAP 03/21/2019 5  4 - 13 mmol/L Final    BUN 03/21/2019 20  5 - 25 mg/dL Final    Creatinine 03/21/2019 1 04  0 60 - 1 30 mg/dL Final    Standardized to IDMS reference method    Glucose 03/21/2019 98  65 - 140 mg/dL Final      If the patient is fasting, the ADA then defines impaired fasting glucose as > 100 mg/dL and diabetes as > or equal to 123 mg/dL  Specimen collection should occur prior to Sulfasalazine administration due to the potential for falsely depressed results  Specimen collection should occur prior to Sulfapyridine administration due to the potential for falsely elevated results   Calcium 03/21/2019 8 5  8 3 - 10 1 mg/dL Final    AST 03/21/2019 13  5 - 45 U/L Final      Specimen collection should occur prior to Sulfasalazine administration due to the potential for falsely depressed results   ALT 03/21/2019 27  12 - 78 U/L Final      Specimen collection should occur prior to Sulfasalazine and/or Sulfapyridine administration due to the potential for falsely depressed results       Alkaline Phosphatase 03/21/2019 52  46 - 116 U/L Final    Total Protein 03/21/2019 7 5  6 4 - 8 2 g/dL Final    Albumin 03/21/2019 3 7  3 5 - 5 0 g/dL Final    Total Bilirubin 03/21/2019 0 54  0 20 - 1 00 mg/dL Final    eGFR 03/21/2019 71  ml/min/1 73sq m Final    WBC 03/21/2019 7 11  4 31 - 10 16 Thousand/uL Final    RBC 03/21/2019 5 37* 3 81 - 5 12 Million/uL Final    Hemoglobin 03/21/2019 15 2  11 5 - 15 4 g/dL Final    Hematocrit 03/21/2019 46 9* 34 8 - 46 1 % Final    MCV 03/21/2019 87  82 - 98 fL Final  MCH 03/21/2019 28 3  26 8 - 34 3 pg Final    MCHC 03/21/2019 32 4  31 4 - 37 4 g/dL Final    RDW 03/21/2019 13 1  11 6 - 15 1 % Final    MPV 03/21/2019 11 9  8 9 - 12 7 fL Final    Platelets 14/67/2671 225  149 - 390 Thousands/uL Final    nRBC 03/21/2019 0  /100 WBCs Final    Neutrophils Relative 03/21/2019 86* 43 - 75 % Final    Immat GRANS % 03/21/2019 0  0 - 2 % Final    Lymphocytes Relative 03/21/2019 10* 14 - 44 % Final    Monocytes Relative 03/21/2019 4  4 - 12 % Final    Eosinophils Relative 03/21/2019 0  0 - 6 % Final    Basophils Relative 03/21/2019 0  0 - 1 % Final    Neutrophils Absolute 03/21/2019 6 08  1 85 - 7 62 Thousands/µL Final    Immature Grans Absolute 03/21/2019 0 01  0 00 - 0 20 Thousand/uL Final    Lymphocytes Absolute 03/21/2019 0 72  0 60 - 4 47 Thousands/µL Final    Monocytes Absolute 03/21/2019 0 25  0 17 - 1 22 Thousand/µL Final    Eosinophils Absolute 03/21/2019 0 02  0 00 - 0 61 Thousand/µL Final    Basophils Absolute 03/21/2019 0 03  0 00 - 0 10 Thousands/µL Final       Results from last 7 days   Lab Units 03/20/19  1739 03/18/19  2150   TROPONIN I ng/mL 0 04 0 03     Results from last 7 days   Lab Units 03/21/19  0447 03/20/19  1739 03/18/19  2150   WBC Thousand/uL 7 11 8 09 7 90   HEMOGLOBIN g/dL 15 2 14 4 14 4   HEMATOCRIT % 46 9* 44 5 43 1   PLATELETS Thousands/uL 225 195 187         Results from last 7 days   Lab Units 03/21/19  0447 03/20/19  1739 03/18/19  2150 03/15/19  1929   POTASSIUM mmol/L 4 1 4 0 4 0 3 4*   CHLORIDE mmol/L 110* 110* 104 102   CO2 mmol/L 22 22 26 28   BUN mg/dL 20 18 25 24   CREATININE mg/dL 1 04 1 09 1 41* 1 15   CALCIUM mg/dL 8 5 8 6 9 1 9 7   ALK PHOS U/L 52 51  --  41   ALT U/L 27 25  --  13   AST U/L 13 20  --  16           Imaging: I have personally reviewed pertinent reports

## 2019-03-21 NOTE — H&P
H&P- Radha Michaels 1985, 36 y o  female MRN: [id number]    Unit/Bed#: [facility] Encounter: [id number]    Primary Care Provider: Maude Youssef DO   Date and time admitted to hospital: 3/20/2019  5:18 PM        * HTN (hypertension), malignant  Assessment & Plan  Referred for admission from Cardiology office for secondary hypertension evaluation and titration of blood pressure medications  Placed on nicardipine infusion with a goal SBP below 180  Restart home medications with the exception of spironolactone  Check renin/aldosterone ratio  Check low-dose dexamethasone suppression test   Check serum metanephrines  Check MRA of renal arteries  Cardiology consultation    Proteinuria  Assessment & Plan  Likely due to uncontrolled hypertension  Avoid Ace inhibitors or ARB use this is will likely interfere with the workup for secondary hypertension  History of CHF (congestive heart failure)  Assessment & Plan  No echo on file, will check echocardiogram              History of Present Illness     HPI:  Radha Michaels is a 36 y o  female who presents with uncontrolled hypertension  Patient states her symptoms have been present for approximately 7-8 years  She does note a history of hyper tension her family but it is well controlled and her parents  She states that despite multiple medications her pressure has not been uncontrolled  She does report use of Excedrin migraine, energy drinks (which she has recently discontinued), she denies use of any other stimulants  She does report that she occasionally does 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  She reports occasional paresthesias of the right side of the body which are not present at this time  Reports occasional blurring of her vision which is not present at this time  Reports occasional headaches which were not present at this time  Reports occasional chest pain which is not present at this time    Reports occasional palpitations which are not present at this time  Review of Systems   All other systems reviewed and are negative  Historical Information   Past Medical History:   Diagnosis Date    CHF (congestive heart failure) ([address] )     Generalized anxiety disorder     Hypertension     Kidney stone      Past Surgical History:   Procedure Laterality Date     SECTION      x 2    KNEE ARTHROSCOPY Left     acl/meniscus repair    [place] CYSTO/URETERO W/LITHOTRIPSY &INDWELL STENT INSRT Right 2018    Procedure: CYSTOSCOPY URETEROSCOPY, RETROGRADE PYELOGRAM AND INSERTION STENT URETERAL, RIGHT STONE EXTRACTION;  Surgeon: Jessica Torrez MD;  [facility];  Service: Urology    TX CYSTOURETHROSCOPY,URETER CATHETER Right 1/15/2018    Procedure: CYSTOSCOPY RETROGRADE PYELOGRAM WITH INSERTION STENT URETERAL;  Surgeon: Clemente Marie MD;  [facility];  Service: Urology    TUBAL LIGATION       Social History   Social History     Substance and Sexual Activity   Alcohol Use No     Social History     Substance and Sexual Activity   Drug Use No     Social History     Tobacco Use   Smoking Status Former Smoker    Packs/day: 0 00    Types: Cigarettes    Last attempt to quit:     Years since quittin 2   Smokeless Tobacco Never Used     Family History: non-contributory    Meds/Allergies   all medications and allergies reviewed  No Known Allergies    Objective   Vitals: Blood pressure 155/76, pulse 73, temperature 98 1 °F (36 7 °C), temperature source Tympanic, resp  rate 18, weight 106 kg (233 lb 11 oz), last menstrual period 2019, SpO2 99 %  No intake or output data in the 24 hours ending 19 2104    Invasive Devices     Peripheral Intravenous Line            Peripheral IV 19 Right Forearm less than 1 day                Physical Exam   Constitutional: She is oriented to person, place, and time  She appears well-developed and well-nourished     Obese but not not overtly cushingoid   HENT: Head: Normocephalic and atraumatic  Eyes: Pupils are equal, round, and reactive to light  EOM are normal    Neck: Neck supple  Cardiovascular: Normal rate and regular rhythm  Pulmonary/Chest: Effort normal    Abdominal: Soft  Bowel sounds are normal  She exhibits no distension  There is no tenderness  No bruit noted   Musculoskeletal: Normal range of motion  She exhibits no edema  Neurological: She is alert and oriented to person, place, and time  Skin: Skin is warm and dry  Capillary refill takes less than 2 seconds  Subtle straie noted   Psychiatric: She has a normal mood and affect  Her behavior is normal        Lab Results: I have personally reviewed pertinent reports  Imaging: I have personally reviewed pertinent reports  EKG, Pathology, and Other Studies: I have personally reviewed pertinent reports  Code Status: Prior  Advance Directive and Living Will:      Power of :    POLST:      Counseling / Coordination of Care  Total floor / unit time spent today 45 minutes  Greater than 50% of total time was spent with the patient and / or family counseling and / or coordination of care  A description of the counseling / coordination of care:  Discussed plan of care with the patient at the bedside

## 2019-03-21 NOTE — ASSESSMENT & PLAN NOTE
- patient recalls removed being told she may have possible migraines  - on exam today, exhibited photosensitivity with improvement after avoidance of light - also notes intermittent unilateral paresthesias with worsening headaches and blurry vision which she attributes to times of elevated blood pressure  - PRN Tylenol on board   - counseled on compliance to antihypertensive regimen to stabilize BP - outpatient follow-up with PCP and/or neurology   - CT of head three months ago was negative for acute intracranial etiology

## 2019-03-21 NOTE — PLAN OF CARE
Problem: DISCHARGE PLANNING - CARE MANAGEMENT  Goal: Discharge to post-acute care or home with appropriate resources  Description  INTERVENTIONS:  - Conduct assessment to determine patient/family and health care team treatment goals, and need for post-acute services based on payer coverage, community resources, and patient preferences, and barriers to discharge  - Address psychosocial, clinical, and financial barriers to discharge as identified in assessment in conjunction with the patient/family and health care team  - Arrange appropriate level of post-acute services according to patient?s   needs and preference and payer coverage in collaboration with the physician and health care team  - Communicate with and update the patient/family, physician, and health care team regarding progress on the discharge plan  - Arrange appropriate transportation to post-acute venues  Outcome: Completed  Note:   Patient to be discharged with appropriate services when medically cleared by MD LANZA to follow as needed

## 2019-03-21 NOTE — CONSULTS
Consult- Aminata Payne 1985, 36 y o  female MRN: [id number]    Unit/Bed#: [facility] 428-01 Encounter: [id number]    Primary Care Provider: iVanney Adkins DO   Date and time admitted to hospital: 3/20/2019  5:18 PM      Inpatient consult to Grace Brown performed by: TANGELA Mathias  Consult ordered by: Caren Alexander MD        * HTN (hypertension), malignant  Assessment & Plan  · Continue cardene drip to maintain -180 (avoid over-correction)  · Resume home amlodipine 5mg daily, labetalol 200mg q12h, and clonidine 0 1mg q12h  · Patient is also on metoprolol 100mg q12h, but patient states that this is a newer medication  Will start at 50mg q12h to avoid bradycardia  Can increase if tolerated  · Hold home spironolactone 25mg daily for now  · Check renin/aldosterone ratio, low-dose dexamethasone suppression test, serum metanephrines as ordered by [facility]  · Agree with MRA of renal arteries  · Cardiology consulted  · Echo was ordered by outpatient cardiology, but will place order for inpatient echo      --------------------------------------------------------------------------------------------------------------  Chief Complaint: "I've been feeling funny, and I knew my blood pressure was high"    History of Present Illness     Aminata Payne is a 36 y o  female  with past medical history significant for hypertension and depression  Patient states that she was 1st diagnosed with hypertension during her pregnancy approximately 8 years ago where she was preeclamptic  She states that since then her primary care provider has been making small changes to her medications as her blood pressure is repeatedly elevated during her outpatient visits  She states that she used to have a home blood pressure machine, however it recently broke    She states that when her systolic blood pressure is elevated > 200, she has a sensation of palpitations in her chest, which are more pronounced when she lies flat and one-sided numbness and paresthesias (switches between right and left)  She also reports a cough when her blood pressure is elevated that produces clear, watery sputum  She denies headache, blurry vision, dizziness, chest pain, or shortness of breath  She states that she takes her medications as prescribed the majority of the time, however she does occasionally miss her morning doses given that she works night shift  Today, she was at her 1st cardiology appointment with Dr Dami Menjivar anti blood pressure was markedly elevated  He recommended to her that she go to the emergency department  She states that she was largely asymptomatic at the time  History obtained from chart review and the patient  Review of Systems   Cardiovascular: Positive for palpitations  Negative for chest pain and leg swelling  Gastrointestinal: Negative  Negative for diarrhea, nausea and vomiting  Genitourinary: Negative  Musculoskeletal: Negative  Neurological: Positive for numbness  Negative for dizziness, weakness, light-headedness and headaches  Numbness and paresthesias on one side of body, alternating    Psychiatric/Behavioral: Negative          Code Status: Level 1 - Full Code  --------------------------------------------------------------------------------------------------------------    Historical Information   Past Medical History:   Diagnosis Date    CHF (congestive heart failure) ([facility] [address] )     Generalized anxiety disorder     Hypertension     Kidney stone      Past Surgical History:   Procedure Laterality Date     SECTION      x 2    KNEE ARTHROSCOPY Left     acl/meniscus repair    NH CYSTO/URETERO W/LITHOTRIPSY &INDWELL STENT INSRT Right 2018    Procedure: CYSTOSCOPY URETEROSCOPY, RETROGRADE PYELOGRAM AND INSERTION STENT URETERAL, RIGHT STONE EXTRACTION;  Surgeon: Kendra Taveras MD;  Location: [facility] OR;  Service: Urology    MARILU Petty Right 1/15/2018    Procedure: CYSTOSCOPY RETROGRADE PYELOGRAM WITH INSERTION STENT URETERAL;  Surgeon: Camilo Guerin MD;  Location: [facility];  Service: Urology    TUBAL LIGATION       Social History   Social History     Substance and Sexual Activity   Alcohol Use No     Social History     Substance and Sexual Activity   Drug Use No     Social History     Tobacco Use   Smoking Status Former Smoker    Packs/day: 0 00    Types: Cigarettes    Last attempt to quit:     Years since quittin 2   Smokeless Tobacco Never Used     Exercise History: Independent ADL  Family History:   Family History   Problem Relation Age of Onset    Hypertension Mother     Cancer Mother         Thyroid    Hypertension Father        Vitals:   Vitals:    19   BP: (!) 185/99 161/74 160/76 155/76   BP Location:       Pulse: 74 76 80 73   Resp: (!) 24 18    Temp:       TempSrc:       SpO2: 97% 98% 98% 99%   Weight:         Temp  Min: 98 1 °F (36 7 °C)  Max: 98 1 °F (36 7 °C)  IBW: -92 5 kg     Body mass index is 37 72 kg/m²  Physical Exam   Constitutional: She is oriented to person, place, and time  She appears well-developed and well-nourished  She is cooperative  No distress  HENT:   Head: Normocephalic and atraumatic  Mouth/Throat: Mucous membranes are normal    Eyes: Pupils are equal, round, and reactive to light  Right conjunctiva is not injected  Right conjunctiva has no hemorrhage  Left conjunctiva is not injected  Left conjunctiva has no hemorrhage  No scleral icterus  Neck: Neck supple  No JVD present  Cardiovascular: Normal rate, regular rhythm and normal heart sounds  No extrasystoles are present  Pulses:       Radial pulses are 3+ on the right side, and 3+ on the left side  Dorsalis pedis pulses are 3+ on the right side, and 3+ on the left side  No peripheral edema   Pulmonary/Chest: Effort normal  No accessory muscle usage   No tachypnea  She has no decreased breath sounds  She has no wheezes  She has no rhonchi  She has no rales  Abdominal: Soft  She exhibits no distension  There is no tenderness  There is no CVA tenderness  Neurological: She is alert and oriented to person, place, and time  She has normal strength  GCS eye subscore is 4  GCS verbal subscore is 5  GCS motor subscore is 6  Skin: Skin is warm and dry  Capillary refill takes less than 2 seconds  She is not diaphoretic  Medications:  Current Facility-Administered Medications   Medication Dose Route Frequency    niCARdipine (CARDENE) 25 mg (STANDARD CONCENTRATION) in sodium chloride 0 9% 250 mL  1-15 mg/hr Intravenous Titrated     Home medications:  Prior to Admission Medications   Prescriptions Last Dose Informant Patient Reported? Taking?    amLODIPine (NORVASC) 5 mg tablet  Self No Yes   Sig: Take 1 tablet (5 mg total) by mouth daily   cloNIDine (CATAPRES) 0 1 mg tablet  Self Yes Yes   Sig: Take 0 1 mg by mouth 2 (two) times a day   furosemide (LASIX) 20 mg tablet  Self Yes Yes   Sig: Take by mouth as needed (Pt not sure of the dose amount she takes)   labetalol (NORMODYNE) 200 mg tablet   No Yes   Sig: Take 1 tablet (200 mg total) by mouth 2 (two) times a day   metoprolol tartrate (LOPRESSOR) 50 mg tablet  Self No Yes   Sig: Take 2 tablets (100 mg total) by mouth 2 (two) times a day   spironolactone (ALDACTONE) 25 mg tablet   No Yes   Sig: Take 1 tablet (25 mg total) by mouth daily      Facility-Administered Medications: None     Allergies:  No Known Allergies      Laboratory and Diagnostics:  Results from last 7 days   Lab Units 03/20/19  1739 03/18/19  2150 03/15/19  1929   WBC Thousand/uL 8 09 7 90 9 10   HEMOGLOBIN g/dL 14 4 14 4 14 8   HEMATOCRIT % 44 5 43 1 44 0   PLATELETS Thousands/uL 195 187 201   NEUTROS PCT % 71 73 68   MONOS PCT % 5 8 8     Results from last 7 days   Lab Units 03/20/19  1739 03/18/19  2150 03/15/19  1929   SODIUM mmol/L 138 135 139   POTASSIUM mmol/L 4 0 4 0 3 4*   CHLORIDE mmol/L 110* 104 102   CO2 mmol/L 22 26 28   ANION GAP mmol/L 6 5 9   BUN mg/dL 18 25 24   CREATININE mg/dL 1 09 1 41* 1 15   CALCIUM mg/dL 8 6 9 1 9 7   ALT U/L 25  --  13   AST U/L 20  --  16   ALK PHOS U/L 51  --  41   ALBUMIN g/dL 3 4*  --  4 0   TOTAL BILIRUBIN mg/dL 0 42  --  0 70              Results from last 7 days   Lab Units 03/20/19  1739 03/18/19  2150   TROPONIN I ng/mL 0 04 0 03         Micro:  None    EKG: NSR  Imaging: None    ------------------------------------------------------------------------------------------------------------    Anticipated Length of Stay is > 2 midnights    Counseling / Coordination of Care  Total Critical Care time spent 10 minutes excluding procedures, teaching and family updates  TANGELA Wilkinson        Portions of the record may have been created with voice recognition software  Occasional wrong word or "sound a like" substitutions may have occurred due to the inherent limitations of voice recognition software    Read the chart carefully and recognize, using context, where substitutions have occurred

## 2019-03-21 NOTE — PROGRESS NOTES
[facility] - Internal Medicine Progress Note       PATIENT INFORMATION      Patient: Denece Riedel 36 y o  female   MRN: [id number]  PCP: Mortimer Simple, DO  Unit/Bed#: [facility] 428-01 Encounter: [id number]  Date Of Visit: 03/21/19       ASSESSMENTS & PLAN     Malignant hypertension on admission   Assessment & Plan  - presented w/ a BP of 266/139 w/ progressive improvement on a Cardene drip (now discontinued)   - counseled on lifestyle/diet modifications including salt/caffeine avoidance   - acknowledges intermittent noncompliance to various BP meds at home  - echocardiogram reveals normal EF 55% with marked LV hypertrophy, grade-2 diastolic dysfunction, mild MR, trace TR, but no evidence of wall motion abnormalities per reading cardiologist  - await MRA of kidneys - cardiology added MRA of carotids and MRA/V of head/neck  - free T4 within normal limits - renin/aldosterone assays ordered - plasma free metanephrines ordered  - underwent dexamethasone suppression test with adequate AM cortisol suppression today   - continue Clonidine/Labetalol/Norvasc/Chlorthalidone regimen - counseled on compliance to medication regimen (especially clonidine due to adverse effect of rebound hypertension if noncompliance)   - continue to monitor serial BP   - likely contributory to intermittent headaches (see plan below)     Chronic headaches  Assessment & Plan  - patient recalls removed being told she may have possible migraines  - on exam today, exhibited photosensitivity with improvement after avoidance of light - also notes intermittent unilateral paresthesias with worsening headaches and blurry vision which she attributes to times of elevated blood pressure  - PRN Tylenol on board   - counseled on compliance to antihypertensive regimen to stabilize BP - outpatient follow-up with PCP and/or neurology   - CT of head three months ago was negative for acute intracranial etiology      VTE Prophylaxis: SCDs - Ambulatory      SUBJECTIVE     Seen/examined earlier today during nursing rounds  She was resting comfortably in bed and states that she is still having intermittent headaches along with mild blurry vision which she states is chronic for her  She associates the symptoms with uncontrolled blood pressure  She does acknowledge occasional noncompliance to some of her blood pressure medications  Denies any chest pain or shortness of breath at this time  Denies any nausea/vomiting or other constitutional symptoms other than the aforementioned  Remains in positive spirits  OBJECTIVE     Vitals:   Temp (24hrs), Av 2 °F (36 8 °C), Min:97 7 °F (36 5 °C), Max:98 6 °F (37 °C)    Temp:  [97 7 °F (36 5 °C)-98 6 °F (37 °C)] 98 6 °F (37 °C)  HR:  [62-96] 70  Resp:  [18-26] 18  BP: (129-241)/() 158/77  SpO2:  [96 %-99 %] 98 %  Body mass index is 37 72 kg/m²  Input and Output Summary (last 24 hours):        Intake/Output Summary (Last 24 hours) at 3/21/2019 1823  Last data filed at 3/21/2019 1153  Gross per 24 hour   Intake 1215 42 ml   Output 1400 ml   Net -184 58 ml       Physical Exam:     GENERAL:  Well-developed/nourished - improved distress from headaches  HEAD:  Normocephalic - atraumatic  EYES: PERRL - EOMI   MOUTH:  Mucosa moist  NECK:  Supple - full range of motion  CARDIAC:  Regular rate/rhythm - S1/S2 positive  PULMONARY:  Clear breath sounds bilaterally - nonlabored respirations  ABDOMEN:  Soft - nontender/nondistended - active bowel sounds  MUSCULOSKELETAL:  Motor strength/range of motion intact  NEUROLOGIC:  Alert/oriented x 3 - photophobia noted  SKIN:  Age-appropriate wrinkles/blemishes   PSYCHIATRIC:  Mood/affect stable      ADDITIONAL DATA       Labs & Recent Cultures:     Results from last 7 days   Lab Units 19  0447   WBC Thousand/uL 7 11   HEMOGLOBIN g/dL 15 2   HEMATOCRIT % 46 9*   PLATELETS Thousands/uL 225   NEUTROS PCT % 86*   LYMPHS PCT % 10*   MONOS PCT % 4   EOS PCT % 0     Results from last 7 days   Lab Units 03/21/19  0447   SODIUM mmol/L 137   POTASSIUM mmol/L 4 1   CHLORIDE mmol/L 110*   CO2 mmol/L 22   BUN mg/dL 20   CREATININE mg/dL 1 04   CALCIUM mg/dL 8 5   ALK PHOS U/L 52   ALT U/L 27   AST U/L 13         Last 24 Hours Medication List:     Current Facility-Administered Medications:  acetaminophen 650 mg Oral Q6H PRN Unknown MD Katey    amLODIPine 5 mg Oral BID Alessandra Mina MD    calcium carbonate 1,000 mg Oral Daily PRN Unknown MD Katey    [START ON 3/22/2019] chlorthalidone 12 5 mg Oral Daily Erasmo Cade DO    cloNIDine 0 1 mg Oral BID Antonino Del Toro MD    labetalol 100 mg Oral TID Sharon Foster MD    LORazepam 0 5 mg Intravenous Daily PRN Unknown MD Katey    niCARdipine 1-15 mg/hr Intravenous Titrated Camillia Buerger, CRNP Last Rate: Stopped (03/21/19 0215)   ondansetron 4 mg Intravenous Q6H PRN Unknown MD Katey           Time Spent for Care: 33 minutes  More than 50% of total time spent on counseling and coordination of care as described above  Current Length of Stay: 1 day(s)      Code Status: Level 1 - Full Code        ** Please Note: This note is constructed using a voice recognition dictation system  An occasional wrong word/phrase or sound-a-like substitution may have been picked up by dictation device due to the inherent limitations of voice recognition software  Read the chart carefully and recognize, using reasonable context, where substitutions may have occurred  **

## 2019-03-21 NOTE — ASSESSMENT & PLAN NOTE
Referred for admission from Cardiology office for secondary hypertension evaluation and titration of blood pressure medications  Placed on nicardipine infusion with a goal SBP below 180  Restart home medications with the exception of spironolactone  Check renin/aldosterone ratio  Check low-dose dexamethasone suppression test   Check serum metanephrines    Check MRA of renal arteries  Cardiology consultation

## 2019-03-22 ENCOUNTER — APPOINTMENT (INPATIENT)
Dept: RADIOLOGY | Facility: HOSPITAL | Age: 34
DRG: 305 | End: 2019-03-22
Payer: COMMERCIAL

## 2019-03-22 PROCEDURE — 70544 MR ANGIOGRAPHY HEAD W/O DYE: CPT

## 2019-03-22 PROCEDURE — 70549 MR ANGIOGRAPH NECK W/O&W/DYE: CPT

## 2019-03-22 PROCEDURE — C8902 MRA W/O FOL W/CONT, ABD: HCPCS

## 2019-03-22 PROCEDURE — 99232 SBSQ HOSP IP/OBS MODERATE 35: CPT | Performed by: INTERNAL MEDICINE

## 2019-03-22 PROCEDURE — A9585 GADOBUTROL INJECTION: HCPCS | Performed by: INTERNAL MEDICINE

## 2019-03-22 RX ORDER — LABETALOL 20 MG/4 ML (5 MG/ML) INTRAVENOUS SYRINGE
5 ONCE
Status: COMPLETED | OUTPATIENT
Start: 2019-03-22 | End: 2019-03-22

## 2019-03-22 RX ORDER — LABETALOL 200 MG/1
200 TABLET, FILM COATED ORAL 3 TIMES DAILY
Status: DISCONTINUED | OUTPATIENT
Start: 2019-03-22 | End: 2019-03-24 | Stop reason: HOSPADM

## 2019-03-22 RX ORDER — LABETALOL 20 MG/4 ML (5 MG/ML) INTRAVENOUS SYRINGE
10 EVERY 6 HOURS PRN
Status: DISCONTINUED | OUTPATIENT
Start: 2019-03-22 | End: 2019-03-24 | Stop reason: HOSPADM

## 2019-03-22 RX ORDER — AMLODIPINE BESYLATE 5 MG/1
5 TABLET ORAL EVERY 12 HOURS
Status: DISCONTINUED | OUTPATIENT
Start: 2019-03-22 | End: 2019-03-24 | Stop reason: HOSPADM

## 2019-03-22 RX ORDER — CHLORTHALIDONE 25 MG/1
25 TABLET ORAL DAILY
Status: DISCONTINUED | OUTPATIENT
Start: 2019-03-23 | End: 2019-03-24 | Stop reason: HOSPADM

## 2019-03-22 RX ORDER — LABETALOL 20 MG/4 ML (5 MG/ML) INTRAVENOUS SYRINGE
Status: DISPENSED
Start: 2019-03-22 | End: 2019-03-23

## 2019-03-22 RX ORDER — LORAZEPAM 2 MG/ML
1 INJECTION INTRAMUSCULAR AS NEEDED
Status: DISCONTINUED | OUTPATIENT
Start: 2019-03-22 | End: 2019-03-24 | Stop reason: HOSPADM

## 2019-03-22 RX ORDER — CLONIDINE HYDROCHLORIDE 0.1 MG/1
0.1 TABLET ORAL EVERY 12 HOURS
Status: DISCONTINUED | OUTPATIENT
Start: 2019-03-22 | End: 2019-03-23

## 2019-03-22 RX ADMIN — AMLODIPINE BESYLATE 5 MG: 5 TABLET ORAL at 21:08

## 2019-03-22 RX ADMIN — GADOBUTROL 10 ML: 604.72 INJECTION INTRAVENOUS at 16:55

## 2019-03-22 RX ADMIN — LORAZEPAM 0.5 MG: 2 INJECTION, SOLUTION INTRAMUSCULAR; INTRAVENOUS at 01:54

## 2019-03-22 RX ADMIN — CHLORTHALIDONE 12.5 MG: 25 TABLET ORAL at 11:01

## 2019-03-22 RX ADMIN — LABETALOL HCL 200 MG: 200 TABLET, FILM COATED ORAL at 21:07

## 2019-03-22 RX ADMIN — CLONIDINE HYDROCHLORIDE 0.1 MG: 0.1 TABLET ORAL at 09:27

## 2019-03-22 RX ADMIN — LABETALOL HYDROCHLORIDE 100 MG: 100 TABLET, FILM COATED ORAL at 09:27

## 2019-03-22 RX ADMIN — LABETALOL HYDROCHLORIDE 5 MG: 5 INJECTION INTRAVENOUS at 12:38

## 2019-03-22 RX ADMIN — LORAZEPAM 1 MG: 2 INJECTION INTRAMUSCULAR; INTRAVENOUS at 15:40

## 2019-03-22 RX ADMIN — LABETALOL HCL 200 MG: 200 TABLET, FILM COATED ORAL at 17:59

## 2019-03-22 RX ADMIN — CLONIDINE HYDROCHLORIDE 0.1 MG: 0.1 TABLET ORAL at 21:07

## 2019-03-22 RX ADMIN — AMLODIPINE BESYLATE 5 MG: 5 TABLET ORAL at 09:27

## 2019-03-22 RX ADMIN — LABETALOL HYDROCHLORIDE 10 MG: 5 INJECTION INTRAVENOUS at 23:45

## 2019-03-22 NOTE — PROGRESS NOTES
[facility] - Internal Medicine Progress Note       PATIENT INFORMATION      Patient: Lashaun Cohen 36 y o  female   MRN: [id number]  PCP: Tyrel Burr DO  Unit/Bed#: [facility] 428-01 Encounter: [id number]  Date Of Visit: 03/22/19       ASSESSMENTS & PLAN       Malignant hypertension on admission   Assessment & Plan  - presented w/ a BP of 266/139 with waxing/waning fluctuations (previously on a Cardene drip)  - counseled on lifestyle/diet modifications including salt/caffeine avoidance   - acknowledges intermittent noncompliance to various BP meds at home  - echocardiogram reveals normal EF 55% with marked LV hypertrophy, grade-2 diastolic dysfunction, mild MR, trace TR, but no evidence of wall motion abnormalities per reading cardiologist  - MRA of kidneys negative for significant renal artery stenosis - MRA of carotids and MRA/V of head/neck also unremarkable  - free T4 within normal limits - renin/aldosterone assays ordered (outpatient follow-up) - plasma free metanephrines ordered (outpatient follow-up)  - underwent dexamethasone suppression test with adequate AM cortisol suppression   - continue Clonidine BID and Norvasc BID - Labetalol increased to 200 mg TID - Chlorthalidone increased to 25 mg daily - counseled on compliance to medication regimen (especially Clonidine due to adverse effect of rebound hypertension if noncompliant)   - continue to monitor serial BPs   - likely contributory to intermittent headaches (see plan below)   - hopeful discharge this weekend if pressures improve/stabilize    Chronic headaches  Assessment & Plan  - patient recalls remotely being told she may have possible migraines  - photosensitivity improved today - also notes intermittent unilateral paresthesias with worsening headaches and blurry vision which she attributes to times of elevated blood pressure  - PRN Tylenol on board   - counseled on compliance to antihypertensive regimen to stabilize BP - outpatient follow-up with PCP and/or neurology   - CT of head three months ago was negative for acute intracranial etiology      VTE Prophylaxis:  SCDs - Ambulatory      SUBJECTIVE     Seen/examined earlier this morning  Sitting upright during my encounter with bright lights in room stating that her headache and photosensitivity improved at that time  OBJECTIVE     Vitals:   Temp (24hrs), Av 2 °F (36 8 °C), Min:97 9 °F (36 6 °C), Max:98 5 °F (36 9 °C)    Temp:  [97 9 °F (36 6 °C)-98 5 °F (36 9 °C)] 98 2 °F (36 8 °C)  HR:  [70-78] 72  Resp:  [18-20] 18  BP: (155-220)/() 220/110  SpO2:  [96 %-100 %] 98 %  Body mass index is 37 72 kg/m²  Input and Output Summary (last 24 hours):        Intake/Output Summary (Last 24 hours) at 3/22/2019 1833  Last data filed at 3/22/2019 1514  Gross per 24 hour   Intake 1905 ml   Output 2200 ml   Net -295 ml       Physical Exam:     GENERAL:  Well-developed/nourished   HEAD:  Normocephalic - atraumatic  EYES: PERRL - EOMI   MOUTH:  Mucosa moist  NECK:  Supple - full range of motion  CARDIAC:  Regular rate/rhythm - S1/S2 positive  PULMONARY:  Clear breath sounds bilaterally - nonlabored respirations  ABDOMEN:  Soft - nontender/nondistended - active bowel sounds  MUSCULOSKELETAL:  Motor strength/range of motion intact  NEUROLOGIC:  Alert/oriented x 3 - photophobia improved today  SKIN:  Age-appropriate wrinkles/blemishes   PSYCHIATRIC:  Mood/affect stable      ADDITIONAL DATA       Labs & Recent Cultures:     Results from last 7 days   Lab Units 19  0447   WBC Thousand/uL 7 11   HEMOGLOBIN g/dL 15 2   HEMATOCRIT % 46 9*   PLATELETS Thousands/uL 225   NEUTROS PCT % 86*   LYMPHS PCT % 10*   MONOS PCT % 4   EOS PCT % 0     Results from last 7 days   Lab Units 19  0447   SODIUM mmol/L 137   POTASSIUM mmol/L 4 1   CHLORIDE mmol/L 110*   CO2 mmol/L 22   BUN mg/dL 20   CREATININE mg/dL 1 04   CALCIUM mg/dL 8 5   ALK PHOS U/L 52   ALT U/L 27   AST U/L 13 Last 24 Hours Medication List:     Current Facility-Administered Medications:  acetaminophen 650 mg Oral Q6H PRN Bella Caballero MD    amLODIPine 5 mg Oral Q12H Herminio Williamson MD    calcium carbonate 1,000 mg Oral Daily PRN Bella Caballero MD Abrleroy Aguilaraquilino ON 3/23/2019] chlorthalidone 25 mg Oral Daily Alessandra Mina MD    cloNIDine 0 1 mg Oral Q12H Herminio Williamson MD    labetalol 200 mg Oral TID Janina Johnson MD    Labetalol HCl        Labetalol HCl 10 mg Intravenous Q6H PRN Herminio Williamson MD    LORazepam 0 5 mg Intravenous Daily PRN Bella Caballero MD    LORazepam 1 mg Intravenous PRN Herminio Williamson MD    niCARdipine 1-15 mg/hr Intravenous Titrated TANGELA Chavez Last Rate: Stopped (03/21/19 0215)   ondansetron 4 mg Intravenous Q6H PRN Bella Caballero MD           Time Spent for Care: 33 minutes  More than 50% of total time spent on counseling and coordination of care as described above  Current Length of Stay: 2 day(s)      Code Status: Level 1 - Full Code        ** Please Note: This note is constructed using a voice recognition dictation system  An occasional wrong word/phrase or sound-a-like substitution may have been picked up by dictation device due to the inherent limitations of voice recognition software  Read the chart carefully and recognize, using reasonable context, where substitutions may have occurred  **

## 2019-03-22 NOTE — PROGRESS NOTES
Cardiology   Alphonso Gr 36 y o  female MRN: [id number]  Unit/Bed#: [facility] 428-01 Encounter: [id number]        Assessment/Plan:    >> Hypertensive emergency      Plan:    Workup for secondary causes of hypertension is still underway    MRI did not reveal any significant stenosis of the renal artery  Increase labetalol to 200 t i d , chlorthalidone 25 mg a day,  Continue amlodipine and clonidine  Continue to monitor blood pressure  Check electrolytes and BMP daily      Subjective:  No complaints    Denies chest pain, shortness of breath, palpitations, orthopnea, PND, pedal edema, syncope, presyncope, diaphoresis, nausea/vomiting     Remainder of ROS done and negative            Historical Information   Past Medical History:   Diagnosis Date    CHF (congestive heart failure) (HCC)     Generalized anxiety disorder     Hypertension     Kidney stone      Past Surgical History:   Procedure Laterality Date     SECTION      x 2    KNEE ARTHROSCOPY Left     acl/meniscus repair    DE CYSTO/URETERO W/LITHOTRIPSY &INDWELL STENT INSRT Right 2018    Procedure: CYSTOSCOPY URETEROSCOPY, RETROGRADE PYELOGRAM AND INSERTION STENT URETERAL, RIGHT STONE EXTRACTION;  Surgeon: Frankie Zarco MD;  Location: [facility];  Service: Urology    DE CYSTOURETHROSCOPY,URETER CATHETER Right 1/15/2018    Procedure: CYSTOSCOPY RETROGRADE PYELOGRAM WITH INSERTION STENT URETERAL;  Surgeon: Saintclair Lah, MD;  Location[facility];  Service: Urology    TUBAL LIGATION       Social History   Social History     Substance and Sexual Activity   Alcohol Use Not Currently    Frequency: Never     Social History     Substance and Sexual Activity   Drug Use No     Social History     Tobacco Use   Smoking Status Former Smoker    Packs/day: 0 00    Types: Cigarettes    Last attempt to quit:     Years since quittin 2   Smokeless Tobacco Never Used     Family History:   Family History   Problem Relation Age of Onset    Hypertension Mother     Cancer Mother         Thyroid    Hypertension Father            Scheduled Meds:  Current Facility-Administered Medications:  Labetalol HCl        acetaminophen 650 mg Oral Q6H PRN Fina Cummings MD    amLODIPine 5 mg Oral Q12H Ariel Jamil MD    calcium carbonate 1,000 mg Oral Daily PRN Fina Cummings MD    chlorthalidone 12 5 mg Oral Daily Rosalia Nissen, DO    cloNIDine 0 1 mg Oral Q12H Ariel Jamil MD    labetalol 100 mg Oral TID Ana Gomez MD    LORazepam 0 5 mg Intravenous Daily PRN Fina Cummings MD    LORazepam 1 mg Intravenous PRN Ariel Jamil MD    niCARdipine 1-15 mg/hr Intravenous Titrated TANGELA Avery Last Rate: Stopped (03/21/19 0215)   ondansetron 4 mg Intravenous Q6H PRN Fina Cummings MD      Continuous Infusions:  niCARdipine 1-15 mg/hr Last Rate: Stopped (03/21/19 0215)     PRN Meds: acetaminophen    calcium carbonate    LORazepam    LORazepam    ondansetron  current meds:   Current Facility-Administered Medications   Medication Dose Route Frequency    Labetalol HCl (NORMODYNE) 5 mg/mL injection **ADS Override Pull**        acetaminophen (TYLENOL) tablet 650 mg  650 mg Oral Q6H PRN    amLODIPine (NORVASC) tablet 5 mg  5 mg Oral Q12H    calcium carbonate (TUMS) chewable tablet 1,000 mg  1,000 mg Oral Daily PRN    [START ON 3/23/2019] chlorthalidone tablet 25 mg  25 mg Oral Daily    cloNIDine (CATAPRES) tablet 0 1 mg  0 1 mg Oral Q12H    labetalol (NORMODYNE) tablet 200 mg  200 mg Oral TID    LORazepam (ATIVAN) 2 mg/mL injection 0 5 mg  0 5 mg Intravenous Daily PRN    LORazepam (ATIVAN) 2 mg/mL injection 1 mg  1 mg Intravenous PRN    niCARdipine (CARDENE) 25 mg (STANDARD CONCENTRATION) in sodium chloride 0 9% 250 mL  1-15 mg/hr Intravenous Titrated    ondansetron (ZOFRAN) injection 4 mg  4 mg Intravenous Q6H PRN    and PTA meds:   Prior to Admission Medications   Prescriptions Last Dose Informant Patient Reported? Taking?    amLODIPine (NORVASC) 5 mg tablet  Self No Yes   Sig: Take 1 tablet (5 mg total) by mouth daily   cloNIDine (CATAPRES) 0 1 mg tablet  Self Yes Yes   Sig: Take 0 1 mg by mouth 2 (two) times a day   furosemide (LASIX) 20 mg tablet  Self Yes Yes   Sig: Take by mouth as needed (Pt not sure of the dose amount she takes)   labetalol (NORMODYNE) 200 mg tablet   No Yes   Sig: Take 1 tablet (200 mg total) by mouth 2 (two) times a day   metoprolol tartrate (LOPRESSOR) 50 mg tablet  Self No Yes   Sig: Take 2 tablets (100 mg total) by mouth 2 (two) times a day   spironolactone (ALDACTONE) 25 mg tablet   No Yes   Sig: Take 1 tablet (25 mg total) by mouth daily      Facility-Administered Medications: None       No Known Allergies    Objective   Vitals: Blood pressure (!) 183/103, pulse 70, temperature 97 9 °F (36 6 °C), temperature source Oral, resp  rate 19, height 5' 6" (1 676 m), weight 106 kg (233 lb 11 oz), last menstrual period 03/08/2019, SpO2 99 %, not currently breastfeeding , Body mass index is 37 72 kg/m² , Orthostatic Blood Pressures      Most Recent Value   Blood Pressure  183/103  (Abnormal)  filed at 03/22/2019 1221   Patient Position - Orthostatic VS  Lying filed at 03/22/2019 1221            Intake/Output Summary (Last 24 hours) at 3/22/2019 1338  Last data filed at 3/22/2019 0900  Gross per 24 hour   Intake 1200 ml   Output 1400 ml   Net -200 ml       Invasive Devices     Peripheral Intravenous Line            Peripheral IV 03/20/19 Right Forearm 1 day                Physical Exam:    General:  AO x3, no acute distress, obese  Cardiac:  S1-S2 normal  No murmurs, rubs or gallops, JVP:  Not seen  Lungs:  Clear to auscultation bilaterally, no wheezing or crackles    Abdomen:  Soft nontender nondistended, positive bowel sounds  Extremities:  Warm, well perfused, pulses palpable, no ulcers or rashes  Neuro: Grossly nonfocal  Psych:  Normal affect      Lab Results:   Recent Results (from the past 24 hour(s))   Pregnancy Test (HCG Qualitative)    Collection Time: 03/21/19  3:54 PM   Result Value Ref Range    Preg, Serum Negative Negative     Imaging: I have personally reviewed pertinent reports

## 2019-03-23 LAB — RENIN PLAS-CCNC: 0.4 NG/ML/HR (ref 0.17–5.38)

## 2019-03-23 PROCEDURE — 99232 SBSQ HOSP IP/OBS MODERATE 35: CPT | Performed by: INTERNAL MEDICINE

## 2019-03-23 PROCEDURE — 84244 ASSAY OF RENIN: CPT | Performed by: INTERNAL MEDICINE

## 2019-03-23 RX ORDER — CLONIDINE HYDROCHLORIDE 0.1 MG/1
0.1 TABLET ORAL ONCE
Status: COMPLETED | OUTPATIENT
Start: 2019-03-23 | End: 2019-03-23

## 2019-03-23 RX ORDER — CLONIDINE HYDROCHLORIDE 0.2 MG/1
0.2 TABLET ORAL EVERY 12 HOURS
Status: DISCONTINUED | OUTPATIENT
Start: 2019-03-23 | End: 2019-03-24 | Stop reason: HOSPADM

## 2019-03-23 RX ADMIN — LABETALOL HCL 200 MG: 200 TABLET, FILM COATED ORAL at 21:24

## 2019-03-23 RX ADMIN — LABETALOL HCL 200 MG: 200 TABLET, FILM COATED ORAL at 15:36

## 2019-03-23 RX ADMIN — CHLORTHALIDONE 25 MG: 25 TABLET ORAL at 09:03

## 2019-03-23 RX ADMIN — AMLODIPINE BESYLATE 5 MG: 5 TABLET ORAL at 09:03

## 2019-03-23 RX ADMIN — CLONIDINE HYDROCHLORIDE 0.1 MG: 0.1 TABLET ORAL at 09:03

## 2019-03-23 RX ADMIN — LABETALOL HYDROCHLORIDE 10 MG: 5 INJECTION INTRAVENOUS at 07:22

## 2019-03-23 RX ADMIN — CLONIDINE HYDROCHLORIDE 0.1 MG: 0.1 TABLET ORAL at 12:10

## 2019-03-23 RX ADMIN — LABETALOL HCL 200 MG: 200 TABLET, FILM COATED ORAL at 09:03

## 2019-03-23 RX ADMIN — CLONIDINE HYDROCHLORIDE 0.2 MG: 0.2 TABLET ORAL at 21:24

## 2019-03-23 RX ADMIN — AMLODIPINE BESYLATE 5 MG: 5 TABLET ORAL at 21:24

## 2019-03-23 NOTE — PROGRESS NOTES
[facility] Hospitalist Service - Internal Medicine Progress Note       PATIENT INFORMATION      Patient: Juan Bound 36 y o  female   MRN: [id number]  PCP: Richa Varghese DO  [facility] Encounter: [id number]  Date Of Visit: 03/23/19       ASSESSMENTS & PLAN       Malignant hypertension on admission   Assessment & Plan  - presented w/ a BP of 266/139 with waxing/waning fluctuations (previously on a Cardene drip) - systolic BP readings still intermittently fluctuating in the 170-190s   - counseled on lifestyle/diet modifications including salt/caffeine avoidance   - acknowledges intermittent noncompliance to various BP meds at home along with caffeine consumption  - echocardiogram reveals normal EF 55% with marked LV hypertrophy, grade-2 diastolic dysfunction, mild MR, trace TR, but no evidence of wall motion abnormalities per reading cardiologist  - MRA of kidneys negative for significant renal artery stenosis - MRA of carotids and MRA/V of head/neck also unremarkable  - free T4 within normal limits - renin/aldosterone assays ordered (outpatient follow-up) - plasma free metanephrines ordered (outpatient follow-up)   - underwent dexamethasone suppression test with adequate AM cortisol suppression   - Clonidine increased today to 2 mg BID - continue Norvasc BID - Labetalol currently a 200 mg TID (uptitrate as necessary) - Chlorthalidone at 25 mg daily - counseled on compliance to medication regimen (especially Clonidine due to adverse effect of rebound hypertension if noncompliant)   - continue to monitor serial BPs   - likely contributory to intermittent headaches (see plan below)     Chronic headaches  Assessment & Plan  - patient recalls remotely being told she may have possible migraines  - photosensitivity improved today - also notes intermittent unilateral paresthesias with worsening headaches and blurry vision which she attributes to times of elevated blood pressure  - PRN Tylenol on board   - counseled on compliance to antihypertensive regimen to stabilize BP - outpatient follow-up with PCP and/or neurology   - CT of head three months ago was negative for acute intracranial etiology      VTE Prophylaxis:  SCDs - Ambulatory      SUBJECTIVE     Seen/examined earlier today during nursing rounds  Continues to deny any further headaches  Understands that her blood pressures, although improved, still are fluctuating  She stated today that she is worried that she will go back to [employer] coffee as she works night shifts  Counseled on avoidance as much as possible of caffeinated beverages and transition to decaffeinated liquids if possible  OBJECTIVE     Vitals:   Temp (24hrs), Av 9 °F (36 6 °C), Min:97 8 °F (36 6 °C), Max:98 1 °F (36 7 °C)    Temp:  [97 8 °F (36 6 °C)-98 1 °F (36 7 °C)] 97 8 °F (36 6 °C)  HR:  [67-76] 69  Resp:  [16-18] 18  BP: (129-225)/() 142/92  SpO2:  [96 %-98 %] 98 %  Body mass index is 37 72 kg/m²  Input and Output Summary (last 24 hours):        Intake/Output Summary (Last 24 hours) at 3/23/2019 1612  Last data filed at 3/23/2019 1507  Gross per 24 hour   Intake 1395 ml   Output 2275 ml   Net -880 ml       Physical Exam:     GENERAL:  Well-developed/nourished   HEAD:  Normocephalic - atraumatic  EYES: PERRL - EOMI   MOUTH:  Mucosa moist  NECK:  Supple - full range of motion  CARDIAC:  Regular rate/rhythm - S1/S2 positive  PULMONARY:  Clear to auscultation bilaterally - nonlabored respirations  ABDOMEN:  Soft - nontender/nondistended - active bowel sounds  MUSCULOSKELETAL:  Motor strength/range of motion intact  NEUROLOGIC:  Alert/oriented x 3   SKIN:  Age-appropriate wrinkles/blemishes   PSYCHIATRIC:  Mood/affect stable      ADDITIONAL DATA       Labs & Recent Cultures:     Results from last 7 days   Lab Units 19  0447   WBC Thousand/uL 7 11   HEMOGLOBIN g/dL 15 2   HEMATOCRIT % 46 9*   PLATELETS Thousands/uL 225   NEUTROS PCT % 86*   LYMPHS PCT % 10*   MONOS PCT % 4   EOS PCT % 0     Results from last 7 days   Lab Units 03/21/19  0447   SODIUM mmol/L 137   POTASSIUM mmol/L 4 1   CHLORIDE mmol/L 110*   CO2 mmol/L 22   BUN mg/dL 20   CREATININE mg/dL 1 04   CALCIUM mg/dL 8 5   ALK PHOS U/L 52   ALT U/L 27   AST U/L 13         Last 24 Hours Medication List:     Current Facility-Administered Medications:  acetaminophen 650 mg Oral Q6H PRN Jonny Gutierrez MD    amLODIPine 5 mg Oral Q12H Mel Alicia MD    calcium carbonate 1,000 mg Oral Daily PRN Jonny Gutierrez MD    chlorthalidone 25 mg Oral Daily Alessandra Mina MD    cloNIDine 0 2 mg Oral Q12H Hill Teran DO    labetalol 200 mg Oral TID Esmer James MD    Labetalol HCl 10 mg Intravenous Q6H PRN Mel Alicia MD    LORazepam 0 5 mg Intravenous Daily PRN Jonny Gutierrez MD    LORazepam 1 mg Intravenous PRN Mel Alicia MD    niCARdipine 1-15 mg/hr Intravenous Titrated TANGELA Boyd Last Rate: Stopped (03/21/19 0215)   ondansetron 4 mg Intravenous Q6H PRN Jonny Gutierrez MD           Time Spent for Care: 33 minutes  More than 50% of total time spent on counseling and coordination of care as described above  Current Length of Stay: 3 day(s)      Code Status: Level 1 - Full Code        ** Please Note: This note is constructed using a voice recognition dictation system  An occasional wrong word/phrase or sound-a-like substitution may have been picked up by dictation device due to the inherent limitations of voice recognition software  Read the chart carefully and recognize, using reasonable context, where substitutions may have occurred  **

## 2019-03-23 NOTE — PROGRESS NOTES
Patient /95 1 hour after receiving scheduled PO meds  No additional PRN's available at this time  Notified Dr Adrianne Gabriel with cards and was advised to recheck in 1 hour and that she will be evaluated for potential changes to meds  Will cont to monitor

## 2019-03-23 NOTE — PROGRESS NOTES
Cardiology Progress Note - Opal Villanueva 36 y o  female MRN: [id number]    Unit/Bed#: [facility] 428-01 Encounter: [id number]    Assessment and plan  1  Hypertensive emergency  2  Chronic headaches  3  Severe LVH    Recommendations:  Increase clonidine 2 2 b i d  Today  Continue amlodipine maximum dose  Will up titrate labetalol throughout the day  Continue chlorthalidone 25 mg daily  May need to add an ARB medication  Blood pressure still remains significantly elevated  Start workup for feel including urine metanephrines and plasma metanephrines  May need CTA of renal arteries to rule out fibromuscular dysplasia  I had a long discussion with the patient about compliance when she leaves the hospital    Subjective:    No significant events overnight  Denies chest pain or dyspnea blood pressures are improved from hospital admission but still not at goal    ROS    Objective:   Vitals: Blood pressure (!) 177/97, pulse 75, temperature 97 9 °F (36 6 °C), temperature source Oral, resp  rate 18, height 5' 6" (1 676 m), weight 106 kg (233 lb 11 oz), last menstrual period 03/08/2019, SpO2 96 %, not currently breastfeeding , Body mass index is 37 72 kg/m² , Orthostatic Blood Pressures      Most Recent Value   Blood Pressure  177/97  (Abnormal)  filed at 03/23/2019 1110   Patient Position - Orthostatic VS  Sitting filed at 03/23/2019 6671         Systolic (95TIV), OKJ:573 , Min:129 , PTH:813     Diastolic (25TPB), XWM:94, Min:67, Max:111      Intake/Output Summary (Last 24 hours) at 3/23/2019 1154  Last data filed at 3/23/2019 0717  Gross per 24 hour   Intake 1380 ml   Output 2075 ml   Net -695 ml     Weight (last 2 days)     None            Telemetry Review: No significant arrhythmias seen on telemetry review  EKG personally reviewed by Yazan Cabrera DO  Physical Exam   Constitutional: She is oriented to person, place, and time  She appears well-nourished  No distress  HENT:   Head: Atraumatic     Eyes: Pupils are equal, round, and reactive to light  Conjunctivae are normal    Neck: Neck supple  Cardiovascular: Normal rate, regular rhythm and normal heart sounds  Exam reveals no friction rub  No murmur heard  Pulmonary/Chest: Effort normal and breath sounds normal  No respiratory distress  She has no wheezes  She has no rales  Abdominal: Bowel sounds are normal  She exhibits no distension  There is no tenderness  There is no rebound  Musculoskeletal: She exhibits no edema  Neurological: She is alert and oriented to person, place, and time  No cranial nerve deficit  Skin: Skin is warm and dry  No erythema  Nursing note and vitals reviewed  Laboratory Results:  Results from last 7 days   Lab Units 03/20/19 1739 03/18/19  2150   TROPONIN I ng/mL 0 04 0 03       CBC with diff: Results from last 7 days   Lab Units 03/21/19 0447 03/20/19 1739 03/18/19  2150   WBC Thousand/uL 7 11 8 09 7 90   HEMOGLOBIN g/dL 15 2 14 4 14 4   HEMATOCRIT % 46 9* 44 5 43 1   MCV fL 87 88 86   PLATELETS Thousands/uL 225 195 187   MCH pg 28 3 28 3 28 5   MCHC g/dL 32 4 32 4 33 3   RDW % 13 1 13 0 13 9   MPV fL 11 9 11 5 9 1   NRBC AUTO /100 WBCs 0 0 0         CMP:  Results from last 7 days   Lab Units 03/21/19 0447 03/20/19 1739 03/18/19  2150   POTASSIUM mmol/L 4 1 4 0 4 0   CHLORIDE mmol/L 110* 110* 104   CO2 mmol/L 22 22 26   BUN mg/dL 20 18 25   CREATININE mg/dL 1 04 1 09 1 41*   CALCIUM mg/dL 8 5 8 6 9 1   AST U/L 13 20  --    ALT U/L 27 25  --    ALK PHOS U/L 52 51  --    EGFR ml/min/1 73sq m 71 67 49         BMP:  Results from last 7 days   Lab Units 03/21/19 0447 03/20/19 1739 03/18/19  2150   POTASSIUM mmol/L 4 1 4 0 4 0   CHLORIDE mmol/L 110* 110* 104   CO2 mmol/L 22 22 26   BUN mg/dL 20 18 25   CREATININE mg/dL 1 04 1 09 1 41*   CALCIUM mg/dL 8 5 8 6 9 1       BNP: No results for input(s): BNP in the last 72 hours      Magnesium:       Coags:       TSH:        Hemoglobin A1C       Lipid Profile: Cardiac testing:   Results for orders placed during the hospital encounter of 19   Echo complete with contrast if indicated    Narrative [address]  [facility], [address] [facility]  [phone]    Transthoracic Echocardiogram  2D, M-mode, Doppler, and Color Doppler    Study date:  21-Mar-2019    Patient: Jose Plummer  MR number: [id number]  Account number: [de-identified]  : 1985  Age: 36 years  Gender: Female  Status: Inpatient  Location: Bedside  Height: 66 in  Weight: 232 5 lb  BP: 212/ 112 mmHg    Indications: Hypertensive Heart Disease    Diagnoses: I11 0 - Hypertensive heart disease with heart failure    Sonographer:  AMILCAR Winslow  Primary Physician:  Suman Covarrubias DO  Referring Physician:  Yoel Figueroa MD  Group:  [facility]  Interpreting Physician:  Courtenay Gowers, MD    SUMMARY    LEFT VENTRICLE:  Size was at the upper limits of normal   Systolic function was normal  Ejection fraction was estimated to be 55 %  There were no regional wall motion abnormalities  Wall thickness was markedly increased  There was severe concentric hypertrophy  Features were consistent with a pseudonormal left ventricular filling pattern, with concomitant abnormal relaxation and increased filling pressure (grade 2 diastolic dysfunction)  LEFT ATRIUM:  The atrium was moderately dilated  MITRAL VALVE:  There was mild regurgitation  TRICUSPID VALVE:  There was trace regurgitation  HISTORY: PRIOR HISTORY: Hypertensive Heart Disease; Congestive Heart Failure; Obesity    PROCEDURE: The procedure was performed at the bedside  This was a routine study  The transthoracic approach was used  The study included complete 2D imaging, M-mode, complete spectral Doppler, and color Doppler  The heart rate was 64 bpm,  at the start of the study   Images were obtained from the parasternal, apical, subcostal, and suprasternal notch acoustic windows  Image quality was good  LEFT VENTRICLE: Size was at the upper limits of normal  Systolic function was normal  Ejection fraction was estimated to be 55 %  There were no regional wall motion abnormalities  Wall thickness was markedly increased  There was severe  concentric hypertrophy  DOPPLER: Features were consistent with a pseudonormal left ventricular filling pattern, with concomitant abnormal relaxation and increased filling pressure (grade 2 diastolic dysfunction)  RIGHT VENTRICLE: The size was normal  Systolic function was normal  Wall thickness was normal     LEFT ATRIUM: The atrium was moderately dilated  RIGHT ATRIUM: Size was normal     MITRAL VALVE: Valve structure was normal  There was normal leaflet separation  DOPPLER: The transmitral velocity was within the normal range  There was no evidence for stenosis  There was mild regurgitation  AORTIC VALVE: The valve was trileaflet  Leaflets exhibited normal thickness and normal cuspal separation  DOPPLER: Transaortic velocity was within the normal range  There was no evidence for stenosis  There was no regurgitation  TRICUSPID VALVE: The valve structure was normal  There was normal leaflet separation  DOPPLER: The transtricuspid velocity was within the normal range  There was no evidence for stenosis  There was trace regurgitation  Pulmonary artery  systolic pressure was within the normal range  PULMONIC VALVE: Leaflets exhibited normal thickness, no calcification, and normal cuspal separation  DOPPLER: The transpulmonic velocity was within the normal range  There was no regurgitation  PERICARDIUM: There was no pericardial effusion  The pericardium was normal in appearance  AORTA: The root exhibited normal size      SYSTEM MEASUREMENT TABLES    2D  %FS: 32 79 %  Ao Diam: 3 43 cm  EDV(Teich): 153 72 ml  EF(Teich): 60 6 %  ESV(Teich): 60 57 ml  IVSd: 1 84 cm  LA Area: 21 47 cm2  LA Diam: 4 93 cm  LVEDV MOD A4C: 144 62 ml  LVEF MOD A4C: 54 75 %  LVESV MOD A4C: 65 44 ml  LVIDd: 5 6 cm  LVIDs: 3 76 cm  LVLd A4C: 8 8 cm  LVLs A4C: 7 31 cm  LVPWd: 1 46 cm  RA Area: 15 11 cm2  RVIDd: 3 08 cm  SV MOD A4C: 79 18 ml  SV(Teich): 93 15 ml    CW  AV Vmax: 1 82 m/s  AV maxP 26 mmHg  TR Vmax: 2 58 m/s  TR maxP 59 mmHg    MM  TAPSE: 2 43 cm    PW  E': 0 05 m/s  E/E': 16 77  LVOT Env  Ti: 350 63 ms  LVOT VTI: 24 28 cm  LVOT Vmax: 1 22 m/s  LVOT Vmean: 0 69 m/s  LVOT maxP 99 mmHg  LVOT meanP 61 mmHg  MV A Harshad: 0 62 m/s  MV Dec [place]: 3 38 m/s2  MV DecT: 248 02 ms  MV E Harshad: 0 84 m/s  MV E/A Ratio: 1 36  MV PHT: 71 93 ms  MVA By PHT: 3 06 cm2    [facility] Accredited Echocardiography [facility]    Prepared and electronically signed by    Jay Eric MD  Signed 21-Mar-2019 14:36:26       No results found for this or any previous visit  No results found for this or any previous visit  No results found for this or any previous visit      Meds/Allergies   all current active meds have been reviewed  Medications Prior to Admission   Medication    amLODIPine (NORVASC) 5 mg tablet    cloNIDine (CATAPRES) 0 1 mg tablet    furosemide (LASIX) 20 mg tablet    labetalol (NORMODYNE) 200 mg tablet    metoprolol tartrate (LOPRESSOR) 50 mg tablet    spironolactone (ALDACTONE) 25 mg tablet         niCARdipine 1-15 mg/hr Last Rate: Stopped (19)     Assessment:  Principal Problem:    Malignant hypertension on admission   Active Problems:    Proteinuria    Chronic headaches

## 2019-03-24 VITALS
SYSTOLIC BLOOD PRESSURE: 145 MMHG | BODY MASS INDEX: 37.56 KG/M2 | WEIGHT: 233.69 LBS | TEMPERATURE: 98 F | HEART RATE: 70 BPM | RESPIRATION RATE: 18 BRPM | HEIGHT: 66 IN | OXYGEN SATURATION: 98 % | DIASTOLIC BLOOD PRESSURE: 74 MMHG

## 2019-03-24 LAB
ANION GAP SERPL CALCULATED.3IONS-SCNC: 7 MMOL/L (ref 4–13)
BUN SERPL-MCNC: 31 MG/DL (ref 5–25)
CALCIUM SERPL-MCNC: 9.1 MG/DL (ref 8.3–10.1)
CHLORIDE SERPL-SCNC: 106 MMOL/L (ref 100–108)
CO2 SERPL-SCNC: 23 MMOL/L (ref 21–32)
CREAT SERPL-MCNC: 1.1 MG/DL (ref 0.6–1.3)
GFR SERPL CREATININE-BSD FRML MDRD: 66 ML/MIN/1.73SQ M
GLUCOSE SERPL-MCNC: 105 MG/DL (ref 65–140)
POTASSIUM SERPL-SCNC: 4.1 MMOL/L (ref 3.5–5.3)
SODIUM SERPL-SCNC: 136 MMOL/L (ref 136–145)

## 2019-03-24 PROCEDURE — 83835 ASSAY OF METANEPHRINES: CPT | Performed by: INTERNAL MEDICINE

## 2019-03-24 PROCEDURE — 99239 HOSP IP/OBS DSCHRG MGMT >30: CPT | Performed by: INTERNAL MEDICINE

## 2019-03-24 PROCEDURE — 82088 ASSAY OF ALDOSTERONE: CPT | Performed by: INTERNAL MEDICINE

## 2019-03-24 PROCEDURE — 80048 BASIC METABOLIC PNL TOTAL CA: CPT | Performed by: INTERNAL MEDICINE

## 2019-03-24 PROCEDURE — 99232 SBSQ HOSP IP/OBS MODERATE 35: CPT | Performed by: INTERNAL MEDICINE

## 2019-03-24 RX ORDER — CLONIDINE HYDROCHLORIDE 0.2 MG/1
0.2 TABLET ORAL EVERY 12 HOURS
Qty: 60 TABLET | Refills: 0 | Status: SHIPPED | OUTPATIENT
Start: 2019-03-24 | End: 2019-04-17

## 2019-03-24 RX ORDER — CHLORTHALIDONE 25 MG/1
25 TABLET ORAL DAILY
Qty: 30 TABLET | Refills: 0 | Status: SHIPPED | OUTPATIENT
Start: 2019-03-25 | End: 2019-04-17 | Stop reason: SDUPTHER

## 2019-03-24 RX ORDER — LABETALOL 200 MG/1
200 TABLET, FILM COATED ORAL 3 TIMES DAILY
Qty: 90 TABLET | Refills: 0 | Status: SHIPPED | OUTPATIENT
Start: 2019-03-24 | End: 2019-04-17 | Stop reason: SDUPTHER

## 2019-03-24 RX ORDER — AMLODIPINE BESYLATE 5 MG/1
5 TABLET ORAL EVERY 12 HOURS
Qty: 60 TABLET | Refills: 0 | Status: SHIPPED | OUTPATIENT
Start: 2019-03-24 | End: 2019-04-17 | Stop reason: SDUPTHER

## 2019-03-24 RX ADMIN — CLONIDINE HYDROCHLORIDE 0.2 MG: 0.2 TABLET ORAL at 09:01

## 2019-03-24 RX ADMIN — CHLORTHALIDONE 25 MG: 25 TABLET ORAL at 09:01

## 2019-03-24 RX ADMIN — LABETALOL HCL 200 MG: 200 TABLET, FILM COATED ORAL at 09:01

## 2019-03-24 RX ADMIN — LABETALOL HCL 200 MG: 200 TABLET, FILM COATED ORAL at 15:42

## 2019-03-24 RX ADMIN — AMLODIPINE BESYLATE 5 MG: 5 TABLET ORAL at 09:01

## 2019-03-24 NOTE — DISCHARGE SUMMARY
Discharge Summary - [facility] - Internal Medicine      Patient Information: Laury Salas 36 y o  female MRN: [id number]  Unit/Bed#: [facility] 428-01 Encounter: [id number]    Discharging Physician / Practitioner: Magui Rizo MD  PCP: Mine Ruvalcaba DO  Admission Date:   Admission Orders (From admission, onward)    Ordered        03/20/19 1926  Inpatient Admission  Once     Order ID Start Status   [id number] 03/20/19 1927 Completed              Discharge Date: 03/24/19      Reason for Admission:  Uncontrolled hypertension       Discharge Diagnoses:     Principal Problem:    Malignant hypertension on admission     Active Problems:    Chronic headaches    Essential hypertension    Noncompliance to medication regimen      Consultations During Hospital Stay:  · Cardiology      Hospital Course:     Malignant hypertension on admission   Assessment & Plan  - presented w/ a BP of 266/139 with waxing/waning fluctuations (previously on a Cardene drip) now progressively improved  - systolic BP readings on day of discharge were ranging in the 130-140s  - counseled on lifestyle/diet modifications including salt/caffeine avoidance   - acknowledges intermittent noncompliance to various BP meds at home along with caffeine consumption  - echocardiogram reveals normal EF 55% with marked LV hypertrophy, grade-2 diastolic dysfunction, mild MR, trace TR, but no evidence of wall motion abnormalities per reading cardiologist  - MRA of kidneys negative for significant renal artery stenosis - MRA of carotids and MRA/V of head/neck also unremarkable  - free T4 within normal limits - renin/aldosterone assays ordered (outpatient follow-up) - plasma free metanephrines ordered (outpatient follow-up) - cardiology recommends consideration of CTA of renal vasculature to assess for fibromuscular dysplasia if uncontrolled BP recurs despite modified regimen  - underwent dexamethasone suppression test with adequate AM cortisol suppression   - Clonidine increased to 2 mg BID - continue Norvasc BID - Labetalol currently at 200 mg TID - Chlorthalidone at 25 mg daily - counseled on compliance to medication regimen (especially Clonidine due to adverse effect of rebound hypertension if noncompliant)    - likely contributory to intermittent headaches (see plan below)      Chronic headaches  Assessment & Plan  - patient recalls remotely being told she may have possible migraines  - photosensitivity resolved - also notes intermittent unilateral paresthesias with worsening headaches and blurry vision which she attributes to times of elevated blood pressure  - PRN Tylenol on board   - counseled on compliance to antihypertensive regimen to stabilize BP - outpatient follow-up with PCP and/or neurology   - CT of head three months ago was negative for acute intracranial etiology       Condition at Discharge: good       Discharge Day Visit / Exam:     Vitals: Blood Pressure: 145/74(Tce156) (03/24/19 1211)  Pulse: 70 (03/24/19 1211)  Temperature: 98 °F (36 7 °C) (03/24/19 1211)  Temp Source: Oral (03/24/19 1211)  Respirations: 18 (03/24/19 1211)  Height: 5' 6" (167 6 cm) (03/20/19 2151)  Weight - Scale: 106 kg (233 lb 11 oz) (03/20/19 2151)  SpO2: 98 % (03/24/19 1211)      Physical exam - I had a face-to-face encounter with the patient on day of discharge  Discussion with Patient and/or Family:  The patient has been advised to return to the ER immediately if any symptoms recur or worsen  Discharge instructions/Information to Patient and/or Family:   See after visit summary for information provided to patient and/or family  Provisions for Follow-Up Care:  See after visit summary for information related to follow-up care and any pertinent home health orders  Disposition:   Home      Discharge Medications:  See after visit summary for reconciled discharge medications provided to patient and/or family          Discharge Statement:  I spent 38 minutes discharging the patient  This time was spent on the day of discharge  I had direct contact with the patient on the day of discharge  Greater than 50% of the total time was spent examining patient, answering all patient questions, arranging and discussing plan of care with patient as well as directly providing post-discharge instructions  Additional time then spent on discharge activities  ** Please Note: This note is constructed using a voice recognition dictation system  An occasional wrong word/phrase or sound-a-like substitution may have been picked up by dictation device due to the inherent limitations of voice recognition software  Read the chart carefully and recognize, using reasonable context, where substitutions may have occurred  **

## 2019-03-24 NOTE — PROGRESS NOTES
Cardiology Progress Note - Radha Michaels 36 y o  female MRN: [id number]    Unit/Bed#: [facility] 428-01 Encounter: [id number]    Assessment and plan  1  Hypertensive emergency  2  Chronic headaches  3  Severe LVH    Recommendations:  Blood pressures have improved throughout the evening  Continue labetalol and clonidine at current doses  Continue chlorthalidone 25 mg daily  Will await 24 hour urine collection and plasma metanephrines  Check basic metabolic profile this morning  May need CTA of renal arteries to rule out fibromuscular dysplasia, I think this can be done as an outpatient  I had a long discussion with the patient about compliance when she leaves the hospital    Subjective:    No significant events overnight  Feeling well today denies chest pain, shortness of breath, palpitations, lightheadedness, dizziness  No events on telemetry  Review of Systems   Constitution: Negative  HENT: Negative  Eyes: Negative  Cardiovascular: Negative  Respiratory: Negative  Endocrine: Negative  Hematologic/Lymphatic: Negative  Skin: Negative  Musculoskeletal: Negative  Gastrointestinal: Negative  Genitourinary: Negative  Neurological: Negative  Psychiatric/Behavioral: Negative  Objective:   Vitals: Blood pressure 137/67, pulse 70, temperature 97 5 °F (36 4 °C), resp   rate 18, height 5' 6" (1 676 m), weight 106 kg (233 lb 11 oz), last menstrual period 03/08/2019, SpO2 98 %, not currently breastfeeding , Body mass index is 37 72 kg/m² ,   Orthostatic Blood Pressures      Most Recent Value   Blood Pressure  137/67 filed at 03/24/2019 0351   Patient Position - Orthostatic VS  Lying filed at 03/24/2019 1656         Systolic (32JKP), AER:676 , Min:137 , FYM:502     Diastolic (83END), YXC:03, Min:67, Max:97      Intake/Output Summary (Last 24 hours) at 3/24/2019 0831  Last data filed at 3/24/2019 0700  Gross per 24 hour   Intake 780 ml   Output 2600 ml   Net -1820 ml Weight (last 2 days)     None            Telemetry Review: No significant arrhythmias seen on telemetry review  EKG personally reviewed by Sanna Zamudio DO  Physical Exam   Constitutional: She is oriented to person, place, and time  She appears well-nourished  No distress  HENT:   Head: Atraumatic  Eyes: Pupils are equal, round, and reactive to light  Conjunctivae are normal    Neck: Neck supple  Cardiovascular: Normal rate, regular rhythm and normal heart sounds  Exam reveals no friction rub  No murmur heard  Pulmonary/Chest: Effort normal and breath sounds normal  No respiratory distress  She has no wheezes  She has no rales  Abdominal: Bowel sounds are normal  She exhibits no distension  There is no tenderness  There is no rebound  Musculoskeletal: She exhibits no edema or deformity  Neurological: She is alert and oriented to person, place, and time  No cranial nerve deficit  Skin: Skin is warm and dry  No erythema  Nursing note and vitals reviewed          Laboratory Results:  Results from last 7 days   Lab Units 03/20/19 1739 03/18/19  2150   TROPONIN I ng/mL 0 04 0 03       CBC with diff:   Results from last 7 days   Lab Units 03/21/19 0447 03/20/19 1739 03/18/19  2150   WBC Thousand/uL 7 11 8 09 7 90   HEMOGLOBIN g/dL 15 2 14 4 14 4   HEMATOCRIT % 46 9* 44 5 43 1   MCV fL 87 88 86   PLATELETS Thousands/uL 225 195 187   MCH pg 28 3 28 3 28 5   MCHC g/dL 32 4 32 4 33 3   RDW % 13 1 13 0 13 9   MPV fL 11 9 11 5 9 1   NRBC AUTO /100 WBCs 0 0 0         CMP:  Results from last 7 days   Lab Units 03/21/19 0447 03/20/19 1739 03/18/19  2150   POTASSIUM mmol/L 4 1 4 0 4 0   CHLORIDE mmol/L 110* 110* 104   CO2 mmol/L 22 22 26   BUN mg/dL 20 18 25   CREATININE mg/dL 1 04 1 09 1 41*   CALCIUM mg/dL 8 5 8 6 9 1   AST U/L 13 20  --    ALT U/L 27 25  --    ALK PHOS U/L 52 51  --    EGFR ml/min/1 73sq m 71 67 49         BMP:  Results from last 7 days   Lab Units 03/21/19 0447 19  1739 19  2150   POTASSIUM mmol/L 4 1 4 0 4 0   CHLORIDE mmol/L 110* 110* 104   CO2 mmol/L 22 22 26   BUN mg/dL 20 18 25   CREATININE mg/dL 1 04 1 09 1 41*   CALCIUM mg/dL 8 5 8 6 9 1       BNP: No results for input(s): BNP in the last 72 hours  Magnesium:       Coags:       TSH:        Hemoglobin A1C       Lipid Profile:       Cardiac testing:   Results for orders placed during the hospital encounter of 19   Echo complete with contrast if indicated    Narrative [address]  [facility], [address] [facility]  [phone]    Transthoracic Echocardiogram  2D, M-mode, Doppler, and Color Doppler    Study date:  21-Mar-2019    Patient: Ancelmo Watkins  MR number: [id number]  Account number: [de-identified]  : 1985  Age: 36 years  Gender: Female  Status: Inpatient  Location: Bedside  Height: 66 in  Weight: 232 5 lb  BP: 212/ 112 mmHg    Indications: Hypertensive Heart Disease    Diagnoses: I11 0 - Hypertensive heart disease with heart failure    Sonographer:  AMILCAR Rajput  Primary Physician:  Deirdre Ruiz DO  Referring Physician:  Kendra Morris MD  Group:  [facility]  Interpreting Physician:  Jennifer Alarcon MD    SUMMARY    LEFT VENTRICLE:  Size was at the upper limits of normal   Systolic function was normal  Ejection fraction was estimated to be 55 %  There were no regional wall motion abnormalities  Wall thickness was markedly increased  There was severe concentric hypertrophy  Features were consistent with a pseudonormal left ventricular filling pattern, with concomitant abnormal relaxation and increased filling pressure (grade 2 diastolic dysfunction)  LEFT ATRIUM:  The atrium was moderately dilated  MITRAL VALVE:  There was mild regurgitation  TRICUSPID VALVE:  There was trace regurgitation  HISTORY: PRIOR HISTORY: Hypertensive Heart Disease; Congestive Heart Failure;  Obesity    PROCEDURE: The procedure was performed at the bedside  This was a routine study  The transthoracic approach was used  The study included complete 2D imaging, M-mode, complete spectral Doppler, and color Doppler  The heart rate was 64 bpm,  at the start of the study  Images were obtained from the parasternal, apical, subcostal, and suprasternal notch acoustic windows  Image quality was good  LEFT VENTRICLE: Size was at the upper limits of normal  Systolic function was normal  Ejection fraction was estimated to be 55 %  There were no regional wall motion abnormalities  Wall thickness was markedly increased  There was severe  concentric hypertrophy  DOPPLER: Features were consistent with a pseudonormal left ventricular filling pattern, with concomitant abnormal relaxation and increased filling pressure (grade 2 diastolic dysfunction)  RIGHT VENTRICLE: The size was normal  Systolic function was normal  Wall thickness was normal     LEFT ATRIUM: The atrium was moderately dilated  RIGHT ATRIUM: Size was normal     MITRAL VALVE: Valve structure was normal  There was normal leaflet separation  DOPPLER: The transmitral velocity was within the normal range  There was no evidence for stenosis  There was mild regurgitation  AORTIC VALVE: The valve was trileaflet  Leaflets exhibited normal thickness and normal cuspal separation  DOPPLER: Transaortic velocity was within the normal range  There was no evidence for stenosis  There was no regurgitation  TRICUSPID VALVE: The valve structure was normal  There was normal leaflet separation  DOPPLER: The transtricuspid velocity was within the normal range  There was no evidence for stenosis  There was trace regurgitation  Pulmonary artery  systolic pressure was within the normal range  PULMONIC VALVE: Leaflets exhibited normal thickness, no calcification, and normal cuspal separation  DOPPLER: The transpulmonic velocity was within the normal range   There was no regurgitation  PERICARDIUM: There was no pericardial effusion  The pericardium was normal in appearance  AORTA: The root exhibited normal size  SYSTEM MEASUREMENT TABLES    2D  %FS: 32 79 %  Ao Diam: 3 43 cm  EDV(Teich): 153 72 ml  EF(Teich): 60 6 %  ESV(Teich): 60 57 ml  IVSd: 1 84 cm  LA Area: 21 47 cm2  LA Diam: 4 93 cm  LVEDV MOD A4C: 144 62 ml  LVEF MOD A4C: 54 75 %  LVESV MOD A4C: 65 44 ml  LVIDd: 5 6 cm  LVIDs: 3 76 cm  LVLd A4C: 8 8 cm  LVLs A4C: 7 31 cm  LVPWd: 1 46 cm  RA Area: 15 11 cm2  RVIDd: 3 08 cm  SV MOD A4C: 79 18 ml  SV(Teich): 93 15 ml    CW  AV Vmax: 1 82 m/s  AV maxP 26 mmHg  TR Vmax: 2 58 m/s  TR maxP 59 mmHg    MM  TAPSE: 2 43 cm    PW  E': 0 05 m/s  E/E': 16 77  LVOT Env  Ti: 350 63 ms  LVOT VTI: 24 28 cm  LVOT Vmax: 1 22 m/s  LVOT Vmean: 0 69 m/s  LVOT maxP 99 mmHg  LVOT meanP 61 mmHg  MV A [place]: 0 62 m/s  MV Dec [place]: 3 38 m/s2  MV DecT: 248 02 ms  MV E Harshad: 0 84 m/s  MV E/A Ratio: 1 36  MV PHT: 71 93 ms  MVA By PHT: 3 06 cm2    [facility] Accredited Echocardi[facility]    Prepared and electronically signed by    Fernando Ryan MD  Signed 21-Mar-2019 14:36:26       No results found for this or any previous visit  No results found for this or any previous visit  No results found for this or any previous visit      Meds/Allergies   all current active meds have been reviewed  Medications Prior to Admission   Medication    amLODIPine (NORVASC) 5 mg tablet    cloNIDine (CATAPRES) 0 1 mg tablet    furosemide (LASIX) 20 mg tablet    labetalol (NORMODYNE) 200 mg tablet    metoprolol tartrate (LOPRESSOR) 50 mg tablet    spironolactone (ALDACTONE) 25 mg tablet         niCARdipine 1-15 mg/hr Last Rate: Stopped (03/21/19 0215)     Assessment:  Principal Problem:    Malignant hypertension on admission   Active Problems:    Proteinuria    Chronic headaches

## 2019-03-24 NOTE — PLAN OF CARE
Problem: CARDIOVASCULAR - ADULT  Goal: Maintains optimal cardiac output and hemodynamic stability  Description  INTERVENTIONS:  - Monitor I/O, vital signs and rhythm  - Monitor for S/S and trends of decreased cardiac output i e  bleeding, hypotension  - Administer and titrate ordered vasoactive medications to optimize hemodynamic stability  - Assess quality of pulses, skin color and temperature  - Assess for signs of decreased coronary artery perfusion - ex  Angina  - Instruct patient to report change in severity of symptoms  Outcome: Progressing  Goal: Absence of cardiac dysrhythmias or at baseline rhythm  Description  INTERVENTIONS:  - Continuous cardiac monitoring, monitor vital signs, obtain 12 lead EKG if indicated  - Administer antiarrhythmic and heart rate control medications as ordered  - Monitor electrolytes and administer replacement therapy as ordered  Outcome: Progressing     Problem: SAFETY ADULT  Goal: Patient will remain free of falls  Description  INTERVENTIONS:  - Assess patient frequently for physical needs  -  Identify cognitive and physical deficits and behaviors that affect risk of falls    -  [place] fall precautions as indicated by assessment   - Educate patient/family on patient safety including physical limitations  - Instruct patient to call for assistance with activity based on assessment  - Modify environment to reduce risk of injury  - Consider OT/PT consult to assist with strengthening/mobility  Outcome: Progressing  Goal: Maintain or return to baseline ADL function  Description  INTERVENTIONS:  -  Assess patient's ability to carry out ADLs; assess patient's baseline for ADL function and identify physical deficits which impact ability to perform ADLs (bathing, care of mouth/teeth, toileting, grooming, dressing, etc )  - Assess/evaluate cause of self-care deficits   - Assess range of motion  - Assess patient's mobility; develop plan if impaired  - Assess patient's need for assistive devices and provide as appropriate  - Encourage maximum independence but intervene and supervise when necessary  ¯ Involve family in performance of ADLs  ¯ Assess for home care needs following discharge   ¯ Request OT consult to assist with ADL evaluation and planning for discharge  ¯ Provide patient education as appropriate  Outcome: Progressing  Goal: Maintain or return mobility status to optimal level  Description  INTERVENTIONS:  - Assess patient's baseline mobility status (ambulation, transfers, stairs, etc )    - Identify cognitive and physical deficits and behaviors that affect mobility  - Identify mobility aids required to assist with transfers and/or ambulation (gait belt, sit-to-stand, lift, walker, cane, etc )  - [place] fall precautions as indicated by assessment  - Record patient progress and toleration of activity level on Mobility SBAR; progress patient to next Phase/Stage  - Instruct patient to call for assistance with activity based on assessment  - Request Rehabilitation consult to assist with strengthening/weightbearing, etc   Outcome: Progressing     Problem: Potential for Falls  Goal: Patient will remain free of falls  Description  INTERVENTIONS:  - Assess patient frequently for physical needs  -  Identify cognitive and physical deficits and behaviors that affect risk of falls    -  [place] fall precautions as indicated by assessment   - Educate patient/family on patient safety including physical limitations  - Instruct patient to call for assistance with activity based on assessment  - Modify environment to reduce risk of injury  - Consider OT/PT consult to assist with strengthening/mobility  Outcome: Progressing

## 2019-03-25 ENCOUNTER — TRANSITIONAL CARE MANAGEMENT (OUTPATIENT)
Dept: INTERNAL MEDICINE CLINIC | Facility: CLINIC | Age: 34
End: 2019-03-25

## 2019-03-25 NOTE — PROGRESS NOTES
Tried to lm to set up [employer]- but mailbox is full and not able to  leave a message at this time

## 2019-03-26 LAB — ALDOST SERPL-MCNC: 4.8 NG/DL (ref 0–30)

## 2019-03-27 LAB
METANEPH FREE SERPL-MCNC: 11 PG/ML (ref 0–62)
NORMETANEPHRINE SERPL-MCNC: 88 PG/ML (ref 0–145)

## 2019-03-28 LAB
METANEPH FREE SERPL-MCNC: 10 PG/ML (ref 0–62)
NORMETANEPHRINE SERPL-MCNC: 15 PG/ML (ref 0–145)

## 2019-03-29 LAB — RENIN PLAS-CCNC: 1.94 NG/ML/HR (ref 0.17–5.38)

## 2019-04-01 ENCOUNTER — OFFICE VISIT (OUTPATIENT)
Dept: INTERNAL MEDICINE CLINIC | Facility: CLINIC | Age: 34
End: 2019-04-01
Payer: COMMERCIAL

## 2019-04-01 VITALS
SYSTOLIC BLOOD PRESSURE: 146 MMHG | RESPIRATION RATE: 16 BRPM | TEMPERATURE: 98.8 F | BODY MASS INDEX: 37.67 KG/M2 | HEIGHT: 66 IN | WEIGHT: 234.4 LBS | DIASTOLIC BLOOD PRESSURE: 90 MMHG | OXYGEN SATURATION: 98 % | HEART RATE: 66 BPM

## 2019-04-01 DIAGNOSIS — I10 HTN (HYPERTENSION), MALIGNANT: Primary | ICD-10-CM

## 2019-04-01 DIAGNOSIS — I50.9 OTHER CONGESTIVE HEART FAILURE (HCC): ICD-10-CM

## 2019-04-01 DIAGNOSIS — F51.01 PRIMARY INSOMNIA: ICD-10-CM

## 2019-04-01 PROCEDURE — 99495 TRANSJ CARE MGMT MOD F2F 14D: CPT | Performed by: INTERNAL MEDICINE

## 2019-04-01 RX ORDER — TRAZODONE HYDROCHLORIDE 50 MG/1
50 TABLET ORAL
Qty: 30 TABLET | Refills: 1 | Status: SHIPPED | OUTPATIENT
Start: 2019-04-01 | End: 2019-05-29 | Stop reason: SDUPTHER

## 2019-04-03 LAB — ALDOST SERPL-MCNC: 20.2 NG/DL (ref 0–30)

## 2019-04-17 ENCOUNTER — OFFICE VISIT (OUTPATIENT)
Dept: INTERNAL MEDICINE CLINIC | Facility: CLINIC | Age: 34
End: 2019-04-17
Payer: COMMERCIAL

## 2019-04-17 VITALS
SYSTOLIC BLOOD PRESSURE: 132 MMHG | DIASTOLIC BLOOD PRESSURE: 90 MMHG | BODY MASS INDEX: 38.41 KG/M2 | WEIGHT: 239 LBS | TEMPERATURE: 97.6 F | HEART RATE: 83 BPM | HEIGHT: 66 IN | RESPIRATION RATE: 18 BRPM | OXYGEN SATURATION: 98 %

## 2019-04-17 DIAGNOSIS — I10 HTN (HYPERTENSION), MALIGNANT: ICD-10-CM

## 2019-04-17 PROCEDURE — 99213 OFFICE O/P EST LOW 20 MIN: CPT | Performed by: INTERNAL MEDICINE

## 2019-04-17 RX ORDER — LABETALOL 300 MG/1
300 TABLET, FILM COATED ORAL 3 TIMES DAILY
Qty: 90 TABLET | Refills: 5 | Status: SHIPPED | OUTPATIENT
Start: 2019-04-17 | End: 2020-04-14 | Stop reason: SDUPTHER

## 2019-04-17 RX ORDER — AMLODIPINE BESYLATE 5 MG/1
5 TABLET ORAL EVERY 12 HOURS
Qty: 60 TABLET | Refills: 5 | Status: SHIPPED | OUTPATIENT
Start: 2019-04-17 | End: 2020-04-14 | Stop reason: SDUPTHER

## 2019-04-17 RX ORDER — CHLORTHALIDONE 25 MG/1
25 TABLET ORAL DAILY
Qty: 30 TABLET | Refills: 5 | Status: SHIPPED | OUTPATIENT
Start: 2019-04-17 | End: 2019-05-29

## 2019-04-17 RX ORDER — LABETALOL 300 MG/1
300 TABLET, FILM COATED ORAL 3 TIMES DAILY
Qty: 90 TABLET | Refills: 5 | Status: SHIPPED | OUTPATIENT
Start: 2019-04-17 | End: 2019-04-17 | Stop reason: SDUPTHER

## 2019-05-29 ENCOUNTER — OFFICE VISIT (OUTPATIENT)
Dept: INTERNAL MEDICINE CLINIC | Facility: CLINIC | Age: 34
End: 2019-05-29
Payer: COMMERCIAL

## 2019-05-29 VITALS
BODY MASS INDEX: 38.73 KG/M2 | HEART RATE: 88 BPM | SYSTOLIC BLOOD PRESSURE: 136 MMHG | DIASTOLIC BLOOD PRESSURE: 98 MMHG | TEMPERATURE: 98.7 F | WEIGHT: 241 LBS | HEIGHT: 66 IN | RESPIRATION RATE: 16 BRPM

## 2019-05-29 DIAGNOSIS — M54.5 ACUTE LOW BACK PAIN, UNSPECIFIED BACK PAIN LATERALITY, WITH SCIATICA PRESENCE UNSPECIFIED: ICD-10-CM

## 2019-05-29 DIAGNOSIS — F51.01 PRIMARY INSOMNIA: ICD-10-CM

## 2019-05-29 DIAGNOSIS — R20.0 NUMBNESS OF FINGERS OF BOTH HANDS: Primary | ICD-10-CM

## 2019-05-29 DIAGNOSIS — I10 ESSENTIAL HYPERTENSION: ICD-10-CM

## 2019-05-29 DIAGNOSIS — J30.1 SEASONAL ALLERGIC RHINITIS DUE TO POLLEN: ICD-10-CM

## 2019-05-29 PROCEDURE — 99214 OFFICE O/P EST MOD 30 MIN: CPT | Performed by: INTERNAL MEDICINE

## 2019-05-29 RX ORDER — LOSARTAN POTASSIUM AND HYDROCHLOROTHIAZIDE 12.5; 5 MG/1; MG/1
1 TABLET ORAL DAILY
Qty: 30 TABLET | Refills: 1 | Status: SHIPPED | OUTPATIENT
Start: 2019-05-29 | End: 2019-08-09 | Stop reason: SDUPTHER

## 2019-05-29 RX ORDER — METHOCARBAMOL 750 MG/1
750 TABLET, FILM COATED ORAL EVERY 6 HOURS PRN
Qty: 60 TABLET | Refills: 1 | Status: SHIPPED | OUTPATIENT
Start: 2019-05-29 | End: 2021-01-28

## 2019-05-29 RX ORDER — TRAZODONE HYDROCHLORIDE 50 MG/1
50 TABLET ORAL
Qty: 30 TABLET | Refills: 3 | Status: SHIPPED | OUTPATIENT
Start: 2019-05-29 | End: 2021-01-28

## 2019-05-29 RX ORDER — LEVOCETIRIZINE DIHYDROCHLORIDE 5 MG/1
5 TABLET, FILM COATED ORAL EVERY EVENING
Qty: 30 TABLET | Refills: 0 | Status: SHIPPED | OUTPATIENT
Start: 2019-05-29 | End: 2020-10-02 | Stop reason: SDUPTHER

## 2019-05-31 ENCOUNTER — HOSPITAL ENCOUNTER (OUTPATIENT)
Dept: NEUROLOGY | Facility: CLINIC | Age: 34
Discharge: HOME | End: 2019-05-31
Payer: COMMERCIAL

## 2019-05-31 DIAGNOSIS — R20.0 NUMBNESS OF FINGERS OF BOTH HANDS: ICD-10-CM

## 2019-05-31 PROCEDURE — 95886 MUSC TEST DONE W/N TEST COMP: CPT | Performed by: PHYSICAL MEDICINE & REHABILITATION

## 2019-05-31 PROCEDURE — 95911 NRV CNDJ TEST 9-10 STUDIES: CPT | Performed by: PHYSICAL MEDICINE & REHABILITATION

## 2019-06-06 ENCOUNTER — TELEPHONE (OUTPATIENT)
Dept: INTERNAL MEDICINE CLINIC | Facility: CLINIC | Age: 34
End: 2019-06-06

## 2019-06-06 DIAGNOSIS — G56.00 CARPAL TUNNEL SYNDROME, UNSPECIFIED LATERALITY: Primary | ICD-10-CM

## 2019-06-26 ENCOUNTER — TELEPHONE (OUTPATIENT)
Dept: INTERNAL MEDICINE CLINIC | Facility: CLINIC | Age: 34
End: 2019-06-26

## 2019-06-28 ENCOUNTER — OFFICE VISIT (OUTPATIENT)
Dept: OBGYN CLINIC | Facility: CLINIC | Age: 34
End: 2019-06-28
Payer: COMMERCIAL

## 2019-06-28 ENCOUNTER — OFFICE VISIT (OUTPATIENT)
Dept: URGENT CARE | Facility: CLINIC | Age: 34
End: 2019-06-28
Payer: COMMERCIAL

## 2019-06-28 VITALS
HEIGHT: 66 IN | BODY MASS INDEX: 38.73 KG/M2 | DIASTOLIC BLOOD PRESSURE: 115 MMHG | HEART RATE: 74 BPM | SYSTOLIC BLOOD PRESSURE: 173 MMHG | WEIGHT: 241 LBS

## 2019-06-28 VITALS
HEART RATE: 72 BPM | TEMPERATURE: 97.5 F | OXYGEN SATURATION: 100 % | RESPIRATION RATE: 18 BRPM | HEIGHT: 66 IN | WEIGHT: 240 LBS | BODY MASS INDEX: 38.57 KG/M2 | DIASTOLIC BLOOD PRESSURE: 84 MMHG | SYSTOLIC BLOOD PRESSURE: 171 MMHG

## 2019-06-28 DIAGNOSIS — M54.50 ACUTE BILATERAL LOW BACK PAIN WITHOUT SCIATICA: Primary | ICD-10-CM

## 2019-06-28 DIAGNOSIS — G56.00 CARPAL TUNNEL SYNDROME, UNSPECIFIED LATERALITY: ICD-10-CM

## 2019-06-28 DIAGNOSIS — G56.03 BILATERAL CARPAL TUNNEL SYNDROME: Primary | ICD-10-CM

## 2019-06-28 PROCEDURE — 99203 OFFICE O/P NEW LOW 30 MIN: CPT | Performed by: ORTHOPAEDIC SURGERY

## 2019-06-28 PROCEDURE — 99213 OFFICE O/P EST LOW 20 MIN: CPT | Performed by: NURSE PRACTITIONER

## 2019-06-28 PROCEDURE — S9088 SERVICES PROVIDED IN URGENT: HCPCS | Performed by: NURSE PRACTITIONER

## 2019-06-28 PROCEDURE — 96372 THER/PROPH/DIAG INJ SC/IM: CPT | Performed by: NURSE PRACTITIONER

## 2019-06-28 RX ORDER — KETOROLAC TROMETHAMINE 30 MG/ML
30 INJECTION, SOLUTION INTRAMUSCULAR; INTRAVENOUS ONCE
Status: COMPLETED | OUTPATIENT
Start: 2019-06-28 | End: 2019-06-28

## 2019-06-28 RX ADMIN — KETOROLAC TROMETHAMINE 30 MG: 30 INJECTION, SOLUTION INTRAMUSCULAR; INTRAVENOUS at 11:35

## 2019-06-28 NOTE — PROGRESS NOTES
CHIEF COMPLAINT:  Chief Complaint   Patient presents with    Right Wrist - Pain    Left Wrist - Pain       SUBJECTIVE:  Oswald Scheuermann is a 36y o  year old RHD female who presents bilateral hand numbness and tingling  Patient states that been going on for few months  She states that it will be worse 1st thing in the morning  She has not used any braces  It does not wake her at night  She will have to bring her hand out 1st thing in morning  She also notes some difficulty with her activities of daily living  She does not note dropping things significantly  She had an EMG done which demonstrated bilateral moderate carpal tunnel syndrome        PAST MEDICAL HISTORY:  Past Medical History:   Diagnosis Date    [facility]congest[facility] failure) ([facility])     Generalized anxiety disorder     Hypertension     Kidney stone     Obesity        PAST SURGICAL HISTORY:  Past Surgical History:   Procedure Laterality Date     SECTION      x 2    KNEE ARTHROSCOPY Left     acl/meniscus repair    [place] CYSTO/URETERO W/LITHOTRIPSY &INDWELL STENT INSRT Right 2018    Procedure: CYSTOSCOPY URETEROSCOPY, RETROGRADE PYELOGRAM AND INSERTION STENT URETERAL, RIGHT STONE EXTRACTION;  Surgeon: Lisandra Manley MD;  [facility];  Service: Urology    [place] CYSTOURETHROSCOPY,URETER CATHETER Right 1/15/2018    Procedure: CYSTOSCOPY RETROGRADE PYELOGRAM WITH INSERTION STENT URETERAL;  Surgeon: Janina Alanis MD;  [facility];  Service: Urology    TUBAL LIGATION         FAMILY HISTORY:  Family History   Problem Relation Age of Onset    Hypertension Mother     Cancer Mother         Thyroid    Hypertension Father        SOCIAL HISTORY:  Social History     Tobacco Use    Smoking status: Former Smoker     Packs/day: 0 00     Types: Cigarettes     Last attempt to quit:      Years since quittin 4    Smokeless tobacco: Never Used   Substance Use Topics    Alcohol use: Not Currently     Frequency: Never    Drug use: No       MEDICATIONS:    Current Outpatient Medications:     amLODIPine (NORVASC) 5 mg tablet, Take 1 tablet (5 mg total) by mouth every 12 (twelve) hours, Disp: 60 tablet, Rfl: 5    labetalol (NORMODYNE) 300 mg tablet, Take 1 tablet (300 mg total) by mouth 3 (three) times a day for 180 days, Disp: 90 tablet, Rfl: 5    levocetirizine (XYZAL) 5 MG tablet, Take 1 tablet (5 mg total) by mouth every evening for 30 days, Disp: 30 tablet, Rfl: 0    losartan-hydrochlorothiazide (HYZAAR) 50-12 5 mg per tablet, Take 1 tablet by mouth daily for 60 days, Disp: 30 tablet, Rfl: 1    methocarbamol (ROBAXIN) 750 mg tablet, Take 1 tablet (750 mg total) by mouth every 6 (six) hours as needed for muscle spasms for up to 30 days, Disp: 60 tablet, Rfl: 1    traZODone (DESYREL) 50 mg tablet, Take 1 tablet (50 mg total) by mouth daily at bedtime as needed for sleep for up to 60 days, Disp: 30 tablet, Rfl: 3    ALLERGIES:  No Known Allergies    REVIEW OF SYSTEMS:  Review of Systems  ROS:   General: no fever, no chills  HEENT:  No loss of hearing or eyesight problems  Eyes:  No red eyes  Respiratory:  No coughing, shortness of breath or wheezing  Cardiovascular:  No chest pain, no palpitations  GI:  Abdomen soft nontender, denies nausea  Endocrine:  No muscle weakness, no frequent urination, no excessive thirst  Urinary:  No dysuria, no incontinence  Musculoskeletal: see HPI and PE  SKIN:  No skin rash, no dry skin  Neurological:  No headaches, no confusion  Psychiatric:  No suicide thoughts, no anxiety, no depression  Review of all other systems is negative    VITALS:  Vitals:    06/28/19 0830   BP: (!) 173/115   Pulse: 74       LABS:  HgA1c: No results found for: HGBA1C  BMP:   Lab Results   Component Value Date    CALCIUM 9 1 03/24/2019     06/22/2018    K 4 1 03/24/2019    CO2 23 03/24/2019     03/24/2019    BUN 31 (H) 03/24/2019    CREATININE 1 10 03/24/2019 _____________________________________________________  PHYSICAL EXAMINATION:  General: well developed and well nourished, alert, oriented times 3 and appears comfortable  Psychiatric: Normal  HEENT: Trachea Midline, No torticollis  Pulmonary: No audible wheezing or respiratory distress   Skin: No masses, erythema, lacerations, fluctation, ulcerations  Neurovascular: Sensation intact to the Ulnar Nerve, Sensation Intact to the Radial Nerve, Decreased Sensation to  the Median Nerve, Motor Intact to the Median, Ulnar, Radial Nerve and Pulses Intact    MUSCULOSKELETAL EXAMINATION:  Bilateral Carpal Tunnel Exam:    Negative thenar atrophy  Positive phalen's test  Positive carpal tunnel compression  Positive tinels over median nerve at the wrist   Opposition strength 5/5  Abduction strength 5/5       2 point discrimination is 4 mm throughout         ___________________________________________________  STUDIES REVIEWED:  EMG of bilateral upper extremities demonstrated moderate carpal tunnel syndrome bilaterally      PROCEDURES PERFORMED:  Procedures  No Procedures performed today    _____________________________________________________  ASSESSMENT/PLAN:    Bilateral carpal tunnel syndrome  - patient was given bilateral cock-up wrist braces to be worn at nighttime  - she was advised of carpal tunnel surgery and how it may help her symptoms  - she would like to hold off on surgery for the time  - she did take vitamin B6  - she will follow up us as needed, upon return if she would like to proceed with endoscopic carpal tunnel release, we can get this scheduled for her  Follow Up:  Return if symptoms worsen or fail to improve  To Do Next Visit:  Re-evaluation of current issue    General Discussions:  Carpal Tunnel Syndrome: The anatomy and physiology of carpal tunnel syndrome was discussed with the patient today    Increase pressure localized under the transverse carpal ligament can cause pain, numbness, tingling, or dysesthesias within the median nerve distribution as well as feelings of fatigue, clumsiness, or awkwardness  These symptoms typically occur at night and worse in the morning upon waking  Eventually, untreated carpal tunnel syndrome can result in weakness and permanent loss of muscle within the thenar compartment of the hand  Treatment options were discussed with the patient  Conservative treatment includes nocturnal resting splints to keep the nerve in a neutral position, ergonomic changes within the work or home environment, activity modification, and tendon gliding exercises  Vitamin B6 one tablet daily over the counter may helpful to reduce symptoms  Steroid injections within the carpal canal can help a majority of patients, however this is often self-limited in a majority of patients  Surgical intervention to divide the transverse carpal ligament typically results in a long-lasting relief of the patient's complaints, with the recurrence rate of less than 1%                           Scribe Attestation    I,:   Bharat Resnediz PA-C am acting as a scribe while in the presence of the attending physician :        I,:   Choco Levine MD personally performed the services described in this documentation    as scribed in my presence :

## 2019-08-09 DIAGNOSIS — I10 ESSENTIAL HYPERTENSION: ICD-10-CM

## 2019-08-09 RX ORDER — LOSARTAN POTASSIUM AND HYDROCHLOROTHIAZIDE 12.5; 5 MG/1; MG/1
1 TABLET ORAL DAILY
Qty: 30 TABLET | Refills: 0 | Status: SHIPPED | OUTPATIENT
Start: 2019-08-09 | End: 2019-10-16 | Stop reason: SDUPTHER

## 2019-10-16 ENCOUNTER — TELEPHONE (OUTPATIENT)
Dept: INTERNAL MEDICINE CLINIC | Facility: CLINIC | Age: 34
End: 2019-10-16

## 2019-10-16 DIAGNOSIS — I10 ESSENTIAL HYPERTENSION: ICD-10-CM

## 2019-10-16 RX ORDER — LOSARTAN POTASSIUM AND HYDROCHLOROTHIAZIDE 12.5; 5 MG/1; MG/1
1 TABLET ORAL DAILY
Qty: 30 TABLET | Refills: 5 | Status: SHIPPED | OUTPATIENT
Start: 2019-10-16 | End: 2020-04-14 | Stop reason: SDUPTHER

## 2019-11-13 ENCOUNTER — HOSPITAL ENCOUNTER (EMERGENCY)
Facility: HOSPITAL | Age: 34
Discharge: HOME | End: 2019-11-13
Attending: INTERNAL MEDICINE | Admitting: INTERNAL MEDICINE
Payer: COMMERCIAL

## 2019-11-13 VITALS
OXYGEN SATURATION: 98 % | WEIGHT: 270 LBS | TEMPERATURE: 97.8 F | HEART RATE: 74 BPM | DIASTOLIC BLOOD PRESSURE: 81 MMHG | HEIGHT: 66 IN | BODY MASS INDEX: 43.39 KG/M2 | SYSTOLIC BLOOD PRESSURE: 165 MMHG | RESPIRATION RATE: 18 BRPM

## 2019-11-13 DIAGNOSIS — G89.29 CHRONIC MIDLINE LOW BACK PAIN WITHOUT SCIATICA: Primary | ICD-10-CM

## 2019-11-13 DIAGNOSIS — M54.50 CHRONIC MIDLINE LOW BACK PAIN WITHOUT SCIATICA: Primary | ICD-10-CM

## 2019-11-13 PROCEDURE — 99284 EMERGENCY DEPT VISIT MOD MDM: CPT | Performed by: INTERNAL MEDICINE

## 2019-11-13 PROCEDURE — 96372 THER/PROPH/DIAG INJ SC/IM: CPT

## 2019-11-13 PROCEDURE — 99283 EMERGENCY DEPT VISIT LOW MDM: CPT

## 2019-11-13 RX ORDER — PREDNISONE 20 MG/1
TABLET ORAL
Qty: 15 TABLET | Refills: 0 | Status: SHIPPED | OUTPATIENT
Start: 2019-11-13 | End: 2019-11-23

## 2019-11-13 RX ORDER — KETOROLAC TROMETHAMINE 30 MG/ML
30 INJECTION, SOLUTION INTRAMUSCULAR; INTRAVENOUS ONCE
Status: COMPLETED | OUTPATIENT
Start: 2019-11-13 | End: 2019-11-13

## 2019-11-13 RX ADMIN — KETOROLAC TROMETHAMINE 30 MG: 30 INJECTION, SOLUTION INTRAMUSCULAR; INTRAVENOUS at 04:07

## 2019-11-13 NOTE — PROGRESS NOTES
Assessment/Plan:    No problem-specific Assessment & Plan notes found for this encounter  Diagnoses and all orders for this visit:    Essential hypertension  -     TSH, 3rd generation with Free T4 reflex; Future  -     POCT ECG  -     amLODIPine (NORVASC) 5 mg tablet; Take 1 tablet (5 mg total) by mouth daily    Palpitations  -     TSH, 3rd generation with Free T4 reflex; Future  -     POCT ECG  -     sertraline (ZOLOFT) 50 mg tablet; Take 1 tablet (50 mg total) by mouth daily    Generalized anxiety disorder  -     TSH, 3rd generation with Free T4 reflex; Future  -     sertraline (ZOLOFT) 50 mg tablet; Take 1 tablet (50 mg total) by mouth daily    Drug intolerance    Hypertensive emergency without congestive heart failure  -     metoprolol tartrate (LOPRESSOR) 50 mg tablet; Take 2 tablets (100 mg total) by mouth 2 (two) times a day    Paresthesias    Acute low back pain, unspecified back pain laterality, with sciatica presence unspecified    Other orders  -     Cancel: cloNIDine (CATAPRES) 0 1 mg tablet; Take 1 tablet (0 1 mg total) by mouth 2 (two) times a day      A/P: EKG unchanged and no acute changes  Pt got numbness and tingling with positional changes for the EKG and ??c spine issues  Will hold the ACEI and try to treat the palpitations, BP, and FELI by increasing the metoprolol to 100mg bid and start amlodipine 5mg q d  Watch closely due to borderline heart rate at rest and OT  May need to cut back  Check a TSH  Will treat the FELI with zoloft  Keep f/u with the workman's comp doc  RTC one week for f/u  May need additional eval of the paresthesia  Subjective:      Patient ID: Darin Hunt is a 29 y o  female  WF, with known Htn and FELI, presents for increase BP the past several weeks  Was in the ER several times for a work related back issues and her BP's have been very high  Pt asymptomatic  No CP or SOB or edema, but ROS positive for some palpitations  No light headedness   Was told that she was having panic attacks  Pt metoprolol was increased to 75mg bid, but no better  Notes at least two episodes a day of feeling anxious and unable to relax and like things are closing in on her  No new meds except for muscle relaxants  Pt feels that her ACEI is causing numbness and tingling at times  So she was only taking half her dose  Hypertension   Associated symptoms include headaches and palpitations  Pertinent negatives include no chest pain or shortness of breath  Headache    Associated symptoms include back pain and numbness  Pertinent negatives include no abdominal pain, coughing, dizziness, fever, nausea, seizures, vomiting or weakness  The following portions of the patient's history were reviewed and updated as appropriate:   She  has a past medical history of CHF (congestive heart failure) ([facility] [address] ); Generalized anxiety disorder; Hypertension; and Kidney stone  She   Patient Active Problem List    Diagnosis Date Noted    Nephrolithiasis 01/15/2018    Hypertension 01/15/2018    History of CHF (congestive heart failure) 01/15/2018    Congestive heart failure ([facility] [address] ) 09/15/2017    Generalized anxiety disorder 2013    Proteinuria 2013    Obesity 2013     She  has a past surgical history that includes pr cystourethroscopy,ureter catheter (Right, 1/15/2018); Knee arthroscopy (Left);  section; Tubal ligation; and pr cysto/uretero w/lithotripsy &indwell stent insrt (Right, 2018)  Her family history includes Cancer in her mother; Hypertension in her father and mother  She  reports that she has been smoking  She has been smoking about 0 25 packs per day  She has never used smokeless tobacco  She reports that she does not drink alcohol or use drugs    Current Outpatient Prescriptions   Medication Sig Dispense Refill    cloNIDine (CATAPRES) 0 1 mg tablet Take 0 1 mg by mouth 2 (two) times a day      furosemide (LASIX) 20 mg tablet Take by mouth as needed (Pt not sure of the dose amount she takes)      metoprolol tartrate (LOPRESSOR) 50 mg tablet Take 2 tablets (100 mg total) by mouth 2 (two) times a day 60 tablet 0    amLODIPine (NORVASC) 5 mg tablet Take 1 tablet (5 mg total) by mouth daily 90 tablet 0    sertraline (ZOLOFT) 50 mg tablet Take 1 tablet (50 mg total) by mouth daily 90 tablet 0     No current facility-administered medications for this visit  Current Outpatient Prescriptions on File Prior to Visit   Medication Sig    cloNIDine (CATAPRES) 0 1 mg tablet Take 0 1 mg by mouth 2 (two) times a day    furosemide (LASIX) 20 mg tablet Take by mouth as needed (Pt not sure of the dose amount she takes)    [DISCONTINUED] lisinopril (ZESTRIL) 20 mg tablet Take 1 tablet (20 mg total) by mouth daily    [DISCONTINUED] metoprolol tartrate (LOPRESSOR) 50 mg tablet Take 1 5 tablets (75 mg total) by mouth 2 (two) times a day     No current facility-administered medications on file prior to visit  She has No Known Allergies       Review of Systems   Constitutional: Negative for activity change, chills, diaphoresis, fatigue and fever  Respiratory: Negative for cough, chest tightness, shortness of breath and wheezing  Cardiovascular: Positive for palpitations  Negative for chest pain and leg swelling  Gastrointestinal: Negative for abdominal pain, constipation, diarrhea, nausea and vomiting  Genitourinary: Negative for difficulty urinating, dysuria and frequency  Musculoskeletal: Positive for back pain  Negative for arthralgias, gait problem and myalgias  Neurological: Positive for numbness and headaches  Negative for dizziness, tremors, seizures, syncope, facial asymmetry, speech difficulty, weakness and light-headedness  Psychiatric/Behavioral: Negative for confusion and dysphoric mood  The patient is nervous/anxious            Objective:      BP (!) 182/120   Pulse 76   Temp 98 2 °F (36 8 °C) (Tympanic)   Resp 16   Ht 5' 6" (1 676 m)   Wt 95 3 kg (210 lb)   LMP 12/18/2018   BMI 33 89 kg/m²          Physical Exam   Constitutional: She is oriented to person, place, and time  She appears well-developed and well-nourished  No distress  HENT:   Head: Normocephalic and atraumatic  Mouth/Throat: Oropharynx is clear and moist    Eyes: Pupils are equal, round, and reactive to light  Conjunctivae and EOM are normal    Neck: Neck supple  No thyromegaly present  Cardiovascular: Normal rate, regular rhythm and normal heart sounds  No murmur heard  Pulmonary/Chest: Effort normal and breath sounds normal  No respiratory distress  She has no wheezes  She has no rales  Abdominal: Soft  Bowel sounds are normal  She exhibits no distension  There is no tenderness  Musculoskeletal: She exhibits no edema  Neurological: She is alert and oriented to person, place, and time  Psychiatric: She has a normal mood and affect  Her behavior is normal  Judgment and thought content normal    Nursing note and vitals reviewed  negative...

## 2019-11-13 NOTE — ED PROVIDER NOTES
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  Every now and then she just has flare-up of this back pain  She does not recall any precipitating event, her back pain is localized is her typical back pain is non radiating  She has no neuropathy or radiculopathy no pain that radiates down her gluteal oral lower extremities  She has not complained of leg weakness or numbness  She states the pain is the worse when standing or bending to stand up  She does get some relief from flexing  Patient states she took 2 800 ibuprofen and 2 Robaxin today without relief  Prior to Admission Medications   Prescriptions Last Dose Informant Patient Reported? Taking?    amLODIPine (NORVASC) 5 mg tablet 11/13/2019 at Unknown time Self No Yes   Sig: Take 1 tablet (5 mg total) by mouth every 12 (twelve) hours   labetalol (NORMODYNE) 300 mg tablet 11/12/2019 at Unknown time Self No Yes   Sig: Take 1 tablet (300 mg total) by mouth 3 (three) times a day for 180 days   levocetirizine (XYZAL) 5 MG tablet More than a month at Unknown time Self No No   Sig: Take 1 tablet (5 mg total) by mouth every evening for 30 days   losartan-hydrochlorothiazide (HYZAAR) 50-12 5 mg per tablet Past Week at Unknown time  No Yes   Sig: Take 1 tablet by mouth daily   methocarbamol (ROBAXIN) 750 mg tablet 11/12/2019 at Unknown time Self No Yes   Sig: Take 1 tablet (750 mg total) by mouth every 6 (six) hours as needed for muscle spasms for up to 30 days   traZODone (DESYREL) 50 mg tablet More than a month at Unknown time Self No No   Sig: Take 1 tablet (50 mg total) by mouth daily at bedtime as needed for sleep for up to 60 days      Facility-Administered Medications: None       Past Medical History:   Diagnosis Date    CHF (congestive heart failure) ([facility])     Chronic back pain     Generalized anxiety disorder     Hypertension     Kidney stone     Obesity        Past Surgical History:   Procedure Laterality Date     SECTION      x 2    KNEE ARTHROSCOPY Left     acl/meniscus repair    [place] CYSTO/URETERO W/LITHOTRIPSY &INDWELL STENT INSRT Right 2018    Procedure: CYSTOSCOPY URETEROSCOPY, RETROGRADE PYELOGRAM AND INSERTION STENT URETERAL, RIGHT STONE EXTRACTION;  Surgeon: Nita Carlos MD;  [facility];  Service: Urology    [place] CYSTOURETHROSCOPY,URETER CATHETER Right 1/15/2018    Procedure: CYSTOSCOPY RETROGRADE PYELOGRAM WITH INSERTION STENT URETERAL;  Surgeon: Roxy Denton MD;  [facility];  Service: Urology    TUBAL LIGATION         Family History   Problem Relation Age of Onset    Hypertension Mother     Cancer Mother         Thyroid    Hypertension Father      I have reviewed and agree with the history as documented  Social History     Tobacco Use    Smoking status: Current Some Day Smoker     Packs/day: 0 00     Types: Cigarettes    Smokeless tobacco: Never Used   Substance Use Topics    Alcohol use: Yes     Frequency: Never     Comment: Rarely    Drug use: No        Review of Systems   Respiratory: Negative  Cardiovascular: Negative  Gastrointestinal: Negative  Endocrine: Negative  Genitourinary: Negative  Musculoskeletal: Positive for back pain  Neurological: Negative  Hematological: Negative  Psychiatric/Behavioral: Negative  Physical Exam  Physical Exam   Constitutional: She appears well-developed and well-nourished  No distress  HENT:   Head: Normocephalic and atraumatic  Neck: Normal range of motion  Cardiovascular: Normal rate and regular rhythm  Pulmonary/Chest: Effort normal    Abdominal: Soft  Musculoskeletal: She exhibits no edema, tenderness or deformity  No point tenderness over the patient's thoracic and lumbosacral spine  She does have pain with extension and trunk twisting  She has normal mass tone sensation DTRs in lower extremities  No pain with straight leg raising bilaterally  Skin: Skin is warm and dry  She is not diaphoretic  Psychiatric: She has a normal mood and affect  Her behavior is normal  Thought content normal    Nursing note and vitals reviewed  Vital Signs  ED Triage Vitals [11/13/19 0343]   Temperature Pulse Respirations Blood Pressure SpO2   97 8 °F (36 6 °C) 74 18 165/81 98 %      Temp Source Heart Rate Source Patient Position - Orthostatic VS BP Location FiO2 (%)   Temporal Monitor -- Left arm --      Pain Score       7           Vitals:    11/13/19 0343   BP: 165/81   Pulse: 74         Visual Acuity      ED Medications  Medications   ketorolac (TORADOL) injection 30 mg (has no administration in time range)       Diagnostic Studies  Results Reviewed     None                 No orders to display              Procedures  Procedures       ED Course                               MDM    Disposition  Final diagnoses:   Chronic midline low back pain without sciatica     Time reflects when diagnosis was documented in both MDM as applicable and the Disposition within this note     Time User Action Codes Description Comment    11/13/2019  4:00 AM Skyler Marcial Add [M54 5  G27 29] Chronic midline low back pain without sciatica       ED Disposition     ED Disposition Condition Date/Time Comment    Discharge Stable Wed Nov 13, 2019  4:02 AM Alka Velásquez discharge to home/self care              Follow-up Information    None         Patient's Medications   Discharge Prescriptions    PREDNISONE 20 MG TABLET    Take 2 tablets (40 mg total) by mouth daily for 5 days, THEN 1 tablet (20 mg total) daily for 5 days  Start Date: 11/13/2019End Date: 11/23/2019       Order Dose: --       Quantity: 15 tablet    Refills: 0     No discharge procedures on file      ED Provider  Electronically Signed by           Vazquez Fernandez MD  11/13/19 0879

## 2019-12-08 ENCOUNTER — HOSPITAL ENCOUNTER (EMERGENCY)
Facility: HOSPITAL | Age: 34
Discharge: HOME | End: 2019-12-08
Attending: FAMILY MEDICINE | Admitting: FAMILY MEDICINE
Payer: COMMERCIAL

## 2019-12-08 VITALS
TEMPERATURE: 99.4 F | OXYGEN SATURATION: 98 % | HEART RATE: 87 BPM | DIASTOLIC BLOOD PRESSURE: 99 MMHG | WEIGHT: 270 LBS | BODY MASS INDEX: 43.58 KG/M2 | RESPIRATION RATE: 18 BRPM | SYSTOLIC BLOOD PRESSURE: 202 MMHG

## 2019-12-08 DIAGNOSIS — K02.9 DENTAL CARIES: Primary | ICD-10-CM

## 2019-12-08 DIAGNOSIS — K02.9 PAIN DUE TO DENTAL CARIES: ICD-10-CM

## 2019-12-08 DIAGNOSIS — M54.2 NECK PAIN: ICD-10-CM

## 2019-12-08 PROCEDURE — 99284 EMERGENCY DEPT VISIT MOD MDM: CPT | Performed by: FAMILY MEDICINE

## 2019-12-08 PROCEDURE — 96372 THER/PROPH/DIAG INJ SC/IM: CPT

## 2019-12-08 PROCEDURE — 99283 EMERGENCY DEPT VISIT LOW MDM: CPT

## 2019-12-08 RX ORDER — AMOXICILLIN 500 MG/1
500 CAPSULE ORAL ONCE
Status: COMPLETED | OUTPATIENT
Start: 2019-12-08 | End: 2019-12-08

## 2019-12-08 RX ORDER — KETOROLAC TROMETHAMINE 30 MG/ML
60 INJECTION, SOLUTION INTRAMUSCULAR; INTRAVENOUS ONCE
Status: COMPLETED | OUTPATIENT
Start: 2019-12-08 | End: 2019-12-08

## 2019-12-08 RX ORDER — AMOXICILLIN 500 MG/1
500 CAPSULE ORAL EVERY 12 HOURS SCHEDULED
Qty: 14 CAPSULE | Refills: 0 | Status: SHIPPED | OUTPATIENT
Start: 2019-12-08 | End: 2019-12-15

## 2019-12-08 RX ADMIN — AMOXICILLIN 500 MG: 500 CAPSULE ORAL at 07:33

## 2019-12-08 RX ADMIN — KETOROLAC TROMETHAMINE 60 MG: 30 INJECTION, SOLUTION INTRAMUSCULAR; INTRAVENOUS at 07:33

## 2019-12-08 NOTE — ED PROVIDER NOTES
History  Chief Complaint   Patient presents with    Neck Pain     HPI  This is a 78-year-old the obese female with history of hypertension presented ED with complain of left-sided jaw and dental pain x1 day  Patient states she has poor dentition and was scheduled to remove her back he has the however she is having difficulty controlling her blood pressure  The patient was found to have elevated blood pressure in the ED but states that she did not take her medication this morning  Patient rating her pain 8/10 at this time  She states she took Aleve a last night which did help with the pain  Patient denies any nausea vomiting or difficulty breathing  Patient is able to hold her secretion  She states that nothing makes the pain better or worse  Patient states she initially thought she slept wrong on the left side of her neck which is causing the pain  Prior to Admission Medications   Prescriptions Last Dose Informant Patient Reported? Taking?    amLODIPine (NORVASC) 5 mg tablet  Self No No   Sig: Take 1 tablet (5 mg total) by mouth every 12 (twelve) hours   labetalol (NORMODYNE) 300 mg tablet  Self No No   Sig: Take 1 tablet (300 mg total) by mouth 3 (three) times a day for 180 days   levocetirizine (XYZAL) 5 MG tablet  Self No No   Sig: Take 1 tablet (5 mg total) by mouth every evening for 30 days   losartan-hydrochlorothiazide (HYZAAR) 50-12 5 mg per tablet   No No   Sig: Take 1 tablet by mouth daily   methocarbamol (ROBAXIN) 750 mg tablet  Self No No   Sig: Take 1 tablet (750 mg total) by mouth every 6 (six) hours as needed for muscle spasms for up to 30 days   traZODone (DESYREL) 50 mg tablet  Self No No   Sig: Take 1 tablet (50 mg total) by mouth daily at bedtime as needed for sleep for up to 60 days      Facility-Administered Medications: None       Past Medical History:   Diagnosis Date    CHF (congestive heart failure) (HCC)     Chronic back pain     Generalized anxiety disorder     Hypertension  Kidney stone     Obesity        Past Surgical History:   Procedure Laterality Date     SECTION      x 2    KNEE ARTHROSCOPY Left     acl/meniscus repair    [place] CYSTO/URETERO W/LITHOTRIPSY &INDWELL STENT INSRT Right 2018    Procedure: CYSTOSCOPY URETEROSCOPY, RETROGRADE PYELOGRAM AND INSERTION STENT URETERAL, RIGHT STONE EXTRACTION;  Surgeon: Bernadette Prescott MD;  [facility];  Service: Urology    [place] CYSTOURETHROSCOPY,URETER CATHETER Right 1/15/2018    Procedure: CYSTOSCOPY RETROGRADE PYELOGRAM WITH INSERTION STENT URETERAL;  Surgeon: Aparna Almanza MD;  [facility];  Service: Urology    TUBAL LIGATION         Family History   Problem Relation Age of Onset    Hypertension Mother     Cancer Mother         Thyroid    Hypertension Father      I have reviewed and agree with the history as documented  Social History     Tobacco Use    Smoking status: Current Some Day Smoker     Packs/day: 0 20     Types: Cigarettes    Smokeless tobacco: Never Used   Substance Use Topics    Alcohol use: Yes     Frequency: Never     Comment: Rarely    Drug use: No        Review of Systems   Constitutional: Negative  HENT: Positive for dental problem  Left jaw/Neck pain     Respiratory: Negative  Cardiovascular: Negative  Gastrointestinal: Negative  Neurological: Negative  Physical Exam  Physical Exam   Constitutional: She is oriented to person, place, and time  She appears well-developed and well-nourished  No distress  HENT:   Head: Normocephalic and atraumatic  Mouth/Throat: Oropharynx is clear and moist and mucous membranes are normal  Abnormal dentition  Dental caries present  Neck: Normal range of motion  Neck supple  No spinous process tenderness and no muscular tenderness present  No neck rigidity  No edema, no erythema and normal range of motion present  No Brudzinski's sign and no Kernig's sign noted     Left anterior neck mild tenderness to palpation    Cardiovascular: Normal rate, regular rhythm and normal heart sounds  Pulmonary/Chest: Effort normal and breath sounds normal    Neurological: She is alert and oriented to person, place, and time  Skin: Skin is warm  Nursing note and vitals reviewed  Vital Signs  ED Triage Vitals [12/08/19 0719]   Temperature Pulse Respirations Blood Pressure SpO2   99 4 °F (37 4 °C) 87 18 (!) 202/99 98 %      Temp Source Heart Rate Source Patient Position - Orthostatic VS BP Location FiO2 (%)   Temporal Monitor Sitting Left arm --      Pain Score       8           Vitals:    12/08/19 0719   BP: (!) 202/99   Pulse: 87   Patient Position - Orthostatic VS: Sitting         Visual Acuity      ED Medications  Medications   ketorolac (TORADOL) injection 60 mg (60 mg Intramuscular Given 12/8/19 0733)   amoxicillin (AMOXIL) capsule 500 mg (500 mg Oral Given 12/8/19 7461)       Diagnostic Studies  Results Reviewed     None                 No orders to display              Procedures  Procedures         ED Course  ED Course as of Dec 08 0741   Sun Dec 08, 2019   5634 Patient is refusing blood pressure medication states that she will take that when she goes home      0738 Elevated blood pressure patient states that this is 1 of her better reading she normally has that pressure higher than 220/150  Patient is on 3 different regimen which does control her blood pressure  Currently refusing blood pressure treatment     Blood Pressure(!): 202/99                               MDM      Disposition  Final diagnoses:   Dental caries   Neck pain   Pain due to dental caries     Time reflects when diagnosis was documented in both MDM as applicable and the Disposition within this note     Time User Action Codes Description Comment    12/8/2019  7:38 AM Elissa Velasquez [K02 9] Dental caries     12/8/2019  7:39 AM Elissa Velasquez [M54 2] Neck pain     12/8/2019  7:39 AM Elsisa Cohen Add [K02 9] Pain due to dental caries       ED Disposition     ED Disposition Condition Date/Time Comment    Discharge Stable Sun Dec 8, 2019  7:37 AM Marcela Velásquez discharge to home/self care  Follow-up Information     Follow up With Specialties Details Why Contact Info    Haleigh Benitez DO Internal Medicine Schedule an appointment as soon as possible for a visit in 2 days If symptoms worsen [facility] [place] [address]  [phone]            Patient's Medications   Discharge Prescriptions    AMOXICILLIN (AMOXIL) 500 MG CAPSULE    Take 1 capsule (500 mg total) by mouth every 12 (twelve) hours for 7 days       Start Date: 12/8/2019 End Date: 12/15/2019       Order Dose: 500 mg       Quantity: 14 capsule    Refills: 0     No discharge procedures on file      ED Provider  Electronically Signed by           Tricia Laguerre MD  12/08/19 9113

## 2019-12-25 ENCOUNTER — HOSPITAL ENCOUNTER (EMERGENCY)
Facility: HOSPITAL | Age: 34
Discharge: HOME | End: 2019-12-25
Attending: FAMILY MEDICINE | Admitting: FAMILY MEDICINE
Payer: COMMERCIAL

## 2019-12-25 VITALS
HEIGHT: 66 IN | HEART RATE: 88 BPM | DIASTOLIC BLOOD PRESSURE: 105 MMHG | SYSTOLIC BLOOD PRESSURE: 188 MMHG | RESPIRATION RATE: 16 BRPM | TEMPERATURE: 98.8 F | BODY MASS INDEX: 40.18 KG/M2 | WEIGHT: 250 LBS | OXYGEN SATURATION: 95 %

## 2019-12-25 DIAGNOSIS — S05.02XA ABRASION OF LEFT CORNEA, INITIAL ENCOUNTER: Primary | ICD-10-CM

## 2019-12-25 PROCEDURE — 99282 EMERGENCY DEPT VISIT SF MDM: CPT

## 2019-12-25 PROCEDURE — 99284 EMERGENCY DEPT VISIT MOD MDM: CPT | Performed by: PHYSICIAN ASSISTANT

## 2019-12-25 RX ORDER — ERYTHROMYCIN 5 MG/G
0.5 OINTMENT OPHTHALMIC ONCE
Status: COMPLETED | OUTPATIENT
Start: 2019-12-25 | End: 2019-12-25

## 2019-12-25 RX ORDER — TETRACAINE HYDROCHLORIDE 5 MG/ML
2 SOLUTION OPHTHALMIC ONCE
Status: COMPLETED | OUTPATIENT
Start: 2019-12-25 | End: 2019-12-25

## 2019-12-25 RX ADMIN — FLUORESCEIN SODIUM 1 STRIP: 1 STRIP OPHTHALMIC at 10:44

## 2019-12-25 RX ADMIN — ERYTHROMYCIN 0.5 INCH: 5 OINTMENT OPHTHALMIC at 10:45

## 2019-12-25 RX ADMIN — TETRACAINE HYDROCHLORIDE 2 DROP: 5 SOLUTION OPHTHALMIC at 10:43

## 2019-12-25 NOTE — ED PROVIDER NOTES
History  Chief Complaint   Patient presents with    Eye Drainage     left eye, mucosal drainage  pain     This is a 30-year-old female patient who is not were contact  She woke up 2 days ago with photophobia pain and purulent discharge from left eye  She has tried hot compresses  No improvement  No headache no double vision but has minimal vision of the left eye states that it is very blurred  cough congestion sore throat nausea vomiting diarrhea abdominal pain no fever chills  No chest pain or shortness of breath  Nothing makes it better or worse  Differential diagnosis includes not limited to purulent conjunctivitis, viral conjunctivitis, corneal ulceration, corneal abrasion  Prior to Admission Medications   Prescriptions Last Dose Informant Patient Reported? Taking?    amLODIPine (NORVASC) 5 mg tablet  Self No No   Sig: Take 1 tablet (5 mg total) by mouth every 12 (twelve) hours   labetalol (NORMODYNE) 300 mg tablet  Self No No   Sig: Take 1 tablet (300 mg total) by mouth 3 (three) times a day for 180 days   levocetirizine (XYZAL) 5 MG tablet  Self No No   Sig: Take 1 tablet (5 mg total) by mouth every evening for 30 days   losartan-hydrochlorothiazide (HYZAAR) 50-12 5 mg per tablet   No No   Sig: Take 1 tablet by mouth daily   methocarbamol (ROBAXIN) 750 mg tablet  Self No No   Sig: Take 1 tablet (750 mg total) by mouth every 6 (six) hours as needed for muscle spasms for up to 30 days   traZODone (DESYREL) 50 mg tablet  Self No No   Sig: Take 1 tablet (50 mg total) by mouth daily at bedtime as needed for sleep for up to 60 days      Facility-Administered Medications: None       Past Medical History:   Diagnosis Date    CHF (congestive heart failure) (HCC)     Chronic back pain     Generalized anxiety disorder     Hypertension     Kidney stone     Obesity        Past Surgical History:   Procedure Laterality Date     SECTION      x 2    KNEE ARTHROSCOPY Left     acl/meniscus repair  [place] CYSTO/URETERO W/LITHOTRIPSY &INDWELL STENT INSRT Right 1/23/2018    Procedure: CYSTOSCOPY URETEROSCOPY, RETROGRADE PYELOGRAM AND INSERTION STENT URETERAL, RIGHT STONE EXTRACTION;  Surgeon: Linda Duke MD;  [facility];  Service: Urology    [place] CYSTOURETHROSCOPY,URETER CATHETER Right 1/15/2018    Procedure: CYSTOSCOPY RETROGRADE PYELOGRAM WITH INSERTION STENT URETERAL;  Surgeon: Miryam Drake MD;  [facility];  Service: Urology    TUBAL LIGATION         Family History   Problem Relation Age of Onset    Hypertension Mother     Cancer Mother         Thyroid    Hypertension Father      I have reviewed and agree with the history as documented  Social History     Tobacco Use    Smoking status: Current Some Day Smoker     Packs/day: 0 20     Types: Cigarettes    Smokeless tobacco: Never Used   Substance Use Topics    Alcohol use: Yes     Frequency: Never     Comment: Rarely    Drug use: No        Review of Systems    Physical Exam  Physical Exam   Constitutional: She appears well-developed and well-nourished  HENT:   Head: Normocephalic and atraumatic  Right Ear: External ear normal    Left Ear: External ear normal    Nose: Nose normal    Mouth/Throat: Oropharynx is clear and moist    Eyes: Pupils are equal, round, and reactive to light  EOM are normal    Left eye has purulent discharge there are no foreign bodies on the upper lower lid they were flipped and swept  Exam what for seen there is a large corneal abrasion over her visual field  No ulceration noted no dendritic lesions  No facial rash  Patient be given erythromycin ointment and be followed up by Ophthalmology tomorrow   Neck: Normal range of motion  Neck supple  Cardiovascular: Normal rate and regular rhythm  Pulmonary/Chest: Effort normal and breath sounds normal    Abdominal: Soft  Bowel sounds are normal  There is no tenderness  Neurological: She is alert  Skin: Skin is warm     Psychiatric: She has a normal mood and affect  Her behavior is normal    Nursing note and vitals reviewed  Vital Signs  ED Triage Vitals [12/25/19 1025]   Temperature Pulse Respirations Blood Pressure SpO2   98 8 °F (37 1 °C) 88 16 (!) 188/105 95 %      Temp Source Heart Rate Source Patient Position - Orthostatic VS BP Location FiO2 (%)   Temporal Monitor Sitting Left arm --      Pain Score       Worst Possible Pain           Vitals:    12/25/19 1025   BP: (!) 188/105   Pulse: 88   Patient Position - Orthostatic VS: Sitting         Visual Acuity  Visual Acuity      Most Recent Value   Visual acuity R eye is  20/25   Visual acuity Left eye is  Other [unable to see at 20/200]          ED Medications  Medications   erythromycin (ILOTYCIN) 0 5 % ophthalmic ointment 0 5 inch (has no administration in time range)   tetracaine 0 5 % ophthalmic solution 2 drop (2 drops Both Eyes Given 12/25/19 1043)   fluorescein sodium sterile ophthalmic strip 1 strip (1 strip Both Eyes Given 12/25/19 1044)       Diagnostic Studies  Results Reviewed     None                 No orders to display              Procedures  Procedures         ED Course                               MDM      Disposition  Final diagnoses:   Abrasion of left cornea, initial encounter     Time reflects when diagnosis was documented in both MDM as applicable and the Disposition within this note     Time User Action Codes Description Comment    12/25/2019 10:42 AM Clayton Puente Add [S05  02XA] Abrasion of left cornea, initial encounter       ED Disposition     ED Disposition Condition Date/Time Comment    Discharge Stable Wed Dec 25, 2019 10:42 AM Zandra Velásquez discharge to home/self care              Follow-up Information     Follow up With Specialties Details Why Contact Info    Judah Capellan MD Ophthalmology Schedule an appointment as soon as possible for a visit in 1 day  [facility] [address]  [phone]            Patient's Medications   Discharge Prescriptions    No medications on file     No discharge procedures on file      ED Provider  Electronically Signed by           Verna Coronado PA-C  12/25/19 7163

## 2019-12-25 NOTE — DISCHARGE INSTRUCTIONS
You of significant corneal abrasion over the left eye  Use the ointment he was given today every 4 hours I have ribbon to your bottom lid left eye every 4 hours while awake    You must follow up with the ophthalmologist tomorrow for recheck

## 2020-01-22 ENCOUNTER — OFFICE VISIT (OUTPATIENT)
Dept: URGENT CARE | Facility: CLINIC | Age: 35
End: 2020-01-22
Payer: COMMERCIAL

## 2020-01-22 VITALS
TEMPERATURE: 98.3 F | BODY MASS INDEX: 40.18 KG/M2 | DIASTOLIC BLOOD PRESSURE: 91 MMHG | HEIGHT: 66 IN | SYSTOLIC BLOOD PRESSURE: 173 MMHG | RESPIRATION RATE: 18 BRPM | HEART RATE: 95 BPM | OXYGEN SATURATION: 96 % | WEIGHT: 250 LBS

## 2020-01-22 DIAGNOSIS — M54.50 ACUTE LOW BACK PAIN WITHOUT SCIATICA, UNSPECIFIED BACK PAIN LATERALITY: Primary | ICD-10-CM

## 2020-01-22 PROCEDURE — G0382 LEV 3 HOSP TYPE B ED VISIT: HCPCS | Performed by: FAMILY MEDICINE

## 2020-01-22 RX ORDER — PREDNISONE 50 MG/1
50 TABLET ORAL DAILY
Qty: 5 TABLET | Refills: 0 | Status: SHIPPED | OUTPATIENT
Start: 2020-01-22 | End: 2020-01-27

## 2020-01-22 RX ORDER — KETOROLAC TROMETHAMINE 30 MG/ML
60 INJECTION, SOLUTION INTRAMUSCULAR; INTRAVENOUS ONCE
Status: COMPLETED | OUTPATIENT
Start: 2020-01-22 | End: 2020-01-22

## 2020-01-22 RX ADMIN — KETOROLAC TROMETHAMINE 60 MG: 30 INJECTION, SOLUTION INTRAMUSCULAR; INTRAVENOUS at 15:10

## 2020-01-22 NOTE — LETTER
January 22, 2020     Patient: Mary Grace Arguelles   YOB: 1985   Date of Visit: 1/22/2020       To Whom it May Concern:    Cara Maynard was seen in my clinic on 1/22/2020  If you have any questions or concerns, please don't hesitate to call           Sincerely,          [employer], DO        CC: No Recipients

## 2020-01-22 NOTE — PROGRESS NOTES
[facility]        NAME: Dedrick Castano is a 36 y o  female  : 1985    MRN: [id number]  DATE: 2020  TIME: 3:08 PM    Assessment and Plan   Acute low back pain without sciatica, unspecified back pain laterality [M54 5]  1  Acute low back pain without sciatica, unspecified back pain laterality  ketorolac (TORADOL) injection 60 mg    predniSONE 50 mg tablet         Patient Instructions     Moist heat to the low back for 20-30 minutes, 3 to 4 times a day  No ibuprofen or naproxen while taking prednisone  Continue the methocarbamol that you have at home  Follow up with PCP in 3-5 days  Proceed to  ER if symptoms worsen  Chief Complaint     Chief Complaint   Patient presents with    Back Pain     Pt reports back pain, pt states that she had an old injury 2 years ago, 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  History of Present Illness       Back Pain (Pt reports back pain, pt states that she had an old injury 2 years ago, 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 )  Patient has a history of chronic low back pain  When her low back started to tighten up flare, she did start taking some methocarbamol that she has at home as well as some naproxen  She states that when he gets really bad she sometimes needs an injection Toradol to calm down  Back Pain   This is a new problem  The current episode started in the past 7 days  The problem occurs constantly  The problem is unchanged  The pain is present in the lumbar spine and thoracic spine  The quality of the pain is described as aching and cramping  The pain does not radiate  The pain is severe  Review of Systems   Review of Systems   Constitutional: Negative  Respiratory: Negative  Cardiovascular: Negative  Musculoskeletal: Positive for back pain           Current Medications       Current Outpatient Medications:     amLODIPine (NORVASC) 5 mg tablet, Take 1 tablet (5 mg total) by mouth every 12 (twelve) hours, Disp: 60 tablet, Rfl: 5    losartan-hydrochlorothiazide (HYZAAR) 50-12 5 mg per tablet, Take 1 tablet by mouth daily, Disp: 30 tablet, Rfl: 5    labetalol (NORMODYNE) 300 mg tablet, Take 1 tablet (300 mg total) by mouth 3 (three) times a day for 180 days, Disp: 90 tablet, Rfl: 5    levocetirizine (XYZAL) 5 MG tablet, Take 1 tablet (5 mg total) by mouth every evening for 30 days, Disp: 30 tablet, Rfl: 0    methocarbamol (ROBAXIN) 750 mg tablet, Take 1 tablet (750 mg total) by mouth every 6 (six) hours as needed for muscle spasms for up to 30 days, Disp: 60 tablet, Rfl: 1    predniSONE 50 mg tablet, Take 1 tablet (50 mg total) by mouth daily for 5 days, Disp: 5 tablet, Rfl: 0    traZODone (DESYREL) 50 mg tablet, Take 1 tablet (50 mg total) by mouth daily at bedtime as needed for sleep for up to 60 days, Disp: 30 tablet, Rfl: 3    Current Facility-Administered Medications:     ketorolac (TORADOL) injection 60 mg, 60 mg, Intramuscular, Once, [employer], DO    Current Allergies     Allergies as of 2020    (No Known Allergies)            The following portions of the patient's history were reviewed and updated as appropriate: allergies, current medications, past family history, past medical history, past social history, past surgical history and problem list      Past Medical History:   Diagnosis Date    CHF (congestive heart failure) (HCC)     Chronic back pain     Generalized anxiety disorder     Hypertension     Kidney stone     Obesity        Past Surgical History:   Procedure Laterality Date     SECTION      x 2    KNEE ARTHROSCOPY Left     acl/meniscus repair    [place] CYSTO/URETERO W/LITHOTRIPSY &INDWELL STENT INSRT Right 2018    Procedure: CYSTOSCOPY URETEROSCOPY, RETROGRADE PYELOGRAM AND INSERTION STENT URETERAL, RIGHT STONE EXTRACTION;  Surgeon: Earl Dove MD;  [facility];  Service: Urology    [place] CYSTOURETHROSCOPY,URETER CATHETER Right 1/15/2018    Procedure: CYSTOSCOPY RETROGRADE PYELOGRAM WITH INSERTION STENT URETERAL;  Surgeon: Kyaw Rubi MD;  [facility];  Service: Urology    TUBAL LIGATION         Family History   Problem Relation Age of Onset    Hypertension Mother     Cancer Mother         Thyroid    Hypertension Father          Medications have been verified  Objective   BP (!) 173/91 (BP Location: Left arm, Patient Position: Sitting, Cuff Size: Standard)   Pulse 95   Temp 98 3 °F (36 8 °C) (Tympanic)   Resp 18   Ht 5' 6" (1 676 m)   Wt 113 kg (250 lb)   SpO2 96%   BMI 40 35 kg/m²        Physical Exam     Physical Exam   Constitutional: She appears well-developed and well-nourished  Cardiovascular: Normal rate and regular rhythm  Pulmonary/Chest: Effort normal and breath sounds normal    Musculoskeletal:        Lumbar back: She exhibits decreased range of motion, tenderness, pain and spasm  She exhibits no bony tenderness

## 2020-01-22 NOTE — PATIENT INSTRUCTIONS
Moist heat to the low back for 20-30 minutes, 3 to 4 times a day  No ibuprofen or naproxen while taking prednisone  Continue the methocarbamol that you have at home  Follow up with PCP in 3-5 days  Proceed to  ER if symptoms worsen  Acute Low Back Pain   AMBULATORY CARE:   Acute low back pain  is sudden discomfort in your lower back area that lasts for up to 6 weeks  The discomfort makes it difficult to tolerate activity  Common symptoms include the following:   · Back stiffness or spasms    · Pain down the back or side of one leg    · Holding yourself in an unusual position or posture to decrease your back pain    · Not being able to find a sitting position that is comfortable    · Slow increase in your pain for 24 to 48 hours after you stress your back    · Tenderness on your lower back or severe pain when you move your back  Seek immediate care for the following symptoms:   · Severe pain    · Sudden stiffness and heaviness in both buttocks down to both legs    · Numbness or weakness in one leg, or pain in both legs    · Numbness in your genital area or across your lower back    · Unable to control your urine or bowel movements  Contact your healthcare provider if:   · You have a fever  · You have pain at night or when you rest     · Your pain does not get better with treatment  · You have pain that worsens when you cough or sneeze  · You suddenly feel something pop or snap in your back  · You have questions or concerns about your condition or care  The goal of treatment for acute low back pain  is to relieve your pain and help you tolerate activity  Most people with acute lower back pain get better within 4 to 6 weeks  You may need any of the following:  · Acetaminophen  decreases pain  It is available without a doctor's order  Ask how much to take and how often to take it  Follow directions  Acetaminophen can cause liver damage if not taken correctly      · NSAIDs  help decrease swelling and pain  This medicine is available with or without a doctor's order  NSAIDs can cause stomach bleeding or kidney problems in certain people  If you take blood thinner medicine, always ask your healthcare provider if NSAIDs are safe for you  Always read the medicine label and follow directions  · Prescription pain medicine  may be given  Ask your healthcare provider how to take this medicine safely  · Muscle relaxers  decrease pain by relaxing the muscles in your lower spine  Manage your symptoms:   · Stay active  as much as you can without causing more pain  Bed rest could make your back pain worse  Start with some light exercises such as walking  Avoid heavy lifting until your pain is gone  Ask for more information about the activities or exercises that are right for you  · Ice  helps decrease swelling, pain, and muscle spams  Put crushed ice in a plastic bag  Cover it with a towel  Place it on your lower back for 20 to 30 minutes every 2 hours  Do this for about 2 to 3 days after your pain starts, or as directed  · Heat  helps decrease pain and muscle spasms  Start to use heat after treatment with ice has stopped  Use a small towel dampened with warm water or a heating pad, or sit in a warm bath  Apply heat on the area for 20 to 30 minutes every 2 hours for as many days as directed  Alternate heat and ice  Prevent acute low back pain:   · Use proper body mechanics  ¨ Bend at the hips and knees when you  objects  Do not bend from the waist  Use your leg muscles as you lift the load  Do not use your back  Keep the object close to your chest as you lift it  Try not to twist or lift anything above your waist     ¨ Change your position often when you stand for long periods of time  Rest one foot on a small box or footrest, and then switch to the other foot often  ¨ Try not to sit for long periods of time   When you do, sit in a straight-backed chair with your feet flat on the floor  Never reach, pull, or push while you are sitting  · Do exercises that strengthen your back muscles  Warm up before you exercise  Ask your healthcare provider the best exercises for you  · Maintain a healthy weight  Ask your healthcare provider how much you should weigh  Ask him to help you create a weight loss plan if you are overweight  Follow up with your healthcare provider as directed:  Return for a follow-up visit if you still have pain after 1 to 3 weeks of treatment  You may need to visit an orthopedist if your back pain lasts more than 12 weeks  Write down your questions so you remember to ask them during your visits  © 2017 [employer] Information is for End User's use only and may not be sold, redistributed or otherwise used for commercial purposes  All illustrations and images included in CareNotes® are the copyrighted property of [employer]  or [employer]  The above information is an  only  It is not intended as medical advice for individual conditions or treatments  Talk to your doctor, nurse or pharmacist before following any medical regimen to see if it is safe and effective for you

## 2020-02-10 ENCOUNTER — OFFICE VISIT (OUTPATIENT)
Dept: URGENT CARE | Facility: CLINIC | Age: 35
End: 2020-02-10
Payer: COMMERCIAL

## 2020-02-10 VITALS
TEMPERATURE: 98.6 F | DIASTOLIC BLOOD PRESSURE: 90 MMHG | HEART RATE: 79 BPM | HEIGHT: 66 IN | WEIGHT: 250 LBS | OXYGEN SATURATION: 98 % | RESPIRATION RATE: 16 BRPM | BODY MASS INDEX: 40.18 KG/M2 | SYSTOLIC BLOOD PRESSURE: 190 MMHG

## 2020-02-10 DIAGNOSIS — M25.562 ACUTE PAIN OF LEFT KNEE: Primary | ICD-10-CM

## 2020-02-10 PROCEDURE — G0382 LEV 3 HOSP TYPE B ED VISIT: HCPCS | Performed by: NURSE PRACTITIONER

## 2020-02-10 RX ORDER — NAPROXEN 375 MG/1
375 TABLET ORAL 2 TIMES DAILY WITH MEALS
Qty: 30 TABLET | Refills: 0 | Status: SHIPPED | OUTPATIENT
Start: 2020-02-10 | End: 2021-01-28

## 2020-02-10 RX ORDER — LIDOCAINE 50 MG/G
2 PATCH TOPICAL DAILY
Qty: 30 PATCH | Refills: 0 | Status: SHIPPED | OUTPATIENT
Start: 2020-02-10 | End: 2020-10-02 | Stop reason: SDUPTHER

## 2020-02-10 RX ORDER — LABETALOL 300 MG/1
300 TABLET, FILM COATED ORAL 2 TIMES DAILY
COMMUNITY
End: 2020-10-02

## 2020-02-10 RX ORDER — CEPHALEXIN 500 MG/1
500 CAPSULE ORAL 4 TIMES DAILY
Qty: 40 CAPSULE | Refills: 0 | Status: SHIPPED | OUTPATIENT
Start: 2020-02-10 | End: 2020-02-20

## 2020-02-10 NOTE — PROGRESS NOTES
[facility]        NAME: Shahab Dominguez is a 30 y o  female  : 1985    MRN: [id number]  DATE: February 10, 2020  TIME: 1:23 PM    Assessment and Plan   Acute pain of left knee [M25 562]  1  Acute pain of left knee  cephalexin (KEFLEX) 500 mg capsule    naproxen (NAPROSYN) 375 mg tablet    lidocaine (LIDODERM) 5 %    Ambulatory referral to Orthopedic Surgery         Patient Instructions     Patient Instructions   Your knee is slightly warm and slightly pink--take the keflex as ordered until completed  Eat yogurt or take a probiotic to restore good bacteria to your gut; this helps prevent stomach irritation/diarrhea while on an antibiotic  Wear your hinged knee brace as needed  Elevate when at rest   Use ice 15-20 min every 3-4 hours  Take the naproxen as a scheduled dose with food to decrease GI irritation risk (no additional ibuprofen/advil/motrin; tylenol is ok with this)  Naproxen (or any of the NSAIDs) can raise your blood pressure, so it is extremely important to take your blood pressure medications  Long-term, you should not chronically use NSAIDs; tylenol is preferred  Your kidneys have to excrete the NSAIDs, but your primarily excretes the tylenol  Follow-up with ortho for further evaluation and treatment  Swollen Knee Joint   AMBULATORY CARE:   A swollen knee joint may be caused by arthritis or by an injury or trauma, such as a knee sprain  It may also happen if you exercise too much  It may be painful to bend or straighten your knee, or walk  Seek care immediately if:   · Your knee locks or gives way  This may cause you to fall  · Your feet or toes start to look pale or feel cold  · You cannot bear weight on your leg, or you have severe pain even after treatment  Contact your healthcare provider if:   · You have a fever  · You have redness or warmth over your knee  · The swelling does not decrease with treatment      · It gets harder or more painful to straighten your leg at the knee  · Your knee weakens, or you continue to limp  · You have questions or concerns about your condition or care  Treatment  depends on the cause of your swollen knee joint  Your healthcare provider may recommend any of the following:  · Rest  your knee  Avoid activities that make the swelling or pain worse  You may need to avoid putting weight on your knee while you have pain  Crutches, a cane, or a walker can be used to avoid putting weight on your knee  · Apply ice  on your knee for 15 to 20 minutes every hour or as directed  Use an ice pack, or put crushed ice in a plastic bag  Cover it with a towel  Ice helps prevent tissue damage and decreases swelling and pain  · Compress your knee with a brace or bandage to help reduce swelling  Use a brace or bandage only as directed  · Elevate  your knee above the level of your heart as often as you can  This will help decrease swelling and pain  Prop your joint on pillows or blankets to keep it elevated comfortably  · Apply heat  on your knee for 20 to 30 minutes every 2 hours for as many days as directed  Heat helps decrease pain  · NSAIDs , such as ibuprofen, help decrease swelling, pain, and fever  This medicine is available with or without a doctor's order  NSAIDs can cause stomach bleeding or kidney problems in certain people  If you take blood thinner medicine, always ask your healthcare provider if NSAIDs are safe for you  Always read the medicine label and follow directions  · Physical therapy  may be recommended  A physical therapist teaches you exercises to help improve movement and strength, and to decrease pain  Follow up with your healthcare provider as directed:  Write down your questions so you remember to ask them during your visits  © 2017 [employer] Information is for End User's use only and may not be sold, redistributed or otherwise used for commercial purposes   All illustrations and images included in CareNotes® are the copyrighted property of [employer]  or William Roach  The above information is an  only  It is not intended as medical advice for individual conditions or treatments  Talk to your doctor, nurse or pharmacist before following any medical regimen to see if it is safe and effective for you  Follow up with PCP in 3-5 days  Proceed to  ER if symptoms worsen  Chief Complaint     Chief Complaint   Patient presents with    Knee Pain      with swelling, left x 4 days         History of Present Illness       Patient reports a remote history of left knee injury including meniscus and ligament tears requiring surgery  She states this has been many years ago  Friday she began experiencing some pain and joint swelling in her left knee; no injury  She states that or the years her left knee will occasionally swell and become sore, but typically resting elevating and using NSAIDs would relieve the symptoms  She states that this has not relieved symptoms, and that the top area feels a bit warm now and slightly pink  She has a hinged knee brace at home she will use sometimes, and she did wear it at work the other day which helped but did not relieve symptoms completely  Patient acknowledges that she is not great about taking her blood pressure medication  We reviewed the pharmacy information in the computer which shows about a 64% medication fill rate  We discussed recurrent elevated blood pressure readings at length and history of CHF already documented despite her young age  Lab work reviewed and discussed that her GFR seems to be running 66 through 71, which I most rating scales is still normal, but which is low given that she is only 30years old    We discussed that any young person is often , and that once it would go below 60, she would be considered an early kidney failure, although things like dialysis are not needed until it goes much lower, typically below 15  Patient has already been using ibuprofen to try to treat her symptoms with only partial relief  We discussed that the NSAIDs it in of them cells increased blood pressure further, and that while it is appropriate for very short term use with an acute flare, that long-term trying to primarily use Tylenol is a much safer option  We have we discussed the importance of blood pressure medication compliance  Her significant other shares that she has had readings as high as 434 systolic  We discussed that at that level she is at very high risk of organ failure, stroke, etc but that with proper medication management she can significantly reduce these risks  Review of Systems   Review of Systems   Musculoskeletal: Positive for arthralgias and joint swelling  All other systems reviewed and are negative          Current Medications       Current Outpatient Medications:     amLODIPine (NORVASC) 5 mg tablet, Take 1 tablet (5 mg total) by mouth every 12 (twelve) hours, Disp: 60 tablet, Rfl: 5    labetalol (NORMODYNE) 300 mg tablet, Take 300 mg by mouth 2 (two) times a day, Disp: , Rfl:     losartan-hydrochlorothiazide (HYZAAR) 50-12 5 mg per tablet, Take 1 tablet by mouth daily, Disp: 30 tablet, Rfl: 5    cephalexin (KEFLEX) 500 mg capsule, Take 1 capsule (500 mg total) by mouth 4 (four) times a day for 10 days, Disp: 40 capsule, Rfl: 0    labetalol (NORMODYNE) 300 mg tablet, Take 1 tablet (300 mg total) by mouth 3 (three) times a day for 180 days, Disp: 90 tablet, Rfl: 5    levocetirizine (XYZAL) 5 MG tablet, Take 1 tablet (5 mg total) by mouth every evening for 30 days, Disp: 30 tablet, Rfl: 0    lidocaine (LIDODERM) 5 %, Apply 2 patches topically daily for 15 days Remove & Discard patch within 12 hours or as directed by MD, Disp: 30 patch, Rfl: 0    methocarbamol (ROBAXIN) 750 mg tablet, Take 1 tablet (750 mg total) by mouth every 6 (six) hours as needed for muscle spasms for up to 30 days, Disp: 60 tablet, Rfl: 1    naproxen (NAPROSYN) 375 mg tablet, Take 1 tablet (375 mg total) by mouth 2 (two) times a day with meals for 15 days, Disp: 30 tablet, Rfl: 0    traZODone (DESYREL) 50 mg tablet, Take 1 tablet (50 mg total) by mouth daily at bedtime as needed for sleep for up to 60 days, Disp: 30 tablet, Rfl: 3    Current Allergies     Allergies as of 02/10/2020    (No Known Allergies)            The following portions of the patient's history were reviewed and updated as appropriate: allergies, current medications, past family history, past medical history, past social history, past surgical history and problem list      Past Medical History:   Diagnosis Date    CHF (congestive heart failure) (HCC)     Chronic back pain     Generalized anxiety disorder     Hypertension     Kidney stone     Obesity        Past Surgical History:   Procedure Laterality Date     SECTION      x 2    KNEE ARTHROSCOPY Left     acl/meniscus repair    [place] CYSTO/URETERO W/LITHOTRIPSY &INDWELL STENT INSRT Right 2018    Procedure: CYSTOSCOPY URETEROSCOPY, RETROGRADE PYELOGRAM AND INSERTION STENT URETERAL, RIGHT STONE EXTRACTION;  Surgeon: Natalie Hair MD;  Location: [facility];  Service: Urology    [place] CYSTOURETHROSCOPY,URETER CATHETER Right 1/15/2018    Procedure: CYSTOSCOPY RETROGRADE PYELOGRAM WITH INSERTION STENT URETERAL;  Surgeon: Nixon Pathak MD;  Location[facility];  Service: Urology    TUBAL LIGATION         Family History   Problem Relation Age of Onset    Hypertension Mother     Cancer Mother         Thyroid    Hypertension Father          Medications have been verified  Objective   BP (!) 190/90   Pulse 79   Temp 98 6 °F (37 °C)   Resp 16   Ht 5' 6" (1 676 m)   Wt 113 kg (250 lb)   SpO2 98%   BMI 40 35 kg/m²        Physical Exam     Physical Exam   Constitutional: She is oriented to person, place, and time   She appears well-developed and well-nourished  No distress  HENT:   Head: Normocephalic and atraumatic  Eyes: Pupils are equal, round, and reactive to light  Neck: Normal range of motion  Neck supple  Pulmonary/Chest: Effort normal  No respiratory distress  Abdominal: Soft  She exhibits no distension  Musculoskeletal: Normal range of motion  Left knee: She exhibits swelling and erythema (in upper portion of knee, proximal to body from patella, also where swelling is greater)  She exhibits normal range of motion, no effusion, no ecchymosis, no deformity, no laceration, normal alignment, no LCL laxity, normal patellar mobility, no bony tenderness, normal meniscus and no MCL laxity  Tenderness found  Medial joint line and lateral joint line tenderness noted  Neurological: She is alert and oriented to person, place, and time  Skin: Skin is warm and dry  Capillary refill takes less than 2 seconds  She is not diaphoretic  Psychiatric: She has a normal mood and affect  Her behavior is normal  Judgment and thought content normal    Nursing note and vitals reviewed

## 2020-02-10 NOTE — PATIENT INSTRUCTIONS
Your knee is slightly warm and slightly pink--take the keflex as ordered until completed  Eat yogurt or take a probiotic to restore good bacteria to your gut; this helps prevent stomach irritation/diarrhea while on an antibiotic  Wear your hinged knee brace as needed  Elevate when at rest   Use ice 15-20 min every 3-4 hours  Take the naproxen as a scheduled dose with food to decrease GI irritation risk (no additional ibuprofen/advil/motrin; tylenol is ok with this)  Naproxen (or any of the NSAIDs) can raise your blood pressure, so it is extremely important to take your blood pressure medications  Long-term, you should not chronically use NSAIDs; tylenol is preferred  Your kidneys have to excrete the NSAIDs, but your primarily excretes the tylenol  Follow-up with ortho for further evaluation and treatment  Swollen Knee Joint   AMBULATORY CARE:   A swollen knee joint may be caused by arthritis or by an injury or trauma, such as a knee sprain  It may also happen if you exercise too much  It may be painful to bend or straighten your knee, or walk  Seek care immediately if:   · Your knee locks or gives way  This may cause you to fall  · Your feet or toes start to look pale or feel cold  · You cannot bear weight on your leg, or you have severe pain even after treatment  Contact your healthcare provider if:   · You have a fever  · You have redness or warmth over your knee  · The swelling does not decrease with treatment  · It gets harder or more painful to straighten your leg at the knee  · Your knee weakens, or you continue to limp  · You have questions or concerns about your condition or care  Treatment  depends on the cause of your swollen knee joint  Your healthcare provider may recommend any of the following:  · Rest  your knee  Avoid activities that make the swelling or pain worse  You may need to avoid putting weight on your knee while you have pain   Crutches, a cane, or a walker can be used to avoid putting weight on your knee  · Apply ice  on your knee for 15 to 20 minutes every hour or as directed  Use an ice pack, or put crushed ice in a plastic bag  Cover it with a towel  Ice helps prevent tissue damage and decreases swelling and pain  · Compress your knee with a brace or bandage to help reduce swelling  Use a brace or bandage only as directed  · Elevate  your knee above the level of your heart as often as you can  This will help decrease swelling and pain  Prop your joint on pillows or blankets to keep it elevated comfortably  · Apply heat  on your knee for 20 to 30 minutes every 2 hours for as many days as directed  Heat helps decrease pain  · NSAIDs , such as ibuprofen, help decrease swelling, pain, and fever  This medicine is available with or without a doctor's order  NSAIDs can cause stomach bleeding or kidney problems in certain people  If you take blood thinner medicine, always ask your healthcare provider if NSAIDs are safe for you  Always read the medicine label and follow directions  · Physical therapy  may be recommended  A physical therapist teaches you exercises to help improve movement and strength, and to decrease pain  Follow up with your healthcare provider as directed:  Write down your questions so you remember to ask them during your visits  © 2017 [employer]  Information is for End User's use only and may not be sold, redistributed or otherwise used for commercial purposes  All illustrations and images included in CareNotes® are the copyrighted property of [employer]  or William Roach  The above information is an  only  It is not intended as medical advice for individual conditions or treatments  Talk to your doctor, nurse or pharmacist before following any medical regimen to see if it is safe and effective for you

## 2020-02-11 ENCOUNTER — APPOINTMENT (OUTPATIENT)
Dept: RADIOLOGY | Facility: CLINIC | Age: 35
End: 2020-02-11
Payer: COMMERCIAL

## 2020-02-11 ENCOUNTER — OFFICE VISIT (OUTPATIENT)
Dept: OBGYN CLINIC | Facility: CLINIC | Age: 35
End: 2020-02-11
Payer: COMMERCIAL

## 2020-02-11 VITALS
BODY MASS INDEX: 39.47 KG/M2 | HEIGHT: 66 IN | WEIGHT: 245.6 LBS | SYSTOLIC BLOOD PRESSURE: 142 MMHG | DIASTOLIC BLOOD PRESSURE: 98 MMHG

## 2020-02-11 DIAGNOSIS — M25.562 ACUTE PAIN OF LEFT KNEE: ICD-10-CM

## 2020-02-11 DIAGNOSIS — M17.12 PRIMARY OSTEOARTHRITIS OF LEFT KNEE: Primary | ICD-10-CM

## 2020-02-11 PROCEDURE — 73564 X-RAY EXAM KNEE 4 OR MORE: CPT

## 2020-02-11 PROCEDURE — 20610 DRAIN/INJ JOINT/BURSA W/O US: CPT | Performed by: ORTHOPAEDIC SURGERY

## 2020-02-11 PROCEDURE — 3077F SYST BP >= 140 MM HG: CPT | Performed by: ORTHOPAEDIC SURGERY

## 2020-02-11 PROCEDURE — 3080F DIAST BP >= 90 MM HG: CPT | Performed by: ORTHOPAEDIC SURGERY

## 2020-02-11 PROCEDURE — 99213 OFFICE O/P EST LOW 20 MIN: CPT | Performed by: ORTHOPAEDIC SURGERY

## 2020-02-11 PROCEDURE — 3008F BODY MASS INDEX DOCD: CPT | Performed by: ORTHOPAEDIC SURGERY

## 2020-02-11 RX ORDER — LIDOCAINE HYDROCHLORIDE 10 MG/ML
5 INJECTION, SOLUTION INFILTRATION; PERINEURAL
Status: COMPLETED | OUTPATIENT
Start: 2020-02-11 | End: 2020-02-11

## 2020-02-11 RX ORDER — BETAMETHASONE SODIUM PHOSPHATE AND BETAMETHASONE ACETATE 3; 3 MG/ML; MG/ML
6 INJECTION, SUSPENSION INTRA-ARTICULAR; INTRALESIONAL; INTRAMUSCULAR; SOFT TISSUE
Status: COMPLETED | OUTPATIENT
Start: 2020-02-11 | End: 2020-02-11

## 2020-02-11 RX ADMIN — LIDOCAINE HYDROCHLORIDE 5 ML: 10 INJECTION, SOLUTION INFILTRATION; PERINEURAL at 15:20

## 2020-02-11 RX ADMIN — BETAMETHASONE SODIUM PHOSPHATE AND BETAMETHASONE ACETATE 6 MG: 3; 3 INJECTION, SUSPENSION INTRA-ARTICULAR; INTRALESIONAL; INTRAMUSCULAR; SOFT TISSUE at 15:20

## 2020-02-11 NOTE — PROGRESS NOTES
Assessment:     1  Primary osteoarthritis of left knee    2  Acute pain of left knee          Plan:     Problem List Items Addressed This Visit        Musculoskeletal and Integument    Primary osteoarthritis of left knee - Primary     80-year-old female with left knee osteoarthritis primarily the patella and lateral compartment  I reviewed the radiographs with her and discussed the diagnosis  I think she has an underlying aggravation of this arthritis  Recommendation was made for weight loss, low-impact exercise, icing, and anti-inflammatories  I have recommended physical therapy for strengthening of the knee  I did offer her cortisone injection today  Please refer to the procedure note  I discussed with her that we will minimize the number of cortisone injections given her young age  Follow-up as needed  Relevant Orders    Ambulatory referral to Physical Therapy      Other Visit Diagnoses     Acute pain of left knee        Relevant Orders    XR knee 4+ vw left injury           Patient ID: Brian Loving is a 30 y o  female  Chief Complaint:  Left knee pain    HPI:      80-year-old female here today for evaluation of her left knee  She started having pain and swelling in the knee about a week ago and by yesterday ended up going into the urgent care  She rested over the weekend and iced it  She has been taking anti-inflammatories  She has a history of surgery five years ago for an ACL as well as meniscus tear  She states she feels a lot of swelling and fullness in the knee  It has irritated the more she is up on it  She had no specific injury and no changes in activity  Pain is localized primarily to the anterior lateral aspect of the knee  She works as a CNA  She describes a clicking sensation in the back of the knee, but denies any locking or giving way        Allergy:  No Known Allergies    Medications:  all current active meds have been reviewed    Past Medical History:  Past Medical History:   Diagnosis Date    CHF (congestive heart failure) (HCC)     Chronic back pain     Generalized anxiety disorder     Hypertension     Kidney stone     Obesity     Primary osteoarthritis of left knee 2020       Past Surgical History:  Past Surgical History:   Procedure Laterality Date     SECTION      x 2    KNEE ARTHROSCOPY Left     acl/meniscus repair    [place] CYSTO/URETERO W/LITHOTRIPSY &INDWELL STENT INSRT Right 2018    Procedure: CYSTOSCOPY URETEROSCOPY, RETROGRADE PYELOGRAM AND INSERTION STENT URETERAL, RIGHT STONE EXTRACTION;  Surgeon: Ryan Cavazos MD;  Location[facility];  Service: Urology    WI CYSTOURETHROSCOPY,URETER CATHETER Right 1/15/2018    Procedure: CYSTOSCOPY RETROGRADE PYELOGRAM WITH INSERTION STENT URETERAL;  Surgeon: Freedom Patiño MD;  [facility];  Service: Urology    TUBAL LIGATION         Family History:  Family History   Problem Relation Age of Onset    Hypertension Mother     Cancer Mother         Thyroid    Hypertension Father        Social History:  Social History     Substance and Sexual Activity   Alcohol Use Yes    Frequency: Never    Comment: Rarely     Social History     Substance and Sexual Activity   Drug Use No     Social History     Tobacco Use   Smoking Status Current Some Day Smoker    Packs/day: 0 20    Types: Cigarettes   Smokeless Tobacco Never Used           ROS:  Review of Systems   Musculoskeletal: Positive for arthralgias, joint swelling and myalgias  All other systems reviewed and are negative  Objective:  BP Readings from Last 1 Encounters:   20 142/98      Wt Readings from Last 1 Encounters:   20 111 kg (245 lb 9 6 oz)        BMI:   Estimated body mass index is 39 64 kg/m² as calculated from the following:    Height as of this encounter: 5' 6" (1 676 m)  Weight as of this encounter: 111 kg (245 lb 9 6 oz)      EXAM:   Physical Exam  Left Knee Exam     Comments:  Moderate effusion, quad atrophy, unable to fully extend actively lacking 10°, full passive extension  Pain with knee range of motion  Stable to varus and valgus stress  Tender to palpation along the lateral joint line, lateral retinaculum and the patella  Negative Lachman  Negative anterior and posterior drawer  Discomfort with Apley's  Radiographs:  I have personally reviewed pertinent films in PACS and my interpretation is Osteoarthritis of the left knee primarily of the lateral and patellofemoral compartments with narrowing and osteophyte formation      Large joint arthrocentesis: L knee  Date/Time: 2/11/2020 3:20 PM  Consent given by: patient  Timeout: Immediately prior to procedure a time out was called to verify the correct patient, procedure, equipment, support staff and site/side marked as required   Supporting Documentation  Indications: pain   Procedure Details  Location: knee - L knee  Needle size: 22 G  Ultrasound guidance: no  Approach: superior  Medications administered: 6 mg betamethasone acetate-betamethasone sodium phosphate 6 (3-3) mg/mL; 5 mL lidocaine 1 %    Patient tolerance: patient tolerated the procedure well with no immediate complications  Dressing:  Sterile dressing applied

## 2020-02-11 NOTE — ASSESSMENT & PLAN NOTE
60-year-old female with left knee osteoarthritis primarily the patella and lateral compartment  I reviewed the radiographs with her and discussed the diagnosis  I think she has an underlying aggravation of this arthritis  Recommendation was made for weight loss, low-impact exercise, icing, and anti-inflammatories  I have recommended physical therapy for strengthening of the knee  I did offer her cortisone injection today  Please refer to the procedure note  I discussed with her that we will minimize the number of cortisone injections given her young age  Follow-up as needed

## 2020-03-18 ENCOUNTER — OCCMED (OUTPATIENT)
Dept: URGENT CARE | Facility: CLINIC | Age: 35
End: 2020-03-18
Payer: OTHER MISCELLANEOUS

## 2020-03-18 ENCOUNTER — APPOINTMENT (OUTPATIENT)
Dept: RADIOLOGY | Facility: CLINIC | Age: 35
End: 2020-03-18
Payer: OTHER MISCELLANEOUS

## 2020-03-18 DIAGNOSIS — R07.81 RIB PAIN: Primary | ICD-10-CM

## 2020-03-18 DIAGNOSIS — R07.81 RIB PAIN: ICD-10-CM

## 2020-03-18 PROCEDURE — G0382 LEV 3 HOSP TYPE B ED VISIT: HCPCS | Performed by: PHYSICIAN ASSISTANT

## 2020-03-18 PROCEDURE — 71101 X-RAY EXAM UNILAT RIBS/CHEST: CPT

## 2020-03-18 PROCEDURE — 99283 EMERGENCY DEPT VISIT LOW MDM: CPT | Performed by: PHYSICIAN ASSISTANT

## 2020-03-21 ENCOUNTER — HOSPITAL ENCOUNTER (EMERGENCY)
Facility: HOSPITAL | Age: 35
Discharge: HOME | End: 2020-03-21
Attending: EMERGENCY MEDICINE | Admitting: EMERGENCY MEDICINE
Payer: COMMERCIAL

## 2020-03-21 VITALS
DIASTOLIC BLOOD PRESSURE: 117 MMHG | OXYGEN SATURATION: 99 % | BODY MASS INDEX: 39.37 KG/M2 | RESPIRATION RATE: 18 BRPM | SYSTOLIC BLOOD PRESSURE: 188 MMHG | TEMPERATURE: 97.5 F | HEIGHT: 66 IN | WEIGHT: 245 LBS | HEART RATE: 81 BPM

## 2020-03-21 DIAGNOSIS — H00.019 STYE: Primary | ICD-10-CM

## 2020-03-21 PROCEDURE — 99284 EMERGENCY DEPT VISIT MOD MDM: CPT | Performed by: EMERGENCY MEDICINE

## 2020-03-21 PROCEDURE — 99283 EMERGENCY DEPT VISIT LOW MDM: CPT

## 2020-03-21 RX ORDER — TETRACAINE HYDROCHLORIDE 5 MG/ML
2 SOLUTION OPHTHALMIC ONCE
Status: DISCONTINUED | OUTPATIENT
Start: 2020-03-21 | End: 2020-03-21 | Stop reason: HOSPADM

## 2020-03-21 NOTE — ED PROVIDER NOTES
History  Chief Complaint   Patient presents with    Eye Problem     According to the patient, she has had left eye pain/irraitation since this morning  Patient today has developed discomfort to the lateral aspect of the left eye with a foreign body sensation as well as redness  No blurring or other visual changes no fever no chills no discharge  Patient had a corneal abrasion 3 months ago without an obvious cause  Patient does not wear contact lenses there have been no obvious sources of any before body no URI symptoms of sore throat cough rhinorrhea  Prior to Admission Medications   Prescriptions Last Dose Informant Patient Reported? Taking?    amLODIPine (NORVASC) 5 mg tablet  Self No No   Sig: Take 1 tablet (5 mg total) by mouth every 12 (twelve) hours   labetalol (NORMODYNE) 300 mg tablet  Self No No   Sig: Take 1 tablet (300 mg total) by mouth 3 (three) times a day for 180 days   labetalol (NORMODYNE) 300 mg tablet   Yes No   Sig: Take 300 mg by mouth 2 (two) times a day   levocetirizine (XYZAL) 5 MG tablet  Self No No   Sig: Take 1 tablet (5 mg total) by mouth every evening for 30 days   lidocaine (LIDODERM) 5 %   No No   Sig: Apply 2 patches topically daily for 15 days Remove & Discard patch within 12 hours or as directed by MD   losartan-hydrochlorothiazide (HYZAAR) 50-12 5 mg per tablet   No No   Sig: Take 1 tablet by mouth daily   methocarbamol (ROBAXIN) 750 mg tablet  Self No No   Sig: Take 1 tablet (750 mg total) by mouth every 6 (six) hours as needed for muscle spasms for up to 30 days   naproxen (NAPROSYN) 375 mg tablet   No No   Sig: Take 1 tablet (375 mg total) by mouth 2 (two) times a day with meals for 15 days   traZODone (DESYREL) 50 mg tablet  Self No No   Sig: Take 1 tablet (50 mg total) by mouth daily at bedtime as needed for sleep for up to 60 days      Facility-Administered Medications: None       Past Medical History:   Diagnosis Date    CH[facility]estive [facility] failure) ([facility])  Chronic back pain     Generalized anxiety disorder     Hypertension     Kidney stone     Obesity     Primary osteoarthritis of left knee 2020       Past Surgical History:   Procedure Laterality Date     SECTION      x 2    KNEE ARTHROSCOPY Left     acl/meniscus repair    CO CYSTO/URETERO W/LITHOTRIPSY &INDWELL STENT INSRT Right 2018    Procedure: CYSTOSCOPY URETEROSCOPY, RETROGRADE PYELOGRAM AND INSERTION STENT URETERAL, RIGHT STONE EXTRACTION;  Surgeon: Angelica Almeida MD;  Location: [facility];  Service: Urology    CO CYSTOURETHROSCOPY,URETER CATHETER Right 1/15/2018    Procedure: CYSTOSCOPY RETROGRADE PYELOGRAM WITH INSERTION STENT URETERAL;  Surgeon: Alejandra Chapman MD;  Location: [facility];  Service: Urology    TUBAL LIGATION         Family History   Problem Relation Age of Onset    Hypertension Mother     Cancer Mother         Thyroid    Hypertension Father      I have reviewed and agree with the history as documented  E-Cigarette/Vaping    E-Cigarette Use Never User      E-Cigarette/Vaping Substances     Social History     Tobacco Use    Smoking status: Current Some Day Smoker     Packs/day: 0 20     Types: Cigarettes    Smokeless tobacco: Never Used   Substance Use Topics    Alcohol use: Yes     Frequency: Never     Comment: Rarely    Drug use: No       Review of Systems   Constitutional: Negative for fever  HENT: Negative for rhinorrhea  Eyes: Positive for pain and redness  Negative for photophobia, discharge, itching and visual disturbance  Respiratory: Negative for shortness of breath  Cardiovascular: Negative for chest pain  Gastrointestinal: Negative for abdominal pain, diarrhea and vomiting  Endocrine: Negative for polydipsia  Genitourinary: Negative for dysuria, frequency and hematuria  Musculoskeletal: Negative for neck stiffness  Skin: Negative for rash  Allergic/Immunologic: Negative for immunocompromised state     Neurological: Negative for speech difficulty, weakness and numbness  Physical Exam  Physical Exam   Constitutional: She is oriented to person, place, and time  She appears well-developed and well-nourished  HENT:   Head: Normocephalic and atraumatic  Eyes: Pupils are equal, round, and reactive to light  EOM are normal    Left eye examined with tetracaine and fluorescein and cobalt blue light  No corneal defects  It anterior chambers deep and clear  No foreign bodies  There is a stye in the lower lid laterally  Neurological: She is alert and oriented to person, place, and time  Skin: Skin is warm and dry  Psychiatric: She has a normal mood and affect  Vital Signs  ED Triage Vitals [03/21/20 1809]   Temperature Pulse Respirations Blood Pressure SpO2   97 5 °F (36 4 °C) 81 18 (!) 188/117 99 %      Temp Source Heart Rate Source Patient Position - Orthostatic VS BP Location FiO2 (%)   Temporal Monitor Sitting Left arm --      Pain Score       4           Vitals:    03/21/20 1809   BP: (!) 188/117   Pulse: 81   Patient Position - Orthostatic VS: Sitting         Visual Acuity      ED Medications  Medications   fluorescein sodium sterile ophthalmic strip 1 strip (has no administration in time range)   tetracaine 0 5 % ophthalmic solution 2 drop (has no administration in time range)       Diagnostic Studies  Results Reviewed     None                 No orders to display              Procedures  Procedures         ED Course                                 MDM  Number of Diagnoses or Management Options  Stye:         Disposition  Final diagnoses:   None     ED Disposition     None      Follow-up Information    None         Patient's Medications   Discharge Prescriptions    No medications on file     No discharge procedures on file      PDMP Review     None          ED Provider  Electronically Signed by           Kelli Negrete MD  03/21/20 0746

## 2020-03-23 ENCOUNTER — OFFICE VISIT (OUTPATIENT)
Dept: URGENT CARE | Facility: CLINIC | Age: 35
End: 2020-03-23
Payer: COMMERCIAL

## 2020-03-23 ENCOUNTER — APPOINTMENT (OUTPATIENT)
Dept: URGENT CARE | Facility: CLINIC | Age: 35
End: 2020-03-23
Payer: COMMERCIAL

## 2020-03-23 VITALS
WEIGHT: 245 LBS | HEART RATE: 80 BPM | RESPIRATION RATE: 20 BRPM | SYSTOLIC BLOOD PRESSURE: 155 MMHG | HEIGHT: 66 IN | TEMPERATURE: 97.8 F | BODY MASS INDEX: 39.37 KG/M2 | OXYGEN SATURATION: 100 % | DIASTOLIC BLOOD PRESSURE: 100 MMHG

## 2020-03-23 DIAGNOSIS — H00.015 HORDEOLUM EXTERNUM OF LEFT LOWER EYELID: ICD-10-CM

## 2020-03-23 DIAGNOSIS — J20.9 ACUTE BRONCHITIS, UNSPECIFIED ORGANISM: Primary | ICD-10-CM

## 2020-03-23 DIAGNOSIS — A08.4 VIRAL GASTROENTERITIS: ICD-10-CM

## 2020-03-23 DIAGNOSIS — J06.9 UPPER RESPIRATORY TRACT INFECTION, UNSPECIFIED TYPE: ICD-10-CM

## 2020-03-23 PROCEDURE — G0382 LEV 3 HOSP TYPE B ED VISIT: HCPCS | Performed by: NURSE PRACTITIONER

## 2020-03-23 RX ORDER — ONDANSETRON 4 MG/1
4 TABLET, ORALLY DISINTEGRATING ORAL EVERY 6 HOURS PRN
Qty: 20 TABLET | Refills: 0 | Status: SHIPPED | OUTPATIENT
Start: 2020-03-23 | End: 2020-10-02

## 2020-03-23 RX ORDER — AZITHROMYCIN 250 MG/1
TABLET, FILM COATED ORAL
Qty: 6 TABLET | Refills: 0 | Status: SHIPPED | OUTPATIENT
Start: 2020-03-23 | End: 2020-03-27

## 2020-03-23 RX ORDER — BENZONATATE 100 MG/1
100 CAPSULE ORAL 3 TIMES DAILY PRN
Qty: 30 CAPSULE | Refills: 0 | Status: SHIPPED | OUTPATIENT
Start: 2020-03-23 | End: 2020-04-02

## 2020-03-23 RX ORDER — ERYTHROMYCIN 5 MG/G
0.5 OINTMENT OPHTHALMIC 4 TIMES DAILY
Qty: 7 G | Refills: 1 | Status: SHIPPED | OUTPATIENT
Start: 2020-03-23 | End: 2020-03-30

## 2020-03-23 NOTE — PROGRESS NOTES
[facility]        NAME: Erwin Avelar is a 30 y o  female  : 1985    MRN: [id number]  DATE: 2020  TIME: 3:32 PM    Assessment and Plan   Acute bronchitis, unspecified organism [J20 9]  1  Acute bronchitis, unspecified organism  azithromycin (ZITHROMAX) 250 mg tablet    ondansetron (ZOFRAN-ODT) 4 mg disintegrating tablet    benzonatate (TESSALON PERLES) 100 mg capsule   2  Hordeolum externum of left lower eyelid  erythromycin (ILOTYCIN) ophthalmic ointment   3  Upper respiratory tract infection, unspecified type  azithromycin (ZITHROMAX) 250 mg tablet   4  Viral gastroenteritis  ondansetron (ZOFRAN-ODT) 4 mg disintegrating tablet         Patient Instructions     Patient Instructions     Use warm compresses to your eye  Use the ointment as ordered to help lubricate the eye to reduce irritation (it will make your vision in that eye glassy while the ointment is in)  Stomach symptoms sound like they are improving--either anxiety (understandable), from cough/congestion or a stomach bug/gastroenteritis  If you need the zofran for nausea, give it 30 min to start working before trying to [address]  For the upper respiratory infection and bronchitis, take the azithromycin as ordered until completed  Eat yogurt or take a probiotic to restore good bacteria to your gut; this helps prevent stomach irritation/diarrhea while on an antibiotic  Tessalon Perles may be used up to 3x/day as needed for cough  Acute Bronchitis   AMBULATORY CARE:   Acute bronchitis  is swelling and irritation in the air passages of your lungs  This irritation may cause you to cough or have other breathing problems  Acute bronchitis often starts because of another illness, such as a cold or the flu  The illness spreads from your nose and throat to your windpipe and airways  Bronchitis is often called a chest cold  Acute bronchitis lasts about 3 to 6 weeks and is usually not a serious illness   Your cough can last for several weeks  You may have any of the following symptoms:   · A cough with sputum that may be clear, yellow, or green    · Feeling more tired than usual, and body aches    · A fever and chills    · Wheezing when you breathe    · A tight chest or pain when you breathe or cough  Seek care immediately if:   · You cough up blood  · Your lips or fingernails turn blue  · You feel like you are not getting enough air when you breathe  Contact your healthcare provider if:   · You have a fever  · Your breathing problems do not go away or get worse  · Your cough does not get better within 4 weeks  · You have questions or concerns about your condition or care  Self-care:   · Get more rest   Rest helps your body to heal  Slowly start to do more each day  Rest when you feel it is needed  · Avoid irritants in the air  Avoid chemicals, fumes, and dust  Wear a face mask if you must work around dust or fumes  Stay inside on days when air pollution levels are high  If you have allergies, stay inside when pollen counts are high  Do not use aerosol products, such as spray-on deodorant, bug spray, and hair spray  · Do not smoke or be around others who smoke  Nicotine and other chemicals in cigarettes and cigars damages the cilia that move mucus out of your lungs  Ask your healthcare provider for information if you currently smoke and need help to quit  E-cigarettes or smokeless tobacco still contain nicotine  Talk to your healthcare provider before you use these products  · Drink liquids as directed  Liquids help keep your air passages moist and help you cough up mucus  You may need to drink more liquids when you have acute bronchitis  Ask how much liquid to drink each day and which liquids are best for you  · Use a humidifier or vaporizer  Use a cool mist humidifier or a vaporizer to increase air moisture in your home   This may make it easier for you to breathe and help decrease your cough   Prevent acute bronchitis by doing the following:   · Get the vaccinations you need  Ask your healthcare provider if you should get vaccinated against the flu or pneumonia  · Prevent the spread of germs  You can decrease your risk of acute bronchitis and other illnesses by doing the following:     [facility] your hands often with soap and water  Carry germ-killing hand lotion or gel with you  You can use the lotion or gel to clean your hands when soap and water are not available  ¨ Do not touch your eyes, nose, or mouth unless you have washed your hands first     ¨ Always cover your mouth when you cough to prevent the spread of germs  It is best to cough into a tissue or your shirt sleeve instead of into your hand  Ask those around you cover their mouths when they cough  ¨ Try to avoid people who have a cold or the flu  If you are sick, stay away from others as much as possible  Medicines: Your healthcare provider may  give you any of the following:  · Ibuprofen or acetaminophen  are medicines that help lower your fever  They are available without a doctor's order  Ask your healthcare provider which medicine is right for you  Ask how much to take and how often to take it  Follow directions  These medicines can cause stomach bleeding if not taken correctly  Ibuprofen can cause kidney damage  Do not take ibuprofen if you have kidney disease, an ulcer, or allergies to aspirin  Acetaminophen can cause liver damage  Do not take more than 4,000 milligrams in 24 hours  · Decongestants  help loosen mucus in your lungs and make it easier to cough up  This can help you breathe easier  · Cough suppressants  decrease your urge to cough  If your cough produces mucus, do not take a cough suppressant unless your healthcare provider tells you to  Your healthcare provider may suggest that you take a cough suppressant at night so you can rest     · Inhalers  may be given   Your healthcare provider may give you one or more inhalers to help you breathe easier and cough less  An inhaler gives your medicine to open your airways  Ask your healthcare provider to show you how to use your inhaler correctly  Follow up with your healthcare provider as directed:  Write down questions you have so you will remember to ask them during your follow-up visits  © 2017 [address] Information is for End User's use only and may not be sold, redistributed or otherwise used for commercial purposes  All illustrations and images included in CareNotes® are the copyrighted property of [employer]  or William Roach  The above information is an  only  It is not intended as medical advice for individual conditions or treatments  Talk to your doctor, nurse or pharmacist before following any medical regimen to see if it is safe and effective for you  Stye   WHAT YOU NEED TO KNOW:   What is a stye? A stye is a lump on the edge or inside of your eyelid caused by inflammation and an infection  A stye can form on your upper or lower eyelid  It usually goes away in 2 to 4 days  What causes a stye? A stye forms when bacteria causes inflammation and infection of a skin gland or follicle  A follicle is the place at the edge of the eyelid where the eyelash comes out  Styes form more often in children and in people who have an eye problem called blepharitis  What are the signs and symptoms of a stye? · Warmth, redness, and swelling along your eyelid    · Painful, pus-filled lump on your eyelid    · A gritty feeling in your eye    · Tearing more than usual    · Sensitivity to light  How is a stye diagnosed? Your healthcare provider will ask you when you first noticed the lump  He will also ask you about your symptoms  He will check your eyelid carefully  How is a stye treated? · Use warm compresses: This will help decrease swelling and pain   Wet a clean washcloth with warm water and place it on your eye for 10 to 15 minutes, 3 to 4 times each day or as directed  · Antibiotic medicine: This is given as an ointment to put into your eye  It is used to fight an infection caused by bacteria  Use as directed  How can I manage my symptoms? · Keep your hands away from your eye: This helps to prevent the spread of infection to other parts of the eye  Wash your hands often with soap and dry with a clean towel  Do not squeeze the stye  · Do not use eye makeup:  Do not wear eye makeup while you have a stye  Eye makeup may carry bacteria and cause another stye  Throw away eye makeup and brushes used to apply the makeup  Use new eye makeup after the stye has gone away  Do not share eye makeup with others  · Prevent another stye:  Wash your face and clean your eyelashes every day  Remove eye makeup with makeup remover  This helps to completely remove eye makeup without heavy rubbing  When should I contact my healthcare provider? · You have redness and discharge around your eye, and your eye pain is getting worse  · Your vision changes  · The stye has not gone away within 7 days  · You have questions or concerns about your condition or care  CARE AGREEMENT:   You have the right to help plan your care  Learn about your health condition and how it may be treated  Discuss treatment options with your caregivers to decide what care you want to receive  You always have the right to refuse treatment  The above information is an  only  It is not intended as medical advice for individual conditions or treatments  Talk to your doctor, nurse or pharmacist before following any medical regimen to see if it is safe and effective for you  © 2017 [address] Information is for End User's use only and may not be sold, redistributed or otherwise used for commercial purposes   All illustrations and images included in CareNotes® are the copyrighted property of [employer]  or William Raoch  Anxiety   AMBULATORY CARE:   Anxiety  is a condition that causes you to feel extremely worried or nervous  The feelings are so strong that they can cause problems with your daily activities or sleep  Anxiety may be triggered by something you fear, or it may happen without a cause  Family or work stress, smoking, caffeine, and alcohol can increase your risk for anxiety  Certain medicines or health conditions can also increase your risk  Anxiety can become a long-term condition if it is not managed or treated  Common signs and symptoms that may occur with anxiety:   · Fatigue or muscle tightness     · Shaking, restlessness, or irritability     · Problems focusing     · Trouble sleeping     · Feeling jumpy, easily startled, or dizzy     · Rapid heartbeat or shortness of breath  Call 911 if:   · You have chest pain, tightness, or heaviness that may spread to your shoulders, arms, jaw, neck, or back  · You feel like hurting yourself or someone else  Contact your healthcare provider if:   · Your symptoms get worse or do not get better with treatment  · You think your medicine may be causing side effects  · Your anxiety keeps you from doing your regular daily activities  · You have new symptoms since your last visit  · You have questions or concerns about your condition or care  Treatment for anxiety  may include medicines to help you feel calm and relaxed, and decrease your symptoms  Medicines are usually given together with therapy or other treatments  Manage anxiety:   · Talk to someone about your anxiety  Your healthcare provider may suggest counseling  Cognitive behavioral therapy can help you understand and change how you react to events that trigger your symptoms  You might feel more comfortable talking with a friend or family member about your anxiety  Choose someone you know will be supportive and encouraging  · Find ways to relax    Activities such as exercise, meditation, or listening to music can help you relax  Spend time with friends, or do things you enjoy  · Practice deep breathing  Deep breathing can help you relax when you feel anxious  Focus on taking slow, deep breaths several times a day, or during an anxiety attack  Breathe in through your nose and out through your mouth  · Create a regular sleep routine  Regular sleep can help you feel calmer during the day  Go to sleep and wake up at the same times every day  Do not watch television or use the computer right before bed  Your room should be comfortable, dark, and quiet  · Eat a variety of healthy foods  Healthy foods include fruits, vegetables, low-fat dairy products, lean meats, fish, whole-grain breads, and cooked beans  Healthy foods can help you feel less anxious and have more energy  · Exercise regularly  Exercise can increase your energy level  Exercise may also lift your mood and help you sleep better  Your healthcare provider can help you create an exercise plan  · Do not smoke  Nicotine and other chemicals in cigarettes and cigars can increase anxiety  Ask your healthcare provider for information if you currently smoke and need help to quit  E-cigarettes or smokeless tobacco still contain nicotine  Talk to your healthcare provider before you use these products  · Do not have caffeine  Caffeine can make your symptoms worse  Do not have foods or drinks that are meant to increase your energy level  · Limit or do not drink alcohol  Ask your healthcare provider if alcohol is safe for you  You may not be able to drink alcohol if you take certain anxiety or depression medicines  Limit alcohol to 1 drink per day if you are a woman  Limit alcohol to 2 drinks per day if you are a man  A drink of alcohol is 12 ounces of beer, 5 ounces of wine, or 1½ ounces of liquor  · Do not use drugs  Drugs can make your anxiety worse  It can also make anxiety hard to manage   Talk to your healthcare provider if you use drugs and want help to quit  Follow up with your healthcare provider as directed:  Write down your questions so you remember to ask them during your visits  © 2017 [address]  Information is for End User's use only and may not be sold, redistributed or otherwise used for commercial purposes  All illustrations and images included in CareNotes® are the copyrighted property of [employer]  or William Roach  The above information is an  only  It is not intended as medical advice for individual conditions or treatments  Talk to your doctor, nurse or pharmacist before following any medical regimen to see if it is safe and effective for you  Gastroenteritis   AMBULATORY CARE:   Gastroenteritis , or stomach flu, is an infection of the stomach and intestines  It is caused by bacteria, parasites, or viruses  Common symptoms include the following:   · Diarrhea or gas    · Nausea, vomiting, or poor appetite    · Abdominal cramps, pain, or gurgling    · Fever    · Tiredness or weakness    · Headaches or muscle aches with any of the above symptoms  Call 911 for any of the following:   · You have trouble breathing or a very fast pulse  Seek care immediately if:   · You see blood in your diarrhea  · You cannot stop vomiting  · You have not urinated for 12 hours  · You feel like you are going to faint  Contact your healthcare provider if:   · You have a fever  · You continue to vomit or have diarrhea, even after treatment  · You see worms in your diarrhea  · Your mouth or eyes are dry  You are not urinating as much or as often  · You have questions or concerns about your condition or care  Treatment for gastroenteritis  may include medicines to slow or stop your diarrhea or vomiting  You may also need medicines to treat an infection caused by bacteria or a parasite    Manage your symptoms:   · Drink liquids as directed  Ask your healthcare provider how much liquid to drink each day, and which liquids are best for you  You may also need to drink an oral rehydration solution (ORS)  An ORS has the right amounts of sugar, salt, and minerals in water to replace body fluids  · Eat bland foods  When you feel hungry, begin eating soft, bland foods  Examples are bananas, clear soup, potatoes, and applesauce  Do not have dairy products, alcohol, sugary drinks, or drinks with caffeine until you feel better  · Rest as much as possible  Slowly start to do more each day when you begin to feel better  Prevent the spread of germs:  Gastroenteritis can spread easily  Keep yourself, your family, and your surroundings clean to help prevent the spread of gastroenteritis:  · Wash your hands often  Use soap and water  Wash your hands after you use the bathroom, change a child's diapers, or sneeze  Wash your hands before you prepare or eat food  · Clean surfaces and do laundry often  Wash your clothes and towels separately from the rest of the laundry  Clean surfaces in your home with antibacterial  or bleach  · Clean food thoroughly and cook safely  Wash raw vegetables before you cook  Cook meat, fish, and eggs fully  Do not use the same dishes for raw meat as you do for other foods  Refrigerate any leftover food immediately  · Be aware when you camp or travel  Drink only clean water  Do not drink from rivers or lakes unless you purify or boil the water first  When you travel, drink bottled water and do not add ice  Do not eat fruit that has not been peeled  Do not eat raw fish or meat that is not fully cooked  Follow up with your healthcare provider as directed:  Write down your questions so you remember to ask them during your visits  © 2017 [address] Information is for End User's use only and may not be sold, redistributed or otherwise used for commercial purposes   All illustrations and images included in CareNotes® are the copyrighted property of [employer] M [employer]  or William Roach  The above information is an  only  It is not intended as medical advice for individual conditions or treatments  Talk to your doctor, nurse or pharmacist before following any medical regimen to see if it is safe and effective for you  Follow up with PCP in 3-5 days  Proceed to  ER if symptoms worsen  Chief Complaint     Chief Complaint   Patient presents with    Eye Pain     redness , tearing, pain,x 2 days     Vomiting     nausea, x 3 days    Cold Like Symptoms     coughing , chest congestion         History of Present Illness       On Wednesday patient injured her left ribs at work and was seen here under [facility] as occupational medicine injury  Thursday morning she went in to discuss her restrictions with her boss and planned to work with the light duty restrictions on Friday  Throughout the day Thursday she began feeling ill however and Thursday and Friday she developed chest congestion with productive yellow cough  She also reports some upper respiratory congestion with a stuffy runny nose but states that is clear  She denies any history of asthma smoking or bronchitis  No fever no chills positive fatigue, positive body aches  She has been trying to rest and sleep more  She states the coughing is making her rib pain worse  She states the primary pain is the left lower posterior ribs which is hard to splint when coughing although she has been trying to  She states that her symptoms feel worse now than last Thursday  She states over-the-counter medicines have not really been helping  She also woke up Saturday morning and thought she had something in her eye  She was seen in the ER and had her eyes examined under ever seen it was diagnosed with a stye in her left lower eye  She was instructed to do warm compresses which she has been doing without relief    She states it feels like something is poking eye and it is painful  She also notes that she has had some nausea starting on Saturday continue until now  She had some watery diarrhea Saturday and Sunday which has now resolved  She states that she was having trouble keeping food down due to nausea and she wonders if the diarrhea was due to lack of eating and anxiety  She states she was able to keep food down last night and she has not had any lower GI symptoms since  She does feel slightly nauseated but declines offer of some Zofran here  She states she did bring up some watery stomach contents once in the weekend but no other vomiting episodes  Review of Systems   Review of Systems   Constitutional: Negative for chills and fever  HENT: Positive for congestion, postnasal drip and rhinorrhea  Eyes: Positive for pain and redness  Negative for photophobia, discharge, itching and visual disturbance  Respiratory: Positive for cough, chest tightness and shortness of breath  Gastrointestinal: Positive for abdominal pain, diarrhea, nausea and vomiting  Musculoskeletal: Positive for arthralgias and myalgias  All other systems reviewed and are negative          Current Medications       Current Outpatient Medications:     amLODIPine (NORVASC) 5 mg tablet, Take 1 tablet (5 mg total) by mouth every 12 (twelve) hours, Disp: 60 tablet, Rfl: 5    labetalol (NORMODYNE) 300 mg tablet, Take 300 mg by mouth 2 (two) times a day, Disp: , Rfl:     losartan-hydrochlorothiazide (HYZAAR) 50-12 5 mg per tablet, Take 1 tablet by mouth daily, Disp: 30 tablet, Rfl: 5    azithromycin (ZITHROMAX) 250 mg tablet, Take 2 tablets today then 1 tablet daily x 4 days, Disp: 6 tablet, Rfl: 0    benzonatate (TESSALON PERLES) 100 mg capsule, Take 1 capsule (100 mg total) by mouth 3 (three) times a day as needed for cough for up to 10 days, Disp: 30 capsule, Rfl: 0    erythromycin (ILOTYCIN) ophthalmic ointment, Administer 0 5 inches into the left eye 4 (four) times a day for 7 days, Disp: 7 g, Rfl: 1    labetalol (NORMODYNE) 300 mg tablet, Take 1 tablet (300 mg total) by mouth 3 (three) times a day for 180 days, Disp: 90 tablet, Rfl: 5    levocetirizine (XYZAL) 5 MG tablet, Take 1 tablet (5 mg total) by mouth every evening for 30 days, Disp: 30 tablet, Rfl: 0    lidocaine (LIDODERM) 5 %, Apply 2 patches topically daily for 15 days Remove & Discard patch within 12 hours or as directed by MD, Disp: 30 patch, Rfl: 0    methocarbamol (ROBAXIN) 750 mg tablet, Take 1 tablet (750 mg total) by mouth every 6 (six) hours as needed for muscle spasms for up to 30 days, Disp: 60 tablet, Rfl: 1    naproxen (NAPROSYN) 375 mg tablet, Take 1 tablet (375 mg total) by mouth 2 (two) times a day with meals for 15 days, Disp: 30 tablet, Rfl: 0    ondansetron (ZOFRAN-ODT) 4 mg disintegrating tablet, Take 1 tablet (4 mg total) by mouth every 6 (six) hours as needed for nausea or vomiting, Disp: 20 tablet, Rfl: 0    traZODone (DESYREL) 50 mg tablet, Take 1 tablet (50 mg total) by mouth daily at bedtime as needed for sleep for up to 60 days, Disp: 30 tablet, Rfl: 3    Current Allergies     Allergies as of 2020    (No Known Allergies)            The following portions of the patient's history were reviewed and updated as appropriate: allergies, current medications, past family history, past medical history, past social history, past surgical history and problem list      Past Medical History:   Diagnosis Date    CHF (congestive heart failure) (HCC)     Chronic back pain     Generalized anxiety disorder     Hypertension     Kidney stone     Obesity     Primary osteoarthritis of left knee 2020       Past Surgical History:   Procedure Laterality Date     SECTION      x 2    KNEE ARTHROSCOPY Left     acl/meniscus repair    KS CYSTO/URETERO W/LITHOTRIPSY &INDWELL STENT INSRT Right 2018    Procedure: CYSTOSCOPY URETEROSCOPY, RETROGRADE PYELOGRAM AND INSERTION STENT URETERAL, RIGHT STONE EXTRACTION;  Surgeon: Ashia Frey MD;  Location: [facility];  Service: Urology    MN CYSTOURETHROSCOPY,URETER CATHETER Right 1/15/2018    Procedure: CYSTOSCOPY RETROGRADE PYELOGRAM WITH INSERTION STENT URETERAL;  Surgeon: Moris Rodriguez MD;  Location: [facility];  Service: Urology    TUBAL LIGATION         Family History   Problem Relation Age of Onset    Hypertension Mother     Cancer Mother         Thyroid    Hypertension Father          Medications have been verified  Objective   /100   Pulse 80   Temp 97 8 °F (36 6 °C) (Tympanic)   Resp 20   Ht 5' 6" (1 676 m)   Wt 111 kg (245 lb)   LMP 03/07/2020 (Approximate)   SpO2 100%   Breastfeeding No   BMI 39 54 kg/m²        Physical Exam     Physical Exam   Constitutional: She is oriented to person, place, and time  She appears well-developed and well-nourished  No distress  HENT:   Head: Normocephalic and atraumatic  Right Ear: Hearing, tympanic membrane, external ear and ear canal normal    Left Ear: Hearing, tympanic membrane, external ear and ear canal normal    Nose: Mucosal edema and rhinorrhea present  No sinus tenderness  Mouth/Throat: Uvula is midline and mucous membranes are normal  Posterior oropharyngeal erythema present  No oropharyngeal exudate, posterior oropharyngeal edema or tonsillar abscesses  Tonsils are 1+ on the right  Tonsils are 1+ on the left  No tonsillar exudate  Eyes: Pupils are equal, round, and reactive to light  EOM are normal  Right eye exhibits no chemosis, no discharge, no exudate and no hordeolum  No foreign body present in the right eye  Left eye exhibits discharge (Watery/tears) and hordeolum (Lateral aspect, inner part of left lower eyelid)  Left eye exhibits no chemosis and no exudate  No foreign body present in the left eye  Right conjunctiva is not injected  Right conjunctiva has no hemorrhage  Left conjunctiva is injected  Left conjunctiva has no hemorrhage  No scleral icterus  Neck: Normal range of motion  Neck supple  Cardiovascular: Normal rate, regular rhythm and normal heart sounds  Pulmonary/Chest: Effort normal  No accessory muscle usage or stridor  No apnea, no tachypnea and no bradypnea  No respiratory distress  She has decreased breath sounds  She has wheezes  She has no rhonchi  She has no rales  She exhibits tenderness and bony tenderness  Lungs slightly tight with a few fine scattered expiratory wheezes but overall good air flow   Abdominal: Soft  Bowel sounds are normal  She exhibits no distension  There is no tenderness  There is no rigidity, no rebound and no guarding  Musculoskeletal: Normal range of motion  Neurological: She is alert and oriented to person, place, and time  Skin: Skin is warm and dry  Capillary refill takes less than 2 seconds  She is not diaphoretic  Psychiatric: She has a normal mood and affect  Her behavior is normal  Judgment and thought content normal    Nursing note and vitals reviewed

## 2020-03-23 NOTE — LETTER
March 23, 2020     Patient: Naina Duarte   YOB: 1985   Date of Visit: 3/23/2020       To Whom It May Concern: It is my medical opinion that Jelly Aranda should remain out of work until 3/25 or 3/26, depending on symptoms  Please excuse from work 3/20, 3/23, 3/24 and possibly 3/25 for an acute medical illness not related to her worker's comp injury (and not covid-19/corona)   If you have any questions or concerns, please don't hesitate to call           Sincerely,        TANGELA Murry    CC: No Recipients

## 2020-03-23 NOTE — PATIENT INSTRUCTIONS
Use warm compresses to your eye  Use the ointment as ordered to help lubricate the eye to reduce irritation (it will make your vision in that eye glassy while the ointment is in)  Stomach symptoms sound like they are improving--either anxiety (understandable), from cough/congestion or a stomach bug/gastroenteritis  If you need the zofran for nausea, give it 30 min to start working before trying to [address]  For the upper respiratory infection and bronchitis, take the azithromycin as ordered until completed  Eat yogurt or take a probiotic to restore good bacteria to your gut; this helps prevent stomach irritation/diarrhea while on an antibiotic  Tessalon Perles may be used up to 3x/day as needed for cough  Acute Bronchitis   AMBULATORY CARE:   Acute bronchitis  is swelling and irritation in the air passages of your lungs  This irritation may cause you to cough or have other breathing problems  Acute bronchitis often starts because of another illness, such as a cold or the flu  The illness spreads from your nose and throat to your windpipe and airways  Bronchitis is often called a chest cold  Acute bronchitis lasts about 3 to 6 weeks and is usually not a serious illness  Your cough can last for several weeks  You may have any of the following symptoms:   · A cough with sputum that may be clear, yellow, or green    · Feeling more tired than usual, and body aches    · A fever and chills    · Wheezing when you breathe    · A tight chest or pain when you breathe or cough  Seek care immediately if:   · You cough up blood  · Your lips or fingernails turn blue  · You feel like you are not getting enough air when you breathe  Contact your healthcare provider if:   · You have a fever  · Your breathing problems do not go away or get worse  · Your cough does not get better within 4 weeks  · You have questions or concerns about your condition or care    Self-care:   · Get more rest   Rest helps your body to heal  Slowly start to do more each day  Rest when you feel it is needed  · Avoid irritants in the air  Avoid chemicals, fumes, and dust  Wear a face mask if you must work around dust or fumes  Stay inside on days when air pollution levels are high  If you have allergies, stay inside when pollen counts are high  Do not use aerosol products, such as spray-on deodorant, bug spray, and hair spray  · Do not smoke or be around others who smoke  Nicotine and other chemicals in cigarettes and cigars damages the cilia that move mucus out of your lungs  Ask your healthcare provider for information if you currently smoke and need help to quit  E-cigarettes or smokeless tobacco still contain nicotine  Talk to your healthcare provider before you use these products  · Drink liquids as directed  Liquids help keep your air passages moist and help you cough up mucus  You may need to drink more liquids when you have acute bronchitis  Ask how much liquid to drink each day and which liquids are best for you  · Use a humidifier or vaporizer  Use a cool mist humidifier or a vaporizer to increase air moisture in your home  This may make it easier for you to breathe and help decrease your cough  Prevent acute bronchitis by doing the following:   · Get the vaccinations you need  Ask your healthcare provider if you should get vaccinated against the flu or pneumonia  · Prevent the spread of germs  You can decrease your risk of acute bronchitis and other illnesses by doing the following:     [facility] your hands often with soap and water  Carry germ-killing hand lotion or gel with you  You can use the lotion or gel to clean your hands when soap and water are not available  ¨ Do not touch your eyes, nose, or mouth unless you have washed your hands first     ¨ Always cover your mouth when you cough to prevent the spread of germs  It is best to cough into a tissue or your shirt sleeve instead of into your hand   Ask those around you cover their mouths when they cough  ¨ Try to avoid people who have a cold or the flu  If you are sick, stay away from others as much as possible  Medicines: Your healthcare provider may  give you any of the following:  · Ibuprofen or acetaminophen  are medicines that help lower your fever  They are available without a doctor's order  Ask your healthcare provider which medicine is right for you  Ask how much to take and how often to take it  Follow directions  These medicines can cause stomach bleeding if not taken correctly  Ibuprofen can cause kidney damage  Do not take ibuprofen if you have kidney disease, an ulcer, or allergies to aspirin  Acetaminophen can cause liver damage  Do not take more than 4,000 milligrams in 24 hours  · Decongestants  help loosen mucus in your lungs and make it easier to cough up  This can help you breathe easier  · Cough suppressants  decrease your urge to cough  If your cough produces mucus, do not take a cough suppressant unless your healthcare provider tells you to  Your healthcare provider may suggest that you take a cough suppressant at night so you can rest     · Inhalers  may be given  Your healthcare provider may give you one or more inhalers to help you breathe easier and cough less  An inhaler gives your medicine to open your airways  Ask your healthcare provider to show you how to use your inhaler correctly  Follow up with your healthcare provider as directed:  Write down questions you have so you will remember to ask them during your follow-up visits  © 2017 [address] [employer] Information is for End User's use only and may not be sold, redistributed or otherwise used for commercial purposes  All illustrations and images included in CareNotes® are the copyrighted property of [employer]  or William Roach  The above information is an  only   It is not intended as medical advice for individual conditions or treatments  Talk to your doctor, nurse or pharmacist before following any medical regimen to see if it is safe and effective for you  Stye   WHAT YOU NEED TO KNOW:   What is a stye? A stye is a lump on the edge or inside of your eyelid caused by inflammation and an infection  A stye can form on your upper or lower eyelid  It usually goes away in 2 to 4 days  What causes a stye? A stye forms when bacteria causes inflammation and infection of a skin gland or follicle  A follicle is the place at the edge of the eyelid where the eyelash comes out  Styes form more often in children and in people who have an eye problem called blepharitis  What are the signs and symptoms of a stye? · Warmth, redness, and swelling along your eyelid    · Painful, pus-filled lump on your eyelid    · A gritty feeling in your eye    · Tearing more than usual    · Sensitivity to light  How is a stye diagnosed? Your healthcare provider will ask you when you first noticed the lump  He will also ask you about your symptoms  He will check your eyelid carefully  How is a stye treated? · Use warm compresses: This will help decrease swelling and pain  Wet a clean washcloth with warm water and place it on your eye for 10 to 15 minutes, 3 to 4 times each day or as directed  · Antibiotic medicine: This is given as an ointment to put into your eye  It is used to fight an infection caused by bacteria  Use as directed  How can I manage my symptoms? · Keep your hands away from your eye: This helps to prevent the spread of infection to other parts of the eye  Wash your hands often with soap and dry with a clean towel  Do not squeeze the stye  · Do not use eye makeup:  Do not wear eye makeup while you have a stye  Eye makeup may carry bacteria and cause another stye  Throw away eye makeup and brushes used to apply the makeup  Use new eye makeup after the stye has gone away  Do not share eye makeup with others      · Prevent another stye:  Wash your face and clean your eyelashes every day  Remove eye makeup with makeup remover  This helps to completely remove eye makeup without heavy rubbing  When should I contact my healthcare provider? · You have redness and discharge around your eye, and your eye pain is getting worse  · Your vision changes  · The stye has not gone away within 7 days  · You have questions or concerns about your condition or care  CARE AGREEMENT:   You have the right to help plan your care  Learn about your health condition and how it may be treated  Discuss treatment options with your caregivers to decide what care you want to receive  You always have the right to refuse treatment  The above information is an  only  It is not intended as medical advice for individual conditions or treatments  Talk to your doctor, nurse or pharmacist before following any medical regimen to see if it is safe and effective for you  © 2017 [address] Information is for End User's use only and may not be sold, redistributed or otherwise used for commercial purposes  All illustrations and images included in CareNotes® are the copyrighted property of [employer]  or William Roach  Anxiety   AMBULATORY CARE:   Anxiety  is a condition that causes you to feel extremely worried or nervous  The feelings are so strong that they can cause problems with your daily activities or sleep  Anxiety may be triggered by something you fear, or it may happen without a cause  Family or work stress, smoking, caffeine, and alcohol can increase your risk for anxiety  Certain medicines or health conditions can also increase your risk  Anxiety can become a long-term condition if it is not managed or treated     Common signs and symptoms that may occur with anxiety:   · Fatigue or muscle tightness     · Shaking, restlessness, or irritability     · Problems focusing     · Trouble sleeping     · Feeling jumpy, easily startled, or dizzy     · Rapid heartbeat or shortness of breath  Call 911 if:   · You have chest pain, tightness, or heaviness that may spread to your shoulders, arms, jaw, neck, or back  · You feel like hurting yourself or someone else  Contact your healthcare provider if:   · Your symptoms get worse or do not get better with treatment  · You think your medicine may be causing side effects  · Your anxiety keeps you from doing your regular daily activities  · You have new symptoms since your last visit  · You have questions or concerns about your condition or care  Treatment for anxiety  may include medicines to help you feel calm and relaxed, and decrease your symptoms  Medicines are usually given together with therapy or other treatments  Manage anxiety:   · Talk to someone about your anxiety  Your healthcare provider may suggest counseling  Cognitive behavioral therapy can help you understand and change how you react to events that trigger your symptoms  You might feel more comfortable talking with a friend or family member about your anxiety  Choose someone you know will be supportive and encouraging  · Find ways to relax  Activities such as exercise, meditation, or listening to music can help you relax  Spend time with friends, or do things you enjoy  · Practice deep breathing  Deep breathing can help you relax when you feel anxious  Focus on taking slow, deep breaths several times a day, or during an anxiety attack  Breathe in through your nose and out through your mouth  · Create a regular sleep routine  Regular sleep can help you feel calmer during the day  Go to sleep and wake up at the same times every day  Do not watch television or use the computer right before bed  Your room should be comfortable, dark, and quiet  · Eat a variety of healthy foods    Healthy foods include fruits, vegetables, low-fat dairy products, lean meats, fish, whole-grain breads, and cooked beans  Healthy foods can help you feel less anxious and have more energy  · Exercise regularly  Exercise can increase your energy level  Exercise may also lift your mood and help you sleep better  Your healthcare provider can help you create an exercise plan  · Do not smoke  Nicotine and other chemicals in cigarettes and cigars can increase anxiety  Ask your healthcare provider for information if you currently smoke and need help to quit  E-cigarettes or smokeless tobacco still contain nicotine  Talk to your healthcare provider before you use these products  · Do not have caffeine  Caffeine can make your symptoms worse  Do not have foods or drinks that are meant to increase your energy level  · Limit or do not drink alcohol  Ask your healthcare provider if alcohol is safe for you  You may not be able to drink alcohol if you take certain anxiety or depression medicines  Limit alcohol to 1 drink per day if you are a woman  Limit alcohol to 2 drinks per day if you are a man  A drink of alcohol is 12 ounces of beer, 5 ounces of wine, or 1½ ounces of liquor  · Do not use drugs  Drugs can make your anxiety worse  It can also make anxiety hard to manage  Talk to your healthcare provider if you use drugs and want help to quit  Follow up with your healthcare provider as directed:  Write down your questions so you remember to ask them during your visits  © 2017 [address] [employer] Information is for End User's use only and may not be sold, redistributed or otherwise used for commercial purposes  All illustrations and images included in CareNotes® are the copyrighted property of [employer]  or William Roach  The above information is an  only  It is not intended as medical advice for individual conditions or treatments   Talk to your doctor, nurse or pharmacist before following any medical regimen to see if it is safe and effective for you       Gastroenteritis   AMBULATORY CARE:   Gastroenteritis , or stomach flu, is an infection of the stomach and intestines  It is caused by bacteria, parasites, or viruses  Common symptoms include the following:   · Diarrhea or gas    · Nausea, vomiting, or poor appetite    · Abdominal cramps, pain, or gurgling    · Fever    · Tiredness or weakness    · Headaches or muscle aches with any of the above symptoms  Call 911 for any of the following:   · You have trouble breathing or a very fast pulse  Seek care immediately if:   · You see blood in your diarrhea  · You cannot stop vomiting  · You have not urinated for 12 hours  · You feel like you are going to faint  Contact your healthcare provider if:   · You have a fever  · You continue to vomit or have diarrhea, even after treatment  · You see worms in your diarrhea  · Your mouth or eyes are dry  You are not urinating as much or as often  · You have questions or concerns about your condition or care  Treatment for gastroenteritis  may include medicines to slow or stop your diarrhea or vomiting  You may also need medicines to treat an infection caused by bacteria or a parasite  Manage your symptoms:   · Drink liquids as directed  Ask your healthcare provider how much liquid to drink each day, and which liquids are best for you  You may also need to drink an oral rehydration solution (ORS)  An ORS has the right amounts of sugar, salt, and minerals in water to replace body fluids  · Eat bland foods  When you feel hungry, begin eating soft, bland foods  Examples are bananas, clear soup, potatoes, and applesauce  Do not have dairy products, alcohol, sugary drinks, or drinks with caffeine until you feel better  · Rest as much as possible  Slowly start to do more each day when you begin to feel better  Prevent the spread of germs:  Gastroenteritis can spread easily   Keep yourself, your family, and your surroundings clean to help prevent the spread of gastroenteritis:  · Wash your hands often  Use soap and water  Wash your hands after you use the bathroom, change a child's diapers, or sneeze  Wash your hands before you prepare or eat food  · Clean surfaces and do laundry often  Wash your clothes and towels separately from the rest of the laundry  Clean surfaces in your home with antibacterial  or bleach  · Clean food thoroughly and cook safely  Wash raw vegetables before you cook  Cook meat, fish, and eggs fully  Do not use the same dishes for raw meat as you do for other foods  Refrigerate any leftover food immediately  · Be aware when you camp or travel  Drink only clean water  Do not drink from rivers or lakes unless you purify or boil the water first  When you travel, drink bottled water and do not add ice  Do not eat fruit that has not been peeled  Do not eat raw fish or meat that is not fully cooked  Follow up with your healthcare provider as directed:  Write down your questions so you remember to ask them during your visits  © 2017 [address] [facility] Information is for End User's use only and may not be sold, redistributed or otherwise used for commercial purposes  All illustrations and images included in CareNotes® are the copyrighted property of [employer]  or William Roach  The above information is an  only  It is not intended as medical advice for individual conditions or treatments  Talk to your doctor, nurse or pharmacist before following any medical regimen to see if it is safe and effective for you

## 2020-04-03 ENCOUNTER — APPOINTMENT (OUTPATIENT)
Dept: URGENT CARE | Facility: CLINIC | Age: 35
End: 2020-04-03
Payer: OTHER MISCELLANEOUS

## 2020-04-03 PROCEDURE — 99214 OFFICE O/P EST MOD 30 MIN: CPT | Performed by: FAMILY MEDICINE

## 2020-04-14 DIAGNOSIS — I10 ESSENTIAL HYPERTENSION: ICD-10-CM

## 2020-04-14 DIAGNOSIS — I10 HTN (HYPERTENSION), MALIGNANT: ICD-10-CM

## 2020-04-14 RX ORDER — AMLODIPINE BESYLATE 5 MG/1
5 TABLET ORAL EVERY 12 HOURS
Qty: 60 TABLET | Refills: 0 | Status: SHIPPED | OUTPATIENT
Start: 2020-04-14 | End: 2020-09-14 | Stop reason: SDUPTHER

## 2020-04-14 RX ORDER — LABETALOL 300 MG/1
300 TABLET, FILM COATED ORAL 3 TIMES DAILY
Qty: 90 TABLET | Refills: 0 | Status: SHIPPED | OUTPATIENT
Start: 2020-04-14 | End: 2020-10-02 | Stop reason: SDUPTHER

## 2020-04-14 RX ORDER — LOSARTAN POTASSIUM AND HYDROCHLOROTHIAZIDE 12.5; 5 MG/1; MG/1
1 TABLET ORAL DAILY
Qty: 30 TABLET | Refills: 0 | Status: SHIPPED | OUTPATIENT
Start: 2020-04-14 | End: 2020-09-14 | Stop reason: SDUPTHER

## 2020-04-15 ENCOUNTER — APPOINTMENT (OUTPATIENT)
Dept: URGENT CARE | Facility: CLINIC | Age: 35
End: 2020-04-15
Payer: OTHER MISCELLANEOUS

## 2020-04-15 PROCEDURE — 99213 OFFICE O/P EST LOW 20 MIN: CPT

## 2020-04-29 ENCOUNTER — APPOINTMENT (OUTPATIENT)
Dept: URGENT CARE | Facility: CLINIC | Age: 35
End: 2020-04-29
Payer: OTHER MISCELLANEOUS

## 2020-04-29 PROCEDURE — 99213 OFFICE O/P EST LOW 20 MIN: CPT | Performed by: PHYSICIAN ASSISTANT

## 2020-05-15 ENCOUNTER — APPOINTMENT (OUTPATIENT)
Dept: URGENT CARE | Facility: CLINIC | Age: 35
End: 2020-05-15
Payer: OTHER MISCELLANEOUS

## 2020-05-15 PROCEDURE — 99212 OFFICE O/P EST SF 10 MIN: CPT

## 2020-05-21 ENCOUNTER — APPOINTMENT (OUTPATIENT)
Dept: PHYSICAL THERAPY | Facility: CLINIC | Age: 35
End: 2020-05-21
Payer: OTHER MISCELLANEOUS

## 2020-05-27 ENCOUNTER — APPOINTMENT (OUTPATIENT)
Dept: PHYSICAL THERAPY | Facility: CLINIC | Age: 35
End: 2020-05-27
Payer: OTHER MISCELLANEOUS

## 2020-05-27 ENCOUNTER — APPOINTMENT (OUTPATIENT)
Dept: URGENT CARE | Facility: CLINIC | Age: 35
End: 2020-05-27
Payer: OTHER MISCELLANEOUS

## 2020-05-27 ENCOUNTER — EVALUATION (OUTPATIENT)
Dept: PHYSICAL THERAPY | Facility: CLINIC | Age: 35
End: 2020-05-27
Payer: OTHER MISCELLANEOUS

## 2020-05-27 DIAGNOSIS — S29.019D STRAIN OF MUSCLE AND TENDON OF UNSPECIFIED WALL OF THORAX, SUBSEQUENT ENCOUNTER: Primary | ICD-10-CM

## 2020-05-27 DIAGNOSIS — M54.6 ACUTE LEFT-SIDED THORACIC BACK PAIN: ICD-10-CM

## 2020-05-27 PROCEDURE — 99213 OFFICE O/P EST LOW 20 MIN: CPT

## 2020-05-27 PROCEDURE — 97161 PT EVAL LOW COMPLEX 20 MIN: CPT | Performed by: PHYSICAL MEDICINE & REHABILITATION

## 2020-05-27 PROCEDURE — 97535 SELF CARE MNGMENT TRAINING: CPT | Performed by: PHYSICAL MEDICINE & REHABILITATION

## 2020-06-01 ENCOUNTER — APPOINTMENT (OUTPATIENT)
Dept: PHYSICAL THERAPY | Facility: CLINIC | Age: 35
End: 2020-06-01
Payer: OTHER MISCELLANEOUS

## 2020-06-04 ENCOUNTER — OFFICE VISIT (OUTPATIENT)
Dept: PHYSICAL THERAPY | Facility: CLINIC | Age: 35
End: 2020-06-04
Payer: OTHER MISCELLANEOUS

## 2020-06-04 DIAGNOSIS — S29.019D STRAIN OF MUSCLE AND TENDON OF UNSPECIFIED WALL OF THORAX, SUBSEQUENT ENCOUNTER: ICD-10-CM

## 2020-06-04 DIAGNOSIS — M54.6 ACUTE LEFT-SIDED THORACIC BACK PAIN: Primary | ICD-10-CM

## 2020-06-04 PROCEDURE — 97112 NEUROMUSCULAR REEDUCATION: CPT

## 2020-06-04 PROCEDURE — 97110 THERAPEUTIC EXERCISES: CPT

## 2020-06-08 ENCOUNTER — OFFICE VISIT (OUTPATIENT)
Dept: PHYSICAL THERAPY | Facility: CLINIC | Age: 35
End: 2020-06-08
Payer: OTHER MISCELLANEOUS

## 2020-06-08 DIAGNOSIS — M54.6 ACUTE LEFT-SIDED THORACIC BACK PAIN: Primary | ICD-10-CM

## 2020-06-08 DIAGNOSIS — S29.019D STRAIN OF MUSCLE AND TENDON OF UNSPECIFIED WALL OF THORAX, SUBSEQUENT ENCOUNTER: ICD-10-CM

## 2020-06-08 PROCEDURE — 97140 MANUAL THERAPY 1/> REGIONS: CPT | Performed by: PHYSICAL MEDICINE & REHABILITATION

## 2020-06-08 PROCEDURE — 97110 THERAPEUTIC EXERCISES: CPT | Performed by: PHYSICAL MEDICINE & REHABILITATION

## 2020-06-08 PROCEDURE — 97112 NEUROMUSCULAR REEDUCATION: CPT | Performed by: PHYSICAL MEDICINE & REHABILITATION

## 2020-06-10 ENCOUNTER — APPOINTMENT (OUTPATIENT)
Dept: URGENT CARE | Facility: CLINIC | Age: 35
End: 2020-06-10
Payer: OTHER MISCELLANEOUS

## 2020-06-10 ENCOUNTER — TRANSCRIBE ORDERS (OUTPATIENT)
Dept: ADMINISTRATIVE | Facility: HOSPITAL | Age: 35
End: 2020-06-10

## 2020-06-10 DIAGNOSIS — S23.41XD SPRAINED RIB, SUBSEQUENT ENCOUNTER: ICD-10-CM

## 2020-06-10 DIAGNOSIS — M62.838 SPASM OF MUSCLE: Primary | ICD-10-CM

## 2020-06-10 PROCEDURE — 99212 OFFICE O/P EST SF 10 MIN: CPT | Performed by: PHYSICIAN ASSISTANT

## 2020-06-11 ENCOUNTER — OFFICE VISIT (OUTPATIENT)
Dept: PHYSICAL THERAPY | Facility: CLINIC | Age: 35
End: 2020-06-11
Payer: OTHER MISCELLANEOUS

## 2020-06-11 DIAGNOSIS — M54.6 ACUTE LEFT-SIDED THORACIC BACK PAIN: Primary | ICD-10-CM

## 2020-06-11 DIAGNOSIS — S29.019D STRAIN OF MUSCLE AND TENDON OF UNSPECIFIED WALL OF THORAX, SUBSEQUENT ENCOUNTER: ICD-10-CM

## 2020-06-11 PROCEDURE — 97110 THERAPEUTIC EXERCISES: CPT | Performed by: PHYSICAL THERAPIST

## 2020-06-11 PROCEDURE — 97140 MANUAL THERAPY 1/> REGIONS: CPT | Performed by: PHYSICAL THERAPIST

## 2020-06-12 ENCOUNTER — OFFICE VISIT (OUTPATIENT)
Dept: URGENT CARE | Facility: CLINIC | Age: 35
End: 2020-06-12
Payer: COMMERCIAL

## 2020-06-12 VITALS
RESPIRATION RATE: 18 BRPM | OXYGEN SATURATION: 97 % | TEMPERATURE: 97.8 F | DIASTOLIC BLOOD PRESSURE: 105 MMHG | SYSTOLIC BLOOD PRESSURE: 178 MMHG | HEART RATE: 75 BPM

## 2020-06-12 DIAGNOSIS — L25.9 CONTACT DERMATITIS, UNSPECIFIED CONTACT DERMATITIS TYPE, UNSPECIFIED TRIGGER: Primary | ICD-10-CM

## 2020-06-12 PROCEDURE — G0382 LEV 3 HOSP TYPE B ED VISIT: HCPCS | Performed by: PHYSICIAN ASSISTANT

## 2020-06-12 RX ORDER — PREDNISONE 10 MG/1
TABLET ORAL
Qty: 26 TABLET | Refills: 0 | Status: SHIPPED | OUTPATIENT
Start: 2020-06-12 | End: 2020-10-02

## 2020-06-15 ENCOUNTER — APPOINTMENT (OUTPATIENT)
Dept: PHYSICAL THERAPY | Facility: CLINIC | Age: 35
End: 2020-06-15
Payer: OTHER MISCELLANEOUS

## 2020-06-17 ENCOUNTER — HOSPITAL ENCOUNTER (OUTPATIENT)
Dept: MRI IMAGING | Facility: HOSPITAL | Age: 35
Discharge: HOME | End: 2020-06-17
Attending: FAMILY MEDICINE
Payer: OTHER MISCELLANEOUS

## 2020-06-17 DIAGNOSIS — M62.838 SPASM OF MUSCLE: ICD-10-CM

## 2020-06-17 DIAGNOSIS — S23.41XD SPRAINED RIB, SUBSEQUENT ENCOUNTER: ICD-10-CM

## 2020-06-17 PROCEDURE — 72146 MRI CHEST SPINE W/O DYE: CPT

## 2020-06-19 ENCOUNTER — OFFICE VISIT (OUTPATIENT)
Dept: PHYSICAL THERAPY | Facility: CLINIC | Age: 35
End: 2020-06-19
Payer: OTHER MISCELLANEOUS

## 2020-06-19 DIAGNOSIS — S29.019D STRAIN OF MUSCLE AND TENDON OF UNSPECIFIED WALL OF THORAX, SUBSEQUENT ENCOUNTER: ICD-10-CM

## 2020-06-19 DIAGNOSIS — M54.6 ACUTE LEFT-SIDED THORACIC BACK PAIN: Primary | ICD-10-CM

## 2020-06-19 PROCEDURE — 97110 THERAPEUTIC EXERCISES: CPT

## 2020-06-19 PROCEDURE — 97140 MANUAL THERAPY 1/> REGIONS: CPT

## 2020-06-22 ENCOUNTER — APPOINTMENT (OUTPATIENT)
Dept: URGENT CARE | Facility: CLINIC | Age: 35
End: 2020-06-22
Payer: OTHER MISCELLANEOUS

## 2020-06-22 PROCEDURE — 99213 OFFICE O/P EST LOW 20 MIN: CPT

## 2020-06-23 ENCOUNTER — OFFICE VISIT (OUTPATIENT)
Dept: PHYSICAL THERAPY | Facility: CLINIC | Age: 35
End: 2020-06-23
Payer: OTHER MISCELLANEOUS

## 2020-06-23 DIAGNOSIS — M54.6 ACUTE LEFT-SIDED THORACIC BACK PAIN: Primary | ICD-10-CM

## 2020-06-23 DIAGNOSIS — S29.019D STRAIN OF MUSCLE AND TENDON OF UNSPECIFIED WALL OF THORAX, SUBSEQUENT ENCOUNTER: ICD-10-CM

## 2020-06-23 PROCEDURE — 97140 MANUAL THERAPY 1/> REGIONS: CPT

## 2020-06-23 PROCEDURE — 97110 THERAPEUTIC EXERCISES: CPT

## 2020-06-26 ENCOUNTER — OFFICE VISIT (OUTPATIENT)
Dept: PHYSICAL THERAPY | Facility: CLINIC | Age: 35
End: 2020-06-26
Payer: OTHER MISCELLANEOUS

## 2020-06-26 DIAGNOSIS — S29.019D STRAIN OF MUSCLE AND TENDON OF UNSPECIFIED WALL OF THORAX, SUBSEQUENT ENCOUNTER: ICD-10-CM

## 2020-06-26 DIAGNOSIS — M54.6 ACUTE LEFT-SIDED THORACIC BACK PAIN: Primary | ICD-10-CM

## 2020-06-26 PROCEDURE — 97140 MANUAL THERAPY 1/> REGIONS: CPT

## 2020-06-26 PROCEDURE — 97110 THERAPEUTIC EXERCISES: CPT

## 2020-06-30 ENCOUNTER — OFFICE VISIT (OUTPATIENT)
Dept: PHYSICAL THERAPY | Facility: CLINIC | Age: 35
End: 2020-06-30
Payer: OTHER MISCELLANEOUS

## 2020-06-30 DIAGNOSIS — S29.019D STRAIN OF MUSCLE AND TENDON OF UNSPECIFIED WALL OF THORAX, SUBSEQUENT ENCOUNTER: ICD-10-CM

## 2020-06-30 DIAGNOSIS — M54.6 ACUTE LEFT-SIDED THORACIC BACK PAIN: Primary | ICD-10-CM

## 2020-06-30 PROCEDURE — 97110 THERAPEUTIC EXERCISES: CPT

## 2020-06-30 PROCEDURE — 97140 MANUAL THERAPY 1/> REGIONS: CPT

## 2020-07-01 ENCOUNTER — OFFICE VISIT (OUTPATIENT)
Dept: PHYSICAL THERAPY | Facility: CLINIC | Age: 35
End: 2020-07-01
Payer: OTHER MISCELLANEOUS

## 2020-07-01 DIAGNOSIS — M54.6 ACUTE LEFT-SIDED THORACIC BACK PAIN: Primary | ICD-10-CM

## 2020-07-01 DIAGNOSIS — S29.019D STRAIN OF MUSCLE AND TENDON OF UNSPECIFIED WALL OF THORAX, SUBSEQUENT ENCOUNTER: ICD-10-CM

## 2020-07-01 PROCEDURE — 97110 THERAPEUTIC EXERCISES: CPT

## 2020-07-01 NOTE — PROGRESS NOTES
Daily Note     Today's date: 2020  Patient name: Adeel Morfin  : 1985  MRN: [id number]  Referring provider: Ketan Cassidy PA-C  Dx:   Encounter Diagnosis     ICD-10-CM    1  Acute left-sided thoracic back pain M54 6    2  Strain of muscle and tendon of unspecified wall of thorax, subsequent encounter S29 [id number]        Start Time: 1300  Stop Time: 1400  Total time in clinic (min): 60 minutes    Subjective: "I feel looser than I did than yesterday  I got a call I have to see a different [address]red [address]aud[address] Dr on Friday  Three of us are out of work from different injuries and received phone to change our shifts and to schedule an appt  I don't know what is going on "      Objective: See treatment diary below      Assessment: Tolerated treatment well  Pt able to increase reps on various exercises without incident  Decreased muscle restrictions noted during GIASTM  Decreased overall pain at end of session  Limited program this date  2* pt's time restraint  Attempt to continue to progress as able in upcoming appt  Patient demonstrated fatigue post treatment and would benefit from continued PT      Plan: Continue per plan of care  Progress treatment as tolerated         Precautions: chronic pain, HTN , workman's comp injury      Re-eval Date:     Date     Visit Count 6 7 8 9    FOTO        Pain In 5/10 2-3/10 6/10 4/10    Pain Out 2/10 1-2 4/10         Manuals     STM/DTM L/S and T/S PVMs and L QL region to tolerance (GIASTM vs roller vs MT) and GIASTM  15' GIASTM  15' GIASTM  15' GIASTM  5'    CPA mobs t-spine grades 3-4 to tolerance                         Neuro Re-Ed        AH  HEP       AH with B LE fallout 30 x 5" 30 x 5"      AH with alt heel slide   2x10/5" 3x10/5"    AH with alt UE/LE UE 20x  LE 20x  Alt 20x Alt 20x Prone  UE 20x  LE 20x Prone  UE 20x  LE 20x    Dead bugs        Quadruped alt UE/LE        Pallof press with AH     Lifts/chops with AH  Double grn  10x10" ea  resume    Ther Ex        Nustep vs UBE L2 10' L3 10' L3 10' L3 10'    Prayer stretch 3 way 3-way  3x20"  Prayer vs ball 3-way  3x20"  Prayer vs ball 3-way  3x20"  Prayer vs ball 3-way  3x20"  Prayer vs ball    Standing vs sitting slump forward flexion stretch reviewed       Supine LTR with LE extended stretch 5 x 20" 5 x 30" 5 x 30" 5 x 30"    DKTC         Cross arm rhomboid stretch doorway                         Ther Activity                        Gait Training                        Modalities prn                       Consent signed for [employer] instrument #4 using to scan, fan, and sweep

## 2020-07-03 ENCOUNTER — APPOINTMENT (OUTPATIENT)
Dept: URGENT CARE | Facility: MEDICAL CENTER | Age: 35
End: 2020-07-03
Payer: OTHER MISCELLANEOUS

## 2020-07-03 PROCEDURE — 99213 OFFICE O/P EST LOW 20 MIN: CPT | Performed by: PREVENTIVE MEDICINE

## 2020-07-06 ENCOUNTER — APPOINTMENT (OUTPATIENT)
Dept: PHYSICAL THERAPY | Facility: CLINIC | Age: 35
End: 2020-07-06
Payer: OTHER MISCELLANEOUS

## 2020-07-08 PROCEDURE — U0003 INFECTIOUS AGENT DETECTION BY NUCLEIC ACID (DNA OR RNA); SEVERE ACUTE RESPIRATORY SYNDROME CORONAVIRUS 2 (SARS-COV-2) (CORONAVIRUS DISEASE [COVID-19]), AMPLIFIED PROBE TECHNIQUE, MAKING USE OF HIGH THROUGHPUT TECHNOLOGIES AS DESCRIBED BY CMS-2020-01-R: HCPCS | Performed by: FAMILY MEDICINE

## 2020-07-09 ENCOUNTER — LAB REQUISITION (OUTPATIENT)
Dept: LAB | Facility: HOSPITAL | Age: 35
End: 2020-07-09
Payer: COMMERCIAL

## 2020-07-09 DIAGNOSIS — Z11.59 ENCOUNTER FOR SCREENING FOR OTHER VIRAL DISEASES: ICD-10-CM

## 2020-07-15 LAB — SARS-COV-2 RNA SPEC QL NAA+PROBE: NOT DETECTED

## 2020-07-16 ENCOUNTER — APPOINTMENT (OUTPATIENT)
Dept: PHYSICAL THERAPY | Facility: CLINIC | Age: 35
End: 2020-07-16
Payer: OTHER MISCELLANEOUS

## 2020-08-05 ENCOUNTER — APPOINTMENT (OUTPATIENT)
Dept: LAB | Facility: HOSPITAL | Age: 35
End: 2020-08-05
Attending: SPECIALIST
Payer: COMMERCIAL

## 2020-08-05 ENCOUNTER — TRANSCRIBE ORDERS (OUTPATIENT)
Dept: LAB | Facility: HOSPITAL | Age: 35
End: 2020-08-05

## 2020-08-05 DIAGNOSIS — Z20.2 STD EXPOSURE: Primary | ICD-10-CM

## 2020-08-05 DIAGNOSIS — Z20.2 STD EXPOSURE: ICD-10-CM

## 2020-08-05 PROCEDURE — 86694 HERPES SIMPLEX NES ANTBDY: CPT

## 2020-08-05 PROCEDURE — 87389 HIV-1 AG W/HIV-1&-2 AB AG IA: CPT

## 2020-08-05 PROCEDURE — 36415 COLL VENOUS BLD VENIPUNCTURE: CPT

## 2020-08-05 PROCEDURE — 86695 HERPES SIMPLEX TYPE 1 TEST: CPT

## 2020-08-05 PROCEDURE — 86696 HERPES SIMPLEX TYPE 2 TEST: CPT

## 2020-08-07 LAB
HIV 1+2 AB+HIV1 P24 AG SERPL QL IA: NORMAL
HSV1 IGG SER IA-ACNC: 46.6 INDEX (ref 0–0.9)
HSV2 IGG SER IA-ACNC: <0.91 INDEX (ref 0–0.9)

## 2020-08-10 LAB — MISCELLANEOUS LAB TEST RESULT: NORMAL

## 2020-08-12 ENCOUNTER — TRANSCRIBE ORDERS (OUTPATIENT)
Dept: ADMINISTRATIVE | Facility: HOSPITAL | Age: 35
End: 2020-08-12

## 2020-08-12 DIAGNOSIS — R10.2 PELVIC PAIN: Primary | ICD-10-CM

## 2020-08-17 NOTE — PROGRESS NOTES
PT Evaluation  and PT Discharge    Today's date: 2020  Patient name: Aminata Payne  : 1985  MRN: [id number]  Referring provider: Jonh Preciado   Dx:   Encounter Diagnosis     ICD-10-CM    1  Acute left-sided thoracic back pain  M54 6    2  Strain of muscle and tendon of unspecified wall of thorax, subsequent encounter  [id number]        Start Time: 1300  Stop Time: 1400  Total time in clinic (min): 60 minutes    Assessment  Assessment details: Aminata Payne is a 30 y o  female presenting to outpatient physical therapy with diagnosis of Acute left-sided thoracic back pain  (primary encounter diagnosis)  She reports onset of L sided lower thoracic and lumbar pain after lifting injury at work 3/18/2020  Referred to OPPT by Fauzia Fritz MD  Patient's current impairments include pain and reported mm spasms thoracolumbar spine, impaired soft tissue mobility L > R thoracolumbar spine, reduced range of motion of thoracolumbar spine with pain/sx/tightness, reduced strength and DLS of deep spinal stabilizers and scapulothoracic region, reduced postural awareness, and reduced activity tolerance  Patient's present functional limitations include difficulty with ADLs with increased need for assistance, reliance on medication and/or modalities for pain relief, poor tolerance for functional mobility and activity, and difficulty completing work and home responsibilities  She is presently OOW  Patient to benefit from skilled outpatient physical therapy 2x/week for 6 weeks in order to reduce pain, maximize pain free range of motion, increase strength and stability, and improve functional mobility/functional activity in order to maximize return to prior level of function with reduced limitations  Thank you for your referral       *Update - Pt attended 10 total tx sessions since [facility] including initial evaluation  Pt was scheduled for a re-eval at her last vist, however she no showed her last 2 sessions   Pt has not returned to therapy since her last attended session on 7/1/2020  Unable to update pain, ROM, goals, etc as pt did not return to therapy  Pt will be d/c at this time   Impairments: abnormal muscle firing, abnormal or restricted ROM, abnormal movement, activity intolerance, lacks appropriate home exercise program, pain with function, poor posture  and poor body mechanics    Symptom irritability: moderate  Goals  STGs to be achieved in 4-6 weeks:    1  Pt will report reduced L thoracic/lumbar pain levels "at worst" by at least 2 points (0-10 scale) in order to allow improved tolerance for functional mobility and reduced reliance on medication or modalities for pain relief  2  Pt will demonstrate improved AROM of thoracolumbar lateral flexion to R and L rotation by at least 5-10* with reduced pain/sx in order to reduce pt difficulty with functional mobility and transfers  3  Pt will demonstrate improved proximal hip and core strength by at least 1/2-1 MMT in order to improve lumbo-pelvic stability to reduce pain and improve function  4  Pt will report overall improved tolerance for sustained mobility/activity by at least 25-50% since [facility] with overall reduced pain levels and reduced fear avoidance  LTGs to be achieved in 8-12 weeks:    1  Pt will be independent with HEP, demonstrating proper technique with exercises and understanding of self progression of program without need for cueing or assistance  2  Pt will report minimized pain levels with at least 75% reduction in pain since [facility]  3  Pt will demonstrate WFL AROM and strength of B hips and shoulders without pain/sx  4  Pt will demonstrate normalized gait and posture without deviations or need for assistive device or external support  5  Pt will report return to work without limitations  6  FOTO will reflect score at discharge that is greater than or equal to predicted level         *Update- unable to update goals as pt has not returned to therapy since last tx on 7/1  Subjective Evaluation    History of Present Illness  Mechanism of injury: Pt notes sx began March 18th; was working night shift as a CNA; 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; notes the pt became confused and her LEs buckled; attempted to maneuver pt over to the toilet to avoid a fall; notes it happened quickly; pt reported an immediate pain in her L upper back that radiated into her L side/rib cage region  Pt denies any n/t or sensory changes  Pt notes it "hurt to breathe"  F/u with urgent care the next day; xrays taken; pt notes she was told she has "bruised ribs"; Rx medication given  Was initially placed on light duty  Cont'd to f/u with urgent care for regular f/u  Sx persisted  F/u with [facility] doctor some time later for follow up  Pt notes she was then removed from work after some tome  Dx with "intercoastal mm spasms" per pt vs "bruised ribs"  Has been resting at home  Taking Ibuprofen; intermittently uses mm relaxers; notes medication does not seem to help sx to this point  No other tx or testing to date  Referred to OPPT at this time by [facility] MD per pt  RTD this afternoon; notes he "may try a different medication"  Pt notes present pain is intermittent  Remains located L side of her thoracolumbar spine; less radiating pain  No n/t or sensory changes  Feel her "mm spasms" in her L side have "gotten worse"; no relief with stretching, Massage, heat etc  Pain and spasms can wrap across her L/S  No UE/LE weakness  Pain is increased with lifting, carrying, pushing/pulling, and prolonged walking  Pain is reduced with Ibuprofen and rest and a hot bath  Notes her back is "stiff and tight" with limited ROM  Notes if she "stretches too far it really spasms"  Presently OOW  Pt's goals for OPPT are to "lessen the tightness and strengthen the mms in my back"  Reports chronic hx of upper back pain "near the bra line" however "not like this before"             Not a recurrent problem   Quality of life: good    Pain  Current pain ratin  At best pain ratin  At worst pain ratin  Location: L thoracic spine, L/S   Quality: cramping, tight, dull ache and sharp  Relieving factors: heat, rest and medications  Aggravating factors: lifting, walking and overhead activity  Progression: no change    Social Support  Steps to enter house: yes  2  Stairs in house: yes   Lives in: multiple-level home  Lives with: spouse ( and 3 kids )    Working: CNA presently [employer]  Hand dominance: right      Diagnostic Tests  X-ray: normal  Treatments  Previous treatment: medication  Current treatment: medication and physical therapy  Patient Goals  Patient goals for therapy: decreased pain, increased motion, increased strength, independence with ADLs/IADLs, return to sport/leisure activities and return to work          Objective     Concurrent Complaints  Negative for bladder dysfunction, bowel dysfunction and saddle (S4) numbness    Postural Observations  Seated posture: poor  Standing posture: fair  Correction of posture: has no consistent effect    Additional Postural Observation Details  Sitting: Forward head/rounded shoulders  Increased thoracic kyphosis   B scapular depression and protraction with mild winging   Increased PPT in sitting    Standing:  Mild increase in lumbar lordosis in standing  Mild increased thoracic kyphosis  Forward head     Will cont to assess          Palpation     Additional Palpation Details  Increased mm tension tone noted B distal thoracic and upper l/s PVMs - moderate ttp L side, minimal ttp R side  Increased mm tone in L QL region vs R - moderate ttp L side only  Notes chronic mild soreness with palpation over upper t-spine and scapulae B grossly  Moderate ttp L SIJ region  No other ttp noted  Will cont to assess    Tenderness   Cervical Spine   Tenderness in the spinous process  Left Hip   Tenderness in the PSIS       Additional Tenderness Details  Gross ttp t/o t-spine SPs and PVMs B- chronic per pt     Mild-moderate ttp L/S PVMs, Moderate ttp thoracolumbar region     Neurological Testing     Sensation   Cervical/Thoracic   Left   Intact: light touch    Right   Intact: light touch    Lumbar   Left   Intact: light touch    Right   Intact: light touch    Reflexes   Left   Patellar (L4): normal (2+)  Achilles (S1): normal (2+)  Clonus sign: negative    Right   Patellar (L4): normal (2+)  Achilles (S1): normal (2+)  Clonus sign: negative    Additional Neurological Details  Chronic n/t in medial distribution of L hand 2* CTS per pt; will cont to monitor     Active Range of Motion   Cervical/Thoracic Spine       Cervical    Flexion:  WFL  Extension:  WFL  Left lateral flexion:  WFL  Right lateral flexion:  WFL  Left rotation:  WFL  Right rotation:  [place]    Thoracic    Flexion:  WFL  Extension:  WFL  Left lateral flexion:  WFL  Right lateral flexion:  with pain Restriction level: minimal  Left rotation:  Restriction level: minimal  Right rotation:  [place]    Lumbar   Flexion: Active lumbar flexion: Pulling lower toracic region B   Restriction level: minimal  Extension: Active lumbar extension: Pain in thoracolumbar region into B L/S /tailbone centrally   with pain Restriction level: minimal  Left lateral flexion:  Restriction level: minimal  Right lateral flexion: Active right lumbar lateral flexion: Pulling pain L L/S /lower t-spine at end range    with pain Restriction level: moderate  Left rotation: Active left lumbar rotation: Pain L lower t-spine region   Restriction level: moderate  Right rotation:  Restriction level: minimal    Additional Active Range of Motion Details  Pain/pulling at end ranges of R thoracic/lumbar SB/L rotation in region of L lower t-spine and QL region   No sx/complaints with c-spine AROM    Will cont to assess    Joint Play   Joints within functional limits: T11, T12, L1, L2, L3 and L4     Hypomobile: T4, T5, T6, T7, T8, T9 and T10 Pain: T11 and T12     Strength/Myotome Testing     Left Hip   Planes of Motion   Flexion: 4+  Abduction: 5  Adduction: 5    Right Hip   Planes of Motion   Flexion: 5  Abduction: 5  Adduction: 5    Left Knee   Flexion: 5  Extension: 5    Right Knee   Flexion: 5  Extension: 5    Left Ankle/Foot   Dorsiflexion: 5  Plantar flexion: 5    Right Ankle/Foot   Dorsiflexion: 5  Plantar flexion: 5    Additional Strength Details  Min discomfort L L/S and L knee with resisted L hip flexion and knee extension  No other sx/complaints     Tests   Cervical   Negative vertical compression and lumbar distraction  Left   Negative Spurling's Test A  Right   Negative Spurling's Test A  Lumbar   Negative vertical compression

## 2020-09-08 ENCOUNTER — LAB REQUISITION (OUTPATIENT)
Dept: LAB | Facility: HOSPITAL | Age: 35
End: 2020-09-08

## 2020-09-08 DIAGNOSIS — Z11.59 ENCOUNTER FOR SCREENING FOR OTHER VIRAL DISEASES: ICD-10-CM

## 2020-09-08 PROCEDURE — U0003 INFECTIOUS AGENT DETECTION BY NUCLEIC ACID (DNA OR RNA); SEVERE ACUTE RESPIRATORY SYNDROME CORONAVIRUS 2 (SARS-COV-2) (CORONAVIRUS DISEASE [COVID-19]), AMPLIFIED PROBE TECHNIQUE, MAKING USE OF HIGH THROUGHPUT TECHNOLOGIES AS DESCRIBED BY CMS-2020-01-R: HCPCS | Performed by: PHYSICIAN ASSISTANT

## 2020-09-09 LAB — SARS-COV-2 RNA SPEC QL NAA+PROBE: NOT DETECTED

## 2020-09-14 DIAGNOSIS — I10 ESSENTIAL HYPERTENSION: ICD-10-CM

## 2020-09-14 DIAGNOSIS — I10 HTN (HYPERTENSION), MALIGNANT: ICD-10-CM

## 2020-09-14 RX ORDER — LOSARTAN POTASSIUM AND HYDROCHLOROTHIAZIDE 12.5; 5 MG/1; MG/1
1 TABLET ORAL DAILY
Qty: 30 TABLET | Refills: 5 | Status: SHIPPED | OUTPATIENT
Start: 2020-09-14 | End: 2021-07-09 | Stop reason: SDUPTHER

## 2020-09-14 RX ORDER — AMLODIPINE BESYLATE 5 MG/1
5 TABLET ORAL EVERY 12 HOURS
Qty: 60 TABLET | Refills: 5 | Status: SHIPPED | OUTPATIENT
Start: 2020-09-14 | End: 2020-10-02 | Stop reason: SDUPTHER

## 2020-09-14 NOTE — TELEPHONE ENCOUNTER
Pt needs rx for losartan-hydrochlorothiazide (HYZAAR) 50-12 5 mg per tablet, amLODIPine (NORVASC) 5 mg tablet, [facility], [facility]  Schedule an appt with Dr Ramin Samano on 10-2-20, first available

## 2020-10-02 ENCOUNTER — OFFICE VISIT (OUTPATIENT)
Dept: INTERNAL MEDICINE CLINIC | Facility: CLINIC | Age: 35
End: 2020-10-02
Payer: COMMERCIAL

## 2020-10-02 VITALS
HEIGHT: 66 IN | HEART RATE: 87 BPM | WEIGHT: 237.5 LBS | SYSTOLIC BLOOD PRESSURE: 150 MMHG | BODY MASS INDEX: 38.17 KG/M2 | TEMPERATURE: 99 F | DIASTOLIC BLOOD PRESSURE: 100 MMHG | OXYGEN SATURATION: 95 %

## 2020-10-02 DIAGNOSIS — I10 ESSENTIAL HYPERTENSION: Primary | ICD-10-CM

## 2020-10-02 DIAGNOSIS — I50.32 CHRONIC DIASTOLIC HEART FAILURE WITH PRESERVED EJECTION FRACTION (HCC): ICD-10-CM

## 2020-10-02 DIAGNOSIS — I10 HTN (HYPERTENSION), MALIGNANT: ICD-10-CM

## 2020-10-02 DIAGNOSIS — F41.1 GENERALIZED ANXIETY DISORDER: ICD-10-CM

## 2020-10-02 DIAGNOSIS — R80.9 PROTEINURIA, UNSPECIFIED TYPE: ICD-10-CM

## 2020-10-02 DIAGNOSIS — I50.9 OTHER CONGESTIVE HEART FAILURE (HCC): ICD-10-CM

## 2020-10-02 DIAGNOSIS — M54.50 ACUTE LOW BACK PAIN: ICD-10-CM

## 2020-10-02 DIAGNOSIS — M25.562 ACUTE PAIN OF LEFT KNEE: ICD-10-CM

## 2020-10-02 DIAGNOSIS — J30.1 SEASONAL ALLERGIC RHINITIS DUE TO POLLEN: ICD-10-CM

## 2020-10-02 DIAGNOSIS — M17.12 PRIMARY OSTEOARTHRITIS OF LEFT KNEE: ICD-10-CM

## 2020-10-02 DIAGNOSIS — E66.9 OBESITY (BMI 30-39.9): ICD-10-CM

## 2020-10-02 PROCEDURE — 99214 OFFICE O/P EST MOD 30 MIN: CPT | Performed by: INTERNAL MEDICINE

## 2020-10-02 RX ORDER — AMLODIPINE BESYLATE 10 MG/1
10 TABLET ORAL DAILY
Qty: 90 TABLET | Refills: 1 | Status: SHIPPED | OUTPATIENT
Start: 2020-10-02 | End: 2021-03-15 | Stop reason: SDUPTHER

## 2020-10-02 RX ORDER — LABETALOL 300 MG/1
300 TABLET, FILM COATED ORAL 3 TIMES DAILY
Qty: 90 TABLET | Refills: 0 | Status: SHIPPED | OUTPATIENT
Start: 2020-10-02 | End: 2020-11-14

## 2020-10-02 RX ORDER — LIDOCAINE 50 MG/G
2 PATCH TOPICAL DAILY
Qty: 30 PATCH | Refills: 0 | Status: SHIPPED | OUTPATIENT
Start: 2020-10-02 | End: 2020-11-14

## 2020-10-02 RX ORDER — LABETALOL 300 MG/1
300 TABLET, FILM COATED ORAL 2 TIMES DAILY
Qty: 30 TABLET | Refills: 5 | Status: SHIPPED | OUTPATIENT
Start: 2020-10-02 | End: 2021-07-09

## 2020-10-02 RX ORDER — LEVOCETIRIZINE DIHYDROCHLORIDE 5 MG/1
5 TABLET, FILM COATED ORAL EVERY EVENING
Qty: 30 TABLET | Refills: 0 | Status: SHIPPED | OUTPATIENT
Start: 2020-10-02 | End: 2021-09-14 | Stop reason: SDUPTHER

## 2020-10-05 ENCOUNTER — TELEPHONE (OUTPATIENT)
Dept: INTERNAL MEDICINE CLINIC | Facility: CLINIC | Age: 35
End: 2020-10-05

## 2020-10-05 DIAGNOSIS — I50.9 OTHER CONGESTIVE HEART FAILURE (HCC): Primary | ICD-10-CM

## 2020-10-05 DIAGNOSIS — I10 ESSENTIAL HYPERTENSION: ICD-10-CM

## 2020-10-06 ENCOUNTER — LAB REQUISITION (OUTPATIENT)
Dept: LAB | Facility: HOSPITAL | Age: 35
End: 2020-10-06

## 2020-10-06 DIAGNOSIS — Z11.59 ENCOUNTER FOR SCREENING FOR OTHER VIRAL DISEASES: ICD-10-CM

## 2020-10-06 PROCEDURE — U0003 INFECTIOUS AGENT DETECTION BY NUCLEIC ACID (DNA OR RNA); SEVERE ACUTE RESPIRATORY SYNDROME CORONAVIRUS 2 (SARS-COV-2) (CORONAVIRUS DISEASE [COVID-19]), AMPLIFIED PROBE TECHNIQUE, MAKING USE OF HIGH THROUGHPUT TECHNOLOGIES AS DESCRIBED BY CMS-2020-01-R: HCPCS | Performed by: INTERNAL MEDICINE

## 2020-10-07 LAB — SARS-COV-2 RNA SPEC QL NAA+PROBE: NOT DETECTED

## 2020-10-27 ENCOUNTER — LAB REQUISITION (OUTPATIENT)
Dept: LAB | Facility: HOSPITAL | Age: 35
End: 2020-10-27

## 2020-10-27 DIAGNOSIS — Z11.59 ENCOUNTER FOR SCREENING FOR OTHER VIRAL DISEASES: ICD-10-CM

## 2020-10-27 PROCEDURE — U0003 INFECTIOUS AGENT DETECTION BY NUCLEIC ACID (DNA OR RNA); SEVERE ACUTE RESPIRATORY SYNDROME CORONAVIRUS 2 (SARS-COV-2) (CORONAVIRUS DISEASE [COVID-19]), AMPLIFIED PROBE TECHNIQUE, MAKING USE OF HIGH THROUGHPUT TECHNOLOGIES AS DESCRIBED BY CMS-2020-01-R: HCPCS | Performed by: INTERNAL MEDICINE

## 2020-10-28 LAB — SARS-COV-2 RNA SPEC QL NAA+PROBE: NOT DETECTED

## 2020-11-03 ENCOUNTER — LAB REQUISITION (OUTPATIENT)
Dept: LAB | Facility: HOSPITAL | Age: 35
End: 2020-11-03

## 2020-11-03 DIAGNOSIS — Z11.59 ENCOUNTER FOR SCREENING FOR OTHER VIRAL DISEASES: ICD-10-CM

## 2020-11-03 LAB — SARS-COV-2 RNA RESP QL NAA+PROBE: NEGATIVE

## 2020-11-03 PROCEDURE — 87635 SARS-COV-2 COVID-19 AMP PRB: CPT | Performed by: INTERNAL MEDICINE

## 2020-11-09 ENCOUNTER — LAB REQUISITION (OUTPATIENT)
Dept: LAB | Facility: HOSPITAL | Age: 35
End: 2020-11-09

## 2020-11-09 DIAGNOSIS — Z11.59 ENCOUNTER FOR SCREENING FOR OTHER VIRAL DISEASES: ICD-10-CM

## 2020-11-09 LAB — SARS-COV-2 RNA RESP QL NAA+PROBE: NEGATIVE

## 2020-11-09 PROCEDURE — 87635 SARS-COV-2 COVID-19 AMP PRB: CPT | Performed by: INTERNAL MEDICINE

## 2020-11-14 DIAGNOSIS — I10 HTN (HYPERTENSION), MALIGNANT: ICD-10-CM

## 2020-11-14 DIAGNOSIS — M25.562 ACUTE PAIN OF LEFT KNEE: ICD-10-CM

## 2020-11-14 RX ORDER — LIDOCAINE 50 MG/G
2 PATCH TOPICAL DAILY
Qty: 30 PATCH | Refills: 0 | Status: SHIPPED | OUTPATIENT
Start: 2020-11-14 | End: 2021-01-28

## 2020-11-14 RX ORDER — LABETALOL 300 MG/1
300 TABLET, FILM COATED ORAL 3 TIMES DAILY
Qty: 90 TABLET | Refills: 0 | Status: SHIPPED | OUTPATIENT
Start: 2020-11-14 | End: 2021-07-09

## 2020-11-16 ENCOUNTER — LAB REQUISITION (OUTPATIENT)
Dept: LAB | Facility: HOSPITAL | Age: 35
End: 2020-11-16

## 2020-11-16 DIAGNOSIS — Z11.59 ENCOUNTER FOR SCREENING FOR OTHER VIRAL DISEASES: ICD-10-CM

## 2020-11-16 PROCEDURE — 87635 SARS-COV-2 COVID-19 AMP PRB: CPT | Performed by: INTERNAL MEDICINE

## 2020-11-17 LAB — SARS-COV-2 RNA RESP QL NAA+PROBE: NEGATIVE

## 2020-11-19 ENCOUNTER — TELEPHONE (OUTPATIENT)
Dept: INTERNAL MEDICINE CLINIC | Facility: CLINIC | Age: 35
End: 2020-11-19

## 2020-11-23 ENCOUNTER — LAB REQUISITION (OUTPATIENT)
Dept: LAB | Facility: HOSPITAL | Age: 35
End: 2020-11-23

## 2020-11-23 DIAGNOSIS — Z11.59 ENCOUNTER FOR SCREENING FOR OTHER VIRAL DISEASES: ICD-10-CM

## 2020-11-23 LAB — SARS-COV-2 RNA RESP QL NAA+PROBE: NEGATIVE

## 2020-11-23 PROCEDURE — 87635 SARS-COV-2 COVID-19 AMP PRB: CPT | Performed by: INTERNAL MEDICINE

## 2020-11-27 ENCOUNTER — LAB REQUISITION (OUTPATIENT)
Dept: LAB | Facility: HOSPITAL | Age: 35
End: 2020-11-27

## 2020-11-27 DIAGNOSIS — Z11.59 ENCOUNTER FOR SCREENING FOR OTHER VIRAL DISEASES: ICD-10-CM

## 2020-11-27 LAB — SARS-COV-2 RNA RESP QL NAA+PROBE: NEGATIVE

## 2020-11-27 PROCEDURE — 87635 SARS-COV-2 COVID-19 AMP PRB: CPT | Performed by: INTERNAL MEDICINE

## 2020-12-01 ENCOUNTER — LAB REQUISITION (OUTPATIENT)
Dept: LAB | Facility: HOSPITAL | Age: 35
End: 2020-12-01

## 2020-12-01 DIAGNOSIS — Z11.59 ENCOUNTER FOR SCREENING FOR OTHER VIRAL DISEASES: ICD-10-CM

## 2020-12-01 PROCEDURE — U0003 INFECTIOUS AGENT DETECTION BY NUCLEIC ACID (DNA OR RNA); SEVERE ACUTE RESPIRATORY SYNDROME CORONAVIRUS 2 (SARS-COV-2) (CORONAVIRUS DISEASE [COVID-19]), AMPLIFIED PROBE TECHNIQUE, MAKING USE OF HIGH THROUGHPUT TECHNOLOGIES AS DESCRIBED BY CMS-2020-01-R: HCPCS | Performed by: INTERNAL MEDICINE

## 2020-12-03 LAB — SARS-COV-2 RNA SPEC QL NAA+PROBE: NOT DETECTED

## 2020-12-04 ENCOUNTER — LAB REQUISITION (OUTPATIENT)
Dept: LAB | Facility: HOSPITAL | Age: 35
End: 2020-12-04

## 2020-12-04 DIAGNOSIS — Z11.59 ENCOUNTER FOR SCREENING FOR OTHER VIRAL DISEASES: ICD-10-CM

## 2020-12-04 LAB — SARS-COV-2 RNA RESP QL NAA+PROBE: NEGATIVE

## 2020-12-04 PROCEDURE — 87635 SARS-COV-2 COVID-19 AMP PRB: CPT | Performed by: INTERNAL MEDICINE

## 2020-12-08 ENCOUNTER — LAB REQUISITION (OUTPATIENT)
Dept: LAB | Facility: HOSPITAL | Age: 35
End: 2020-12-08

## 2020-12-08 DIAGNOSIS — Z11.59 ENCOUNTER FOR SCREENING FOR OTHER VIRAL DISEASES: ICD-10-CM

## 2020-12-08 PROCEDURE — U0003 INFECTIOUS AGENT DETECTION BY NUCLEIC ACID (DNA OR RNA); SEVERE ACUTE RESPIRATORY SYNDROME CORONAVIRUS 2 (SARS-COV-2) (CORONAVIRUS DISEASE [COVID-19]), AMPLIFIED PROBE TECHNIQUE, MAKING USE OF HIGH THROUGHPUT TECHNOLOGIES AS DESCRIBED BY CMS-2020-01-R: HCPCS | Performed by: INTERNAL MEDICINE

## 2020-12-09 LAB — SARS-COV-2 RNA SPEC QL NAA+PROBE: NOT DETECTED

## 2020-12-11 ENCOUNTER — LAB REQUISITION (OUTPATIENT)
Dept: LAB | Facility: HOSPITAL | Age: 35
End: 2020-12-11

## 2020-12-11 DIAGNOSIS — Z11.59 ENCOUNTER FOR SCREENING FOR OTHER VIRAL DISEASES: ICD-10-CM

## 2020-12-11 PROCEDURE — U0003 INFECTIOUS AGENT DETECTION BY NUCLEIC ACID (DNA OR RNA); SEVERE ACUTE RESPIRATORY SYNDROME CORONAVIRUS 2 (SARS-COV-2) (CORONAVIRUS DISEASE [COVID-19]), AMPLIFIED PROBE TECHNIQUE, MAKING USE OF HIGH THROUGHPUT TECHNOLOGIES AS DESCRIBED BY CMS-2020-01-R: HCPCS | Performed by: INTERNAL MEDICINE

## 2020-12-12 LAB — SARS-COV-2 RNA SPEC QL NAA+PROBE: NOT DETECTED

## 2020-12-15 ENCOUNTER — LAB REQUISITION (OUTPATIENT)
Dept: LAB | Facility: HOSPITAL | Age: 35
End: 2020-12-15

## 2020-12-15 DIAGNOSIS — Z11.59 ENCOUNTER FOR SCREENING FOR OTHER VIRAL DISEASES: ICD-10-CM

## 2020-12-15 PROCEDURE — U0003 INFECTIOUS AGENT DETECTION BY NUCLEIC ACID (DNA OR RNA); SEVERE ACUTE RESPIRATORY SYNDROME CORONAVIRUS 2 (SARS-COV-2) (CORONAVIRUS DISEASE [COVID-19]), AMPLIFIED PROBE TECHNIQUE, MAKING USE OF HIGH THROUGHPUT TECHNOLOGIES AS DESCRIBED BY CMS-2020-01-R: HCPCS | Performed by: INTERNAL MEDICINE

## 2020-12-16 LAB — SARS-COV-2 RNA SPEC QL NAA+PROBE: NOT DETECTED

## 2020-12-18 ENCOUNTER — LAB REQUISITION (OUTPATIENT)
Dept: LAB | Facility: HOSPITAL | Age: 35
End: 2020-12-18

## 2020-12-18 DIAGNOSIS — Z11.59 ENCOUNTER FOR SCREENING FOR OTHER VIRAL DISEASES: ICD-10-CM

## 2020-12-18 PROCEDURE — U0003 INFECTIOUS AGENT DETECTION BY NUCLEIC ACID (DNA OR RNA); SEVERE ACUTE RESPIRATORY SYNDROME CORONAVIRUS 2 (SARS-COV-2) (CORONAVIRUS DISEASE [COVID-19]), AMPLIFIED PROBE TECHNIQUE, MAKING USE OF HIGH THROUGHPUT TECHNOLOGIES AS DESCRIBED BY CMS-2020-01-R: HCPCS | Performed by: INTERNAL MEDICINE

## 2020-12-19 LAB — SARS-COV-2 RNA SPEC QL NAA+PROBE: NOT DETECTED

## 2020-12-21 ENCOUNTER — LAB REQUISITION (OUTPATIENT)
Dept: LAB | Facility: HOSPITAL | Age: 35
End: 2020-12-21

## 2020-12-21 DIAGNOSIS — Z11.59 ENCOUNTER FOR SCREENING FOR OTHER VIRAL DISEASES: ICD-10-CM

## 2020-12-21 PROCEDURE — U0003 INFECTIOUS AGENT DETECTION BY NUCLEIC ACID (DNA OR RNA); SEVERE ACUTE RESPIRATORY SYNDROME CORONAVIRUS 2 (SARS-COV-2) (CORONAVIRUS DISEASE [COVID-19]), AMPLIFIED PROBE TECHNIQUE, MAKING USE OF HIGH THROUGHPUT TECHNOLOGIES AS DESCRIBED BY CMS-2020-01-R: HCPCS | Performed by: INTERNAL MEDICINE

## 2020-12-22 LAB — SARS-COV-2 RNA SPEC QL NAA+PROBE: NOT DETECTED

## 2020-12-24 ENCOUNTER — LAB REQUISITION (OUTPATIENT)
Dept: LAB | Facility: HOSPITAL | Age: 35
End: 2020-12-24

## 2020-12-24 DIAGNOSIS — Z11.59 ENCOUNTER FOR SCREENING FOR OTHER VIRAL DISEASES: ICD-10-CM

## 2020-12-24 PROCEDURE — U0003 INFECTIOUS AGENT DETECTION BY NUCLEIC ACID (DNA OR RNA); SEVERE ACUTE RESPIRATORY SYNDROME CORONAVIRUS 2 (SARS-COV-2) (CORONAVIRUS DISEASE [COVID-19]), AMPLIFIED PROBE TECHNIQUE, MAKING USE OF HIGH THROUGHPUT TECHNOLOGIES AS DESCRIBED BY CMS-2020-01-R: HCPCS | Performed by: INTERNAL MEDICINE

## 2020-12-25 LAB — SARS-COV-2 RNA SPEC QL NAA+PROBE: NOT DETECTED

## 2020-12-30 ENCOUNTER — LAB REQUISITION (OUTPATIENT)
Dept: LAB | Facility: HOSPITAL | Age: 35
End: 2020-12-30

## 2020-12-30 DIAGNOSIS — Z11.59 ENCOUNTER FOR SCREENING FOR OTHER VIRAL DISEASES: ICD-10-CM

## 2020-12-30 PROCEDURE — U0003 INFECTIOUS AGENT DETECTION BY NUCLEIC ACID (DNA OR RNA); SEVERE ACUTE RESPIRATORY SYNDROME CORONAVIRUS 2 (SARS-COV-2) (CORONAVIRUS DISEASE [COVID-19]), AMPLIFIED PROBE TECHNIQUE, MAKING USE OF HIGH THROUGHPUT TECHNOLOGIES AS DESCRIBED BY CMS-2020-01-R: HCPCS | Performed by: INTERNAL MEDICINE

## 2020-12-31 LAB — SARS-COV-2 RNA SPEC QL NAA+PROBE: NOT DETECTED

## 2021-01-04 ENCOUNTER — LAB REQUISITION (OUTPATIENT)
Dept: LAB | Facility: HOSPITAL | Age: 36
End: 2021-01-04

## 2021-01-04 DIAGNOSIS — Z11.59 ENCOUNTER FOR SCREENING FOR OTHER VIRAL DISEASES: ICD-10-CM

## 2021-01-04 PROCEDURE — U0003 INFECTIOUS AGENT DETECTION BY NUCLEIC ACID (DNA OR RNA); SEVERE ACUTE RESPIRATORY SYNDROME CORONAVIRUS 2 (SARS-COV-2) (CORONAVIRUS DISEASE [COVID-19]), AMPLIFIED PROBE TECHNIQUE, MAKING USE OF HIGH THROUGHPUT TECHNOLOGIES AS DESCRIBED BY CMS-2020-01-R: HCPCS | Performed by: INTERNAL MEDICINE

## 2021-01-05 LAB — SARS-COV-2 RNA SPEC QL NAA+PROBE: NOT DETECTED

## 2021-01-07 ENCOUNTER — LAB REQUISITION (OUTPATIENT)
Dept: LAB | Facility: HOSPITAL | Age: 36
End: 2021-01-07

## 2021-01-07 DIAGNOSIS — Z11.59 ENCOUNTER FOR SCREENING FOR OTHER VIRAL DISEASES: ICD-10-CM

## 2021-01-07 PROCEDURE — U0005 INFEC AGEN DETEC AMPLI PROBE: HCPCS | Performed by: INTERNAL MEDICINE

## 2021-01-07 PROCEDURE — U0003 INFECTIOUS AGENT DETECTION BY NUCLEIC ACID (DNA OR RNA); SEVERE ACUTE RESPIRATORY SYNDROME CORONAVIRUS 2 (SARS-COV-2) (CORONAVIRUS DISEASE [COVID-19]), AMPLIFIED PROBE TECHNIQUE, MAKING USE OF HIGH THROUGHPUT TECHNOLOGIES AS DESCRIBED BY CMS-2020-01-R: HCPCS | Performed by: INTERNAL MEDICINE

## 2021-01-08 LAB — SARS-COV-2 RNA SPEC QL NAA+PROBE: NOT DETECTED

## 2021-01-11 ENCOUNTER — TELEMEDICINE (OUTPATIENT)
Dept: INTERNAL MEDICINE CLINIC | Facility: CLINIC | Age: 36
End: 2021-01-11
Payer: COMMERCIAL

## 2021-01-11 VITALS — WEIGHT: 237 LBS | HEIGHT: 66 IN | BODY MASS INDEX: 38.09 KG/M2

## 2021-01-11 DIAGNOSIS — Z20.822 CLOSE EXPOSURE TO COVID-19 VIRUS: ICD-10-CM

## 2021-01-11 DIAGNOSIS — J06.9 VIRAL UPPER RESPIRATORY TRACT INFECTION: ICD-10-CM

## 2021-01-11 DIAGNOSIS — J06.9 VIRAL UPPER RESPIRATORY TRACT INFECTION: Primary | ICD-10-CM

## 2021-01-11 PROCEDURE — U0003 INFECTIOUS AGENT DETECTION BY NUCLEIC ACID (DNA OR RNA); SEVERE ACUTE RESPIRATORY SYNDROME CORONAVIRUS 2 (SARS-COV-2) (CORONAVIRUS DISEASE [COVID-19]), AMPLIFIED PROBE TECHNIQUE, MAKING USE OF HIGH THROUGHPUT TECHNOLOGIES AS DESCRIBED BY CMS-2020-01-R: HCPCS | Performed by: INTERNAL MEDICINE

## 2021-01-11 PROCEDURE — U0005 INFEC AGEN DETEC AMPLI PROBE: HCPCS | Performed by: INTERNAL MEDICINE

## 2021-01-11 PROCEDURE — 99214 OFFICE O/P EST MOD 30 MIN: CPT | Performed by: INTERNAL MEDICINE

## 2021-01-11 NOTE — PATIENT INSTRUCTIONS
COVID-19 (Coronavirus Disease 2019)   WHAT YOU NEED TO KNOW:   COVID-19 is the disease caused by the novel (new) coronavirus first discovered in December 2019  Coronaviruses generally cause upper respiratory (nose, throat, and lung) infections, such as a cold  The new virus can also cause serious lower respiratory conditions, such as pneumonia or acute respiratory distress syndrome (ARDS)  Anyone can develop serious problems from the new virus, but your risk is higher if you are 65 or older  A weak immune system, diabetes, or a heart or lung condition can also increase your risk  DISCHARGE INSTRUCTIONS:   If you think you or someone you know may be infected:  Do the following to protect others:  · If emergency care is needed,  tell the  about the possible infection, or call ahead and tell the emergency department  · Call a healthcare provider  for instructions if symptoms are mild  Anyone who may be infected should not  arrive without calling first  The provider will need to protect staff members and other patients  · The person who may be infected needs to wear a face covering  while getting medical care  This will help lower the risk of infecting others  Coverings are not used for anyone who is younger than 2 years, has breathing problems, or cannot remove it  The provider can give you instructions for anyone who cannot wear a covering  Call your local emergency number (911 in the [facility]) or go to the emergency department if:   · You have trouble breathing or shortness of breath at rest     · You have chest pain or pressure that lasts longer than 5 minutes  · You become confused or hard to wake  · Your lips or face are blue  · You have a fever of 104°F (40°C) or higher  Call your doctor if:   · You do not  have symptoms of COVID-19 but had close physical contact within 14 days with someone who tested positive  · You have questions or concerns about your condition or care      Medicines: You may need any of the following for mild symptoms:  · Decongestants  help reduce nasal congestion and help you breathe more easily  If you take decongestant pills, they may make you feel restless or cause problems with your sleep  Do not use decongestant sprays for more than a few days  · Cough suppressants  help reduce coughing  Ask your healthcare provider which type of cough medicine is best for you  · Acetaminophen  decreases pain and fever  It is available without a doctor's order  Ask how much to take and how often to take it  Follow directions  Read the labels of all other medicines you are using to see if they also contain acetaminophen, or ask your doctor or pharmacist  Acetaminophen can cause liver damage if not taken correctly  Do not use more than 4 grams (4,000 milligrams) total of acetaminophen in one day  · NSAIDs , such as ibuprofen, help decrease swelling, pain, and fever  NSAIDs can cause stomach bleeding or kidney problems in certain people  If you take blood thinner medicine, always ask your healthcare provider if NSAIDs are safe for you  Always read the medicine label and follow directions  · Take your medicine as directed  Contact your healthcare provider if you think your medicine is not helping or if you have side effects  Tell him or her if you are allergic to any medicine  Keep a list of the medicines, vitamins, and herbs you take  Include the amounts, and when and why you take them  Bring the list or the pill bottles to follow-up visits  Carry your medicine list with you in case of an emergency  How the 2019 coronavirus spreads: The virus spreads quickly and easily  You can become infected if you are in contact with a large amount of the virus, even for a short time  You can also become infected by being around a small amount of virus for a long time   The following are ways the virus is thought to spread, but more information may be coming:  · Droplets are the most common way all coronaviruses spread  The virus can travel in droplets that form when a person talks, coughs, or sneezes  Anyone who breathes in the droplets or gets them in his or her eyes can become infected with the virus  Close personal contact with an infected person is thought to be the main way the virus spreads  Close personal contact means you are within 6 feet (2 meters) of the person  · Person-to-person contact can spread the virus  For example, a person with the virus on his or her hands can spread it by shaking hands with someone  At this time, it does not appear that the virus can be passed to a baby during pregnancy or delivery  The baby can be infected after he or she is born through person-to-person contact  The virus also does not appear to spread in breast milk  If you are pregnant or breastfeeding, talk to your healthcare provider or obstetrician about any concerns you have  · The virus can stay on objects and surfaces  A person can get the virus on his or her hands by touching the object or surface  Infection happens if the person then touches his or her eyes or mouth with unwashed hands  It is not yet known how long the virus can stay on an object or surface  That is why it is important to clean all surfaces that are used regularly  · An infected animal may be able to infect a person who touches it  This may happen at live markets or on a farm  How everyone can lower the risk for COVID-19:  The best way to prevent infection is to avoid anyone who is infected, but this can be hard to do  An infected person can spread the virus before signs or symptoms begin, or even if signs or symptoms never develop  The following can help lower the risk for infection:      · Wash your hands often throughout the day  Use soap and water  Rub your soapy hands together, lacing your fingers  Wash the front and back of each hand, and in between your fingers   Use the fingers of one hand to scrub under the fingernails of the other hand  Wash for at least 20 seconds  Rinse with warm, running water for several seconds  Then dry your hands with a clean towel or paper towel  Use hand  that contains alcohol if soap and water are not available  Do not touch your eyes, nose, or mouth without washing your hands first  Teach children how to wash their hands and use hand   · Cover a sneeze or cough  This prevents droplets from traveling from you to others  Turn your face away and cover your mouth and nose with a tissue  Throw the tissue away  Use the bend of your arm if a tissue is not available  Then wash your hands well with soap and water or use hand   Turn and cover your face if you are around someone who is sneezing or coughing  Teach children how to cover a cough or sneeze  · Follow worldwide, national, and local social distancing guidelines  Social distancing means people avoid close physical contact so the virus cannot spread from one person to another  Keep at least 6 feet (2 meters) between you and others  Also keep this distance from anyone who comes to your home, such as someone making a delivery  · Make a habit of not touching your face  It is not known how long the virus can stay on objects and surfaces  If you get the virus on your hands, you can transfer it to your eyes, nose, or mouth and become infected  You can also transfer it to objects, surfaces, or people  Be aware of what you touch when you go out  Examples include handrails and elevator buttons  Try not to touch anything with bare hands unless it is necessary  Wash your hands before you leave your home and when you return  · Clean and disinfect high-touch surfaces and objects often  Use a disinfecting solution or wipes  You can make a solution by diluting 4 teaspoons of bleach in 1 quart (4 cups) of water   Clean and disinfect even if you think no one living in or coming to your home is infected with the virus  You can wipe items with a disinfecting cloth before you bring them into your home  Wash your hands after you handle anything you bring into your home  · Make your immune system as healthy as possible  A weakened immune system makes you more vulnerable to the new coronavirus  No COVID-19 vaccine is available yet  Vaccines such as the flu and pneumonia vaccines can help your immune system  Your healthcare provider can tell you which vaccines to get, and when to get them  Keep your immune system as strong as possible  Do not smoke  Eat healthy foods, exercise regularly, and try to manage stress  Go to bed and wake up at the same times each day  Social distancing guidelines:  National and local social distancing rules vary  Rules may change over time as restrictions are lifted  Restrictions may return if an outbreak happens where you live  It is important to know and follow all current social distancing rules in your area  The following are general guidelines:  · Limit trips out of your home  You may be able to have food, medicines, and other supplies delivered  If possible, have delivered items left at your door or other area  Try not to have someone hand you an item  You will be so close to the person that the virus can spread between you  · Do not have close physical contact with anyone who does not live in your home  Do not shake hands with, hug, or kiss a person as a greeting  Stand or walk as far from others as possible  If you must use public transportation (such as a bus or subway), try to sit or stand away from others  You can stay safely connected with others through phone calls, e-mail messages, social media websites, and video chats  Check in on anyone who may be having a hard time socially distancing, or who lives alone  Ask administrators at nursing homes or long-term care facilities how you can safely communicate with someone living there      · Wear a cloth face covering around others who do not live in your home  Face coverings help prevent the virus from spreading to others in droplets  You can use a clear face covering if someone needs to read your lips  This is a cloth covering that has plastic over the mouth area so your lips can be seen  Do not use coverings that have breathing valves or vents  The virus can travel out of the valve or vent and be spread to others  Do not take your covering off to talk, cough, or sneeze  Do not use coverings on children younger than 2 years or on anyone who has breathing problems or cannot remove it  · Only allow medical or other necessary professionals into your home  Wear your face covering, and remind professionals to wear a face covering  Remind them to wash their hands when they arrive and before they leave  Do not  let anyone who does not live in your home in, even if the person is not sick  A person can pass the virus to others before symptoms of COVID-19 begin  Some people never even develop symptoms  Children commonly have mild symptoms or no symptoms  It may be hard to tell a child not to hug or kiss you  Explain that this is how he or she can help you stay healthy  · Do not go to someone else's home unless it is necessary  Do not go over to visit, even if the person is lonely  Only go if you need to help him or her  Make sure you both wear face coverings while you are there  · Avoid large gatherings and crowds  Gatherings or crowds of 10 or more individuals can cause the virus to spread  Examples of gatherings include parties, sporting events, Jew services, and conferences  Crowds may form at beaches, [place], and tourist attractions  Protect yourself by staying away from large gatherings and crowds  · Ask your healthcare provider for other ways to have appointments  You may be able to have appointments without having to go into the provider's office  Some providers offer phone, video, or other types of appointments  You may also be able to get prescriptions for a few months of your medicines at a time  · Stay safe if you must go out to work  You may have a job that can only be done outside your home  Keep physical distance between you and other workers as much as possible  Follow your employer's rules so everyone stays safe  If you have COVID-19 and are recovering at home:  Healthcare providers will give you specific instructions to follow  The following are general guidelines to remind you how to keep others safe until you are well:  · Wash your hands often  Use soap and water as much as possible  You can use hand  that contains alcohol if soap and water are not available  Do not share towels with anyone  If you use paper towels, throw them away in a lined trash can kept in your room or area  Use a covered trash can, if possible  · Do not go out of your home unless it is necessary  You may have to go to your healthcare provider's office for check-ups or to get prescription refills  Do not arrive at the provider's office without an appointment  Providers have to make their offices safe for staff and other patients  · Do not have close physical contact with anyone unless it is necessary  Only have close physical contact with a person giving direct care, or a baby or child you must care for  Family members and friends should not visit you  If possible, stay in a separate area or room of your home if you live with others  No one should go into the area or room except to give you care  You can visit with others by phone, video chat, e-mail, or similar systems  It is important to stay connected with others in your life while you recover  · Wear a face covering while others are near you  This can help prevent droplets from spreading the virus when you talk, sneeze, or cough  Put the covering on before anyone comes into your room or area   Remind the person to cover his or her nose and mouth before going in to provide care for you  · Do not share items  Do not share dishes, towels, or other items with anyone  Items need to be washed after you use them  · Protect your baby  Wash your hands with soap and water often throughout the day  Wear a clean face covering while you breastfeed, or while you express or pump breast milk  If possible, ask someone who is well to care for your baby  You can put breast milk in bottles for the person to use, if needed  Talk to your healthcare provider if you have any questions or concerns about caring for or bonding with your baby  He or she will tell you when to bring your baby in for check-ups and vaccines  He or she will also tell you what to do if you think your baby was infected with the new virus  · Do not handle live animals  Until more is known, it is best not to touch, play with, or handle live animals  Some animals, including pets, have been infected with the new coronavirus  Do not handle or care for animals until you are well  Care includes feeding, petting, and cuddling your pet  Do not let your pet lick you or share your food  Ask someone who is not infected to take care of your pet, if possible  If you must care for a pet, wear a face covering  Wash your hands before and after you give care  · Follow directions from your healthcare provider for being around others after you recover  You will need to wait at least 10 days after symptoms first appeared  Then you will need to have no fever for 24 hours without fever medicine, and no other symptoms  A loss of taste or smell may continue for several months  It is considered okay to be around others if this is your only symptom  It is not known for sure if or for how long a recovered person can pass the virus to others  Your provider may give you instructions, such as continuing social distancing or wearing a face covering around others      How to take care of someone who has COVID-19:  If the person lives in another home, arrange for a time to give care  Remember to bring a few pairs of disposable gloves and a cloth face covering  The following are general guidelines to help you safely care for anyone who has COVID-19:  · Wash your hands often  Wash before and after you go into the person's home, area, or room  Throw paper towels away in a lined trash can that has a lid, if possible  · Do not allow others to go near the person  No one should come into the person's home unless it is necessary  If possible, the person should be in a separate area or room if he or she lives with others  Keep the room's door shut unless you need to go in or out  Have others call, video chat, or e-mail the person if he or she is feeling well enough  The person may feel lonely if he or she is kept separate for a long period of time  Safe communication can help him or her stay connected to family and friends  · Make sure the person's room has good air flow  You may be able to open the window if the weather allows  An air conditioner can also be turned on to help air move  · Contact the person before you go in to give care  Make sure the person is wearing a face covering  Remind him or her to wash his or her hands with soap and water  He or she can use hand  that contains alcohol if soap and water are not available  Put on a face covering before you go in to give care  · Wear gloves while you give care and clean  Clean items the person uses often  Clean countertops, cooking surfaces, and the fronts and insides of the microwave and refrigerator  Clean the shower, toilet, the area around the toilet, the sink, the area around the sink, and faucets  Gather used laundry or bedding  Wash and dry items on the warmest settings the fabric allows  Wash dishes and silverware in hot, soapy water or in a   · Anything you throw away needs to go into a lined trash can    When you need to empty the trash, close the open end of the lining and tie it closed  This helps prevent items the virus is on from spilling out of the trash  Remove your gloves and throw them away  Wash your hands  Follow up with your doctor as directed:  Write down your questions so you remember to ask them during your visits  For more information:   · Centers for Disease Control and Prevention  [address] , [address]  Phone: 7- 618 - 653-4619  Web Address: [employer] com br    © Copyright [address] [place] [address] Drive Information is for End User's use only and may not be sold, redistributed or otherwise used for commercial purposes  All illustrations and images included in CareNotes® are the copyrighted property of A D A M [employer] Inc  or [facility]   The above information is an  only  It is not intended as medical advice for individual conditions or treatments  Talk to your doctor, nurse or pharmacist before following any medical regimen to see if it is safe and effective for you

## 2021-01-11 NOTE — PROGRESS NOTES
COVID-19 Virtual Visit     Assessment/Plan:    Problem List Items Addressed This Visit     None      Visit Diagnoses     Viral upper respiratory tract infection    -  Primary    Relevant Orders    Novel Coronavirus (COVID-19), PCR [facility] - Collected at [facility] or [facility]    Close exposure to COVID-19 virus        Relevant Orders    Novel Coronavirus (COVID-19), PCR [facility]         Disposition:     I referred patient to one of our centralized sites for a COVID-19 swab  A/P: Day # 7 since onset of COVID s/s  Covid test will be ordered despite recent negative test and new exposure    Doing ok, but has fevers, cough, SOB, and nasal congestion  No conversational dyspnea    Start  isolation/quarantine, increase fluids, PRN motrin/tylenol, and breathing exercises  RTC two days  for f/u  I have spent 20 minutes directly with the patient  Greater than 50% of this time was spent in counseling/coordination of care regarding: diagnostic results, prognosis, risks and benefits of treatment options, instructions for management, patient and family education, importance of treatment compliance, risk factor reductions and impressions  Encounter provider Soheila Newberry DO    Provider located at [address] [place] [id number]    Recent Visits  No visits were found meeting these conditions  Showing recent visits within past 7 days and meeting all other requirements     Today's Visits  Date Type Provider Dept   01/11/21 Telemedicine Soheila Newberry DO  Toni Bruno today's visits and meeting all other requirements     Future Appointments  No visits were found meeting these conditions  Showing future appointments within next 150 days and meeting all other requirements      This virtual check-in was done via [employer] and patient was informed that this is a secure, HIPAA-compliant platform   She agrees to proceed  Patient agrees to participate in a virtual check in via telephone or video visit instead of presenting to the office to address urgent/immediate medical needs  Patient is aware this is a billable service  After connecting through [facility], the patient was identified by name and date of birth  Mk Yip was informed that this was a telemedicine visit and that the exam was being conducted confidentially over secure lines  My office door was closed  No one else was in the room  Mk Yip acknowledged consent and understanding of privacy and security of the telemedicine visit  I informed the patient that I have reviewed her record in Epic and presented the opportunity for her to ask any questions regarding the visit today  The patient agreed to participate  Subjective:   Mk Yip is a 30 y o  female who is concerned about COVID-19  Patient's symptoms include fever, fatigue, nasal congestion, rhinorrhea, sore throat, cough, shortness of breath, diarrhea and myalgias  Patient denies chills, anosmia, loss of taste, chest tightness, abdominal pain, nausea, vomiting and headaches       Date of symptom onset: 1/4/2021  Date of exposure: 1/10/2021    Exposure:   Contact with a person who is under investigation (PUI) for or who is positive for COVID-19 within the last 14 days?: Yes    Hospitalized recently for fever and/or lower respiratory symptoms?: No      Currently a healthcare worker that is involved in direct patient care?: Yes      Works in a special setting where the risk of COVID-19 transmission may be high? (this may include long-term care, correctional and prison facilities; homeless shelters; assisted-living facilities and group homes ): Yes      Resident in a special setting where the risk of COVID-19 transmission may be high? (this may include long-term care, correctional and prison facilities; homeless shelters; assisted-living facilities and group homes ): No      Lab Results   Component Value Date    SARSCOV2 Not Detected 2021     Past Medical History:   Diagnosis Date    CHF (congestive heart failure) (HCC)     Chronic back pain     Generalized anxiety disorder     Hypertension     Kidney stone     Obesity     Primary osteoarthritis of left knee 2020     Past Surgical History:   Procedure Laterality Date     SECTION      x 2    KNEE ARTHROSCOPY Left     acl/meniscus repair    AR CYSTO/URETERO W/LITHOTRIPSY &INDWELL STENT INSRT Right 2018    Procedure: CYSTOSCOPY URETEROSCOPY, RETROGRADE PYELOGRAM AND INSERTION STENT URETERAL, RIGHT STONE EXTRACTION;  Surgeon: Quynh Patel MD;  Location: [facility];  Service: Urology    AR CYSTOURETHROSCOPY,URETER CATHETER Right 1/15/2018    Procedure: CYSTOSCOPY RETROGRADE PYELOGRAM WITH INSERTION STENT URETERAL;  Surgeon: Kyaw Rubi MD;  Location: [facility];  Service: Urology    TUBAL LIGATION       Current Outpatient Medications   Medication Sig Dispense Refill    amLODIPine (NORVASC) 10 mg tablet Take 1 tablet (10 mg total) by mouth daily 90 tablet 1    labetalol (NORMODYNE) 300 mg tablet Take 1 tablet (300 mg total) by mouth 2 (two) times a day 30 tablet 5    lidocaine (LIDODERM) 5 % APPLY 2 PATCHES TOPICALLY DAILY FOR 15 DAYS REMOVE & DISCARD PATCH WITHIN 12 HOURS OR AS DIRECTED BY MD 30 patch 0    losartan-hydrochlorothiazide (HYZAAR) 50-12 5 mg per tablet Take 1 tablet by mouth daily 30 tablet 5    labetalol (NORMODYNE) 300 mg tablet TAKE 1 TABLET (300 MG TOTAL) BY MOUTH 3 (THREE) TIMES A DAY (Patient not taking: Reported on 2021) 90 tablet 0    levocetirizine (XYZAL) 5 MG tablet Take 1 tablet (5 mg total) by mouth every evening (Patient not taking: Reported on 2021) 30 tablet 0    methocarbamol (ROBAXIN) 750 mg tablet Take 1 tablet (750 mg total) by mouth every 6 (six) hours as needed for muscle spasms for up to 30 days (Patient not taking: Reported on 1/11/2021) 60 tablet 1    naproxen (NAPROSYN) 375 mg tablet Take 1 tablet (375 mg total) by mouth 2 (two) times a day with meals for 15 days (Patient not taking: Reported on 1/11/2021) 30 tablet 0    traZODone (DESYREL) 50 mg tablet Take 1 tablet (50 mg total) by mouth daily at bedtime as needed for sleep for up to 60 days (Patient not taking: Reported on 1/11/2021) 30 tablet 3     No current facility-administered medications for this visit  No Known Allergies    Review of Systems   Constitutional: Positive for activity change, fatigue and fever  Negative for chills and diaphoresis  HENT: Positive for congestion, postnasal drip, rhinorrhea, sore throat and voice change  Negative for sinus pressure and sinus pain  No loss of taste/smell  Respiratory: Positive for cough and shortness of breath  Negative for chest tightness and wheezing  Cardiovascular: Negative for chest pain, palpitations and leg swelling  Gastrointestinal: Positive for diarrhea  Negative for abdominal pain, constipation, nausea and vomiting  Genitourinary: Negative for difficulty urinating, dysuria and frequency  Musculoskeletal: Positive for myalgias  Negative for arthralgias and gait problem  Neurological: Negative for light-headedness and headaches  Psychiatric/Behavioral: Negative for confusion  The patient is not nervous/anxious  Objective:    Vitals:    01/11/21 1514   Weight: 108 kg (237 lb)   Height: 5' 6" (1 676 m)       Physical Exam  Vitals signs and nursing note reviewed  Constitutional:       General: She is not in acute distress  Appearance: Normal appearance  She is not ill-appearing  HENT:      Head: Normocephalic and atraumatic  Eyes:      Extraocular Movements: Extraocular movements intact  Conjunctiva/sclera: Conjunctivae normal       Pupils: Pupils are equal, round, and reactive to light  Pulmonary:      Effort: Pulmonary effort is normal  No respiratory distress  Neurological:      General: No focal deficit present  Mental Status: She is alert and oriented to person, place, and time  Mental status is at baseline  Psychiatric:         Mood and Affect: Mood normal          Behavior: Behavior normal          Thought Content: Thought content normal          Judgment: Judgment normal        VIRTUAL VISIT DISCLAIMER    Yuliaakash Marie acknowledges that she has consented to an online visit or consultation  She understands that the online visit is based solely on information provided by her, and that, in the absence of a face-to-face physical evaluation by the physician, the diagnosis she receives is both limited and provisional in terms of accuracy and completeness  This is not intended to replace a full medical face-to-face evaluation by the physician  Yuli Marie understands and accepts these terms

## 2021-01-12 ENCOUNTER — TELEMEDICINE (OUTPATIENT)
Dept: INTERNAL MEDICINE CLINIC | Facility: CLINIC | Age: 36
End: 2021-01-12
Payer: COMMERCIAL

## 2021-01-12 VITALS — OXYGEN SATURATION: 95 % | TEMPERATURE: 99.3 F | HEART RATE: 87 BPM

## 2021-01-12 DIAGNOSIS — J06.9 VIRAL UPPER RESPIRATORY TRACT INFECTION: Primary | ICD-10-CM

## 2021-01-12 DIAGNOSIS — Z20.822 CLOSE EXPOSURE TO COVID-19 VIRUS: ICD-10-CM

## 2021-01-12 PROCEDURE — 99214 OFFICE O/P EST MOD 30 MIN: CPT | Performed by: INTERNAL MEDICINE

## 2021-01-12 NOTE — PATIENT INSTRUCTIONS
COVID-19 (Coronavirus Disease 2019)   WHAT YOU NEED TO KNOW:   COVID-19 is the disease caused by the novel (new) coronavirus first discovered in December 2019  Coronaviruses generally cause upper respiratory (nose, throat, and lung) infections, such as a cold  The new virus can also cause serious lower respiratory conditions, such as pneumonia or acute respiratory distress syndrome (ARDS)  Anyone can develop serious problems from the new virus, but your risk is higher if you are 65 or older  A weak immune system, diabetes, or a heart or lung condition can also increase your risk  DISCHARGE INSTRUCTIONS:   If you think you or someone you know may be infected:  Do the following to protect others:  · If emergency care is needed,  tell the  about the possible infection, or call ahead and tell the emergency department  · Call a healthcare provider  for instructions if symptoms are mild  Anyone who may be infected should not  arrive without calling first  The provider will need to protect staff members and other patients  · The person who may be infected needs to wear a face covering  while getting medical care  This will help lower the risk of infecting others  Coverings are not used for anyone who is younger than 2 years, has breathing problems, or cannot remove it  The provider can give you instructions for anyone who cannot wear a covering  Call your local emergency number (911 in the [address] [facility]) or go to the emergency department if:   · You have trouble breathing or shortness of breath at rest     · You have chest pain or pressure that lasts longer than 5 minutes  · You become confused or hard to wake  · Your lips or face are blue  · You have a fever of 104°F (40°C) or higher  Call your doctor if:   · You do not  have symptoms of COVID-19 but had close physical contact within 14 days with someone who tested positive  · You have questions or concerns about your condition or care      Medicines: You may need any of the following for mild symptoms:  · Decongestants  help reduce nasal congestion and help you breathe more easily  If you take decongestant pills, they may make you feel restless or cause problems with your sleep  Do not use decongestant sprays for more than a few days  · Cough suppressants  help reduce coughing  Ask your healthcare provider which type of cough medicine is best for you  · Acetaminophen  decreases pain and fever  It is available without a doctor's order  Ask how much to take and how often to take it  Follow directions  Read the labels of all other medicines you are using to see if they also contain acetaminophen, or ask your doctor or pharmacist  Acetaminophen can cause liver damage if not taken correctly  Do not use more than 4 grams (4,000 milligrams) total of acetaminophen in one day  · NSAIDs , such as ibuprofen, help decrease swelling, pain, and fever  NSAIDs can cause stomach bleeding or kidney problems in certain people  If you take blood thinner medicine, always ask your healthcare provider if NSAIDs are safe for you  Always read the medicine label and follow directions  · Take your medicine as directed  Contact your healthcare provider if you think your medicine is not helping or if you have side effects  Tell him or her if you are allergic to any medicine  Keep a list of the medicines, vitamins, and herbs you take  Include the amounts, and when and why you take them  Bring the list or the pill bottles to follow-up visits  Carry your medicine list with you in case of an emergency  How the 2019 coronavirus spreads: The virus spreads quickly and easily  You can become infected if you are in contact with a large amount of the virus, even for a short time  You can also become infected by being around a small amount of virus for a long time   The following are ways the virus is thought to spread, but more information may be coming:  · Droplets are the most common way all coronaviruses spread  The virus can travel in droplets that form when a person talks, coughs, or sneezes  Anyone who breathes in the droplets or gets them in his or her eyes can become infected with the virus  Close personal contact with an infected person is thought to be the main way the virus spreads  Close personal contact means you are within 6 feet (2 meters) of the person  · Person-to-person contact can spread the virus  For example, a person with the virus on his or her hands can spread it by shaking hands with someone  At this time, it does not appear that the virus can be passed to a baby during pregnancy or delivery  The baby can be infected after he or she is born through person-to-person contact  The virus also does not appear to spread in breast milk  If you are pregnant or breastfeeding, talk to your healthcare provider or obstetrician about any concerns you have  · The virus can stay on objects and surfaces  A person can get the virus on his or her hands by touching the object or surface  Infection happens if the person then touches his or her eyes or mouth with unwashed hands  It is not yet known how long the virus can stay on an object or surface  That is why it is important to clean all surfaces that are used regularly  · An infected animal may be able to infect a person who touches it  This may happen at live markets or on a farm  How everyone can lower the risk for COVID-19:  The best way to prevent infection is to avoid anyone who is infected, but this can be hard to do  An infected person can spread the virus before signs or symptoms begin, or even if signs or symptoms never develop  The following can help lower the risk for infection:      · Wash your hands often throughout the day  Use soap and water  Rub your soapy hands together, lacing your fingers  Wash the front and back of each hand, and in between your fingers   Use the fingers of one hand to scrub under the fingernails of the other hand  Wash for at least 20 seconds  Rinse with warm, running water for several seconds  Then dry your hands with a clean towel or paper towel  Use hand  that contains alcohol if soap and water are not available  Do not touch your eyes, nose, or mouth without washing your hands first  Teach children how to wash their hands and use hand   · Cover a sneeze or cough  This prevents droplets from traveling from you to others  Turn your face away and cover your mouth and nose with a tissue  Throw the tissue away  Use the bend of your arm if a tissue is not available  Then wash your hands well with soap and water or use hand   Turn and cover your face if you are around someone who is sneezing or coughing  Teach children how to cover a cough or sneeze  · Follow worldwide, national, and local social distancing guidelines  Social distancing means people avoid close physical contact so the virus cannot spread from one person to another  Keep at least 6 feet (2 meters) between you and others  Also keep this distance from anyone who comes to your home, such as someone making a delivery  · Make a habit of not touching your face  It is not known how long the virus can stay on objects and surfaces  If you get the virus on your hands, you can transfer it to your eyes, nose, or mouth and become infected  You can also transfer it to objects, surfaces, or people  Be aware of what you touch when you go out  Examples include handrails and elevator buttons  Try not to touch anything with bare hands unless it is necessary  Wash your hands before you leave your home and when you return  · Clean and disinfect high-touch surfaces and objects often  Use a disinfecting solution or wipes  You can make a solution by diluting 4 teaspoons of bleach in 1 quart (4 cups) of water   Clean and disinfect even if you think no one living in or coming to your home is infected with the virus  You can wipe items with a disinfecting cloth before you bring them into your home  Wash your hands after you handle anything you bring into your home  · Make your immune system as healthy as possible  A weakened immune system makes you more vulnerable to the new coronavirus  No COVID-19 vaccine is available yet  Vaccines such as the flu and pneumonia vaccines can help your immune system  Your healthcare provider can tell you which vaccines to get, and when to get them  Keep your immune system as strong as possible  Do not smoke  Eat healthy foods, exercise regularly, and try to manage stress  Go to bed and wake up at the same times each day  Social distancing guidelines:  National and local social distancing rules vary  Rules may change over time as restrictions are lifted  Restrictions may return if an outbreak happens where you live  It is important to know and follow all current social distancing rules in your area  The following are general guidelines:  · Limit trips out of your home  You may be able to have food, medicines, and other supplies delivered  If possible, have delivered items left at your door or other area  Try not to have someone hand you an item  You will be so close to the person that the virus can spread between you  · Do not have close physical contact with anyone who does not live in your home  Do not shake hands with, hug, or kiss a person as a greeting  Stand or walk as far from others as possible  If you must use public transportation (such as a bus or subway), try to sit or stand away from others  You can stay safely connected with others through phone calls, e-mail messages, social media websites, and video chats  Check in on anyone who may be having a hard time socially distancing, or who lives alone  Ask administrators at nursing homes or long-term care facilities how you can safely communicate with someone living there      · Wear a cloth face covering around others who do not live in your home  Face coverings help prevent the virus from spreading to others in droplets  You can use a clear face covering if someone needs to read your lips  This is a cloth covering that has plastic over the mouth area so your lips can be seen  Do not use coverings that have breathing valves or vents  The virus can travel out of the valve or vent and be spread to others  Do not take your covering off to talk, cough, or sneeze  Do not use coverings on children younger than 2 years or on anyone who has breathing problems or cannot remove it  · Only allow medical or other necessary professionals into your home  Wear your face covering, and remind professionals to wear a face covering  Remind them to wash their hands when they arrive and before they leave  Do not  let anyone who does not live in your home in, even if the person is not sick  A person can pass the virus to others before symptoms of COVID-19 begin  Some people never even develop symptoms  Children commonly have mild symptoms or no symptoms  It may be hard to tell a child not to hug or kiss you  Explain that this is how he or she can help you stay healthy  · Do not go to someone else's home unless it is necessary  Do not go over to visit, even if the person is lonely  Only go if you need to help him or her  Make sure you both wear face coverings while you are there  · Avoid large gatherings and crowds  Gatherings or crowds of 10 or more individuals can cause the virus to spread  Examples of gatherings include parties, sporting events, Bahai services, and conferences  Crowds may form at beaches, [place], and tourist attractions  Protect yourself by staying away from large gatherings and crowds  · Ask your healthcare provider for other ways to have appointments  You may be able to have appointments without having to go into the provider's office  Some providers offer phone, video, or other types of appointments  You may also be able to get prescriptions for a few months of your medicines at a time  · Stay safe if you must go out to work  You may have a job that can only be done outside your home  Keep physical distance between you and other workers as much as possible  Follow your employer's rules so everyone stays safe  If you have COVID-19 and are recovering at home:  Healthcare providers will give you specific instructions to follow  The following are general guidelines to remind you how to keep others safe until you are well:  · Wash your hands often  Use soap and water as much as possible  You can use hand  that contains alcohol if soap and water are not available  Do not share towels with anyone  If you use paper towels, throw them away in a lined trash can kept in your room or area  Use a covered trash can, if possible  · Do not go out of your home unless it is necessary  You may have to go to your healthcare provider's office for check-ups or to get prescription refills  Do not arrive at the provider's office without an appointment  Providers have to make their offices safe for staff and other patients  · Do not have close physical contact with anyone unless it is necessary  Only have close physical contact with a person giving direct care, or a baby or child you must care for  Family members and friends should not visit you  If possible, stay in a separate area or room of your home if you live with others  No one should go into the area or room except to give you care  You can visit with others by phone, video chat, e-mail, or similar systems  It is important to stay connected with others in your life while you recover  · Wear a face covering while others are near you  This can help prevent droplets from spreading the virus when you talk, sneeze, or cough  Put the covering on before anyone comes into your room or area   Remind the person to cover his or her nose and mouth before going in to provide care for you  · Do not share items  Do not share dishes, towels, or other items with anyone  Items need to be washed after you use them  · Protect your baby  Wash your hands with soap and water often throughout the day  Wear a clean face covering while you breastfeed, or while you express or pump breast milk  If possible, ask someone who is well to care for your baby  You can put breast milk in bottles for the person to use, if needed  Talk to your healthcare provider if you have any questions or concerns about caring for or bonding with your baby  He or she will tell you when to bring your baby in for check-ups and vaccines  He or she will also tell you what to do if you think your baby was infected with the new virus  · Do not handle live animals  Until more is known, it is best not to touch, play with, or handle live animals  Some animals, including pets, have been infected with the new coronavirus  Do not handle or care for animals until you are well  Care includes feeding, petting, and cuddling your pet  Do not let your pet lick you or share your food  Ask someone who is not infected to take care of your pet, if possible  If you must care for a pet, wear a face covering  Wash your hands before and after you give care  · Follow directions from your healthcare provider for being around others after you recover  You will need to wait at least 10 days after symptoms first appeared  Then you will need to have no fever for 24 hours without fever medicine, and no other symptoms  A loss of taste or smell may continue for several months  It is considered okay to be around others if this is your only symptom  It is not known for sure if or for how long a recovered person can pass the virus to others  Your provider may give you instructions, such as continuing social distancing or wearing a face covering around others      How to take care of someone who has COVID-19:  If the person lives in another home, arrange for a time to give care  Remember to bring a few pairs of disposable gloves and a cloth face covering  The following are general guidelines to help you safely care for anyone who has COVID-19:  · Wash your hands often  Wash before and after you go into the person's home, area, or room  Throw paper towels away in a lined trash can that has a lid, if possible  · Do not allow others to go near the person  No one should come into the person's home unless it is necessary  If possible, the person should be in a separate area or room if he or she lives with others  Keep the room's door shut unless you need to go in or out  Have others call, video chat, or e-mail the person if he or she is feeling well enough  The person may feel lonely if he or she is kept separate for a long period of time  Safe communication can help him or her stay connected to family and friends  · Make sure the person's room has good air flow  You may be able to open the window if the weather allows  An air conditioner can also be turned on to help air move  · Contact the person before you go in to give care  Make sure the person is wearing a face covering  Remind him or her to wash his or her hands with soap and water  He or she can use hand  that contains alcohol if soap and water are not available  Put on a face covering before you go in to give care  · Wear gloves while you give care and clean  Clean items the person uses often  Clean countertops, cooking surfaces, and the fronts and insides of the microwave and refrigerator  Clean the shower, toilet, the area around the toilet, the sink, the area around the sink, and faucets  Gather used laundry or bedding  Wash and dry items on the warmest settings the fabric allows  Wash dishes and silverware in hot, soapy water or in a   · Anything you throw away needs to go into a lined trash can    When you need to empty the trash, close the open end of the lining and tie it closed  This helps prevent items the virus is on from spilling out of the trash  Remove your gloves and throw them away  Wash your hands  Follow up with your doctor as directed:  Write down your questions so you remember to ask them during your visits  For more information:   · Centers for Disease Control and Prevention  [address] , [address]  Phone: 4- 561 - [phone]  Web Address: [employer] com br    © Copyright [address] [place] [address] Drive Information is for End User's use only and may not be sold, redistributed or otherwise used for commercial purposes  All illustrations and images included in CareNotes® are the copyrighted property of A D A M [employer] Inc  or [facility]   The above information is an  only  It is not intended as medical advice for individual conditions or treatments  Talk to your doctor, nurse or pharmacist before following any medical regimen to see if it is safe and effective for you

## 2021-01-12 NOTE — PROGRESS NOTES
COVID-19 Virtual Visit     Assessment/Plan:    Problem List Items Addressed This Visit     None      Visit Diagnoses     Viral upper respiratory tract infection    -  Primary    Close exposure to COVID-19 virus             Disposition:     I recommended continued isolation until at least 24 hours have passed since recovery defined as resolution of fever without the use of fever-reducing medications AND improvement in COVID symptoms AND 10 days have passed since onset of symptoms (or 10 days have passed since date of first positive viral diagnostic test for asymptomatic patients)  A/P: Day # 8 since onset of COVID s/s  Covid test still pending  Doing about the same  No fevers, but cough with GALLARDO and nasal congestion  RA Sat was 95%    Continue isolation/quarantine, increase fluids, PRN motrin/tylenol, and breathing exercises  RTC one day for f/u  I have spent 15 minutes directly with the patient  Greater than 50% of this time was spent in counseling/coordination of care regarding: diagnostic results, prognosis, risks and benefits of treatment options, instructions for management, patient and family education, importance of treatment compliance, risk factor reductions and impressions  Encounter provider Jacquelene Brittle, DO    Provider located at [address] [place] [phone]    Recent Visits  Date Type Provider Dept   01/11/21 Telemedicine Jacquelene Brittle, DO [facility] recent visits within past 7 days and meeting all other requirements     Today's Visits  Date Type Provider Dept   01/12/21 Telemedicine Jacquelene Brittle, DO [facility] today's visits and meeting all other requirements     Future Appointments  No visits were found meeting these conditions     Showing future appointments within next 150 days and meeting all other requirements      This virtual check-in was done via [employer] and patient was informed that this is a secure, HIPAA-compliant platform  She agrees to proceed  Patient agrees to participate in a virtual check in via telephone or video visit instead of presenting to the office to address urgent/immediate medical needs  Patient is aware this is a billable service  [place] [facility] the patient was identified by name and date of birth  Mary Grace Arguelles was informed that this was a telemedicine visit and that the exam was being conducted confidentially over secure lines  My office door was closed  No one else was in the room  Mary Grace Arguelles acknowledged consent and understanding of privacy and security of the telemedicine visit  I informed the patient that I have reviewed her record in Epic and presented the opportunity for her to ask any questions regarding the visit today  The patient agreed to participate  Subjective:   Mary Grace Arguelles is a 30 y o  female who has been screened for COVID-19  Symptom change since last report: unchanged  Patient's symptoms include fatigue, nasal congestion, rhinorrhea, sore throat, cough, shortness of breath and myalgias  Patient denies fever, chills, anosmia, loss of taste, chest tightness, abdominal pain, nausea, vomiting, diarrhea and headaches  Azalia Crawley has been staying home and has isolated themselves in her home  She is taking care to not share personal items and is cleaning all surfaces that are touched often, like counters, tabletops, and doorknobs using household cleaning sprays or wipes  She is wearing a mask when she leaves her room       Date of symptom onset: 1/4/2021    Lab Results   Component Value Date    SARSCOV2 Not Detected 01/07/2021     Past Medical History:   Diagnosis Date    CHF (congestive heart failure) (HCC)     Chronic back pain     Generalized anxiety disorder     Hypertension     Kidney stone     Obesity     Primary osteoarthritis of left knee 2/11/2020     Past Surgical History:   Procedure Laterality Date     SECTION      x 2    KNEE ARTHROSCOPY Left     acl/meniscus repair    NV CYSTO/URETERO W/LITHOTRIPSY &INDWELL STENT INSRT Right 2018    Procedure: CYSTOSCOPY URETEROSCOPY, RETROGRADE PYELOGRAM AND INSERTION STENT URETERAL, RIGHT STONE EXTRACTION;  Surgeon: Sarthak Chapman MD;  [facility];  Service: Urology    NV CYSTOURETHROSCOPY,URETER CATHETER Right 1/15/2018    Procedure: CYSTOSCOPY RETROGRADE PYELOGRAM WITH INSERTION STENT URETERAL;  Surgeon: Debi Malone MD;  [facility];  Service: Urology    TUBAL LIGATION       Current Outpatient Medications   Medication Sig Dispense Refill    amLODIPine (NORVASC) 10 mg tablet Take 1 tablet (10 mg total) by mouth daily 90 tablet 1    labetalol (NORMODYNE) 300 mg tablet Take 1 tablet (300 mg total) by mouth 2 (two) times a day 30 tablet 5    lidocaine (LIDODERM) 5 % APPLY 2 PATCHES TOPICALLY DAILY FOR 15 DAYS REMOVE & DISCARD PATCH WITHIN 12 HOURS OR AS DIRECTED BY MD 30 patch 0    losartan-hydrochlorothiazide (HYZAAR) 50-12 5 mg per tablet Take 1 tablet by mouth daily 30 tablet 5    labetalol (NORMODYNE) 300 mg tablet TAKE 1 TABLET (300 MG TOTAL) BY MOUTH 3 (THREE) TIMES A DAY (Patient not taking: Reported on 2021) 90 tablet 0    levocetirizine (XYZAL) 5 MG tablet Take 1 tablet (5 mg total) by mouth every evening (Patient not taking: Reported on 2021) 30 tablet 0    methocarbamol (ROBAXIN) 750 mg tablet Take 1 tablet (750 mg total) by mouth every 6 (six) hours as needed for muscle spasms for up to 30 days (Patient not taking: Reported on 2021) 60 tablet 1    naproxen (NAPROSYN) 375 mg tablet Take 1 tablet (375 mg total) by mouth 2 (two) times a day with meals for 15 days (Patient not taking: Reported on 2021) 30 tablet 0    traZODone (DESYREL) 50 mg tablet Take 1 tablet (50 mg total) by mouth daily at bedtime as needed for sleep for up to 60 days (Patient not taking: Reported on 1/11/2021) 30 tablet 3     No current facility-administered medications for this visit  No Known Allergies    Review of Systems   Constitutional: Positive for activity change and fatigue  Negative for chills, diaphoresis and fever  HENT: Positive for congestion, postnasal drip, rhinorrhea and sore throat  Negative for sinus pressure and sinus pain  No loss of taste/smell  Respiratory: Positive for cough and shortness of breath  Negative for chest tightness and wheezing  Cardiovascular: Negative for chest pain, palpitations and leg swelling  Gastrointestinal: Negative for abdominal pain, constipation, diarrhea, nausea and vomiting  Genitourinary: Negative for difficulty urinating, dysuria and frequency  Musculoskeletal: Positive for myalgias  Negative for arthralgias and gait problem  Neurological: Negative for light-headedness and headaches  Psychiatric/Behavioral: Negative for confusion  The patient is not nervous/anxious  Objective:    Vitals:    01/12/21 1122   Pulse: 87   Temp: 99 3 °F (37 4 °C)   SpO2: 95%       Physical Exam  Vitals signs and nursing note reviewed  Constitutional:       General: She is not in acute distress  Appearance: Normal appearance  She is not ill-appearing  HENT:      Head: Normocephalic and atraumatic  Mouth/Throat:      Mouth: Mucous membranes are moist    Eyes:      Extraocular Movements: Extraocular movements intact  Conjunctiva/sclera: Conjunctivae normal       Pupils: Pupils are equal, round, and reactive to light  Pulmonary:      Effort: Pulmonary effort is normal  No respiratory distress  Neurological:      General: No focal deficit present  Mental Status: She is alert and oriented to person, place, and time  Mental status is at baseline  Psychiatric:         Mood and Affect: Mood normal          Behavior: Behavior normal          Thought Content:  Thought content normal          Judgment: Judgment normal  VIRTUAL VISIT DISCLAIMER    Shahab Dominguez acknowledges that she has consented to an online visit or consultation  She understands that the online visit is based solely on information provided by her, and that, in the absence of a face-to-face physical evaluation by the physician, the diagnosis she receives is both limited and provisional in terms of accuracy and completeness  This is not intended to replace a full medical face-to-face evaluation by the physician  Shahab Dominguez understands and accepts these terms

## 2021-01-13 ENCOUNTER — TELEMEDICINE (OUTPATIENT)
Dept: INTERNAL MEDICINE CLINIC | Facility: CLINIC | Age: 36
End: 2021-01-13
Payer: COMMERCIAL

## 2021-01-13 VITALS — TEMPERATURE: 98.9 F

## 2021-01-13 DIAGNOSIS — J06.9 VIRAL UPPER RESPIRATORY TRACT INFECTION: Primary | ICD-10-CM

## 2021-01-13 DIAGNOSIS — Z20.822 CLOSE EXPOSURE TO COVID-19 VIRUS: ICD-10-CM

## 2021-01-13 LAB — SARS-COV-2 RNA SPEC QL NAA+PROBE: NOT DETECTED

## 2021-01-13 PROCEDURE — 99214 OFFICE O/P EST MOD 30 MIN: CPT | Performed by: INTERNAL MEDICINE

## 2021-01-13 RX ORDER — FLUTICASONE PROPIONATE 50 MCG
1 SPRAY, SUSPENSION (ML) NASAL DAILY
Qty: 16 G | Refills: 0 | Status: SHIPPED | OUTPATIENT
Start: 2021-01-13 | End: 2021-10-06

## 2021-01-13 RX ORDER — AZITHROMYCIN 250 MG/1
TABLET, FILM COATED ORAL
Qty: 6 TABLET | Refills: 0 | Status: SHIPPED | OUTPATIENT
Start: 2021-01-13 | End: 2021-01-18

## 2021-01-13 RX ORDER — GUAIFENESIN 600 MG
600 TABLET, EXTENDED RELEASE 12 HR ORAL EVERY 12 HOURS SCHEDULED
Qty: 20 TABLET | Refills: 0 | Status: SHIPPED | OUTPATIENT
Start: 2021-01-13 | End: 2021-01-27

## 2021-01-13 NOTE — PROGRESS NOTES
COVID-19 Virtual Visit     Assessment/Plan:    Problem List Items Addressed This Visit     None      Visit Diagnoses     Viral upper respiratory tract infection    -  Primary    Relevant Medications    azithromycin (ZITHROMAX) 250 mg tablet    guaiFENesin (Mucinex) 600 mg 12 hr tablet    fluticasone (FLONASE) 50 mcg/act nasal spray    Close exposure to COVID-19 virus        Relevant Medications    azithromycin (ZITHROMAX) 250 mg tablet    guaiFENesin (Mucinex) 600 mg 12 hr tablet    fluticasone (FLONASE) 50 mcg/act nasal spray         Disposition:     I recommended continued isolation until at least 24 hours have passed since recovery defined as resolution of fever without the use of fever-reducing medications AND improvement in COVID symptoms AND 10 days have passed since onset of symptoms (or 10 days have passed since date of first positive viral diagnostic test for asymptomatic patients)  A/P: Day # 9 since onset of COVID s/s  Covid test was negative  Doing ok and still with fatigue and nasal congestion with productive green cough  Some GALLARDO    Can lift the quarantine, but still needs to mask and distance herself  Will start empiric abx, INS, and mucinex  Continue increase fluids, PRN motrin/tylenol, and breathing exercises  RTC as scheduled for f/u  I have spent 15 minutes directly with the patient  Greater than 50% of this time was spent in counseling/coordination of care regarding: diagnostic results, prognosis, risks and benefits of treatment options, instructions for management, patient and family education, importance of treatment compliance, risk factor reductions and impressions          Encounter provider Yariel Love,     Provider located at [address] [place] [phone]    Recent Visits  Date Type Provider Dept   01/12/21 [facility], DO [facility]   [facility]   Showing recent visits within past 7 days and meeting all other requirements     Today's Visits  Date Type Provider Dept   01/13/21 Telemedicine DO [facility] today's visits and meeting all other requirements     Future Appointments  No visits were found meeting these conditions  Showing future appointments within next 150 days and meeting all other requirements      This virtual check-in was done via [employer] and patient was informed that this is a secure, HIPAA-compliant platform  She agrees to proceed  Patient agrees to participate in a virtual check in via telephone or video visit instead of presenting to the office to address urgent/immediate medical needs  Patient is aware this is a billable service  After connecting through [facility], the patient was identified by name and date of birth  Eli Norwood was informed that this was a telemedicine visit and that the exam was being conducted confidentially over secure lines  My office door was closed  No one else was in the room  Eli Norwood acknowledged consent and understanding of privacy and security of the telemedicine visit  I informed the patient that I have reviewed her record in Epic and presented the opportunity for her to ask any questions regarding the visit today  The patient agreed to participate  Subjective:   Eli Norwood is a 30 y o  female who has been screened for COVID-19  Symptom change since last report: unchanged  Patient's symptoms include fatigue, nasal congestion, rhinorrhea, sore throat, cough and shortness of breath  Patient denies fever, chills, anosmia, loss of taste, chest tightness, abdominal pain, nausea, vomiting, diarrhea, myalgias and headaches  Aleja Dia has been staying home and has isolated themselves in her home   She is taking care to not share personal items and is cleaning all surfaces that are touched often, like counters, tabletops, and doorknobs using household cleaning sprays or wipes  She is wearing a mask when she leaves her room       Date of symptom onset: 2021    Lab Results   Component Value Date    SARSCOV2 Not Detected 2021     Past Medical History:   Diagnosis Date    CHF (congestive heart failure) (HCC)     Chronic back pain     Generalized anxiety disorder     Hypertension     Kidney stone     Obesity     Primary osteoarthritis of left knee 2020     Past Surgical History:   Procedure Laterality Date     SECTION      x 2    KNEE ARTHROSCOPY Left     acl/meniscus repair    [place] CYSTO/URETERO W/LITHOTRIPSY &INDWELL STENT INSRT Right 2018    Procedure: CYSTOSCOPY URETEROSCOPY, RETROGRADE PYELOGRAM AND INSERTION STENT URETERAL, RIGHT STONE EXTRACTION;  Surgeon: Bg Blanton MD;  [facility];  Service: Urology    MS CYSTOURETHROSCOPY,URETER CATHETER Right 1/15/2018    Procedure: CYSTOSCOPY RETROGRADE PYELOGRAM WITH INSERTION STENT URETERAL;  Surgeon: Pan Solorzano MD;  [facility];  Service: Urology    TUBAL LIGATION       Current Outpatient Medications   Medication Sig Dispense Refill    amLODIPine (NORVASC) 10 mg tablet Take 1 tablet (10 mg total) by mouth daily 90 tablet 1    labetalol (NORMODYNE) 300 mg tablet Take 1 tablet (300 mg total) by mouth 2 (two) times a day 30 tablet 5    lidocaine (LIDODERM) 5 % APPLY 2 PATCHES TOPICALLY DAILY FOR 15 DAYS REMOVE & DISCARD PATCH WITHIN 12 HOURS OR AS DIRECTED BY MD 30 patch 0    losartan-hydrochlorothiazide (HYZAAR) 50-12 5 mg per tablet Take 1 tablet by mouth daily 30 tablet 5    azithromycin (ZITHROMAX) 250 mg tablet Take 2 tablets today then 1 tablet daily x 4 days 6 tablet 0    fluticasone (FLONASE) 50 mcg/act nasal spray 1 spray into each nostril daily 16 g 0    guaiFENesin (Mucinex) 600 mg 12 hr tablet Take 1 tablet (600 mg total) by mouth every 12 (twelve) hours 20 tablet 0    labetalol (NORMODYNE) 300 mg tablet TAKE 1 TABLET (300 MG TOTAL) BY MOUTH 3 (THREE) TIMES A DAY (Patient not taking: Reported on 1/11/2021) 90 tablet 0    levocetirizine (XYZAL) 5 MG tablet Take 1 tablet (5 mg total) by mouth every evening (Patient not taking: Reported on 1/11/2021) 30 tablet 0    methocarbamol (ROBAXIN) 750 mg tablet Take 1 tablet (750 mg total) by mouth every 6 (six) hours as needed for muscle spasms for up to 30 days (Patient not taking: Reported on 1/11/2021) 60 tablet 1    naproxen (NAPROSYN) 375 mg tablet Take 1 tablet (375 mg total) by mouth 2 (two) times a day with meals for 15 days (Patient not taking: Reported on 1/11/2021) 30 tablet 0    traZODone (DESYREL) 50 mg tablet Take 1 tablet (50 mg total) by mouth daily at bedtime as needed for sleep for up to 60 days (Patient not taking: Reported on 1/11/2021) 30 tablet 3     No current facility-administered medications for this visit  No Known Allergies    Review of Systems   Constitutional: Positive for fatigue  Negative for activity change, chills, diaphoresis and fever  HENT: Positive for congestion, postnasal drip, rhinorrhea and sore throat  Negative for sinus pressure and sinus pain  No loss of taste/smell  Respiratory: Positive for cough and shortness of breath  Negative for chest tightness and wheezing  Cardiovascular: Negative for chest pain, palpitations and leg swelling  Gastrointestinal: Negative for abdominal pain, constipation, diarrhea, nausea and vomiting  Genitourinary: Negative for difficulty urinating, dysuria and frequency  Musculoskeletal: Negative for arthralgias, gait problem and myalgias  Neurological: Negative for light-headedness and headaches  Psychiatric/Behavioral: Negative for confusion  The patient is not nervous/anxious  Objective:    Vitals:    01/13/21 1107   Temp: 98 9 °F (37 2 °C)       Physical Exam  Vitals signs and nursing note reviewed  Constitutional:       General: She is not in acute distress       Appearance: Normal appearance  She is not ill-appearing  HENT:      Head: Normocephalic and atraumatic  Eyes:      Extraocular Movements: Extraocular movements intact  Conjunctiva/sclera: Conjunctivae normal       Pupils: Pupils are equal, round, and reactive to light  Cardiovascular:      Heart sounds: Normal heart sounds  Pulmonary:      Effort: Pulmonary effort is normal  No respiratory distress  Neurological:      General: No focal deficit present  Mental Status: She is alert and oriented to person, place, and time  Mental status is at baseline  Psychiatric:         Mood and Affect: Mood normal          Behavior: Behavior normal          Thought Content: Thought content normal          Judgment: Judgment normal        VIRTUAL VISIT DISCLAIMER    Brant Merrill acknowledges that she has consented to an online visit or consultation  She understands that the online visit is based solely on information provided by her, and that, in the absence of a face-to-face physical evaluation by the physician, the diagnosis she receives is both limited and provisional in terms of accuracy and completeness  This is not intended to replace a full medical face-to-face evaluation by the physician  Guoens Merrill understands and accepts these terms

## 2021-01-13 NOTE — PATIENT INSTRUCTIONS
COVID-19 (Coronavirus Disease 2019)   WHAT YOU NEED TO KNOW:   COVID-19 is the disease caused by the novel (new) coronavirus first discovered in December 2019  Coronaviruses generally cause upper respiratory (nose, throat, and lung) infections, such as a cold  The new virus can also cause serious lower respiratory conditions, such as pneumonia or acute respiratory distress syndrome (ARDS)  Anyone can develop serious problems from the new virus, but your risk is higher if you are 65 or older  A weak immune system, diabetes, or a heart or lung condition can also increase your risk  DISCHARGE INSTRUCTIONS:   If you think you or someone you know may be infected:  Do the following to protect others:  · If emergency care is needed,  tell the  about the possible infection, or call ahead and tell the emergency department  · Call a healthcare provider  for instructions if symptoms are mild  Anyone who may be infected should not  arrive without calling first  The provider will need to protect staff members and other patients  · The person who may be infected needs to wear a face covering  while getting medical care  This will help lower the risk of infecting others  Coverings are not used for anyone who is younger than 2 years, has breathing problems, or cannot remove it  The provider can give you instructions for anyone who cannot wear a covering  Call your local emergency number (911 in the [address] [facility]) or go to the emergency department if:   · You have trouble breathing or shortness of breath at rest     · You have chest pain or pressure that lasts longer than 5 minutes  · You become confused or hard to wake  · Your lips or face are blue  · You have a fever of 104°F (40°C) or higher  Call your doctor if:   · You do not  have symptoms of COVID-19 but had close physical contact within 14 days with someone who tested positive  · You have questions or concerns about your condition or care      Medicines: You may need any of the following for mild symptoms:  · Decongestants  help reduce nasal congestion and help you breathe more easily  If you take decongestant pills, they may make you feel restless or cause problems with your sleep  Do not use decongestant sprays for more than a few days  · Cough suppressants  help reduce coughing  Ask your healthcare provider which type of cough medicine is best for you  · Acetaminophen  decreases pain and fever  It is available without a doctor's order  Ask how much to take and how often to take it  Follow directions  Read the labels of all other medicines you are using to see if they also contain acetaminophen, or ask your doctor or pharmacist  Acetaminophen can cause liver damage if not taken correctly  Do not use more than 4 grams (4,000 milligrams) total of acetaminophen in one day  · NSAIDs , such as ibuprofen, help decrease swelling, pain, and fever  NSAIDs can cause stomach bleeding or kidney problems in certain people  If you take blood thinner medicine, always ask your healthcare provider if NSAIDs are safe for you  Always read the medicine label and follow directions  · Take your medicine as directed  Contact your healthcare provider if you think your medicine is not helping or if you have side effects  Tell him or her if you are allergic to any medicine  Keep a list of the medicines, vitamins, and herbs you take  Include the amounts, and when and why you take them  Bring the list or the pill bottles to follow-up visits  Carry your medicine list with you in case of an emergency  How the 2019 coronavirus spreads: The virus spreads quickly and easily  You can become infected if you are in contact with a large amount of the virus, even for a short time  You can also become infected by being around a small amount of virus for a long time   The following are ways the virus is thought to spread, but more information may be coming:  · Droplets are the most common way all coronaviruses spread  The virus can travel in droplets that form when a person talks, coughs, or sneezes  Anyone who breathes in the droplets or gets them in his or her eyes can become infected with the virus  Close personal contact with an infected person is thought to be the main way the virus spreads  Close personal contact means you are within 6 feet (2 meters) of the person  · Person-to-person contact can spread the virus  For example, a person with the virus on his or her hands can spread it by shaking hands with someone  At this time, it does not appear that the virus can be passed to a baby during pregnancy or delivery  The baby can be infected after he or she is born through person-to-person contact  The virus also does not appear to spread in breast milk  If you are pregnant or breastfeeding, talk to your healthcare provider or obstetrician about any concerns you have  · The virus can stay on objects and surfaces  A person can get the virus on his or her hands by touching the object or surface  Infection happens if the person then touches his or her eyes or mouth with unwashed hands  It is not yet known how long the virus can stay on an object or surface  That is why it is important to clean all surfaces that are used regularly  · An infected animal may be able to infect a person who touches it  This may happen at live markets or on a farm  How everyone can lower the risk for COVID-19:  The best way to prevent infection is to avoid anyone who is infected, but this can be hard to do  An infected person can spread the virus before signs or symptoms begin, or even if signs or symptoms never develop  The following can help lower the risk for infection:      · Wash your hands often throughout the day  Use soap and water  Rub your soapy hands together, lacing your fingers  Wash the front and back of each hand, and in between your fingers   Use the fingers of one hand to scrub under the fingernails of the other hand  Wash for at least 20 seconds  Rinse with warm, running water for several seconds  Then dry your hands with a clean towel or paper towel  Use hand  that contains alcohol if soap and water are not available  Do not touch your eyes, nose, or mouth without washing your hands first  Teach children how to wash their hands and use hand   · Cover a sneeze or cough  This prevents droplets from traveling from you to others  Turn your face away and cover your mouth and nose with a tissue  Throw the tissue away  Use the bend of your arm if a tissue is not available  Then wash your hands well with soap and water or use hand   Turn and cover your face if you are around someone who is sneezing or coughing  Teach children how to cover a cough or sneeze  · Follow worldwide, national, and local social distancing guidelines  Social distancing means people avoid close physical contact so the virus cannot spread from one person to another  Keep at least 6 feet (2 meters) between you and others  Also keep this distance from anyone who comes to your home, such as someone making a delivery  · Make a habit of not touching your face  It is not known how long the virus can stay on objects and surfaces  If you get the virus on your hands, you can transfer it to your eyes, nose, or mouth and become infected  You can also transfer it to objects, surfaces, or people  Be aware of what you touch when you go out  Examples include handrails and elevator buttons  Try not to touch anything with bare hands unless it is necessary  Wash your hands before you leave your home and when you return  · Clean and disinfect high-touch surfaces and objects often  Use a disinfecting solution or wipes  You can make a solution by diluting 4 teaspoons of bleach in 1 quart (4 cups) of water   Clean and disinfect even if you think no one living in or coming to your home is infected with the virus  You can wipe items with a disinfecting cloth before you bring them into your home  Wash your hands after you handle anything you bring into your home  · Make your immune system as healthy as possible  A weakened immune system makes you more vulnerable to the new coronavirus  No COVID-19 vaccine is available yet  Vaccines such as the flu and pneumonia vaccines can help your immune system  Your healthcare provider can tell you which vaccines to get, and when to get them  Keep your immune system as strong as possible  Do not smoke  Eat healthy foods, exercise regularly, and try to manage stress  Go to bed and wake up at the same times each day  Social distancing guidelines:  National and local social distancing rules vary  Rules may change over time as restrictions are lifted  Restrictions may return if an outbreak happens where you live  It is important to know and follow all current social distancing rules in your area  The following are general guidelines:  · Limit trips out of your home  You may be able to have food, medicines, and other supplies delivered  If possible, have delivered items left at your door or other area  Try not to have someone hand you an item  You will be so close to the person that the virus can spread between you  · Do not have close physical contact with anyone who does not live in your home  Do not shake hands with, hug, or kiss a person as a greeting  Stand or walk as far from others as possible  If you must use public transportation (such as a bus or subway), try to sit or stand away from others  You can stay safely connected with others through phone calls, e-mail messages, social media websites, and video chats  Check in on anyone who may be having a hard time socially distancing, or who lives alone  Ask administrators at nursing homes or long-term care facilities how you can safely communicate with someone living there      · Wear a cloth face covering around others who do not live in your home  Face coverings help prevent the virus from spreading to others in droplets  You can use a clear face covering if someone needs to read your lips  This is a cloth covering that has plastic over the mouth area so your lips can be seen  Do not use coverings that have breathing valves or vents  The virus can travel out of the valve or vent and be spread to others  Do not take your covering off to talk, cough, or sneeze  Do not use coverings on children younger than 2 years or on anyone who has breathing problems or cannot remove it  · Only allow medical or other necessary professionals into your home  Wear your face covering, and remind professionals to wear a face covering  Remind them to wash their hands when they arrive and before they leave  Do not  let anyone who does not live in your home in, even if the person is not sick  A person can pass the virus to others before symptoms of COVID-19 begin  Some people never even develop symptoms  Children commonly have mild symptoms or no symptoms  It may be hard to tell a child not to hug or kiss you  Explain that this is how he or she can help you stay healthy  · Do not go to someone else's home unless it is necessary  Do not go over to visit, even if the person is lonely  Only go if you need to help him or her  Make sure you both wear face coverings while you are there  · Avoid large gatherings and crowds  Gatherings or crowds of 10 or more individuals can cause the virus to spread  Examples of gatherings include parties, sporting events, [employer] services, and conferences  Crowds may form at beaches, [place], and tourist attractions  Protect yourself by staying away from large gatherings and crowds  · Ask your healthcare provider for other ways to have appointments  You may be able to have appointments without having to go into the provider's office  Some providers offer phone, video, or other types of appointments  You may also be able to get prescriptions for a few months of your medicines at a time  · Stay safe if you must go out to work  You may have a job that can only be done outside your home  Keep physical distance between you and other workers as much as possible  Follow your employer's rules so everyone stays safe  If you have COVID-19 and are recovering at home:  Healthcare providers will give you specific instructions to follow  The following are general guidelines to remind you how to keep others safe until you are well:  · Wash your hands often  Use soap and water as much as possible  You can use hand  that contains alcohol if soap and water are not available  Do not share towels with anyone  If you use paper towels, throw them away in a lined trash can kept in your room or area  Use a covered trash can, if possible  · Do not go out of your home unless it is necessary  You may have to go to your healthcare provider's office for check-ups or to get prescription refills  Do not arrive at the provider's office without an appointment  Providers have to make their offices safe for staff and other patients  · Do not have close physical contact with anyone unless it is necessary  Only have close physical contact with a person giving direct care, or a baby or child you must care for  Family members and friends should not visit you  If possible, stay in a separate area or room of your home if you live with others  No one should go into the area or room except to give you care  You can visit with others by phone, video chat, e-mail, or similar systems  It is important to stay connected with others in your life while you recover  · Wear a face covering while others are near you  This can help prevent droplets from spreading the virus when you talk, sneeze, or cough  Put the covering on before anyone comes into your room or area   Remind the person to cover his or her nose and mouth before going in to provide care for you  · Do not share items  Do not share dishes, towels, or other items with anyone  Items need to be washed after you use them  · Protect your baby  Wash your hands with soap and water often throughout the day  Wear a clean face covering while you breastfeed, or while you express or pump breast milk  If possible, ask someone who is well to care for your baby  You can put breast milk in bottles for the person to use, if needed  Talk to your healthcare provider if you have any questions or concerns about caring for or bonding with your baby  He or she will tell you when to bring your baby in for check-ups and vaccines  He or she will also tell you what to do if you think your baby was infected with the new virus  · Do not handle live animals  Until more is known, it is best not to touch, play with, or handle live animals  Some animals, including pets, have been infected with the new coronavirus  Do not handle or care for animals until you are well  Care includes feeding, petting, and cuddling your pet  Do not let your pet lick you or share your food  Ask someone who is not infected to take care of your pet, if possible  If you must care for a pet, wear a face covering  Wash your hands before and after you give care  · Follow directions from your healthcare provider for being around others after you recover  You will need to wait at least 10 days after symptoms first appeared  Then you will need to have no fever for 24 hours without fever medicine, and no other symptoms  A loss of taste or smell may continue for several months  It is considered okay to be around others if this is your only symptom  It is not known for sure if or for how long a recovered person can pass the virus to others  Your provider may give you instructions, such as continuing social distancing or wearing a face covering around others      How to take care of someone who has COVID-19:  If the person lives in another home, arrange for a time to give care  Remember to bring a few pairs of disposable gloves and a cloth face covering  The following are general guidelines to help you safely care for anyone who has COVID-19:  · Wash your hands often  Wash before and after you go into the person's home, area, or room  Throw paper towels away in a lined trash can that has a lid, if possible  · Do not allow others to go near the person  No one should come into the person's home unless it is necessary  If possible, the person should be in a separate area or room if he or she lives with others  Keep the room's door shut unless you need to go in or out  Have others call, video chat, or e-mail the person if he or she is feeling well enough  The person may feel lonely if he or she is kept separate for a long period of time  Safe communication can help him or her stay connected to family and friends  · Make sure the person's room has good air flow  You may be able to open the window if the weather allows  An air conditioner can also be turned on to help air move  · Contact the person before you go in to give care  Make sure the person is wearing a face covering  Remind him or her to wash his or her hands with soap and water  He or she can use hand  that contains alcohol if soap and water are not available  Put on a face covering before you go in to give care  · Wear gloves while you give care and clean  Clean items the person uses often  Clean countertops, cooking surfaces, and the fronts and insides of the microwave and refrigerator  Clean the shower, toilet, the area around the toilet, the sink, the area around the sink, and faucets  Gather used laundry or bedding  Wash and dry items on the warmest settings the fabric allows  Wash dishes and silverware in hot, soapy water or in a   · Anything you throw away needs to go into a lined trash can    When you need to empty the trash, close the open end of the lining and tie it closed  This helps prevent items the virus is on from spilling out of the trash  Remove your gloves and throw them away  Wash your hands  Follow up with your doctor as directed:  Write down your questions so you remember to ask them during your visits  For more information:   · Centers for Disease Control and Prevention  [address] , [address]  Phone: 2- 051 - 351-7005  Web Address: [employer] com br    © Copyright [address] [place] [address] Drive Information is for End User's use only and may not be sold, redistributed or otherwise used for commercial purposes  All illustrations and images included in CareNotes® are the copyrighted property of A D A M , Inc  or [facility]   The above information is an  only  It is not intended as medical advice for individual conditions or treatments  Talk to your doctor, nurse or pharmacist before following any medical regimen to see if it is safe and effective for you

## 2021-01-26 ENCOUNTER — TELEPHONE (OUTPATIENT)
Dept: INTERNAL MEDICINE CLINIC | Facility: CLINIC | Age: 36
End: 2021-01-26

## 2021-01-26 NOTE — TELEPHONE ENCOUNTER
She works at [employer]   tested + on 1/9/21, pt has sob x a few weeks  She needs a note or a negative test to go back to work

## 2021-01-27 ENCOUNTER — TELEMEDICINE (OUTPATIENT)
Dept: INTERNAL MEDICINE CLINIC | Facility: CLINIC | Age: 36
End: 2021-01-27
Payer: COMMERCIAL

## 2021-01-27 VITALS
OXYGEN SATURATION: 97 % | WEIGHT: 237 LBS | HEIGHT: 66 IN | HEART RATE: 82 BPM | BODY MASS INDEX: 38.09 KG/M2 | TEMPERATURE: 96.6 F

## 2021-01-27 DIAGNOSIS — J06.9 UPPER RESPIRATORY TRACT INFECTION, UNSPECIFIED TYPE: Primary | ICD-10-CM

## 2021-01-27 DIAGNOSIS — Z20.822 CLOSE EXPOSURE TO COVID-19 VIRUS: ICD-10-CM

## 2021-01-27 DIAGNOSIS — J06.9 UPPER RESPIRATORY TRACT INFECTION, UNSPECIFIED TYPE: ICD-10-CM

## 2021-01-27 PROCEDURE — U0005 INFEC AGEN DETEC AMPLI PROBE: HCPCS | Performed by: INTERNAL MEDICINE

## 2021-01-27 PROCEDURE — U0003 INFECTIOUS AGENT DETECTION BY NUCLEIC ACID (DNA OR RNA); SEVERE ACUTE RESPIRATORY SYNDROME CORONAVIRUS 2 (SARS-COV-2) (CORONAVIRUS DISEASE [COVID-19]), AMPLIFIED PROBE TECHNIQUE, MAKING USE OF HIGH THROUGHPUT TECHNOLOGIES AS DESCRIBED BY CMS-2020-01-R: HCPCS | Performed by: INTERNAL MEDICINE

## 2021-01-27 PROCEDURE — 99214 OFFICE O/P EST MOD 30 MIN: CPT | Performed by: INTERNAL MEDICINE

## 2021-01-27 RX ORDER — AZITHROMYCIN 250 MG/1
TABLET, FILM COATED ORAL
Qty: 6 TABLET | Refills: 0 | Status: SHIPPED | OUTPATIENT
Start: 2021-01-27 | End: 2021-02-02

## 2021-01-27 RX ORDER — GUAIFENESIN 600 MG
600 TABLET, EXTENDED RELEASE 12 HR ORAL EVERY 12 HOURS SCHEDULED
Qty: 20 TABLET | Refills: 0 | Status: SHIPPED | OUTPATIENT
Start: 2021-01-27 | End: 2021-10-06

## 2021-01-27 RX ORDER — ALBUTEROL SULFATE 90 UG/1
2 AEROSOL, METERED RESPIRATORY (INHALATION) EVERY 6 HOURS PRN
Qty: 3 INHALER | Refills: 3 | Status: SHIPPED | OUTPATIENT
Start: 2021-01-27 | End: 2022-02-24 | Stop reason: SDUPTHER

## 2021-01-27 NOTE — PATIENT INSTRUCTIONS
COVID-19 (Coronavirus Disease 2019)   WHAT YOU NEED TO KNOW:   COVID-19 is the disease caused by the novel (new) coronavirus first discovered in December 2019  Coronaviruses generally cause upper respiratory (nose, throat, and lung) infections, such as a cold  The new virus can also cause serious lower respiratory conditions, such as pneumonia or acute respiratory distress syndrome (ARDS)  Anyone can develop serious problems from the new virus, but your risk is higher if you are 65 or older  A weak immune system, diabetes, or a heart or lung condition can also increase your risk  DISCHARGE INSTRUCTIONS:   If you think you or someone you know may be infected:  Do the following to protect others:  · If emergency care is needed,  tell the  about the possible infection, or call ahead and tell the emergency department  · Call a healthcare provider  for instructions if symptoms are mild  Anyone who may be infected should not  arrive without calling first  The provider will need to protect staff members and other patients  · The person who may be infected needs to wear a face covering  while getting medical care  This will help lower the risk of infecting others  Coverings are not used for anyone who is younger than 2 years, has breathing problems, or cannot remove it  The provider can give you instructions for anyone who cannot wear a covering  Call your local emergency number (911 in the [address] [facility]) or go to the emergency department if:   · You have trouble breathing or shortness of breath at rest     · You have chest pain or pressure that lasts longer than 5 minutes  · You become confused or hard to wake  · Your lips or face are blue  · You have a fever of 104°F (40°C) or higher  Call your doctor if:   · You do not  have symptoms of COVID-19 but had close physical contact within 14 days with someone who tested positive  · You have questions or concerns about your condition or care      Medicines: You may need any of the following for mild symptoms:  · Decongestants  help reduce nasal congestion and help you breathe more easily  If you take decongestant pills, they may make you feel restless or cause problems with your sleep  Do not use decongestant sprays for more than a few days  · Cough suppressants  help reduce coughing  Ask your healthcare provider which type of cough medicine is best for you  · Acetaminophen  decreases pain and fever  It is available without a doctor's order  Ask how much to take and how often to take it  Follow directions  Read the labels of all other medicines you are using to see if they also contain acetaminophen, or ask your doctor or pharmacist  Acetaminophen can cause liver damage if not taken correctly  Do not use more than 4 grams (4,000 milligrams) total of acetaminophen in one day  · NSAIDs , such as ibuprofen, help decrease swelling, pain, and fever  NSAIDs can cause stomach bleeding or kidney problems in certain people  If you take blood thinner medicine, always ask your healthcare provider if NSAIDs are safe for you  Always read the medicine label and follow directions  · Take your medicine as directed  Contact your healthcare provider if you think your medicine is not helping or if you have side effects  Tell him or her if you are allergic to any medicine  Keep a list of the medicines, vitamins, and herbs you take  Include the amounts, and when and why you take them  Bring the list or the pill bottles to follow-up visits  Carry your medicine list with you in case of an emergency  How the 2019 coronavirus spreads: The virus spreads quickly and easily  You can become infected if you are in contact with a large amount of the virus, even for a short time  You can also become infected by being around a small amount of virus for a long time   The following are ways the virus is thought to spread, but more information may be coming:  · Droplets are the most common way all coronaviruses spread  The virus can travel in droplets that form when a person talks, coughs, or sneezes  Anyone who breathes in the droplets or gets them in his or her eyes can become infected with the virus  Close personal contact with an infected person is thought to be the main way the virus spreads  Close personal contact means you are within 6 feet (2 meters) of the person  · Person-to-person contact can spread the virus  For example, a person with the virus on his or her hands can spread it by shaking hands with someone  At this time, it does not appear that the virus can be passed to a baby during pregnancy or delivery  The baby can be infected after he or she is born through person-to-person contact  The virus also does not appear to spread in breast milk  If you are pregnant or breastfeeding, talk to your healthcare provider or obstetrician about any concerns you have  · The virus can stay on objects and surfaces  A person can get the virus on his or her hands by touching the object or surface  Infection happens if the person then touches his or her eyes or mouth with unwashed hands  It is not yet known how long the virus can stay on an object or surface  That is why it is important to clean all surfaces that are used regularly  · An infected animal may be able to infect a person who touches it  This may happen at live markets or on a farm  How everyone can lower the risk for COVID-19:  The best way to prevent infection is to avoid anyone who is infected, but this can be hard to do  An infected person can spread the virus before signs or symptoms begin, or even if signs or symptoms never develop  The following can help lower the risk for infection:      · Wash your hands often throughout the day  Use soap and water  Rub your soapy hands together, lacing your fingers  Wash the front and back of each hand, and in between your fingers   Use the fingers of one hand to scrub under the fingernails of the other hand  Wash for at least 20 seconds  Rinse with warm, running water for several seconds  Then dry your hands with a clean towel or paper towel  Use hand  that contains alcohol if soap and water are not available  Do not touch your eyes, nose, or mouth without washing your hands first  Teach children how to wash their hands and use hand   · Cover a sneeze or cough  This prevents droplets from traveling from you to others  Turn your face away and cover your mouth and nose with a tissue  Throw the tissue away  Use the bend of your arm if a tissue is not available  Then wash your hands well with soap and water or use hand   Turn and cover your face if you are around someone who is sneezing or coughing  Teach children how to cover a cough or sneeze  · Follow worldwide, national, and local social distancing guidelines  Social distancing means people avoid close physical contact so the virus cannot spread from one person to another  Keep at least 6 feet (2 meters) between you and others  Also keep this distance from anyone who comes to your home, such as someone making a delivery  · Make a habit of not touching your face  It is not known how long the virus can stay on objects and surfaces  If you get the virus on your hands, you can transfer it to your eyes, nose, or mouth and become infected  You can also transfer it to objects, surfaces, or people  Be aware of what you touch when you go out  Examples include handrails and elevator buttons  Try not to touch anything with bare hands unless it is necessary  Wash your hands before you leave your home and when you return  · Clean and disinfect high-touch surfaces and objects often  Use a disinfecting solution or wipes  You can make a solution by diluting 4 teaspoons of bleach in 1 quart (4 cups) of water   Clean and disinfect even if you think no one living in or coming to your home is infected with the virus  You can wipe items with a disinfecting cloth before you bring them into your home  Wash your hands after you handle anything you bring into your home  · Make your immune system as healthy as possible  A weakened immune system makes you more vulnerable to the new coronavirus  No COVID-19 vaccine is available yet  Vaccines such as the flu and pneumonia vaccines can help your immune system  Your healthcare provider can tell you which vaccines to get, and when to get them  Keep your immune system as strong as possible  Do not smoke  Eat healthy foods, exercise regularly, and try to manage stress  Go to bed and wake up at the same times each day  Social distancing guidelines:  National and local social distancing rules vary  Rules may change over time as restrictions are lifted  Restrictions may return if an outbreak happens where you live  It is important to know and follow all current social distancing rules in your area  The following are general guidelines:  · Limit trips out of your home  You may be able to have food, medicines, and other supplies delivered  If possible, have delivered items left at your door or other area  Try not to have someone hand you an item  You will be so close to the person that the virus can spread between you  · Do not have close physical contact with anyone who does not live in your home  Do not shake hands with, hug, or kiss a person as a greeting  Stand or walk as far from others as possible  If you must use public transportation (such as a bus or subway), try to sit or stand away from others  You can stay safely connected with others through phone calls, e-mail messages, social media websites, and video chats  Check in on anyone who may be having a hard time socially distancing, or who lives alone  Ask administrators at nursing homes or long-term care facilities how you can safely communicate with someone living there      · Wear a cloth face covering around others who do not live in your home  Face coverings help prevent the virus from spreading to others in droplets  You can use a clear face covering if someone needs to read your lips  This is a cloth covering that has plastic over the mouth area so your lips can be seen  Do not use coverings that have breathing valves or vents  The virus can travel out of the valve or vent and be spread to others  Do not take your covering off to talk, cough, or sneeze  Do not use coverings on children younger than 2 years or on anyone who has breathing problems or cannot remove it  · Only allow medical or other necessary professionals into your home  Wear your face covering, and remind professionals to wear a face covering  Remind them to wash their hands when they arrive and before they leave  Do not  let anyone who does not live in your home in, even if the person is not sick  A person can pass the virus to others before symptoms of COVID-19 begin  Some people never even develop symptoms  Children commonly have mild symptoms or no symptoms  It may be hard to tell a child not to hug or kiss you  Explain that this is how he or she can help you stay healthy  · Do not go to someone else's home unless it is necessary  Do not go over to visit, even if the person is lonely  Only go if you need to help him or her  Make sure you both wear face coverings while you are there  · Avoid large gatherings and crowds  Gatherings or crowds of 10 or more individuals can cause the virus to spread  Examples of gatherings include parties, sporting events, [employer] services, and conferences  Crowds may form at beaches, [place], and tourist attractions  Protect yourself by staying away from large gatherings and crowds  · Ask your healthcare provider for other ways to have appointments  You may be able to have appointments without having to go into the provider's office  Some providers offer phone, video, or other types of appointments  You may also be able to get prescriptions for a few months of your medicines at a time  · Stay safe if you must go out to work  You may have a job that can only be done outside your home  Keep physical distance between you and other workers as much as possible  Follow your employer's rules so everyone stays safe  If you have COVID-19 and are recovering at home:  Healthcare providers will give you specific instructions to follow  The following are general guidelines to remind you how to keep others safe until you are well:  · Wash your hands often  Use soap and water as much as possible  You can use hand  that contains alcohol if soap and water are not available  Do not share towels with anyone  If you use paper towels, throw them away in a lined trash can kept in your room or area  Use a covered trash can, if possible  · Do not go out of your home unless it is necessary  You may have to go to your healthcare provider's office for check-ups or to get prescription refills  Do not arrive at the provider's office without an appointment  Providers have to make their offices safe for staff and other patients  · Do not have close physical contact with anyone unless it is necessary  Only have close physical contact with a person giving direct care, or a baby or child you must care for  Family members and friends should not visit you  If possible, stay in a separate area or room of your home if you live with others  No one should go into the area or room except to give you care  You can visit with others by phone, video chat, e-mail, or similar systems  It is important to stay connected with others in your life while you recover  · Wear a face covering while others are near you  This can help prevent droplets from spreading the virus when you talk, sneeze, or cough  Put the covering on before anyone comes into your room or area   Remind the person to cover his or her nose and mouth before going in to provide care for you  · Do not share items  Do not share dishes, towels, or other items with anyone  Items need to be washed after you use them  · Protect your baby  Wash your hands with soap and water often throughout the day  Wear a clean face covering while you breastfeed, or while you express or pump breast milk  If possible, ask someone who is well to care for your baby  You can put breast milk in bottles for the person to use, if needed  Talk to your healthcare provider if you have any questions or concerns about caring for or bonding with your baby  He or she will tell you when to bring your baby in for check-ups and vaccines  He or she will also tell you what to do if you think your baby was infected with the new virus  · Do not handle live animals  Until more is known, it is best not to touch, play with, or handle live animals  Some animals, including pets, have been infected with the new coronavirus  Do not handle or care for animals until you are well  Care includes feeding, petting, and cuddling your pet  Do not let your pet lick you or share your food  Ask someone who is not infected to take care of your pet, if possible  If you must care for a pet, wear a face covering  Wash your hands before and after you give care  · Follow directions from your healthcare provider for being around others after you recover  You will need to wait at least 10 days after symptoms first appeared  Then you will need to have no fever for 24 hours without fever medicine, and no other symptoms  A loss of taste or smell may continue for several months  It is considered okay to be around others if this is your only symptom  It is not known for sure if or for how long a recovered person can pass the virus to others  Your provider may give you instructions, such as continuing social distancing or wearing a face covering around others      How to take care of someone who has COVID-19:  If the person lives in another home, arrange for a time to give care  Remember to bring a few pairs of disposable gloves and a cloth face covering  The following are general guidelines to help you safely care for anyone who has COVID-19:  · Wash your hands often  Wash before and after you go into the person's home, area, or room  Throw paper towels away in a lined trash can that has a lid, if possible  · Do not allow others to go near the person  No one should come into the person's home unless it is necessary  If possible, the person should be in a separate area or room if he or she lives with others  Keep the room's door shut unless you need to go in or out  Have others call, video chat, or e-mail the person if he or she is feeling well enough  The person may feel lonely if he or she is kept separate for a long period of time  Safe communication can help him or her stay connected to family and friends  · Make sure the person's room has good air flow  You may be able to open the window if the weather allows  An air conditioner can also be turned on to help air move  · Contact the person before you go in to give care  Make sure the person is wearing a face covering  Remind him or her to wash his or her hands with soap and water  He or she can use hand  that contains alcohol if soap and water are not available  Put on a face covering before you go in to give care  · Wear gloves while you give care and clean  Clean items the person uses often  Clean countertops, cooking surfaces, and the fronts and insides of the microwave and refrigerator  Clean the shower, toilet, the area around the toilet, the sink, the area around the sink, and faucets  Gather used laundry or bedding  Wash and dry items on the warmest settings the fabric allows  Wash dishes and silverware in hot, soapy water or in a   · Anything you throw away needs to go into a lined trash can    When you need to empty the trash, close the open end of the lining and tie it closed  This helps prevent items the virus is on from spilling out of the trash  Remove your gloves and throw them away  Wash your hands  Follow up with your doctor as directed:  Write down your questions so you remember to ask them during your visits  For more information:   · Centers for Disease Control and Prevention  [address]  [facility] , [address]  Phone: 6- 082 - [phone]  Web Address: [employer] com br    © Copyright [address] [place] [address] Drive Information is for End User's use only and may not be sold, redistributed or otherwise used for commercial purposes  All illustrations and images included in CareNotes® are the copyrighted property of A D A M [employer]  or [facility] Jesús Bright   The above information is an  only  It is not intended as medical advice for individual conditions or treatments  Talk to your doctor, nurse or pharmacist before following any medical regimen to see if it is safe and effective for you

## 2021-01-27 NOTE — PROGRESS NOTES
COVID-19 Virtual Visit     Assessment/Plan:    Problem List Items Addressed This Visit     None      Visit Diagnoses     Upper respiratory tract infection, unspecified type    -  Primary    Relevant Medications    azithromycin (ZITHROMAX) 250 mg tablet    guaiFENesin (Mucinex) 600 mg 12 hr tablet    albuterol (PROVENTIL HFA,VENTOLIN HFA) 90 mcg/act inhaler    Other Relevant Orders    Novel Coronavirus (Covid-19),PCR [facility] - Collected at [facility]    Close exposure to COVID-19 virus        Relevant Medications    azithromycin (ZITHROMAX) 250 mg tablet    guaiFENesin (Mucinex) 600 mg 12 hr tablet    albuterol (PROVENTIL HFA,VENTOLIN HFA) 90 mcg/act inhaler    Other Relevant Orders    Novel Coronavirus (Covid-19),PCR [facility] - Collected at [facility]         Disposition:     I referred patient to one of our centralized sites for a COVID-19 swab  A/P: Day # 14 since onset of COVID s/s  Covid test will be tested  Work requiring it as well  Doing ok w/o fevers, but nasal s/s with cough and SOB  No conversational dyspnea  Wheezing noted  Give the duration of the s/s, will start abx, mucinex, and HERIBERTO  Start isolation/quarantine, increase fluids, PRN motrin/tylenol, and breathing exercises  RTC one day for f/u  I have spent 20 minutes directly with the patient  Greater than 50% of this time was spent in counseling/coordination of care regarding: diagnostic results, prognosis, risks and benefits of treatment options, instructions for management, patient and family education, importance of treatment compliance, risk factor reductions and impressions          Encounter provider Yolanda Rios DO    Provider located at [address] [place] [id number]    Recent Visits  Date Type Provider Dept   01/26/21 Telephone [facility]   Showing recent visits within past 7 days and meeting all other requirements     Today's Visits  Date Type Provider Dept   01/27/21 Telemedicine Lurene Floor, DO [facility] Bruno today's visits and meeting all other requirements     Future Appointments  No visits were found meeting these conditions  Showing future appointments within next 150 days and meeting all other requirements      This virtual check-in was done via [employer] and patient was informed that this is a secure, HIPAA-compliant platform  She agrees to proceed  Patient agrees to participate in a virtual check in via telephone or video visit instead of presenting to the office to address urgent/immediate medical needs  Patient is aware this is a billable service  After connecting through [facility], the patient was identified by name and date of birth  Lela Christiansen was informed that this was a telemedicine visit and that the exam was being conducted confidentially over secure lines  My office door was closed  No one else was in the room  Lela Christiansen acknowledged consent and understanding of privacy and security of the telemedicine visit  I informed the patient that I have reviewed her record in Epic and presented the opportunity for her to ask any questions regarding the visit today  The patient agreed to participate  Subjective:   Lela Christiansen is a 30 y o  female who is concerned about COVID-19  Patient's symptoms include nasal congestion, rhinorrhea, sore throat, cough, shortness of breath and headache  Patient denies fever, chills, fatigue, anosmia, loss of taste, chest tightness, abdominal pain, nausea, vomiting, diarrhea and myalgias       Date of symptom onset: 1/13/2021  Date of exposure: 1/9/2021    Exposure:   Contact with a person who is under investigation (PUI) for or who is positive for COVID-19 within the last 14 days?: Yes    Hospitalized recently for fever and/or lower respiratory symptoms?: No      Currently a healthcare worker that is involved in direct patient care?: No Works in a special setting where the risk of COVID-19 transmission may be high? (this may include long-term care, correctional and assisted facilities; homeless shelters; assisted-living facilities and group homes ): No      Resident in a special setting where the risk of COVID-19 transmission may be high? (this may include long-term care, correctional and assisted facilities; homeless shelters; assisted-living facilities and group homes ): No      Lab Results   Component Value Date    SARSCOV2 Not Detected 2021     Past Medical History:   Diagnosis Date    CHF [facility]congestive heart failure) ([facility] [address] )     Chronic back pain     Generalized anxiety disorder     Hypertension     Kidney stone     Obesity     Primary osteoarthritis of left knee 2020     Past Surgical History:   Procedure Laterality Date     SECTION      x 2    KNEE ARTHROSCOPY Left     acl/meniscus repair    [place] CYSTO/URETERO W/LITHOTRIPSY &INDWELL STENT INSRT Right 2018    Procedure: CYSTOSCOPY URETEROSCOPY, RETROGRADE PYELOGRAM AND INSERTION STENT URETERAL, RIGHT STONE EXTRACTION;  Surgeon: Tawanna Hensley MD;  [facility];  Service: Urology    [place] CYSTOURETHROSCOPY,URETER CATHETER Right 1/15/2018    Procedure: CYSTOSCOPY RETROGRADE PYELOGRAM WITH INSERTION STENT URETERAL;  Surgeon: Lico Bhagat MD;  [facility];  Service: Urology    TUBAL LIGATION       Current Outpatient Medications   Medication Sig Dispense Refill    amLODIPine (NORVASC) 10 mg tablet Take 1 tablet (10 mg total) by mouth daily 90 tablet 1    labetalol (NORMODYNE) 300 mg tablet Take 1 tablet (300 mg total) by mouth 2 (two) times a day 30 tablet 5    losartan-hydrochlorothiazide (HYZAAR) 50-12 5 mg per tablet Take 1 tablet by mouth daily 30 tablet 5    albuterol (PROVENTIL HFA,VENTOLIN HFA) 90 mcg/act inhaler Inhale 2 puffs every 6 (six) hours as needed for wheezing or shortness of breath 3 Inhaler 3    azithromycin (ZITHROMAX) 250 mg tablet Take 2 tablets today then 1 tablet daily x 4 days 6 tablet 0    fluticasone (FLONASE) 50 mcg/act nasal spray 1 spray into each nostril daily (Patient not taking: Reported on 1/27/2021) 16 g 0    guaiFENesin (Mucinex) 600 mg 12 hr tablet Take 1 tablet (600 mg total) by mouth every 12 (twelve) hours 20 tablet 0    labetalol (NORMODYNE) 300 mg tablet TAKE 1 TABLET (300 MG TOTAL) BY MOUTH 3 (THREE) TIMES A DAY (Patient not taking: Reported on 1/11/2021) 90 tablet 0    levocetirizine (XYZAL) 5 MG tablet Take 1 tablet (5 mg total) by mouth every evening (Patient not taking: Reported on 1/11/2021) 30 tablet 0    lidocaine (LIDODERM) 5 % APPLY 2 PATCHES TOPICALLY DAILY FOR 15 DAYS REMOVE & DISCARD PATCH WITHIN 12 HOURS OR AS DIRECTED BY MD 30 patch 0    methocarbamol (ROBAXIN) 750 mg tablet Take 1 tablet (750 mg total) by mouth every 6 (six) hours as needed for muscle spasms for up to 30 days (Patient not taking: Reported on 1/11/2021) 60 tablet 1    naproxen (NAPROSYN) 375 mg tablet Take 1 tablet (375 mg total) by mouth 2 (two) times a day with meals for 15 days (Patient not taking: Reported on 1/11/2021) 30 tablet 0    traZODone (DESYREL) 50 mg tablet Take 1 tablet (50 mg total) by mouth daily at bedtime as needed for sleep for up to 60 days (Patient not taking: Reported on 1/11/2021) 30 tablet 3     No current facility-administered medications for this visit  No Known Allergies    Review of Systems   Constitutional: Positive for activity change  Negative for chills, diaphoresis, fatigue and fever  HENT: Positive for congestion, postnasal drip, rhinorrhea and sore throat  Negative for sinus pressure and sinus pain  No loss of taste/smell  Respiratory: Positive for cough, shortness of breath and wheezing  Negative for chest tightness  Cardiovascular: Negative for chest pain, palpitations and leg swelling     Gastrointestinal: Negative for abdominal pain, constipation, diarrhea, nausea and vomiting  Genitourinary: Negative for difficulty urinating, dysuria and frequency  Musculoskeletal: Negative for arthralgias, gait problem and myalgias  Neurological: Positive for headaches  Negative for light-headedness  Psychiatric/Behavioral: Negative for confusion  The patient is not nervous/anxious  Objective:    Vitals:    01/27/21 0828   Pulse: 82   Temp: (!) 96 6 °F (35 9 °C)   SpO2: 97%   Weight: 108 kg (237 lb)   Height: 5' 6" (1 676 m)       Physical Exam  Vitals signs and nursing note reviewed  Constitutional:       General: She is not in acute distress  Appearance: Normal appearance  She is not ill-appearing  Eyes:      Extraocular Movements: Extraocular movements intact  Conjunctiva/sclera: Conjunctivae normal       Pupils: Pupils are equal, round, and reactive to light  Pulmonary:      Effort: Pulmonary effort is normal  No respiratory distress  Neurological:      General: No focal deficit present  Mental Status: She is alert and oriented to person, place, and time  Mental status is at baseline  Psychiatric:         Mood and Affect: Mood normal          Behavior: Behavior normal          Thought Content: Thought content normal          Judgment: Judgment normal        VIRTUAL VISIT DISCLAIMER    Brock Pugh acknowledges that she has consented to an online visit or consultation  She understands that the online visit is based solely on information provided by her, and that, in the absence of a face-to-face physical evaluation by the physician, the diagnosis she receives is both limited and provisional in terms of accuracy and completeness  This is not intended to replace a full medical face-to-face evaluation by the physician  Brock Pugh understands and accepts these terms

## 2021-01-28 ENCOUNTER — TELEMEDICINE (OUTPATIENT)
Dept: INTERNAL MEDICINE CLINIC | Facility: CLINIC | Age: 36
End: 2021-01-28
Payer: COMMERCIAL

## 2021-01-28 VITALS
BODY MASS INDEX: 38.09 KG/M2 | HEIGHT: 66 IN | OXYGEN SATURATION: 98 % | TEMPERATURE: 97.9 F | WEIGHT: 237 LBS | HEART RATE: 88 BPM

## 2021-01-28 DIAGNOSIS — J06.9 UPPER RESPIRATORY TRACT INFECTION, UNSPECIFIED TYPE: Primary | ICD-10-CM

## 2021-01-28 DIAGNOSIS — Z20.822 CLOSE EXPOSURE TO COVID-19 VIRUS: ICD-10-CM

## 2021-01-28 LAB — SARS-COV-2 RNA RESP QL NAA+PROBE: NEGATIVE

## 2021-01-28 PROCEDURE — 99213 OFFICE O/P EST LOW 20 MIN: CPT | Performed by: INTERNAL MEDICINE

## 2021-01-28 NOTE — PROGRESS NOTES
COVID-19 Virtual Visit     Assessment/Plan:    Problem List Items Addressed This Visit     None      Visit Diagnoses     Upper respiratory tract infection, unspecified type    -  Primary    Close exposure to COVID-19 virus             Disposition:     I recommended continued isolation until at least 24 hours have passed since recovery defined as resolution of fever without the use of fever-reducing medications AND improvement in COVID symptoms AND 10 days have passed since onset of symptoms (or 10 days have passed since date of first positive viral diagnostic test for asymptomatic patients)  A/P: Day # 15 since onset of COVID s/s  Covid test still pending  Doing a little better  No fever or chills  Mainly nasal congestion and SOB, but no conversational dyspnea    Continue isolation/quarantine, increase fluids, PRN motrin/tylenol, and breathing exercises  RTC one day for f/u  I have spent 15 minutes directly with the patient  Greater than 50% of this time was spent in counseling/coordination of care regarding: diagnostic results, prognosis, risks and benefits of treatment options, instructions for management, patient and family education, importance of treatment compliance, risk factor reductions and impressions  Encounter provider Hilario Turner DO    Provider located at [address] [place] [phone]    Recent Visits  Date Type Provider Dept   [facility]   [facility]21 [facility]phone [facility]   Showing recent visits within past 7 days and meeting all other requirements     Today's Visits  Date Type Provider Dept   01/28/21 Telemedicine DO Prince Velasquez today's visits and meeting all other requirements     Future Appointments  No visits were found meeting these conditions     Showing future appointments within next 150 days and meeting all other requirements      This virtual check-in was done via [employer] and patient was informed that this is a secure, HIPAA-compliant platform  She agrees to proceed  Patient agrees to participate in a virtual check in via telephone or video visit instead of presenting to the office to address urgent/immediate medical needs  Patient is aware this is a billable service  After connecting through [facility], the patient was identified by name and date of birth  Vicky Carlos was informed that this was a telemedicine visit and that the exam was being conducted confidentially over secure lines  My office door was closed  No one else was in the room  Vicky Carlos acknowledged consent and understanding of privacy and security of the telemedicine visit  I informed the patient that I have reviewed her record in Epic and presented the opportunity for her to ask any questions regarding the visit today  The patient agreed to participate  Subjective:   Vicky Carlos is a 30 y o  female who has been screened for COVID-19  Symptom change since last report: improving  Patient's symptoms include nasal congestion, rhinorrhea and shortness of breath  Patient denies fever, chills, fatigue, sore throat, anosmia, loss of taste, cough, chest tightness, abdominal pain, nausea, vomiting, diarrhea, myalgias and headaches  Ramón Carolina has been staying home and has isolated themselves in her home  She is taking care to not share personal items and is cleaning all surfaces that are touched often, like counters, tabletops, and doorknobs using household cleaning sprays or wipes  She is wearing a mask when she leaves her room       Date of symptom onset: 1/13/2021    Lab Results   Component Value Date    SARSCOV2 Not Detected 01/11/2021     Past Medical History:   Diagnosis Date    CHF (congestive heart failure) (HCC)     Chronic back pain     Generalized anxiety disorder     Hypertension     Kidney stone     Obesity  Primary osteoarthritis of left knee 2020     Past Surgical History:   Procedure Laterality Date     SECTION      x 2    KNEE ARTHROSCOPY Left     acl/meniscus repair    [place] CYSTO/URETERO W/LITHOTRIPSY &INDWELL STENT INSRT Right 2018    Procedure: CYSTOSCOPY URETEROSCOPY, RETROGRADE PYELOGRAM AND INSERTION STENT URETERAL, RIGHT STONE EXTRACTION;  Surgeon: Ryan Cavazos MD;  [facility];  Service: Urology    [place] CYSTOURETHROSCOPY,URETER CATHETER Right 1/15/2018    Procedure: CYSTOSCOPY RETROGRADE PYELOGRAM WITH INSERTION STENT URETERAL;  Surgeon: Freedom Patiño MD;  [facility];  Service: Urology    TUBAL LIGATION       Current Outpatient Medications   Medication Sig Dispense Refill    albuterol (PROVENTIL HFA,VENTOLIN HFA) 90 mcg/act inhaler Inhale 2 puffs every 6 (six) hours as needed for wheezing or shortness of breath 3 Inhaler 3    amLODIPine (NORVASC) 10 mg tablet Take 1 tablet (10 mg total) by mouth daily 90 tablet 1    azithromycin (ZITHROMAX) 250 mg tablet Take 2 tablets today then 1 tablet daily x 4 days 6 tablet 0    guaiFENesin (Mucinex) 600 mg 12 hr tablet Take 1 tablet (600 mg total) by mouth every 12 (twelve) hours 20 tablet 0    labetalol (NORMODYNE) 300 mg tablet TAKE 1 TABLET (300 MG TOTAL) BY MOUTH 3 (THREE) TIMES A DAY 90 tablet 0    lidocaine (LIDODERM) 5 % APPLY 2 PATCHES TOPICALLY DAILY FOR 15 DAYS REMOVE & DISCARD PATCH WITHIN 12 HOURS OR AS DIRECTED BY MD 30 patch 0    losartan-hydrochlorothiazide (HYZAAR) 50-12 5 mg per tablet Take 1 tablet by mouth daily 30 tablet 5    fluticasone (FLONASE) 50 mcg/act nasal spray 1 spray into each nostril daily (Patient not taking: Reported on 2021) 16 g 0    labetalol (NORMODYNE) 300 mg tablet Take 1 tablet (300 mg total) by mouth 2 (two) times a day 30 tablet 5    levocetirizine (XYZAL) 5 MG tablet Take 1 tablet (5 mg total) by mouth every evening (Patient not taking: Reported on 2021) 30 tablet 0     No current facility-administered medications for this visit  No Known Allergies    Review of Systems   Constitutional: Positive for activity change  Negative for chills, diaphoresis, fatigue and fever  HENT: Positive for congestion, postnasal drip and rhinorrhea  Negative for sinus pressure, sinus pain and sore throat  No loss of taste/smell  Respiratory: Positive for shortness of breath and wheezing  Negative for cough and chest tightness  Cardiovascular: Negative for chest pain, palpitations and leg swelling  Gastrointestinal: Negative for abdominal pain, constipation, diarrhea, nausea and vomiting  Genitourinary: Negative for difficulty urinating, dysuria and frequency  Musculoskeletal: Negative for arthralgias, gait problem and myalgias  Neurological: Negative for light-headedness and headaches  Psychiatric/Behavioral: Negative for confusion  The patient is not nervous/anxious  Objective:    Vitals:    01/28/21 1043   Pulse: 88   Temp: 97 9 °F (36 6 °C)   SpO2: 98%   Weight: 108 kg (237 lb)   Height: 5' 6" (1 676 m)       Physical Exam  Vitals signs and nursing note reviewed  Constitutional:       General: She is not in acute distress  Appearance: Normal appearance  She is not ill-appearing  HENT:      Head: Normocephalic and atraumatic  Eyes:      Extraocular Movements: Extraocular movements intact  Conjunctiva/sclera: Conjunctivae normal       Pupils: Pupils are equal, round, and reactive to light  Pulmonary:      Effort: Pulmonary effort is normal  No respiratory distress  Neurological:      General: No focal deficit present  Mental Status: She is alert and oriented to person, place, and time  Mental status is at baseline  Psychiatric:         Mood and Affect: Mood normal          Behavior: Behavior normal          Thought Content:  Thought content normal          Judgment: Judgment normal        VIRTUAL VISIT DISCLAIMER    Marlyn Ellison Zina Simon acknowledges that she has consented to an online visit or consultation  She understands that the online visit is based solely on information provided by her, and that, in the absence of a face-to-face physical evaluation by the physician, the diagnosis she receives is both limited and provisional in terms of accuracy and completeness  This is not intended to replace a full medical face-to-face evaluation by the physician  Lela Christiansen understands and accepts these terms

## 2021-01-28 NOTE — PATIENT INSTRUCTIONS
COVID-19 (Coronavirus Disease 2019)   WHAT YOU NEED TO KNOW:   COVID-19 is the disease caused by the novel (new) coronavirus first discovered in December 2019  Coronaviruses generally cause upper respiratory (nose, throat, and lung) infections, such as a cold  The new virus can also cause serious lower respiratory conditions, such as pneumonia or acute respiratory distress syndrome (ARDS)  Anyone can develop serious problems from the new virus, but your risk is higher if you are 65 or older  A weak immune system, diabetes, or a heart or lung condition can also increase your risk  DISCHARGE INSTRUCTIONS:   If you think you or someone you know may be infected:  Do the following to protect others:  · If emergency care is needed,  tell the  about the possible infection, or call ahead and tell the emergency department  · Call a healthcare provider  for instructions if symptoms are mild  Anyone who may be infected should not  arrive without calling first  The provider will need to protect staff members and other patients  · The person who may be infected needs to wear a face covering  while getting medical care  This will help lower the risk of infecting others  Coverings are not used for anyone who is younger than 2 years, has breathing problems, or cannot remove it  The provider can give you instructions for anyone who cannot wear a covering  Call your local emergency number (911 in the [address] [facility]) or go to the emergency department if:   · You have trouble breathing or shortness of breath at rest     · You have chest pain or pressure that lasts longer than 5 minutes  · You become confused or hard to wake  · Your lips or face are blue  · You have a fever of 104°F (40°C) or higher  Call your doctor if:   · You do not  have symptoms of COVID-19 but had close physical contact within 14 days with someone who tested positive  · You have questions or concerns about your condition or care      Medicines: You may need any of the following for mild symptoms:  · Decongestants  help reduce nasal congestion and help you breathe more easily  If you take decongestant pills, they may make you feel restless or cause problems with your sleep  Do not use decongestant sprays for more than a few days  · Cough suppressants  help reduce coughing  Ask your healthcare provider which type of cough medicine is best for you  · Acetaminophen  decreases pain and fever  It is available without a doctor's order  Ask how much to take and how often to take it  Follow directions  Read the labels of all other medicines you are using to see if they also contain acetaminophen, or ask your doctor or pharmacist  Acetaminophen can cause liver damage if not taken correctly  Do not use more than 4 grams (4,000 milligrams) total of acetaminophen in one day  · NSAIDs , such as ibuprofen, help decrease swelling, pain, and fever  NSAIDs can cause stomach bleeding or kidney problems in certain people  If you take blood thinner medicine, always ask your healthcare provider if NSAIDs are safe for you  Always read the medicine label and follow directions  · Take your medicine as directed  Contact your healthcare provider if you think your medicine is not helping or if you have side effects  Tell him or her if you are allergic to any medicine  Keep a list of the medicines, vitamins, and herbs you take  Include the amounts, and when and why you take them  Bring the list or the pill bottles to follow-up visits  Carry your medicine list with you in case of an emergency  How the 2019 coronavirus spreads: The virus spreads quickly and easily  You can become infected if you are in contact with a large amount of the virus, even for a short time  You can also become infected by being around a small amount of virus for a long time   The following are ways the virus is thought to spread, but more information may be coming:  · Droplets are the most common way all coronaviruses spread  The virus can travel in droplets that form when a person talks, coughs, or sneezes  Anyone who breathes in the droplets or gets them in his or her eyes can become infected with the virus  Close personal contact with an infected person is thought to be the main way the virus spreads  Close personal contact means you are within 6 feet (2 meters) of the person  · Person-to-person contact can spread the virus  For example, a person with the virus on his or her hands can spread it by shaking hands with someone  At this time, it does not appear that the virus can be passed to a baby during pregnancy or delivery  The baby can be infected after he or she is born through person-to-person contact  The virus also does not appear to spread in breast milk  If you are pregnant or breastfeeding, talk to your healthcare provider or obstetrician about any concerns you have  · The virus can stay on objects and surfaces  A person can get the virus on his or her hands by touching the object or surface  Infection happens if the person then touches his or her eyes or mouth with unwashed hands  It is not yet known how long the virus can stay on an object or surface  That is why it is important to clean all surfaces that are used regularly  · An infected animal may be able to infect a person who touches it  This may happen at live markets or on a farm  How everyone can lower the risk for COVID-19:  The best way to prevent infection is to avoid anyone who is infected, but this can be hard to do  An infected person can spread the virus before signs or symptoms begin, or even if signs or symptoms never develop  The following can help lower the risk for infection:      · Wash your hands often throughout the day  Use soap and water  Rub your soapy hands together, lacing your fingers  Wash the front and back of each hand, and in between your fingers   Use the fingers of one hand to scrub under the fingernails of the other hand  Wash for at least 20 seconds  Rinse with warm, running water for several seconds  Then dry your hands with a clean towel or paper towel  Use hand  that contains alcohol if soap and water are not available  Do not touch your eyes, nose, or mouth without washing your hands first  Teach children how to wash their hands and use hand   · Cover a sneeze or cough  This prevents droplets from traveling from you to others  Turn your face away and cover your mouth and nose with a tissue  Throw the tissue away  Use the bend of your arm if a tissue is not available  Then wash your hands well with soap and water or use hand   Turn and cover your face if you are around someone who is sneezing or coughing  Teach children how to cover a cough or sneeze  · Follow worldwide, national, and local social distancing guidelines  Social distancing means people avoid close physical contact so the virus cannot spread from one person to another  Keep at least 6 feet (2 meters) between you and others  Also keep this distance from anyone who comes to your home, such as someone making a delivery  · Make a habit of not touching your face  It is not known how long the virus can stay on objects and surfaces  If you get the virus on your hands, you can transfer it to your eyes, nose, or mouth and become infected  You can also transfer it to objects, surfaces, or people  Be aware of what you touch when you go out  Examples include handrails and elevator buttons  Try not to touch anything with bare hands unless it is necessary  Wash your hands before you leave your home and when you return  · Clean and disinfect high-touch surfaces and objects often  Use a disinfecting solution or wipes  You can make a solution by diluting 4 teaspoons of bleach in 1 quart (4 cups) of water   Clean and disinfect even if you think no one living in or coming to your home is infected with the virus  You can wipe items with a disinfecting cloth before you bring them into your home  Wash your hands after you handle anything you bring into your home  · Make your immune system as healthy as possible  A weakened immune system makes you more vulnerable to the new coronavirus  No COVID-19 vaccine is available yet  Vaccines such as the flu and pneumonia vaccines can help your immune system  Your healthcare provider can tell you which vaccines to get, and when to get them  Keep your immune system as strong as possible  Do not smoke  Eat healthy foods, exercise regularly, and try to manage stress  Go to bed and wake up at the same times each day  Social distancing guidelines:  National and local social distancing rules vary  Rules may change over time as restrictions are lifted  Restrictions may return if an outbreak happens where you live  It is important to know and follow all current social distancing rules in your area  The following are general guidelines:  · Limit trips out of your home  You may be able to have food, medicines, and other supplies delivered  If possible, have delivered items left at your door or other area  Try not to have someone hand you an item  You will be so close to the person that the virus can spread between you  · Do not have close physical contact with anyone who does not live in your home  Do not shake hands with, hug, or kiss a person as a greeting  Stand or walk as far from others as possible  If you must use public transportation (such as a bus or subway), try to sit or stand away from others  You can stay safely connected with others through phone calls, e-mail messages, social media websites, and video chats  Check in on anyone who may be having a hard time socially distancing, or who lives alone  Ask administrators at nursing homes or long-term care facilities how you can safely communicate with someone living there      · Wear a cloth face covering around others who do not live in your home  Face coverings help prevent the virus from spreading to others in droplets  You can use a clear face covering if someone needs to read your lips  This is a cloth covering that has plastic over the mouth area so your lips can be seen  Do not use coverings that have breathing valves or vents  The virus can travel out of the valve or vent and be spread to others  Do not take your covering off to talk, cough, or sneeze  Do not use coverings on children younger than 2 years or on anyone who has breathing problems or cannot remove it  · Only allow medical or other necessary professionals into your home  Wear your face covering, and remind professionals to wear a face covering  Remind them to wash their hands when they arrive and before they leave  Do not  let anyone who does not live in your home in, even if the person is not sick  A person can pass the virus to others before symptoms of COVID-19 begin  Some people never even develop symptoms  Children commonly have mild symptoms or no symptoms  It may be hard to tell a child not to hug or kiss you  Explain that this is how he or she can help you stay healthy  · Do not go to someone else's home unless it is necessary  Do not go over to visit, even if the person is lonely  Only go if you need to help him or her  Make sure you both wear face coverings while you are there  · Avoid large gatherings and crowds  Gatherings or crowds of 10 or more individuals can cause the virus to spread  Examples of gatherings include parties, sporting events, [employer] services, and conferences  Crowds may form at beaches, [place], and tourist attractions  Protect yourself by staying away from large gatherings and crowds  · Ask your healthcare provider for other ways to have appointments  You may be able to have appointments without having to go into the provider's office  Some providers offer phone, video, or other types of appointments  You may also be able to get prescriptions for a few months of your medicines at a time  · Stay safe if you must go out to work  You may have a job that can only be done outside your home  Keep physical distance between you and other workers as much as possible  Follow your employer's rules so everyone stays safe  If you have COVID-19 and are recovering at home:  Healthcare providers will give you specific instructions to follow  The following are general guidelines to remind you how to keep others safe until you are well:  · Wash your hands often  Use soap and water as much as possible  You can use hand  that contains alcohol if soap and water are not available  Do not share towels with anyone  If you use paper towels, throw them away in a lined trash can kept in your room or area  Use a covered trash can, if possible  · Do not go out of your home unless it is necessary  You may have to go to your healthcare provider's office for check-ups or to get prescription refills  Do not arrive at the provider's office without an appointment  Providers have to make their offices safe for staff and other patients  · Do not have close physical contact with anyone unless it is necessary  Only have close physical contact with a person giving direct care, or a baby or child you must care for  Family members and friends should not visit you  If possible, stay in a separate area or room of your home if you live with others  No one should go into the area or room except to give you care  You can visit with others by phone, video chat, e-mail, or similar systems  It is important to stay connected with others in your life while you recover  · Wear a face covering while others are near you  This can help prevent droplets from spreading the virus when you talk, sneeze, or cough  Put the covering on before anyone comes into your room or area   Remind the person to cover his or her nose and mouth before going in to provide care for you  · Do not share items  Do not share dishes, towels, or other items with anyone  Items need to be washed after you use them  · Protect your baby  Wash your hands with soap and water often throughout the day  Wear a clean face covering while you breastfeed, or while you express or pump breast milk  If possible, ask someone who is well to care for your baby  You can put breast milk in bottles for the person to use, if needed  Talk to your healthcare provider if you have any questions or concerns about caring for or bonding with your baby  He or she will tell you when to bring your baby in for check-ups and vaccines  He or she will also tell you what to do if you think your baby was infected with the new virus  · Do not handle live animals  Until more is known, it is best not to touch, play with, or handle live animals  Some animals, including pets, have been infected with the new coronavirus  Do not handle or care for animals until you are well  Care includes feeding, petting, and cuddling your pet  Do not let your pet lick you or share your food  Ask someone who is not infected to take care of your pet, if possible  If you must care for a pet, wear a face covering  Wash your hands before and after you give care  · Follow directions from your healthcare provider for being around others after you recover  You will need to wait at least 10 days after symptoms first appeared  Then you will need to have no fever for 24 hours without fever medicine, and no other symptoms  A loss of taste or smell may continue for several months  It is considered okay to be around others if this is your only symptom  It is not known for sure if or for how long a recovered person can pass the virus to others  Your provider may give you instructions, such as continuing social distancing or wearing a face covering around others      How to take care of someone who has COVID-19:  If the person lives in another home, arrange for a time to give care  Remember to bring a few pairs of disposable gloves and a cloth face covering  The following are general guidelines to help you safely care for anyone who has COVID-19:  · Wash your hands often  Wash before and after you go into the person's home, area, or room  Throw paper towels away in a lined trash can that has a lid, if possible  · Do not allow others to go near the person  No one should come into the person's home unless it is necessary  If possible, the person should be in a separate area or room if he or she lives with others  Keep the room's door shut unless you need to go in or out  Have others call, video chat, or e-mail the person if he or she is feeling well enough  The person may feel lonely if he or she is kept separate for a long period of time  Safe communication can help him or her stay connected to family and friends  · Make sure the person's room has good air flow  You may be able to open the window if the weather allows  An air conditioner can also be turned on to help air move  · Contact the person before you go in to give care  Make sure the person is wearing a face covering  Remind him or her to wash his or her hands with soap and water  He or she can use hand  that contains alcohol if soap and water are not available  Put on a face covering before you go in to give care  · Wear gloves while you give care and clean  Clean items the person uses often  Clean countertops, cooking surfaces, and the fronts and insides of the microwave and refrigerator  Clean the shower, toilet, the area around the toilet, the sink, the area around the sink, and faucets  Gather used laundry or bedding  Wash and dry items on the warmest settings the fabric allows  Wash dishes and silverware in hot, soapy water or in a   · Anything you throw away needs to go into a lined trash can    When you need to empty the trash, close the open end of the lining and tie it closed  This helps prevent items the virus is on from spilling out of the trash  Remove your gloves and throw them away  Wash your hands  Follow up with your doctor as directed:  Write down your questions so you remember to ask them during your visits  For more information:   · Centers for Disease Control and Prevention  [address] , [address]  Phone: 8- 273 - [phone]  Web Address: [employer] com br    © Copyright [address] [place] [address] Drive Information is for End User's use only and may not be sold, redistributed or otherwise used for commercial purposes  All illustrations and images included in CareNotes® are the copyrighted property of [employer] D A M [employer]  or [facility]   The above information is an  only  It is not intended as medical advice for individual conditions or treatments  Talk to your doctor, nurse or pharmacist before following any medical regimen to see if it is safe and effective for you

## 2021-01-29 ENCOUNTER — TELEPHONE (OUTPATIENT)
Dept: CARDIOLOGY CLINIC | Facility: HOSPITAL | Age: 36
End: 2021-01-29

## 2021-01-29 ENCOUNTER — TELEMEDICINE (OUTPATIENT)
Dept: INTERNAL MEDICINE CLINIC | Facility: CLINIC | Age: 36
End: 2021-01-29
Payer: COMMERCIAL

## 2021-01-29 VITALS
OXYGEN SATURATION: 96 % | BODY MASS INDEX: 38.09 KG/M2 | HEIGHT: 66 IN | WEIGHT: 237 LBS | HEART RATE: 88 BPM | TEMPERATURE: 98.1 F

## 2021-01-29 DIAGNOSIS — J06.9 UPPER RESPIRATORY TRACT INFECTION, UNSPECIFIED TYPE: Primary | ICD-10-CM

## 2021-01-29 DIAGNOSIS — Z20.822 CLOSE EXPOSURE TO COVID-19 VIRUS: ICD-10-CM

## 2021-01-29 PROCEDURE — 99214 OFFICE O/P EST MOD 30 MIN: CPT | Performed by: INTERNAL MEDICINE

## 2021-01-29 NOTE — PATIENT INSTRUCTIONS
COVID-19 (Coronavirus Disease 2019)   WHAT YOU NEED TO KNOW:   COVID-19 is the disease caused by the novel (new) coronavirus first discovered in December 2019  Coronaviruses generally cause upper respiratory (nose, throat, and lung) infections, such as a cold  The new virus can also cause serious lower respiratory conditions, such as pneumonia or acute respiratory distress syndrome (ARDS)  Anyone can develop serious problems from the new virus, but your risk is higher if you are 65 or older  A weak immune system, diabetes, or a heart or lung condition can also increase your risk  DISCHARGE INSTRUCTIONS:   If you think you or someone you know may be infected:  Do the following to protect others:  · If emergency care is needed,  tell the  about the possible infection, or call ahead and tell the emergency department  · Call a healthcare provider  for instructions if symptoms are mild  Anyone who may be infected should not  arrive without calling first  The provider will need to protect staff members and other patients  · The person who may be infected needs to wear a face covering  while getting medical care  This will help lower the risk of infecting others  Coverings are not used for anyone who is younger than 2 years, has breathing problems, or cannot remove it  The provider can give you instructions for anyone who cannot wear a covering  Call your local emergency number (911 in the [address] [facility]) or go to the emergency department if:   · You have trouble breathing or shortness of breath at rest     · You have chest pain or pressure that lasts longer than 5 minutes  · You become confused or hard to wake  · Your lips or face are blue  · You have a fever of 104°F (40°C) or higher  Call your doctor if:   · You do not  have symptoms of COVID-19 but had close physical contact within 14 days with someone who tested positive  · You have questions or concerns about your condition or care      Medicines: You may need any of the following for mild symptoms:  · Decongestants  help reduce nasal congestion and help you breathe more easily  If you take decongestant pills, they may make you feel restless or cause problems with your sleep  Do not use decongestant sprays for more than a few days  · Cough suppressants  help reduce coughing  Ask your healthcare provider which type of cough medicine is best for you  · Acetaminophen  decreases pain and fever  It is available without a doctor's order  Ask how much to take and how often to take it  Follow directions  Read the labels of all other medicines you are using to see if they also contain acetaminophen, or ask your doctor or pharmacist  Acetaminophen can cause liver damage if not taken correctly  Do not use more than 4 grams (4,000 milligrams) total of acetaminophen in one day  · NSAIDs , such as ibuprofen, help decrease swelling, pain, and fever  NSAIDs can cause stomach bleeding or kidney problems in certain people  If you take blood thinner medicine, always ask your healthcare provider if NSAIDs are safe for you  Always read the medicine label and follow directions  · Take your medicine as directed  Contact your healthcare provider if you think your medicine is not helping or if you have side effects  Tell him or her if you are allergic to any medicine  Keep a list of the medicines, vitamins, and herbs you take  Include the amounts, and when and why you take them  Bring the list or the pill bottles to follow-up visits  Carry your medicine list with you in case of an emergency  How the 2019 coronavirus spreads: The virus spreads quickly and easily  You can become infected if you are in contact with a large amount of the virus, even for a short time  You can also become infected by being around a small amount of virus for a long time   The following are ways the virus is thought to spread, but more information may be coming:  · Droplets are the most common way all coronaviruses spread  The virus can travel in droplets that form when a person talks, coughs, or sneezes  Anyone who breathes in the droplets or gets them in his or her eyes can become infected with the virus  Close personal contact with an infected person is thought to be the main way the virus spreads  Close personal contact means you are within 6 feet (2 meters) of the person  · Person-to-person contact can spread the virus  For example, a person with the virus on his or her hands can spread it by shaking hands with someone  At this time, it does not appear that the virus can be passed to a baby during pregnancy or delivery  The baby can be infected after he or she is born through person-to-person contact  The virus also does not appear to spread in breast milk  If you are pregnant or breastfeeding, talk to your healthcare provider or obstetrician about any concerns you have  · The virus can stay on objects and surfaces  A person can get the virus on his or her hands by touching the object or surface  Infection happens if the person then touches his or her eyes or mouth with unwashed hands  It is not yet known how long the virus can stay on an object or surface  That is why it is important to clean all surfaces that are used regularly  · An infected animal may be able to infect a person who touches it  This may happen at live markets or on a farm  How everyone can lower the risk for COVID-19:  The best way to prevent infection is to avoid anyone who is infected, but this can be hard to do  An infected person can spread the virus before signs or symptoms begin, or even if signs or symptoms never develop  The following can help lower the risk for infection:      · Wash your hands often throughout the day  Use soap and water  Rub your soapy hands together, lacing your fingers  Wash the front and back of each hand, and in between your fingers   Use the fingers of one hand to scrub under the fingernails of the other hand  Wash for at least 20 seconds  Rinse with warm, running water for several seconds  Then dry your hands with a clean towel or paper towel  Use hand  that contains alcohol if soap and water are not available  Do not touch your eyes, nose, or mouth without washing your hands first  Teach children how to wash their hands and use hand   · Cover a sneeze or cough  This prevents droplets from traveling from you to others  Turn your face away and cover your mouth and nose with a tissue  Throw the tissue away  Use the bend of your arm if a tissue is not available  Then wash your hands well with soap and water or use hand   Turn and cover your face if you are around someone who is sneezing or coughing  Teach children how to cover a cough or sneeze  · Follow worldwide, national, and local social distancing guidelines  Social distancing means people avoid close physical contact so the virus cannot spread from one person to another  Keep at least 6 feet (2 meters) between you and others  Also keep this distance from anyone who comes to your home, such as someone making a delivery  · Make a habit of not touching your face  It is not known how long the virus can stay on objects and surfaces  If you get the virus on your hands, you can transfer it to your eyes, nose, or mouth and become infected  You can also transfer it to objects, surfaces, or people  Be aware of what you touch when you go out  Examples include handrails and elevator buttons  Try not to touch anything with bare hands unless it is necessary  Wash your hands before you leave your home and when you return  · Clean and disinfect high-touch surfaces and objects often  Use a disinfecting solution or wipes  You can make a solution by diluting 4 teaspoons of bleach in 1 quart (4 cups) of water   Clean and disinfect even if you think no one living in or coming to your home is infected with the virus  You can wipe items with a disinfecting cloth before you bring them into your home  Wash your hands after you handle anything you bring into your home  · Make your immune system as healthy as possible  A weakened immune system makes you more vulnerable to the new coronavirus  No COVID-19 vaccine is available yet  Vaccines such as the flu and pneumonia vaccines can help your immune system  Your healthcare provider can tell you which vaccines to get, and when to get them  Keep your immune system as strong as possible  Do not smoke  Eat healthy foods, exercise regularly, and try to manage stress  Go to bed and wake up at the same times each day  Social distancing guidelines:  National and local social distancing rules vary  Rules may change over time as restrictions are lifted  Restrictions may return if an outbreak happens where you live  It is important to know and follow all current social distancing rules in your area  The following are general guidelines:  · Limit trips out of your home  You may be able to have food, medicines, and other supplies delivered  If possible, have delivered items left at your door or other area  Try not to have someone hand you an item  You will be so close to the person that the virus can spread between you  · Do not have close physical contact with anyone who does not live in your home  Do not shake hands with, hug, or kiss a person as a greeting  Stand or walk as far from others as possible  If you must use public transportation (such as a bus or subway), try to sit or stand away from others  You can stay safely connected with others through phone calls, e-mail messages, social media websites, and video chats  Check in on anyone who may be having a hard time socially distancing, or who lives alone  Ask administrators at nursing homes or long-term care facilities how you can safely communicate with someone living there      · Wear a cloth face covering around others who do not live in your home  Face coverings help prevent the virus from spreading to others in droplets  You can use a clear face covering if someone needs to read your lips  This is a cloth covering that has plastic over the mouth area so your lips can be seen  Do not use coverings that have breathing valves or vents  The virus can travel out of the valve or vent and be spread to others  Do not take your covering off to talk, cough, or sneeze  Do not use coverings on children younger than 2 years or on anyone who has breathing problems or cannot remove it  · Only allow medical or other necessary professionals into your home  Wear your face covering, and remind professionals to wear a face covering  Remind them to wash their hands when they arrive and before they leave  Do not  let anyone who does not live in your home in, even if the person is not sick  A person can pass the virus to others before symptoms of COVID-19 begin  Some people never even develop symptoms  Children commonly have mild symptoms or no symptoms  It may be hard to tell a child not to hug or kiss you  Explain that this is how he or she can help you stay healthy  · Do not go to someone else's home unless it is necessary  Do not go over to visit, even if the person is lonely  Only go if you need to help him or her  Make sure you both wear face coverings while you are there  · Avoid large gatherings and crowds  Gatherings or crowds of 10 or more individuals can cause the virus to spread  Examples of gatherings include parties, sporting events, [employer] services, and conferences  Crowds may form at beaches, [place], and tourist attractions  Protect yourself by staying away from large gatherings and crowds  · Ask your healthcare provider for other ways to have appointments  You may be able to have appointments without having to go into the provider's office  Some providers offer phone, video, or other types of appointments  You may also be able to get prescriptions for a few months of your medicines at a time  · Stay safe if you must go out to work  You may have a job that can only be done outside your home  Keep physical distance between you and other workers as much as possible  Follow your employer's rules so everyone stays safe  If you have COVID-19 and are recovering at home:  Healthcare providers will give you specific instructions to follow  The following are general guidelines to remind you how to keep others safe until you are well:  · Wash your hands often  Use soap and water as much as possible  You can use hand  that contains alcohol if soap and water are not available  Do not share towels with anyone  If you use paper towels, throw them away in a lined trash can kept in your room or area  Use a covered trash can, if possible  · Do not go out of your home unless it is necessary  You may have to go to your healthcare provider's office for check-ups or to get prescription refills  Do not arrive at the provider's office without an appointment  Providers have to make their offices safe for staff and other patients  · Do not have close physical contact with anyone unless it is necessary  Only have close physical contact with a person giving direct care, or a baby or child you must care for  Family members and friends should not visit you  If possible, stay in a separate area or room of your home if you live with others  No one should go into the area or room except to give you care  You can visit with others by phone, video chat, e-mail, or similar systems  It is important to stay connected with others in your life while you recover  · Wear a face covering while others are near you  This can help prevent droplets from spreading the virus when you talk, sneeze, or cough  Put the covering on before anyone comes into your room or area   Remind the person to cover his or her nose and mouth before going in to provide care for you  · Do not share items  Do not share dishes, towels, or other items with anyone  Items need to be washed after you use them  · Protect your baby  Wash your hands with soap and water often throughout the day  Wear a clean face covering while you breastfeed, or while you express or pump breast milk  If possible, ask someone who is well to care for your baby  You can put breast milk in bottles for the person to use, if needed  Talk to your healthcare provider if you have any questions or concerns about caring for or bonding with your baby  He or she will tell you when to bring your baby in for check-ups and vaccines  He or she will also tell you what to do if you think your baby was infected with the new virus  · Do not handle live animals  Until more is known, it is best not to touch, play with, or handle live animals  Some animals, including pets, have been infected with the new coronavirus  Do not handle or care for animals until you are well  Care includes feeding, petting, and cuddling your pet  Do not let your pet lick you or share your food  Ask someone who is not infected to take care of your pet, if possible  If you must care for a pet, wear a face covering  Wash your hands before and after you give care  · Follow directions from your healthcare provider for being around others after you recover  You will need to wait at least 10 days after symptoms first appeared  Then you will need to have no fever for 24 hours without fever medicine, and no other symptoms  A loss of taste or smell may continue for several months  It is considered okay to be around others if this is your only symptom  It is not known for sure if or for how long a recovered person can pass the virus to others  Your provider may give you instructions, such as continuing social distancing or wearing a face covering around others      How to take care of someone who has COVID-19:  If the person lives in another home, arrange for a time to give care  Remember to bring a few pairs of disposable gloves and a cloth face covering  The following are general guidelines to help you safely care for anyone who has COVID-19:  · Wash your hands often  Wash before and after you go into the person's home, area, or room  Throw paper towels away in a lined trash can that has a lid, if possible  · Do not allow others to go near the person  No one should come into the person's home unless it is necessary  If possible, the person should be in a separate area or room if he or she lives with others  Keep the room's door shut unless you need to go in or out  Have others call, video chat, or e-mail the person if he or she is feeling well enough  The person may feel lonely if he or she is kept separate for a long period of time  Safe communication can help him or her stay connected to family and friends  · Make sure the person's room has good air flow  You may be able to open the window if the weather allows  An air conditioner can also be turned on to help air move  · Contact the person before you go in to give care  Make sure the person is wearing a face covering  Remind him or her to wash his or her hands with soap and water  He or she can use hand  that contains alcohol if soap and water are not available  Put on a face covering before you go in to give care  · Wear gloves while you give care and clean  Clean items the person uses often  Clean countertops, cooking surfaces, and the fronts and insides of the microwave and refrigerator  Clean the shower, toilet, the area around the toilet, the sink, the area around the sink, and faucets  Gather used laundry or bedding  Wash and dry items on the warmest settings the fabric allows  Wash dishes and silverware in hot, soapy water or in a   · Anything you throw away needs to go into a lined trash can    When you need to empty the trash, close the open end of the lining and tie it closed  This helps prevent items the virus is on from spilling out of the trash  Remove your gloves and throw them away  Wash your hands  Follow up with your doctor as directed:  Write down your questions so you remember to ask them during your visits  For more information:   · Centers for Disease Control and Prevention  [address] , [address]  Phone: 5- 155 - 224-6365  Web Address: [employer] com br    © Copyright [address] Information is for End User's use only and may not be sold, redistributed or otherwise used for commercial purposes  All illustrations and images included in CareNotes® are the copyrighted property of [employer] M [employer]  or [facility] Jesús Bright   The above information is an  only  It is not intended as medical advice for individual conditions or treatments  Talk to your doctor, nurse or pharmacist before following any medical regimen to see if it is safe and effective for you

## 2021-01-29 NOTE — PROGRESS NOTES
COVID-19 Virtual Visit     Assessment/Plan:    Problem List Items Addressed This Visit     None      Visit Diagnoses     Upper respiratory tract infection, unspecified type    -  Primary    Close exposure to COVID-19 virus             Disposition:     I recommended continued isolation until at least 24 hours have passed since recovery defined as resolution of fever without the use of fever-reducing medications AND improvement in COVID symptoms AND 10 days have passed since onset of symptoms (or 10 days have passed since date of first positive viral diagnostic test for asymptomatic patients)  A/P: Day # 16 since onset of COVID s/s  Covid test was negative  Doing well and no fever or chills  Some nasal congestion and cough with SOB  RA SAT are 96%  All s/s slowly improving  Can lift isolation and can go back to work wearing a mask and distancing  Finish abx    Continue increase fluids, PRN motrin/tylenol, and breathing exercises  RTC as scheduled for f/u  I have spent 15 minutes directly with the patient  Greater than 50% of this time was spent in counseling/coordination of care regarding: prognosis, risks and benefits of treatment options, instructions for management, patient and family education, importance of treatment compliance, risk factor reductions and impressions          Encounter provider Tucker Tanner DO    Provider located at [address] [place] [id number]    Recent Visits  Date Type Provider Dept   01/28/21 [facility]   01[facility]21 [facility]   01/26/21 Telephone [facility]   Showing recent visits within past 7 days and meeting all other requirements     Today's Visits  Date Type Provider Dept   01/29/21 Telemedicine DO [facility] today's visits and meeting all other requirements     Future Appointments  No visits were found meeting these conditions  Showing future appointments within next 150 days and meeting all other requirements      This virtual check-in was done via [employer] and patient was informed that this is a secure, HIPAA-compliant platform  She agrees to proceed  Patient agrees to participate in a virtual check in via telephone or video visit instead of presenting to the office to address urgent/immediate medical needs  Patient is aware this is a billable service  After connecting through [facility], the patient was identified by name and date of birth  Rian Tejeda was informed that this was a telemedicine visit and that the exam was being conducted confidentially over secure lines  My office door was closed  No one else was in the room  Rian Tejeda acknowledged consent and understanding of privacy and security of the telemedicine visit  I informed the patient that I have reviewed her record in Epic and presented the opportunity for her to ask any questions regarding the visit today  The patient agreed to participate  Subjective:   Rian Tejeda is a 30 y o  female who has been screened for COVID-19  Symptom change since last report: improving  Patient's symptoms include nasal congestion, rhinorrhea, sore throat, cough and shortness of breath  Patient denies fever, chills, fatigue, anosmia, loss of taste, chest tightness, abdominal pain, nausea, vomiting, diarrhea, myalgias and headaches  Akilah Valadez has been staying home and has isolated themselves in her home  She is taking care to not share personal items and is cleaning all surfaces that are touched often, like counters, tabletops, and doorknobs using household cleaning sprays or wipes  She is wearing a mask when she leaves her room       Date of symptom onset: 1/13/2021    Lab Results   Component Value Date    SARSCOV2 Negative 01/27/2021    SARSCOV2 Not Detected 01/11/2021     Past Medical History: Diagnosis Date    CHF (congestive heart failure) (HCC)     Chronic back pain     Generalized anxiety disorder     Hypertension     Kidney stone     Obesity     Primary osteoarthritis of left knee 2020     Past Surgical History:   Procedure Laterality Date     SECTION      x 2    KNEE ARTHROSCOPY Left     acl/meniscus repair    [place] CYSTO/URETERO W/LITHOTRIPSY &INDWELL STENT INSRT Right 2018    Procedure: CYSTOSCOPY URETEROSCOPY, RETROGRADE PYELOGRAM AND INSERTION STENT URETERAL, RIGHT STONE EXTRACTION;  Surgeon: Jose Og MD;  [facility];  Service: Urology    GA CYSTOURETHROSCOPY,URETER CATHETER Right 1/15/2018    Procedure: CYSTOSCOPY RETROGRADE PYELOGRAM WITH INSERTION STENT URETERAL;  Surgeon: Oli Bolden MD;  [facility];  Service: Urology    TUBAL LIGATION       Current Outpatient Medications   Medication Sig Dispense Refill    albuterol (PROVENTIL HFA,VENTOLIN HFA) 90 mcg/act inhaler Inhale 2 puffs every 6 (six) hours as needed for wheezing or shortness of breath 3 Inhaler 3    amLODIPine (NORVASC) 10 mg tablet Take 1 tablet (10 mg total) by mouth daily 90 tablet 1    azithromycin (ZITHROMAX) 250 mg tablet Take 2 tablets today then 1 tablet daily x 4 days 6 tablet 0    fluticasone (FLONASE) 50 mcg/act nasal spray 1 spray into each nostril daily (Patient not taking: Reported on 2021) 16 g 0    guaiFENesin (Mucinex) 600 mg 12 hr tablet Take 1 tablet (600 mg total) by mouth every 12 (twelve) hours 20 tablet 0    labetalol (NORMODYNE) 300 mg tablet Take 1 tablet (300 mg total) by mouth 2 (two) times a day 30 tablet 5    labetalol (NORMODYNE) 300 mg tablet TAKE 1 TABLET (300 MG TOTAL) BY MOUTH 3 (THREE) TIMES A DAY 90 tablet 0    levocetirizine (XYZAL) 5 MG tablet Take 1 tablet (5 mg total) by mouth every evening (Patient not taking: Reported on 2021) 30 tablet 0    lidocaine (LIDODERM) 5 % APPLY 2 PATCHES TOPICALLY DAILY FOR 15 DAYS REMOVE & DISCARD PATCH WITHIN 12 HOURS OR AS DIRECTED BY MD 30 patch 0    losartan-hydrochlorothiazide (HYZAAR) 50-12 5 mg per tablet Take 1 tablet by mouth daily 30 tablet 5     No current facility-administered medications for this visit  No Known Allergies    Review of Systems   Constitutional: Negative for activity change, chills, diaphoresis, fatigue and fever  HENT: Positive for congestion, postnasal drip, rhinorrhea and sore throat  Negative for sinus pressure and sinus pain  No loss of taste/smell  Respiratory: Positive for cough and shortness of breath  Negative for chest tightness and wheezing  Cardiovascular: Negative for chest pain, palpitations and leg swelling  Gastrointestinal: Negative for abdominal pain, constipation, diarrhea, nausea and vomiting  Genitourinary: Negative for difficulty urinating, dysuria and frequency  Musculoskeletal: Negative for arthralgias, gait problem and myalgias  Neurological: Negative for light-headedness and headaches  Psychiatric/Behavioral: Negative for confusion  The patient is not nervous/anxious  Objective:    Vitals:    01/29/21 1040   Pulse: 88   Temp: 98 1 °F (36 7 °C)   SpO2: 96%   Weight: 108 kg (237 lb)   Height: 5' 6" (1 676 m)       Physical Exam  Vitals signs and nursing note reviewed  Constitutional:       General: She is not in acute distress  Appearance: Normal appearance  She is not ill-appearing  HENT:      Head: Normocephalic and atraumatic  Eyes:      Extraocular Movements: Extraocular movements intact  Conjunctiva/sclera: Conjunctivae normal       Pupils: Pupils are equal, round, and reactive to light  Pulmonary:      Effort: Pulmonary effort is normal  No respiratory distress  Neurological:      General: No focal deficit present  Mental Status: She is alert and oriented to person, place, and time  Mental status is at baseline     Psychiatric:         Mood and Affect: Mood normal  Behavior: Behavior normal          Thought Content: Thought content normal          Judgment: Judgment normal        VIRTUAL VISIT DISCLAIMER    Elías Lujan acknowledges that she has consented to an online visit or consultation  She understands that the online visit is based solely on information provided by her, and that, in the absence of a face-to-face physical evaluation by the physician, the diagnosis she receives is both limited and provisional in terms of accuracy and completeness  This is not intended to replace a full medical face-to-face evaluation by the physician  Elías Lujan understands and accepts these terms

## 2021-01-29 NOTE — TELEPHONE ENCOUNTER
Called the patient on multiple occasions   stating she was sleeping and would call back  Never heard back from the patient  Called again today on 1-29-21 and the mailbox is full and you can't leave a message

## 2021-02-05 ENCOUNTER — LAB REQUISITION (OUTPATIENT)
Dept: LAB | Facility: HOSPITAL | Age: 36
End: 2021-02-05

## 2021-02-05 DIAGNOSIS — Z11.59 ENCOUNTER FOR SCREENING FOR OTHER VIRAL DISEASES: ICD-10-CM

## 2021-02-05 PROCEDURE — U0005 INFEC AGEN DETEC AMPLI PROBE: HCPCS | Performed by: INTERNAL MEDICINE

## 2021-02-05 PROCEDURE — U0003 INFECTIOUS AGENT DETECTION BY NUCLEIC ACID (DNA OR RNA); SEVERE ACUTE RESPIRATORY SYNDROME CORONAVIRUS 2 (SARS-COV-2) (CORONAVIRUS DISEASE [COVID-19]), AMPLIFIED PROBE TECHNIQUE, MAKING USE OF HIGH THROUGHPUT TECHNOLOGIES AS DESCRIBED BY CMS-2020-01-R: HCPCS | Performed by: INTERNAL MEDICINE

## 2021-02-06 LAB — SARS-COV-2 N GENE RESP QL NAA+PROBE: NEGATIVE

## 2021-02-09 ENCOUNTER — LAB REQUISITION (OUTPATIENT)
Dept: LAB | Facility: HOSPITAL | Age: 36
End: 2021-02-09

## 2021-02-09 DIAGNOSIS — Z11.59 ENCOUNTER FOR SCREENING FOR OTHER VIRAL DISEASES: ICD-10-CM

## 2021-02-09 PROCEDURE — 87635 SARS-COV-2 COVID-19 AMP PRB: CPT | Performed by: INTERNAL MEDICINE

## 2021-02-10 LAB — SARS-COV-2 RNA RESP QL NAA+PROBE: NEGATIVE

## 2021-02-12 ENCOUNTER — LAB REQUISITION (OUTPATIENT)
Dept: LAB | Facility: HOSPITAL | Age: 36
End: 2021-02-12

## 2021-02-12 DIAGNOSIS — Z11.59 ENCOUNTER FOR SCREENING FOR OTHER VIRAL DISEASES: ICD-10-CM

## 2021-02-12 PROCEDURE — U0003 INFECTIOUS AGENT DETECTION BY NUCLEIC ACID (DNA OR RNA); SEVERE ACUTE RESPIRATORY SYNDROME CORONAVIRUS 2 (SARS-COV-2) (CORONAVIRUS DISEASE [COVID-19]), AMPLIFIED PROBE TECHNIQUE, MAKING USE OF HIGH THROUGHPUT TECHNOLOGIES AS DESCRIBED BY CMS-2020-01-R: HCPCS | Performed by: INTERNAL MEDICINE

## 2021-02-12 PROCEDURE — U0005 INFEC AGEN DETEC AMPLI PROBE: HCPCS | Performed by: INTERNAL MEDICINE

## 2021-02-13 LAB — SARS-COV-2 N GENE RESP QL NAA+PROBE: NEGATIVE

## 2021-02-15 ENCOUNTER — LAB REQUISITION (OUTPATIENT)
Dept: LAB | Facility: HOSPITAL | Age: 36
End: 2021-02-15

## 2021-02-15 DIAGNOSIS — Z11.59 ENCOUNTER FOR SCREENING FOR OTHER VIRAL DISEASES: ICD-10-CM

## 2021-02-15 LAB — SARS-COV-2 N GENE RESP QL NAA+PROBE: NEGATIVE

## 2021-02-15 PROCEDURE — U0005 INFEC AGEN DETEC AMPLI PROBE: HCPCS | Performed by: INTERNAL MEDICINE

## 2021-02-15 PROCEDURE — U0003 INFECTIOUS AGENT DETECTION BY NUCLEIC ACID (DNA OR RNA); SEVERE ACUTE RESPIRATORY SYNDROME CORONAVIRUS 2 (SARS-COV-2) (CORONAVIRUS DISEASE [COVID-19]), AMPLIFIED PROBE TECHNIQUE, MAKING USE OF HIGH THROUGHPUT TECHNOLOGIES AS DESCRIBED BY CMS-2020-01-R: HCPCS | Performed by: INTERNAL MEDICINE

## 2021-02-19 ENCOUNTER — LAB REQUISITION (OUTPATIENT)
Dept: LAB | Facility: HOSPITAL | Age: 36
End: 2021-02-19

## 2021-02-19 DIAGNOSIS — Z11.59 ENCOUNTER FOR SCREENING FOR OTHER VIRAL DISEASES: ICD-10-CM

## 2021-02-19 PROCEDURE — U0005 INFEC AGEN DETEC AMPLI PROBE: HCPCS | Performed by: INTERNAL MEDICINE

## 2021-02-19 PROCEDURE — U0003 INFECTIOUS AGENT DETECTION BY NUCLEIC ACID (DNA OR RNA); SEVERE ACUTE RESPIRATORY SYNDROME CORONAVIRUS 2 (SARS-COV-2) (CORONAVIRUS DISEASE [COVID-19]), AMPLIFIED PROBE TECHNIQUE, MAKING USE OF HIGH THROUGHPUT TECHNOLOGIES AS DESCRIBED BY CMS-2020-01-R: HCPCS | Performed by: INTERNAL MEDICINE

## 2021-02-20 LAB — SARS-COV-2 N GENE RESP QL NAA+PROBE: NEGATIVE

## 2021-02-23 ENCOUNTER — LAB REQUISITION (OUTPATIENT)
Dept: LAB | Facility: HOSPITAL | Age: 36
End: 2021-02-23

## 2021-02-23 DIAGNOSIS — Z11.59 ENCOUNTER FOR SCREENING FOR OTHER VIRAL DISEASES: ICD-10-CM

## 2021-02-23 PROCEDURE — 87635 SARS-COV-2 COVID-19 AMP PRB: CPT | Performed by: INTERNAL MEDICINE

## 2021-02-24 LAB — SARS-COV-2 RNA RESP QL NAA+PROBE: NEGATIVE

## 2021-02-26 ENCOUNTER — LAB REQUISITION (OUTPATIENT)
Dept: LAB | Facility: HOSPITAL | Age: 36
End: 2021-02-26

## 2021-02-26 DIAGNOSIS — Z11.59 ENCOUNTER FOR SCREENING FOR OTHER VIRAL DISEASES: ICD-10-CM

## 2021-02-26 LAB — SARS-COV-2 RNA RESP QL NAA+PROBE: NEGATIVE

## 2021-02-26 PROCEDURE — 87635 SARS-COV-2 COVID-19 AMP PRB: CPT | Performed by: INTERNAL MEDICINE

## 2021-03-01 ENCOUNTER — LAB REQUISITION (OUTPATIENT)
Dept: LAB | Facility: HOSPITAL | Age: 36
End: 2021-03-01

## 2021-03-01 DIAGNOSIS — Z11.59 ENCOUNTER FOR SCREENING FOR OTHER VIRAL DISEASES: ICD-10-CM

## 2021-03-01 PROCEDURE — U0005 INFEC AGEN DETEC AMPLI PROBE: HCPCS | Performed by: INTERNAL MEDICINE

## 2021-03-01 PROCEDURE — U0003 INFECTIOUS AGENT DETECTION BY NUCLEIC ACID (DNA OR RNA); SEVERE ACUTE RESPIRATORY SYNDROME CORONAVIRUS 2 (SARS-COV-2) (CORONAVIRUS DISEASE [COVID-19]), AMPLIFIED PROBE TECHNIQUE, MAKING USE OF HIGH THROUGHPUT TECHNOLOGIES AS DESCRIBED BY CMS-2020-01-R: HCPCS | Performed by: INTERNAL MEDICINE

## 2021-03-02 LAB — SARS-COV-2 RNA RESP QL NAA+PROBE: NEGATIVE

## 2021-03-09 ENCOUNTER — LAB REQUISITION (OUTPATIENT)
Dept: LAB | Facility: HOSPITAL | Age: 36
End: 2021-03-09

## 2021-03-09 DIAGNOSIS — Z11.59 ENCOUNTER FOR SCREENING FOR OTHER VIRAL DISEASES: ICD-10-CM

## 2021-03-09 LAB — SARS-COV-2 RNA RESP QL NAA+PROBE: NEGATIVE

## 2021-03-09 PROCEDURE — U0003 INFECTIOUS AGENT DETECTION BY NUCLEIC ACID (DNA OR RNA); SEVERE ACUTE RESPIRATORY SYNDROME CORONAVIRUS 2 (SARS-COV-2) (CORONAVIRUS DISEASE [COVID-19]), AMPLIFIED PROBE TECHNIQUE, MAKING USE OF HIGH THROUGHPUT TECHNOLOGIES AS DESCRIBED BY CMS-2020-01-R: HCPCS | Performed by: INTERNAL MEDICINE

## 2021-03-09 PROCEDURE — U0005 INFEC AGEN DETEC AMPLI PROBE: HCPCS | Performed by: INTERNAL MEDICINE

## 2021-03-11 ENCOUNTER — HOSPITAL ENCOUNTER (OUTPATIENT)
Dept: RADIOLOGY | Facility: HOSPITAL | Age: 36
Discharge: HOME | End: 2021-03-11
Payer: COMMERCIAL

## 2021-03-11 ENCOUNTER — TELEMEDICINE (OUTPATIENT)
Dept: INTERNAL MEDICINE CLINIC | Facility: CLINIC | Age: 36
End: 2021-03-11
Payer: COMMERCIAL

## 2021-03-11 VITALS — TEMPERATURE: 101.4 F | HEIGHT: 66 IN | WEIGHT: 237 LBS | BODY MASS INDEX: 38.09 KG/M2

## 2021-03-11 DIAGNOSIS — R50.9 FEVER OF UNKNOWN ORIGIN: Primary | ICD-10-CM

## 2021-03-11 DIAGNOSIS — R50.9 FEVER OF UNKNOWN ORIGIN: ICD-10-CM

## 2021-03-11 PROCEDURE — 74018 RADEX ABDOMEN 1 VIEW: CPT

## 2021-03-11 PROCEDURE — 99214 OFFICE O/P EST MOD 30 MIN: CPT | Performed by: NURSE PRACTITIONER

## 2021-03-11 NOTE — ASSESSMENT & PLAN NOTE
· Illness of unknown origin since 1/9/2021  · Patient with wax and wain of vomiting, diarrhea, nausea  · Frequent elevated temperatures of 103 0  · Will send patient for KUB to rule out obstruction and/or kidney stones  · Will send patient for lab work    · Patient sent for urinalysis  ·  note for patient's work to be off 3/11/21 and 3/12/21  · Will re-evaluate on 3/12/21 at end of the day

## 2021-03-11 NOTE — PATIENT INSTRUCTIONS
Fever in Adults   WHAT YOU NEED TO KNOW:   A fever is an increase in your body temperature  Normal body temperature is 98 6°F (37°C)  Fever is generally defined as greater than 100 4°F (38°C)  Common causes include an infection, injury, or disease such as arthritis  DISCHARGE INSTRUCTIONS:   Return to the emergency department if:   · Your fever does not go away or gets worse even after treatment  · You have a stiff neck and a bad headache  · You are confused  You may not be able to think clearly or remember things like you normally do  · Your heart beats faster than usual even after treatment  · You have shortness of breath or chest pain when you breathe  · You urinate small amounts or not at all  · Your skin, lips, or nails turn blue  Contact your healthcare provider if:   · You have abdominal pain or you feel bloated  · You have nausea or are vomiting  · You have pain or burning when you urinate, or you have pain in your back  · You have questions or concerns about your condition or care  Medicines: You may need any of the following:  · NSAIDs , such as ibuprofen, help decrease swelling, pain, and fever  This medicine is available with or without a doctor's order  NSAIDs can cause stomach bleeding or kidney problems in certain people  If you take blood thinner medicine, always ask if NSAIDs are safe for you  Always read the medicine label and follow directions  Do not give these medicines to children under 10months of age without direction from your child's healthcare provider  · Acetaminophen  decreases pain and fever  It is available without a doctor's order  Ask how much to take and how often to take it  Follow directions  Read the labels of all other medicines you are using to see if they also contain acetaminophen, or ask your doctor or pharmacist  Acetaminophen can cause liver damage if not taken correctly   Do not use more than 4 grams (4,000 milligrams) total of acetaminophen in one day  · Antibiotics  may be given if you have an infection caused by bacteria  · Take your medicine as directed  Contact your healthcare provider if you think your medicine is not helping or if you have side effects  Tell him of her if you are allergic to any medicine  Keep a list of the medicines, vitamins, and herbs you take  Include the amounts, and when and why you take them  Bring the list or the pill bottles to follow-up visits  Carry your medicine list with you in case of an emergency  Follow up with your healthcare provider as directed:  Write down your questions so you remember to ask them during your visits  Self-care:   · Drink more liquids as directed  A fever makes you sweat  This can increase your risk for dehydration  Liquids can help prevent dehydration  ? Drink at least 6 to 8 eight-ounce cups of clear liquids each day  Drink water, juice, or broth  Do not drink sports drinks  They may contain caffeine  ? Ask your healthcare provider if you should drink an oral rehydration solution (ORS)  An ORS has the right amounts of water, salts, and sugar you need to replace body fluids  · Dress in lightweight clothes  Shivers may be a sign that your fever is rising  Do not put extra blankets or clothes on  This may cause your fever to rise even higher  Dress in light, comfortable clothing  Use a lightweight blanket or sheet when you sleep  Change your clothes, blanket, or sheets if they get wet  · Cool yourself safely  Take a bath in cool or lukewarm water  Use an ice pack wrapped in a small towel or wet a washcloth with cool water  Place the ice pack or wet washcloth on your forehead or the back of your neck  © Copyright [address] Information is for End User's use only and may not be sold, redistributed or otherwise used for commercial purposes   All illustrations and images included in CareNotes® are the copyrighted property of [employer]  or [address]  The above information is an  only  It is not intended as medical advice for individual conditions or treatments  Talk to your doctor, nurse or pharmacist before following any medical regimen to see if it is safe and effective for you

## 2021-03-11 NOTE — LETTER
March 11, 2021     Patient: Jessica Ulrich   YOB: 1985   Date of Visit: 3/11/2021       To Whom it May Concern:    Earl Parham is under my professional care  She was seen in my office on 3/11/2021  She may return to work on 3/13/2021 if testing (radiological and laboratory) results are determined to be normal  Patient will have a return visit with me on 3/12/2021 at the end of the day to review blood work and radiology results       If you have any questions or concerns, please don't hesitate to call           Sincerely,          TANGELA Dutta        CC: No Recipients

## 2021-03-11 NOTE — PROGRESS NOTES
Virtual Regular Visit      Assessment/Plan:    Problem List Items Addressed This Visit        Other    Fever of unknown origin - Primary     · Illness of unknown origin since 1/9/2021  · Patient with wax and wain of vomiting, diarrhea, nausea  · Frequent elevated temperatures of 103 0  · Will send patient for KUB to rule out obstruction and/or kidney stones  · Will send patient for lab work  ·  note for patient's work to be off 3/11/21 and 3/12/21  · Will re-evaluate on 3/12/21 at end of the day         Relevant Orders    CBC and differential    Comprehensive metabolic panel    TSH, 3rd generation    T4, free    Iron Panel (Includes Ferritin, Iron Sat%, Iron, and TIBC)    XR abdomen 1 view kub               Reason for visit is   Chief Complaint   Patient presents with   [address] [phone], pt c/o vomiting, headaches, bodyaches, chills, cough, wheezing, pt state's covid test tuesday was neg, pt needs note for work        Encounter provider Rojas Bush, [facility], [place]    Provider located at [address] [place] [address]      Recent Visits  No visits were found meeting these conditions  Showing recent visits within past 7 days and meeting all other requirements     Today's Visits  Date Type Provider Dept   03/11/21 Telemedicine T[facility]A [facility] today's visits and meeting all other requirements     Future Appointments  No visits were found meeting these conditions  Showing future appointments within next 150 days and meeting all other requirements        The patient was identified by name and date of birth  Silvia Bose was informed that this is a telemedicine visit and that the visit is being conducted through [employer] and patient was informed that this is not a secure, HIPAA-compliant platform  She agrees to proceed     My office door was closed  No one else was in the room    She acknowledged consent and understanding of privacy and security of the video platform  The patient has agreed to participate and understands they can discontinue the visit at any time  Patient is aware this is a billable service  Anjel Reagan is a 29 y o  female       Anjel Reagan is a 29 y o  female who presents for evaluation of fever  She has had the fever for several months  Symptoms have been gradually worsening  Symptoms are described as fevers up to 103 0 degrees, and are worse at all times of the day  Associated symptoms are abdominal pain, body aches, diarrhea, fatigue, nausea and urinary tract symptoms  Patient denies headache and URI symptoms  She has tried to alleviate the symptoms with acetaminophen and ibuprofen with no relief  The patient has no known comorbidities (structural heart/valvular disease, prosthetic joints, immunocompromised state, recent dental work, known abscesses)           Past Medical History:   Diagnosis Date    CHF (congestive heart failure) (HCC)     Chronic back pain     Generalized anxiety disorder     Hypertension     Kidney stone     Obesity     Primary osteoarthritis of left knee 2020       Past Surgical History:   Procedure Laterality Date     SECTION      x 2    KNEE ARTHROSCOPY Left     acl/meniscus repair    [place] CYSTO/URETERO W/LITHOTRIPSY &INDWELL STENT INSRT Right 2018    Procedure: CYSTOSCOPY URETEROSCOPY, RETROGRADE PYELOGRAM AND INSERTION STENT URETERAL, RIGHT STONE EXTRACTION;  Surgeon: Bg Blanton MD;  [facility];  Service: Urology    AK CYSTOURETHROSCOPY,URETER CATHETER Right 1/15/2018    Procedure: CYSTOSCOPY RETROGRADE PYELOGRAM WITH INSERTION STENT URETERAL;  Surgeon: Pan Solorzano MD;  [facility];  Service: Urology    TUBAL LIGATION         Current Outpatient Medications   Medication Sig Dispense Refill    albuterol (PROVENTIL HFA,VENTOLIN HFA) 90 mcg/act inhaler Inhale 2 puffs every 6 (six) hours as needed for wheezing or shortness of breath 3 Inhaler 3    amLODIPine (NORVASC) 10 mg tablet Take 1 tablet (10 mg total) by mouth daily 90 tablet 1    labetalol (NORMODYNE) 300 mg tablet TAKE 1 TABLET (300 MG TOTAL) BY MOUTH 3 (THREE) TIMES A DAY 90 tablet 0    losartan-hydrochlorothiazide (HYZAAR) 50-12 5 mg per tablet Take 1 tablet by mouth daily 30 tablet 5    fluticasone (FLONASE) 50 mcg/act nasal spray 1 spray into each nostril daily (Patient not taking: Reported on 1/27/2021) 16 g 0    guaiFENesin (Mucinex) 600 mg 12 hr tablet Take 1 tablet (600 mg total) by mouth every 12 (twelve) hours (Patient not taking: Reported on 3/11/2021) 20 tablet 0    labetalol (NORMODYNE) 300 mg tablet Take 1 tablet (300 mg total) by mouth 2 (two) times a day (Patient not taking: Reported on 3/11/2021) 30 tablet 5    levocetirizine (XYZAL) 5 MG tablet Take 1 tablet (5 mg total) by mouth every evening (Patient not taking: Reported on 1/11/2021) 30 tablet 0    lidocaine (LIDODERM) 5 % APPLY 2 PATCHES TOPICALLY DAILY FOR 15 DAYS REMOVE & DISCARD PATCH WITHIN 12 HOURS OR AS DIRECTED BY MD 30 patch 0     No current facility-administered medications for this visit  No Known Allergies    Review of Systems   Constitutional: Positive for diaphoresis  Negative for activity change, chills, fatigue and fever  HENT: Negative for rhinorrhea and sore throat  Eyes: Negative for pain  Respiratory: Negative for cough and shortness of breath  Cardiovascular: Negative for chest pain, palpitations and leg swelling  Gastrointestinal: Positive for diarrhea, nausea and vomiting  Negative for abdominal pain and constipation  Genitourinary: Negative for difficulty urinating, flank pain, frequency and urgency  Musculoskeletal: Positive for arthralgias  Negative for gait problem, joint swelling and myalgias  Skin: Negative for color change     Neurological: Negative for dizziness, weakness, light-headedness and headaches  Psychiatric/Behavioral: Negative for sleep disturbance  The patient is not nervous/anxious  All other systems reviewed and are negative  Video Exam    Vitals:    03/11/21 1315   Temp: (!) 101 4 °F (38 6 °C)   Weight: 108 kg (237 lb)   Height: 5' 6" (1 676 m)       Physical Exam  Constitutional:       General: She is awake  Appearance: Normal appearance  She is well-developed  HENT:      Head: Normocephalic  Nose: Nose normal       Mouth/Throat:      Mouth: Mucous membranes are moist    Eyes:      Extraocular Movements: Extraocular movements intact  Neck:      Musculoskeletal: Normal range of motion  Cardiovascular:      Rate and Rhythm: Normal rate  Pulmonary:      Effort: Pulmonary effort is normal    Abdominal:      Palpations: Abdomen is soft  Musculoskeletal: Normal range of motion  Skin:     General: Skin is warm and moist    Neurological:      Mental Status: She is alert and oriented to person, place, and time  Psychiatric:         Attention and Perception: Attention normal          Mood and Affect: Mood normal          Speech: Speech normal          Behavior: Behavior normal  Behavior is cooperative  I spent 30 minutes with patient today in which greater than 50% of the time was spent in counseling/coordination of care regarding medications for the future, radiology studies and laboratory studies  VIRTUAL VISIT DISCLAIMER    Vicky Carlos acknowledges that she has consented to an online visit or consultation  She understands that the online visit is based solely on information provided by her, and that, in the absence of a face-to-face physical evaluation by the physician, the diagnosis she receives is both limited and provisional in terms of accuracy and completeness  This is not intended to replace a full medical face-to-face evaluation by the physician  Vicky Carlos understands and accepts these terms

## 2021-03-12 ENCOUNTER — APPOINTMENT (OUTPATIENT)
Dept: LAB | Facility: HOSPITAL | Age: 36
End: 2021-03-12
Attending: INTERNAL MEDICINE
Payer: COMMERCIAL

## 2021-03-12 ENCOUNTER — TELEMEDICINE (OUTPATIENT)
Dept: INTERNAL MEDICINE CLINIC | Facility: CLINIC | Age: 36
End: 2021-03-12

## 2021-03-12 VITALS — BODY MASS INDEX: 38.09 KG/M2 | WEIGHT: 237 LBS | TEMPERATURE: 99.8 F | HEIGHT: 66 IN

## 2021-03-12 DIAGNOSIS — I10 ESSENTIAL HYPERTENSION: ICD-10-CM

## 2021-03-12 DIAGNOSIS — R06.02 SOB (SHORTNESS OF BREATH) ON EXERTION: ICD-10-CM

## 2021-03-12 DIAGNOSIS — I50.32 CHRONIC DIASTOLIC HEART FAILURE WITH PRESERVED EJECTION FRACTION (HCC): ICD-10-CM

## 2021-03-12 DIAGNOSIS — N30.01 ACUTE CYSTITIS WITH HEMATURIA: Primary | ICD-10-CM

## 2021-03-12 DIAGNOSIS — R50.9 FEVER OF UNKNOWN ORIGIN: ICD-10-CM

## 2021-03-12 LAB
ALBUMIN SERPL BCP-MCNC: 4.2 G/DL (ref 3.5–5.7)
ALP SERPL-CCNC: 48 U/L (ref 40–150)
ALT SERPL W P-5'-P-CCNC: 22 U/L (ref 7–52)
ANION GAP SERPL CALCULATED.3IONS-SCNC: 11 MMOL/L (ref 4–13)
AST SERPL W P-5'-P-CCNC: 21 U/L (ref 13–39)
BACTERIA UR QL AUTO: ABNORMAL /HPF
BASOPHILS # BLD AUTO: 0 THOUSANDS/ΜL (ref 0–0.1)
BASOPHILS NFR BLD AUTO: 0 % (ref 0–2)
BILIRUB SERPL-MCNC: 0.7 MG/DL (ref 0.2–1)
BILIRUB UR QL STRIP: ABNORMAL
BUN SERPL-MCNC: 19 MG/DL (ref 7–25)
CALCIUM SERPL-MCNC: 9.3 MG/DL (ref 8.6–10.5)
CHLORIDE SERPL-SCNC: 102 MMOL/L (ref 98–107)
CHOLEST SERPL-MCNC: 110 MG/DL (ref 0–200)
CLARITY UR: ABNORMAL
CO2 SERPL-SCNC: 25 MMOL/L (ref 21–31)
COLOR UR: YELLOW
CREAT SERPL-MCNC: 0.95 MG/DL (ref 0.6–1.2)
CREAT UR-MCNC: 275 MG/DL
EOSINOPHIL # BLD AUTO: 0.1 THOUSAND/ΜL (ref 0–0.61)
EOSINOPHIL NFR BLD AUTO: 2 % (ref 0–5)
ERYTHROCYTE [DISTWIDTH] IN BLOOD BY AUTOMATED COUNT: 13.2 % (ref 11.5–14.5)
FERRITIN SERPL-MCNC: 156 NG/ML (ref 8–388)
GFR SERPL CREATININE-BSD FRML MDRD: 78 ML/MIN/1.73SQ M
GLUCOSE P FAST SERPL-MCNC: 106 MG/DL (ref 65–99)
GLUCOSE UR STRIP-MCNC: NEGATIVE MG/DL
HCT VFR BLD AUTO: 43.2 % (ref 42–47)
HDLC SERPL-MCNC: 29 MG/DL
HGB BLD-MCNC: 14.8 G/DL (ref 12–16)
HGB UR QL STRIP.AUTO: ABNORMAL
IRON SATN MFR SERPL: 8 %
IRON SERPL-MCNC: 24 UG/DL (ref 50–170)
KETONES UR STRIP-MCNC: NEGATIVE MG/DL
LDLC SERPL CALC-MCNC: 62 MG/DL (ref 0–100)
LEUKOCYTE ESTERASE UR QL STRIP: ABNORMAL
LYMPHOCYTES # BLD AUTO: 1 THOUSANDS/ΜL (ref 0.6–4.47)
LYMPHOCYTES NFR BLD AUTO: 19 % (ref 21–51)
MCH RBC QN AUTO: 29 PG (ref 26–34)
MCHC RBC AUTO-ENTMCNC: 34.2 G/DL (ref 31–37)
MCV RBC AUTO: 85 FL (ref 81–99)
MICROALBUMIN UR-MCNC: 1660 MG/L (ref 0–20)
MICROALBUMIN/CREAT 24H UR: 604 MG/G CREATININE (ref 0–30)
MONOCYTES # BLD AUTO: 0.9 THOUSAND/ΜL (ref 0.17–1.22)
MONOCYTES NFR BLD AUTO: 16 % (ref 2–12)
NEUTROPHILS # BLD AUTO: 3.3 THOUSANDS/ΜL (ref 1.4–6.5)
NEUTS SEG NFR BLD AUTO: 62 % (ref 42–75)
NITRITE UR QL STRIP: NEGATIVE
NON-SQ EPI CELLS URNS QL MICRO: ABNORMAL /HPF
PH UR STRIP.AUTO: 6 [PH]
PLATELET # BLD AUTO: 177 THOUSANDS/UL (ref 149–390)
PMV BLD AUTO: 8.5 FL (ref 8.6–11.7)
POTASSIUM SERPL-SCNC: 3.4 MMOL/L (ref 3.5–5.5)
PROT SERPL-MCNC: 7.5 G/DL (ref 6.4–8.9)
PROT UR STRIP-MCNC: ABNORMAL MG/DL
RBC # BLD AUTO: 5.09 MILLION/UL (ref 3.9–5.2)
RBC #/AREA URNS AUTO: ABNORMAL /HPF
SODIUM SERPL-SCNC: 138 MMOL/L (ref 134–143)
SP GR UR STRIP.AUTO: >=1.03 (ref 1–1.03)
T4 FREE SERPL-MCNC: 1.31 NG/DL (ref 0.76–1.46)
TIBC SERPL-MCNC: 296 UG/DL (ref 250–450)
TRIGL SERPL-MCNC: 93 MG/DL (ref 44–166)
TSH SERPL DL<=0.05 MIU/L-ACNC: 1.86 UIU/ML (ref 0.45–5.33)
UROBILINOGEN UR QL STRIP.AUTO: 2 E.U./DL
WBC # BLD AUTO: 5.3 THOUSAND/UL (ref 4.8–10.8)
WBC #/AREA URNS AUTO: ABNORMAL /HPF

## 2021-03-12 PROCEDURE — 36415 COLL VENOUS BLD VENIPUNCTURE: CPT

## 2021-03-12 PROCEDURE — 81001 URINALYSIS AUTO W/SCOPE: CPT

## 2021-03-12 PROCEDURE — 80053 COMPREHEN METABOLIC PANEL: CPT

## 2021-03-12 PROCEDURE — 82570 ASSAY OF URINE CREATININE: CPT | Performed by: INTERNAL MEDICINE

## 2021-03-12 PROCEDURE — 84439 ASSAY OF FREE THYROXINE: CPT

## 2021-03-12 PROCEDURE — 84443 ASSAY THYROID STIM HORMONE: CPT

## 2021-03-12 PROCEDURE — 83540 ASSAY OF IRON: CPT

## 2021-03-12 PROCEDURE — 82043 UR ALBUMIN QUANTITATIVE: CPT | Performed by: INTERNAL MEDICINE

## 2021-03-12 PROCEDURE — 99213 OFFICE O/P EST LOW 20 MIN: CPT | Performed by: NURSE PRACTITIONER

## 2021-03-12 PROCEDURE — 87077 CULTURE AEROBIC IDENTIFY: CPT

## 2021-03-12 PROCEDURE — 87186 SC STD MICRODIL/AGAR DIL: CPT

## 2021-03-12 PROCEDURE — 87086 URINE CULTURE/COLONY COUNT: CPT

## 2021-03-12 PROCEDURE — 82728 ASSAY OF FERRITIN: CPT

## 2021-03-12 PROCEDURE — 85025 COMPLETE CBC W/AUTO DIFF WBC: CPT

## 2021-03-12 PROCEDURE — 80061 LIPID PANEL: CPT

## 2021-03-12 PROCEDURE — 83550 IRON BINDING TEST: CPT

## 2021-03-12 RX ORDER — BUDESONIDE AND FORMOTEROL FUMARATE DIHYDRATE 80; 4.5 UG/1; UG/1
2 AEROSOL RESPIRATORY (INHALATION) 2 TIMES DAILY
Qty: 1 INHALER | Refills: 1 | Status: SHIPPED | OUTPATIENT
Start: 2021-03-12 | End: 2021-07-08 | Stop reason: SDUPTHER

## 2021-03-12 RX ORDER — PREDNISONE 10 MG/1
TABLET ORAL
Qty: 21 TABLET | Refills: 0 | Status: SHIPPED | OUTPATIENT
Start: 2021-03-12 | End: 2021-03-21

## 2021-03-12 RX ORDER — SULFAMETHOXAZOLE AND TRIMETHOPRIM 800; 160 MG/1; MG/1
1 TABLET ORAL EVERY 12 HOURS SCHEDULED
Qty: 14 TABLET | Refills: 0 | Status: SHIPPED | OUTPATIENT
Start: 2021-03-12 | End: 2021-03-19

## 2021-03-12 NOTE — PROGRESS NOTES
Assessment/Plan:      Diagnoses and all orders for this visit:    Acute cystitis with hematuria    ·   UA showed positive leukocytes, cloudy, sp  Gr of >1 030, 2 0 urobilinogen, 1+ bili, 3+ blood and innumerable bacteria  · Start Bactrim for 7 days  I have prescribed an antibiotic for the infection  Please take the antibiotic as prescribed and finish the entire prescription  I recommend you take an over the counter probiotic or eat yogurt with live cultures in it Angel Fritz) to keep good bacteria in the gut and help prevent diarrhea  Wash your hands frequently to prevent the spread of infection  You can use Ibuprofen and/or tylenol as needed for pain or fever  If not improving over the next 5 days, please call back to the office  Subjective:     Patient ID: Lorenza Tierney is a 29 y o  female  Patient with fever of unknown origin on 3/11/2021  Sent patient for outpatient urinalysis  Positive results  Review of Systems   Reason unable to perform ROS: patient did not complain of dysuria  Objective:     Physical Exam  Vitals reviewed: see note from 3/11/2021

## 2021-03-12 NOTE — LETTER
March 12, 2021     Patient: Shaniqua Irving   YOB: 1985   Date of Visit: 3/12/2021       To Whom it May Concern:    Sahara Heath is under my professional care  She was seen in my office on 3/12/2021  She may return to work on 3/16/2021  If you have any questions or concerns, please don't hesitate to call           Sincerely,          TANGELA Gtz        CC: No Recipients

## 2021-03-12 NOTE — ASSESSMENT & PLAN NOTE
· UA showed positive leukocytes, cloudy, sp  Gr of >1 030, 2 0 urobilinogen, 1+ bili, 3+ blood and innumerable bacteria  · Start Bactrim for 7 days  I have prescribed an antibiotic for the infection  Please take the antibiotic as prescribed and finish the entire prescription  I recommend you take an over the counter probiotic or eat yogurt with live cultures in it Olivia Parham) to keep good bacteria in the gut and help prevent diarrhea  Wash your hands frequently to prevent the spread of infection  You can use Ibuprofen and/or tylenol as needed for pain or fever  If not improving over the next 5 days, please call back to the office

## 2021-03-12 NOTE — PATIENT INSTRUCTIONS
Urinary Tract Infection in Women   WHAT YOU NEED TO KNOW:   A urinary tract infection (UTI) is caused by bacteria that get inside your urinary tract  Most bacteria that enter your urinary tract come out when you urinate  If the bacteria stay in your urinary tract, you may get an infection  Your urinary tract includes your kidneys, ureters, bladder, and urethra  Urine is made in your kidneys, and it flows from the ureters to the bladder  Urine leaves the bladder through the urethra  A UTI is more common in your lower urinary tract, which includes your bladder and urethra  DISCHARGE INSTRUCTIONS:   Return to the emergency department if:   · You are urinating very little or not at all  · You have a high fever with shaking chills  · You have side or back pain that gets worse  Call your doctor if:   · You have a fever  · You do not feel better after 2 days of taking antibiotics  · You are vomiting  · You have questions or concerns about your condition or care  Medicines:   · Antibiotics  help fight a bacterial infection  If you have UTIs often (called recurrent UTIs), you may be given antibiotics to take regularly  You will be given directions for when and how to use antibiotics  The goal is to prevent UTIs but not cause antibiotic resistance by using antibiotics too often  · Medicines  may be given to decrease pain and burning when you urinate  They will also help decrease the feeling that you need to urinate often  These medicines will make your urine orange or red  · Take your medicine as directed  Contact your healthcare provider if you think your medicine is not helping or if you have side effects  Tell him or her if you are allergic to any medicine  Keep a list of the medicines, vitamins, and herbs you take  Include the amounts, and when and why you take them  Bring the list or the pill bottles to follow-up visits   Carry your medicine list with you in case of an emergency  Follow up with your healthcare provider as directed:  Write down your questions so you remember to ask them during your visits  Prevent another UTI:   · Empty your bladder often  Urinate and empty your bladder as soon as you feel the need  Do not hold your urine for long periods of time  · Wipe from front to back after you urinate or have a bowel movement  This will help prevent germs from getting into your urinary tract through your urethra  · Drink liquids as directed  Ask how much liquid to drink each day and which liquids are best for you  You may need to drink more liquids than usual to help flush out the bacteria  Do not drink alcohol, caffeine, or citrus juices  These can irritate your bladder and increase your symptoms  Your healthcare provider may recommend cranberry juice to help prevent a UTI  · Urinate after you have sex  This can help flush out bacteria passed during sex  · Do not douche or use feminine deodorants  These can change the chemical balance in your vagina  · Change sanitary pads or tampons often  This will help prevent germs from getting into your urinary tract  · Talk to your healthcare provider about your birth control method  You may need to change your method if it is increasing your risk for UTIs  · Wear cotton underwear and clothes that are loose  Tight pants and nylon underwear can trap moisture and cause bacteria to grow  · Vaginal estrogen may be recommended  This medicine helps prevent UTIs in women who have gone through menopause or are in gray-menopause  · Do pelvic muscle exercises often  Pelvic muscle exercises may help you start and stop urinating  Strong pelvic muscles may help you empty your bladder easier  Squeeze these muscles tightly for 5 seconds like you are trying to hold back urine  Then relax for 5 seconds  Gradually work up to squeezing for 10 seconds  Do 3 sets of 15 repetitions a day, or as directed      © Copyright [address] Information is for [employer] use only and may not be sold, redistributed or otherwise used for commercial purposes  All illustrations and images included in CareNotes® are the copyrighted property of A D A M , Inc  or Corazon Verma  The above information is an  only  It is not intended as medical advice for individual conditions or treatments  Talk to your doctor, nurse or pharmacist before following any medical regimen to see if it is safe and effective for you

## 2021-03-14 LAB — BACTERIA UR CULT: ABNORMAL

## 2021-03-15 DIAGNOSIS — I10 HTN (HYPERTENSION), MALIGNANT: ICD-10-CM

## 2021-03-15 DIAGNOSIS — M54.50 ACUTE LOW BACK PAIN, UNSPECIFIED BACK PAIN LATERALITY, UNSPECIFIED WHETHER SCIATICA PRESENT: ICD-10-CM

## 2021-03-15 DIAGNOSIS — M54.50 ACUTE LOW BACK PAIN, UNSPECIFIED BACK PAIN LATERALITY, UNSPECIFIED WHETHER SCIATICA PRESENT: Primary | ICD-10-CM

## 2021-03-15 RX ORDER — AMLODIPINE BESYLATE 5 MG/1
10 TABLET ORAL DAILY
Qty: 90 TABLET | Refills: 1 | Status: SHIPPED | OUTPATIENT
Start: 2021-03-15 | End: 2022-02-24 | Stop reason: SDUPTHER

## 2021-03-16 NOTE — TELEPHONE ENCOUNTER
pharmacy called back, they don't carry the lidocaine gel, its a special order, they want to know if they can change it to  %4cream or a %5 ointment they are the only 2 products they carry

## 2021-03-17 PROCEDURE — 87635 SARS-COV-2 COVID-19 AMP PRB: CPT | Performed by: NURSE PRACTITIONER

## 2021-03-17 RX ORDER — LIDOCAINE 50 MG/G
OINTMENT TOPICAL AS NEEDED
Qty: 35.44 G | Refills: 0 | Status: SHIPPED | OUTPATIENT
Start: 2021-03-17 | End: 2021-04-14 | Stop reason: SDUPTHER

## 2021-03-23 ENCOUNTER — LAB REQUISITION (OUTPATIENT)
Dept: LAB | Facility: HOSPITAL | Age: 36
End: 2021-03-23

## 2021-03-23 DIAGNOSIS — Z11.59 ENCOUNTER FOR SCREENING FOR OTHER VIRAL DISEASES: ICD-10-CM

## 2021-03-23 LAB — SARS-COV-2 RNA RESP QL NAA+PROBE: NEGATIVE

## 2021-03-23 PROCEDURE — U0005 INFEC AGEN DETEC AMPLI PROBE: HCPCS | Performed by: INTERNAL MEDICINE

## 2021-03-23 PROCEDURE — U0003 INFECTIOUS AGENT DETECTION BY NUCLEIC ACID (DNA OR RNA); SEVERE ACUTE RESPIRATORY SYNDROME CORONAVIRUS 2 (SARS-COV-2) (CORONAVIRUS DISEASE [COVID-19]), AMPLIFIED PROBE TECHNIQUE, MAKING USE OF HIGH THROUGHPUT TECHNOLOGIES AS DESCRIBED BY CMS-2020-01-R: HCPCS | Performed by: INTERNAL MEDICINE

## 2021-03-29 ENCOUNTER — LAB REQUISITION (OUTPATIENT)
Dept: LAB | Facility: HOSPITAL | Age: 36
End: 2021-03-29

## 2021-03-29 DIAGNOSIS — Z11.59 ENCOUNTER FOR SCREENING FOR OTHER VIRAL DISEASES: ICD-10-CM

## 2021-03-29 LAB — SARS-COV-2 RNA RESP QL NAA+PROBE: NEGATIVE

## 2021-03-29 PROCEDURE — U0003 INFECTIOUS AGENT DETECTION BY NUCLEIC ACID (DNA OR RNA); SEVERE ACUTE RESPIRATORY SYNDROME CORONAVIRUS 2 (SARS-COV-2) (CORONAVIRUS DISEASE [COVID-19]), AMPLIFIED PROBE TECHNIQUE, MAKING USE OF HIGH THROUGHPUT TECHNOLOGIES AS DESCRIBED BY CMS-2020-01-R: HCPCS | Performed by: INTERNAL MEDICINE

## 2021-03-29 PROCEDURE — U0005 INFEC AGEN DETEC AMPLI PROBE: HCPCS | Performed by: INTERNAL MEDICINE

## 2021-04-01 ENCOUNTER — IMMUNIZATIONS (OUTPATIENT)
Dept: FAMILY MEDICINE CLINIC | Facility: HOSPITAL | Age: 36
End: 2021-04-01

## 2021-04-01 DIAGNOSIS — Z23 ENCOUNTER FOR IMMUNIZATION: Primary | ICD-10-CM

## 2021-04-01 PROCEDURE — 0011A SARS-COV-2 / COVID-19 MRNA VACCINE (MODERNA) 100 MCG: CPT

## 2021-04-01 PROCEDURE — 91301 SARS-COV-2 / COVID-19 MRNA VACCINE (MODERNA) 100 MCG: CPT

## 2021-04-06 ENCOUNTER — LAB REQUISITION (OUTPATIENT)
Dept: LAB | Facility: HOSPITAL | Age: 36
End: 2021-04-06

## 2021-04-06 DIAGNOSIS — Z11.59 ENCOUNTER FOR SCREENING FOR OTHER VIRAL DISEASES: ICD-10-CM

## 2021-04-06 LAB — SARS-COV-2 RNA RESP QL NAA+PROBE: NEGATIVE

## 2021-04-06 PROCEDURE — U0003 INFECTIOUS AGENT DETECTION BY NUCLEIC ACID (DNA OR RNA); SEVERE ACUTE RESPIRATORY SYNDROME CORONAVIRUS 2 (SARS-COV-2) (CORONAVIRUS DISEASE [COVID-19]), AMPLIFIED PROBE TECHNIQUE, MAKING USE OF HIGH THROUGHPUT TECHNOLOGIES AS DESCRIBED BY CMS-2020-01-R: HCPCS | Performed by: INTERNAL MEDICINE

## 2021-04-06 PROCEDURE — U0005 INFEC AGEN DETEC AMPLI PROBE: HCPCS | Performed by: INTERNAL MEDICINE

## 2021-04-07 DIAGNOSIS — Z02.89 ENCOUNTER FOR PHYSICAL EXAMINATION RELATED TO EMPLOYMENT: Primary | ICD-10-CM

## 2021-04-12 ENCOUNTER — LAB REQUISITION (OUTPATIENT)
Dept: LAB | Facility: HOSPITAL | Age: 36
End: 2021-04-12

## 2021-04-12 DIAGNOSIS — Z11.59 ENCOUNTER FOR SCREENING FOR OTHER VIRAL DISEASES: ICD-10-CM

## 2021-04-12 LAB — SARS-COV-2 RNA RESP QL NAA+PROBE: NEGATIVE

## 2021-04-12 PROCEDURE — U0005 INFEC AGEN DETEC AMPLI PROBE: HCPCS | Performed by: INTERNAL MEDICINE

## 2021-04-12 PROCEDURE — U0003 INFECTIOUS AGENT DETECTION BY NUCLEIC ACID (DNA OR RNA); SEVERE ACUTE RESPIRATORY SYNDROME CORONAVIRUS 2 (SARS-COV-2) (CORONAVIRUS DISEASE [COVID-19]), AMPLIFIED PROBE TECHNIQUE, MAKING USE OF HIGH THROUGHPUT TECHNOLOGIES AS DESCRIBED BY CMS-2020-01-R: HCPCS | Performed by: INTERNAL MEDICINE

## 2021-04-14 DIAGNOSIS — M54.50 ACUTE LOW BACK PAIN, UNSPECIFIED BACK PAIN LATERALITY, UNSPECIFIED WHETHER SCIATICA PRESENT: ICD-10-CM

## 2021-04-14 RX ORDER — LIDOCAINE 50 MG/G
OINTMENT TOPICAL AS NEEDED
Qty: 35.44 G | Refills: 5 | Status: SHIPPED | OUTPATIENT
Start: 2021-04-14 | End: 2021-04-28 | Stop reason: SDUPTHER

## 2021-04-15 ENCOUNTER — LAB REQUISITION (OUTPATIENT)
Dept: LAB | Facility: HOSPITAL | Age: 36
End: 2021-04-15

## 2021-04-15 DIAGNOSIS — Z11.59 ENCOUNTER FOR SCREENING FOR OTHER VIRAL DISEASES: ICD-10-CM

## 2021-04-15 LAB — SARS-COV-2 RNA RESP QL NAA+PROBE: NEGATIVE

## 2021-04-15 PROCEDURE — U0003 INFECTIOUS AGENT DETECTION BY NUCLEIC ACID (DNA OR RNA); SEVERE ACUTE RESPIRATORY SYNDROME CORONAVIRUS 2 (SARS-COV-2) (CORONAVIRUS DISEASE [COVID-19]), AMPLIFIED PROBE TECHNIQUE, MAKING USE OF HIGH THROUGHPUT TECHNOLOGIES AS DESCRIBED BY CMS-2020-01-R: HCPCS | Performed by: INTERNAL MEDICINE

## 2021-04-15 PROCEDURE — U0005 INFEC AGEN DETEC AMPLI PROBE: HCPCS | Performed by: INTERNAL MEDICINE

## 2021-04-20 ENCOUNTER — LAB REQUISITION (OUTPATIENT)
Dept: LAB | Facility: HOSPITAL | Age: 36
End: 2021-04-20

## 2021-04-20 DIAGNOSIS — Z11.59 ENCOUNTER FOR SCREENING FOR OTHER VIRAL DISEASES: ICD-10-CM

## 2021-04-20 PROCEDURE — U0005 INFEC AGEN DETEC AMPLI PROBE: HCPCS | Performed by: INTERNAL MEDICINE

## 2021-04-20 PROCEDURE — U0003 INFECTIOUS AGENT DETECTION BY NUCLEIC ACID (DNA OR RNA); SEVERE ACUTE RESPIRATORY SYNDROME CORONAVIRUS 2 (SARS-COV-2) (CORONAVIRUS DISEASE [COVID-19]), AMPLIFIED PROBE TECHNIQUE, MAKING USE OF HIGH THROUGHPUT TECHNOLOGIES AS DESCRIBED BY CMS-2020-01-R: HCPCS | Performed by: INTERNAL MEDICINE

## 2021-04-21 LAB — SARS-COV-2 RNA RESP QL NAA+PROBE: NEGATIVE

## 2021-04-23 ENCOUNTER — LAB REQUISITION (OUTPATIENT)
Dept: LAB | Facility: HOSPITAL | Age: 36
End: 2021-04-23

## 2021-04-23 DIAGNOSIS — Z11.59 ENCOUNTER FOR SCREENING FOR OTHER VIRAL DISEASES: ICD-10-CM

## 2021-04-23 LAB — SARS-COV-2 RNA RESP QL NAA+PROBE: NEGATIVE

## 2021-04-23 PROCEDURE — U0005 INFEC AGEN DETEC AMPLI PROBE: HCPCS | Performed by: INTERNAL MEDICINE

## 2021-04-23 PROCEDURE — U0003 INFECTIOUS AGENT DETECTION BY NUCLEIC ACID (DNA OR RNA); SEVERE ACUTE RESPIRATORY SYNDROME CORONAVIRUS 2 (SARS-COV-2) (CORONAVIRUS DISEASE [COVID-19]), AMPLIFIED PROBE TECHNIQUE, MAKING USE OF HIGH THROUGHPUT TECHNOLOGIES AS DESCRIBED BY CMS-2020-01-R: HCPCS | Performed by: INTERNAL MEDICINE

## 2021-04-25 NOTE — PROGRESS NOTES
Daily Note     Today's date: 2019  Patient name: Melodie Sheriff  : 1985  MRN: [id number]  Referring provider: Abimbola Morse PA-C  Dx:   Encounter Diagnosis     ICD-10-CM    1  Strain of latissimus dorsi muscle, subsequent encounter [id number] [id number]        Start Time: 0900  Stop Time: 1000  Total time in clinic (min): 60 minutes  Precautions: Falls  Re-eval Date: 2/3/19    Date       Visit Count 1 2      FOTO        Pain In See IE 6      Pain Out See IE 4        Subjective: "I worked over the weekend so I am sore "      Objective: See treatment diary below      Assessment: Tolerated treatment well  Initiated exercise program this date  Pt able to complete all exercises without incident  Patient demonstrated fatigue post treatment and would benefit from continued PT      Plan: Continue per plan of care  Progress treatment as tolerated  Daily Treatment Diary     Manual          10 mins resume                      Gentle mobs thoracic spine AP, grade II in prone  X hand distraction T-L junction    Date        HS stretch        Prayer stretch 60" x 3 ways 60" x 3 ways      Hip flexor/quad stretch  4x30"      LTR  10x10"      SKTC/DKTC  10x10" ea      Piriformis stretch  4x30" ea      AH  20x/5"      AH with hip abd/add  20x/5"  Ball/blue      AH with Alt UE/LEs  UE x20  LE x20      Dead bugs         Bridges         Bridge with abd/add        Modified planks         Tband Anti Trunk rotation          Prone alt UEs/Les         Clamshells         90/90 hip abd SL         Prayer stretch for home this weekend along with MH        Modalities         Thermal 10 mins 10 mins      Estim 10 mins 10 mins                Estim: 15 mins IFC to thoracic spine with MH  Patient positioned for comfort with pillow under abdomen  Skin intact pre and post application with no immediate adverse effects noted  You were evaluated in the Emergency Department for KNEE PAIN.     There are no signs of emergency conditions requiring admission to the hospital on today's workup.      Further evaluation may be required by your primary care doctor or specialist for a definitive diagnosis.  Therefore, follow up as directed and if symptoms change/worsen or any emergency conditions, please return to the ER.    We recommend that you:  1. See your primary care physician within the next 1-3 DAYS for follow up.  Bring a copy of your discharge paperwork (including any test results) to your doctor.    2. See the orthopedic doctor within the next 1-3 DAYS for follow up.  Bring a copy of your discharge paperwork (including any test results) to your doctor.    2. Continue to take your medications as prescribed.      3. For pain you can take ibuprofen (Motrin, Advil) or acetaminophen (Tylenol) as needed, as directed on packaging.       *** Return immediately if you have [INSERT SIGNS/SYMPTOMS TO WATCH FOR], or any other new/concerning symptoms. ***

## 2021-04-26 ENCOUNTER — LAB REQUISITION (OUTPATIENT)
Dept: LAB | Facility: HOSPITAL | Age: 36
End: 2021-04-26

## 2021-04-26 DIAGNOSIS — Z11.59 ENCOUNTER FOR SCREENING FOR OTHER VIRAL DISEASES: ICD-10-CM

## 2021-04-26 LAB — SARS-COV-2 RNA RESP QL NAA+PROBE: NEGATIVE

## 2021-04-26 PROCEDURE — U0003 INFECTIOUS AGENT DETECTION BY NUCLEIC ACID (DNA OR RNA); SEVERE ACUTE RESPIRATORY SYNDROME CORONAVIRUS 2 (SARS-COV-2) (CORONAVIRUS DISEASE [COVID-19]), AMPLIFIED PROBE TECHNIQUE, MAKING USE OF HIGH THROUGHPUT TECHNOLOGIES AS DESCRIBED BY CMS-2020-01-R: HCPCS | Performed by: INTERNAL MEDICINE

## 2021-04-26 PROCEDURE — U0005 INFEC AGEN DETEC AMPLI PROBE: HCPCS | Performed by: INTERNAL MEDICINE

## 2021-04-28 DIAGNOSIS — M54.50 ACUTE LOW BACK PAIN, UNSPECIFIED BACK PAIN LATERALITY, UNSPECIFIED WHETHER SCIATICA PRESENT: ICD-10-CM

## 2021-04-28 RX ORDER — LIDOCAINE 50 MG/G
OINTMENT TOPICAL AS NEEDED
Qty: 35.44 G | Refills: 5 | Status: SHIPPED | OUTPATIENT
Start: 2021-04-28

## 2021-04-29 ENCOUNTER — IMMUNIZATIONS (OUTPATIENT)
Dept: FAMILY MEDICINE CLINIC | Facility: HOSPITAL | Age: 36
End: 2021-04-29

## 2021-04-29 ENCOUNTER — LAB REQUISITION (OUTPATIENT)
Dept: LAB | Facility: HOSPITAL | Age: 36
End: 2021-04-29

## 2021-04-29 DIAGNOSIS — Z23 ENCOUNTER FOR IMMUNIZATION: Primary | ICD-10-CM

## 2021-04-29 DIAGNOSIS — Z11.59 ENCOUNTER FOR SCREENING FOR OTHER VIRAL DISEASES: ICD-10-CM

## 2021-04-29 LAB — SARS-COV-2 RNA RESP QL NAA+PROBE: NEGATIVE

## 2021-04-29 PROCEDURE — U0003 INFECTIOUS AGENT DETECTION BY NUCLEIC ACID (DNA OR RNA); SEVERE ACUTE RESPIRATORY SYNDROME CORONAVIRUS 2 (SARS-COV-2) (CORONAVIRUS DISEASE [COVID-19]), AMPLIFIED PROBE TECHNIQUE, MAKING USE OF HIGH THROUGHPUT TECHNOLOGIES AS DESCRIBED BY CMS-2020-01-R: HCPCS | Performed by: INTERNAL MEDICINE

## 2021-04-29 PROCEDURE — 91301 SARS-COV-2 / COVID-19 MRNA VACCINE (MODERNA) 100 MCG: CPT

## 2021-04-29 PROCEDURE — U0005 INFEC AGEN DETEC AMPLI PROBE: HCPCS | Performed by: INTERNAL MEDICINE

## 2021-04-29 PROCEDURE — 0012A SARS-COV-2 / COVID-19 MRNA VACCINE (MODERNA) 100 MCG: CPT

## 2021-05-03 ENCOUNTER — LAB REQUISITION (OUTPATIENT)
Dept: LAB | Facility: HOSPITAL | Age: 36
End: 2021-05-03

## 2021-05-03 DIAGNOSIS — Z11.59 ENCOUNTER FOR SCREENING FOR OTHER VIRAL DISEASES: ICD-10-CM

## 2021-05-03 PROCEDURE — U0005 INFEC AGEN DETEC AMPLI PROBE: HCPCS | Performed by: INTERNAL MEDICINE

## 2021-05-03 PROCEDURE — U0003 INFECTIOUS AGENT DETECTION BY NUCLEIC ACID (DNA OR RNA); SEVERE ACUTE RESPIRATORY SYNDROME CORONAVIRUS 2 (SARS-COV-2) (CORONAVIRUS DISEASE [COVID-19]), AMPLIFIED PROBE TECHNIQUE, MAKING USE OF HIGH THROUGHPUT TECHNOLOGIES AS DESCRIBED BY CMS-2020-01-R: HCPCS | Performed by: INTERNAL MEDICINE

## 2021-05-04 LAB — SARS-COV-2 RNA RESP QL NAA+PROBE: NEGATIVE

## 2021-05-06 ENCOUNTER — LAB REQUISITION (OUTPATIENT)
Dept: LAB | Facility: HOSPITAL | Age: 36
End: 2021-05-06

## 2021-05-06 DIAGNOSIS — Z11.59 ENCOUNTER FOR SCREENING FOR OTHER VIRAL DISEASES: ICD-10-CM

## 2021-05-06 PROCEDURE — U0003 INFECTIOUS AGENT DETECTION BY NUCLEIC ACID (DNA OR RNA); SEVERE ACUTE RESPIRATORY SYNDROME CORONAVIRUS 2 (SARS-COV-2) (CORONAVIRUS DISEASE [COVID-19]), AMPLIFIED PROBE TECHNIQUE, MAKING USE OF HIGH THROUGHPUT TECHNOLOGIES AS DESCRIBED BY CMS-2020-01-R: HCPCS | Performed by: INTERNAL MEDICINE

## 2021-05-06 PROCEDURE — U0005 INFEC AGEN DETEC AMPLI PROBE: HCPCS | Performed by: INTERNAL MEDICINE

## 2021-05-07 LAB — SARS-COV-2 RNA RESP QL NAA+PROBE: NEGATIVE

## 2021-05-10 ENCOUNTER — LAB REQUISITION (OUTPATIENT)
Dept: LAB | Facility: HOSPITAL | Age: 36
End: 2021-05-10

## 2021-05-10 DIAGNOSIS — Z11.59 ENCOUNTER FOR SCREENING FOR OTHER VIRAL DISEASES: ICD-10-CM

## 2021-05-10 LAB — SARS-COV-2 RNA RESP QL NAA+PROBE: NEGATIVE

## 2021-05-10 PROCEDURE — U0005 INFEC AGEN DETEC AMPLI PROBE: HCPCS | Performed by: INTERNAL MEDICINE

## 2021-05-10 PROCEDURE — U0003 INFECTIOUS AGENT DETECTION BY NUCLEIC ACID (DNA OR RNA); SEVERE ACUTE RESPIRATORY SYNDROME CORONAVIRUS 2 (SARS-COV-2) (CORONAVIRUS DISEASE [COVID-19]), AMPLIFIED PROBE TECHNIQUE, MAKING USE OF HIGH THROUGHPUT TECHNOLOGIES AS DESCRIBED BY CMS-2020-01-R: HCPCS | Performed by: INTERNAL MEDICINE

## 2021-05-13 ENCOUNTER — LAB REQUISITION (OUTPATIENT)
Dept: LAB | Facility: HOSPITAL | Age: 36
End: 2021-05-13

## 2021-05-13 DIAGNOSIS — Z11.59 ENCOUNTER FOR SCREENING FOR OTHER VIRAL DISEASES: ICD-10-CM

## 2021-05-13 LAB — SARS-COV-2 RNA RESP QL NAA+PROBE: NEGATIVE

## 2021-05-13 PROCEDURE — U0003 INFECTIOUS AGENT DETECTION BY NUCLEIC ACID (DNA OR RNA); SEVERE ACUTE RESPIRATORY SYNDROME CORONAVIRUS 2 (SARS-COV-2) (CORONAVIRUS DISEASE [COVID-19]), AMPLIFIED PROBE TECHNIQUE, MAKING USE OF HIGH THROUGHPUT TECHNOLOGIES AS DESCRIBED BY CMS-2020-01-R: HCPCS | Performed by: INTERNAL MEDICINE

## 2021-05-13 PROCEDURE — U0005 INFEC AGEN DETEC AMPLI PROBE: HCPCS | Performed by: INTERNAL MEDICINE

## 2021-05-14 ENCOUNTER — VBI (OUTPATIENT)
Dept: ADMINISTRATIVE | Facility: OTHER | Age: 36
End: 2021-05-14

## 2021-07-08 DIAGNOSIS — R06.02 SOB (SHORTNESS OF BREATH) ON EXERTION: ICD-10-CM

## 2021-07-09 DIAGNOSIS — I10 ESSENTIAL HYPERTENSION: ICD-10-CM

## 2021-07-09 RX ORDER — LABETALOL 300 MG/1
300 TABLET, FILM COATED ORAL 2 TIMES DAILY
Qty: 30 TABLET | Refills: 5 | Status: SHIPPED | OUTPATIENT
Start: 2021-07-09 | End: 2022-02-24 | Stop reason: SDUPTHER

## 2021-07-09 RX ORDER — BUDESONIDE AND FORMOTEROL FUMARATE DIHYDRATE 80; 4.5 UG/1; UG/1
2 AEROSOL RESPIRATORY (INHALATION) 2 TIMES DAILY
Qty: 10.2 G | Refills: 0 | Status: SHIPPED | OUTPATIENT
Start: 2021-07-09 | End: 2021-09-14 | Stop reason: SDUPTHER

## 2021-07-09 RX ORDER — LOSARTAN POTASSIUM AND HYDROCHLOROTHIAZIDE 12.5; 5 MG/1; MG/1
1 TABLET ORAL DAILY
Qty: 30 TABLET | Refills: 5 | Status: SHIPPED | OUTPATIENT
Start: 2021-07-09 | End: 2022-05-23 | Stop reason: SDUPTHER

## 2021-08-05 ENCOUNTER — LAB REQUISITION (OUTPATIENT)
Dept: LAB | Facility: HOSPITAL | Age: 36
End: 2021-08-05

## 2021-08-05 DIAGNOSIS — Z11.59 ENCOUNTER FOR SCREENING FOR OTHER VIRAL DISEASES: ICD-10-CM

## 2021-08-05 LAB — SARS-COV-2 RNA RESP QL NAA+PROBE: NEGATIVE

## 2021-08-05 PROCEDURE — U0005 INFEC AGEN DETEC AMPLI PROBE: HCPCS | Performed by: INTERNAL MEDICINE

## 2021-08-05 PROCEDURE — U0003 INFECTIOUS AGENT DETECTION BY NUCLEIC ACID (DNA OR RNA); SEVERE ACUTE RESPIRATORY SYNDROME CORONAVIRUS 2 (SARS-COV-2) (CORONAVIRUS DISEASE [COVID-19]), AMPLIFIED PROBE TECHNIQUE, MAKING USE OF HIGH THROUGHPUT TECHNOLOGIES AS DESCRIBED BY CMS-2020-01-R: HCPCS | Performed by: INTERNAL MEDICINE

## 2021-08-11 ENCOUNTER — LAB REQUISITION (OUTPATIENT)
Dept: LAB | Facility: HOSPITAL | Age: 36
End: 2021-08-11

## 2021-08-11 DIAGNOSIS — Z11.59 ENCOUNTER FOR SCREENING FOR OTHER VIRAL DISEASES: ICD-10-CM

## 2021-08-11 PROCEDURE — U0005 INFEC AGEN DETEC AMPLI PROBE: HCPCS | Performed by: INTERNAL MEDICINE

## 2021-08-11 PROCEDURE — U0003 INFECTIOUS AGENT DETECTION BY NUCLEIC ACID (DNA OR RNA); SEVERE ACUTE RESPIRATORY SYNDROME CORONAVIRUS 2 (SARS-COV-2) (CORONAVIRUS DISEASE [COVID-19]), AMPLIFIED PROBE TECHNIQUE, MAKING USE OF HIGH THROUGHPUT TECHNOLOGIES AS DESCRIBED BY CMS-2020-01-R: HCPCS | Performed by: INTERNAL MEDICINE

## 2021-08-12 LAB — SARS-COV-2 RNA RESP QL NAA+PROBE: NEGATIVE

## 2021-08-16 ENCOUNTER — LAB REQUISITION (OUTPATIENT)
Dept: LAB | Facility: HOSPITAL | Age: 36
End: 2021-08-16

## 2021-08-16 DIAGNOSIS — Z11.59 ENCOUNTER FOR SCREENING FOR OTHER VIRAL DISEASES: ICD-10-CM

## 2021-08-16 LAB — SARS-COV-2 RNA RESP QL NAA+PROBE: NEGATIVE

## 2021-08-16 PROCEDURE — U0005 INFEC AGEN DETEC AMPLI PROBE: HCPCS | Performed by: INTERNAL MEDICINE

## 2021-08-16 PROCEDURE — U0003 INFECTIOUS AGENT DETECTION BY NUCLEIC ACID (DNA OR RNA); SEVERE ACUTE RESPIRATORY SYNDROME CORONAVIRUS 2 (SARS-COV-2) (CORONAVIRUS DISEASE [COVID-19]), AMPLIFIED PROBE TECHNIQUE, MAKING USE OF HIGH THROUGHPUT TECHNOLOGIES AS DESCRIBED BY CMS-2020-01-R: HCPCS | Performed by: INTERNAL MEDICINE

## 2021-09-07 ENCOUNTER — LAB REQUISITION (OUTPATIENT)
Dept: LAB | Facility: HOSPITAL | Age: 36
End: 2021-09-07

## 2021-09-07 DIAGNOSIS — Z11.59 ENCOUNTER FOR SCREENING FOR OTHER VIRAL DISEASES: ICD-10-CM

## 2021-09-07 PROCEDURE — U0005 INFEC AGEN DETEC AMPLI PROBE: HCPCS | Performed by: INTERNAL MEDICINE

## 2021-09-07 PROCEDURE — U0003 INFECTIOUS AGENT DETECTION BY NUCLEIC ACID (DNA OR RNA); SEVERE ACUTE RESPIRATORY SYNDROME CORONAVIRUS 2 (SARS-COV-2) (CORONAVIRUS DISEASE [COVID-19]), AMPLIFIED PROBE TECHNIQUE, MAKING USE OF HIGH THROUGHPUT TECHNOLOGIES AS DESCRIBED BY CMS-2020-01-R: HCPCS | Performed by: INTERNAL MEDICINE

## 2021-09-08 LAB — SARS-COV-2 RNA RESP QL NAA+PROBE: NEGATIVE

## 2021-09-13 ENCOUNTER — LAB REQUISITION (OUTPATIENT)
Dept: LAB | Facility: HOSPITAL | Age: 36
End: 2021-09-13

## 2021-09-13 DIAGNOSIS — Z11.59 ENCOUNTER FOR SCREENING FOR OTHER VIRAL DISEASES: ICD-10-CM

## 2021-09-13 LAB — SARS-COV-2 RNA RESP QL NAA+PROBE: NEGATIVE

## 2021-09-13 PROCEDURE — U0005 INFEC AGEN DETEC AMPLI PROBE: HCPCS | Performed by: INTERNAL MEDICINE

## 2021-09-13 PROCEDURE — U0003 INFECTIOUS AGENT DETECTION BY NUCLEIC ACID (DNA OR RNA); SEVERE ACUTE RESPIRATORY SYNDROME CORONAVIRUS 2 (SARS-COV-2) (CORONAVIRUS DISEASE [COVID-19]), AMPLIFIED PROBE TECHNIQUE, MAKING USE OF HIGH THROUGHPUT TECHNOLOGIES AS DESCRIBED BY CMS-2020-01-R: HCPCS | Performed by: INTERNAL MEDICINE

## 2021-09-20 ENCOUNTER — LAB REQUISITION (OUTPATIENT)
Dept: LAB | Facility: HOSPITAL | Age: 36
End: 2021-09-20

## 2021-09-20 DIAGNOSIS — Z11.59 ENCOUNTER FOR SCREENING FOR OTHER VIRAL DISEASES: ICD-10-CM

## 2021-09-20 PROCEDURE — U0003 INFECTIOUS AGENT DETECTION BY NUCLEIC ACID (DNA OR RNA); SEVERE ACUTE RESPIRATORY SYNDROME CORONAVIRUS 2 (SARS-COV-2) (CORONAVIRUS DISEASE [COVID-19]), AMPLIFIED PROBE TECHNIQUE, MAKING USE OF HIGH THROUGHPUT TECHNOLOGIES AS DESCRIBED BY CMS-2020-01-R: HCPCS | Performed by: INTERNAL MEDICINE

## 2021-09-20 PROCEDURE — U0005 INFEC AGEN DETEC AMPLI PROBE: HCPCS | Performed by: INTERNAL MEDICINE

## 2021-09-21 LAB — SARS-COV-2 RNA RESP QL NAA+PROBE: NEGATIVE

## 2021-09-27 ENCOUNTER — LAB REQUISITION (OUTPATIENT)
Dept: LAB | Facility: HOSPITAL | Age: 36
End: 2021-09-27

## 2021-09-27 DIAGNOSIS — Z11.59 ENCOUNTER FOR SCREENING FOR OTHER VIRAL DISEASES: ICD-10-CM

## 2021-09-27 PROCEDURE — U0003 INFECTIOUS AGENT DETECTION BY NUCLEIC ACID (DNA OR RNA); SEVERE ACUTE RESPIRATORY SYNDROME CORONAVIRUS 2 (SARS-COV-2) (CORONAVIRUS DISEASE [COVID-19]), AMPLIFIED PROBE TECHNIQUE, MAKING USE OF HIGH THROUGHPUT TECHNOLOGIES AS DESCRIBED BY CMS-2020-01-R: HCPCS | Performed by: INTERNAL MEDICINE

## 2021-09-27 PROCEDURE — U0005 INFEC AGEN DETEC AMPLI PROBE: HCPCS | Performed by: INTERNAL MEDICINE

## 2021-09-28 LAB — SARS-COV-2 RNA RESP QL NAA+PROBE: NEGATIVE

## 2021-10-01 ENCOUNTER — TELEPHONE (OUTPATIENT)
Dept: INTERNAL MEDICINE CLINIC | Facility: CLINIC | Age: 36
End: 2021-10-01

## 2021-10-01 DIAGNOSIS — E66.9 CLASS 2 OBESITY WITH BODY MASS INDEX (BMI) OF 38.0 TO 38.9 IN ADULT, UNSPECIFIED OBESITY TYPE, UNSPECIFIED WHETHER SERIOUS COMORBIDITY PRESENT: Primary | ICD-10-CM

## 2021-10-04 ENCOUNTER — LAB REQUISITION (OUTPATIENT)
Dept: LAB | Facility: HOSPITAL | Age: 36
End: 2021-10-04

## 2021-10-04 DIAGNOSIS — Z11.59 ENCOUNTER FOR SCREENING FOR OTHER VIRAL DISEASES: ICD-10-CM

## 2021-10-04 PROCEDURE — U0005 INFEC AGEN DETEC AMPLI PROBE: HCPCS | Performed by: INTERNAL MEDICINE

## 2021-10-04 PROCEDURE — U0003 INFECTIOUS AGENT DETECTION BY NUCLEIC ACID (DNA OR RNA); SEVERE ACUTE RESPIRATORY SYNDROME CORONAVIRUS 2 (SARS-COV-2) (CORONAVIRUS DISEASE [COVID-19]), AMPLIFIED PROBE TECHNIQUE, MAKING USE OF HIGH THROUGHPUT TECHNOLOGIES AS DESCRIBED BY CMS-2020-01-R: HCPCS | Performed by: INTERNAL MEDICINE

## 2021-10-05 ENCOUNTER — TELEMEDICINE (OUTPATIENT)
Dept: INTERNAL MEDICINE CLINIC | Facility: CLINIC | Age: 36
End: 2021-10-05
Payer: COMMERCIAL

## 2021-10-05 VITALS — OXYGEN SATURATION: 97 % | HEART RATE: 92 BPM | TEMPERATURE: 98.3 F

## 2021-10-05 DIAGNOSIS — Z20.822 CLOSE EXPOSURE TO COVID-19 VIRUS: ICD-10-CM

## 2021-10-05 DIAGNOSIS — J06.9 UPPER RESPIRATORY TRACT INFECTION, UNSPECIFIED TYPE: Primary | ICD-10-CM

## 2021-10-05 LAB — SARS-COV-2 RNA RESP QL NAA+PROBE: POSITIVE

## 2021-10-05 PROCEDURE — 99213 OFFICE O/P EST LOW 20 MIN: CPT | Performed by: INTERNAL MEDICINE

## 2021-10-06 ENCOUNTER — TELEMEDICINE (OUTPATIENT)
Dept: INTERNAL MEDICINE CLINIC | Facility: CLINIC | Age: 36
End: 2021-10-06
Payer: COMMERCIAL

## 2021-10-06 VITALS — OXYGEN SATURATION: 96 % | TEMPERATURE: 98.1 F | HEART RATE: 81 BPM

## 2021-10-06 DIAGNOSIS — J06.9 UPPER RESPIRATORY TRACT INFECTION, UNSPECIFIED TYPE: ICD-10-CM

## 2021-10-06 DIAGNOSIS — U07.1 LAB TEST POSITIVE FOR DETECTION OF COVID-19 VIRUS: Primary | ICD-10-CM

## 2021-10-06 DIAGNOSIS — J01.10 ACUTE NON-RECURRENT FRONTAL SINUSITIS: ICD-10-CM

## 2021-10-06 PROCEDURE — 99442 PR PHYS/QHP TELEPHONE EVALUATION 11-20 MIN: CPT | Performed by: INTERNAL MEDICINE

## 2021-10-06 RX ORDER — AZITHROMYCIN 250 MG/1
TABLET, FILM COATED ORAL
Qty: 6 TABLET | Refills: 0 | Status: SHIPPED | OUTPATIENT
Start: 2021-10-06 | End: 2021-10-11

## 2021-10-06 RX ORDER — FLUTICASONE PROPIONATE 50 MCG
1 SPRAY, SUSPENSION (ML) NASAL DAILY
Qty: 16 G | Refills: 0 | Status: SHIPPED | OUTPATIENT
Start: 2021-10-06 | End: 2022-02-04 | Stop reason: ALTCHOICE

## 2021-10-06 RX ORDER — GUAIFENESIN 600 MG
600 TABLET, EXTENDED RELEASE 12 HR ORAL EVERY 12 HOURS SCHEDULED
Qty: 20 TABLET | Refills: 0 | Status: SHIPPED | OUTPATIENT
Start: 2021-10-06 | End: 2022-02-04 | Stop reason: ALTCHOICE

## 2021-10-07 ENCOUNTER — TELEMEDICINE (OUTPATIENT)
Dept: INTERNAL MEDICINE CLINIC | Facility: CLINIC | Age: 36
End: 2021-10-07
Payer: COMMERCIAL

## 2021-10-07 VITALS — TEMPERATURE: 98.4 F | OXYGEN SATURATION: 96 % | HEART RATE: 84 BPM

## 2021-10-07 DIAGNOSIS — U07.1 LAB TEST POSITIVE FOR DETECTION OF COVID-19 VIRUS: Primary | ICD-10-CM

## 2021-10-07 DIAGNOSIS — J06.9 UPPER RESPIRATORY TRACT INFECTION, UNSPECIFIED TYPE: ICD-10-CM

## 2021-10-07 PROCEDURE — 99213 OFFICE O/P EST LOW 20 MIN: CPT | Performed by: INTERNAL MEDICINE

## 2021-10-14 ENCOUNTER — TELEPHONE (OUTPATIENT)
Dept: INTERNAL MEDICINE CLINIC | Facility: CLINIC | Age: 36
End: 2021-10-14

## 2021-10-15 ENCOUNTER — APPOINTMENT (OUTPATIENT)
Dept: RADIOLOGY | Facility: CLINIC | Age: 36
End: 2021-10-15
Payer: COMMERCIAL

## 2021-10-15 ENCOUNTER — TELEMEDICINE (OUTPATIENT)
Dept: INTERNAL MEDICINE CLINIC | Facility: CLINIC | Age: 36
End: 2021-10-15
Payer: COMMERCIAL

## 2021-10-15 VITALS — TEMPERATURE: 98.7 F | OXYGEN SATURATION: 96 % | HEART RATE: 87 BPM

## 2021-10-15 DIAGNOSIS — U07.1 COVID-19: Primary | ICD-10-CM

## 2021-10-15 DIAGNOSIS — U07.1 COVID-19: ICD-10-CM

## 2021-10-15 PROCEDURE — 99213 OFFICE O/P EST LOW 20 MIN: CPT | Performed by: PHYSICIAN ASSISTANT

## 2021-10-15 PROCEDURE — 71046 X-RAY EXAM CHEST 2 VIEWS: CPT

## 2021-10-18 ENCOUNTER — TELEPHONE (OUTPATIENT)
Dept: INTERNAL MEDICINE CLINIC | Facility: CLINIC | Age: 36
End: 2021-10-18

## 2021-11-09 ENCOUNTER — CLINICAL SUPPORT (OUTPATIENT)
Dept: BARIATRICS | Facility: CLINIC | Age: 36
End: 2021-11-09

## 2021-11-09 VITALS
HEART RATE: 75 BPM | SYSTOLIC BLOOD PRESSURE: 142 MMHG | BODY MASS INDEX: 44.37 KG/M2 | HEIGHT: 65 IN | WEIGHT: 266.3 LBS | DIASTOLIC BLOOD PRESSURE: 80 MMHG | TEMPERATURE: 98 F

## 2021-11-09 DIAGNOSIS — Z01.812 BLOOD TESTS PRIOR TO TREATMENT OR PROCEDURE: ICD-10-CM

## 2021-11-09 DIAGNOSIS — E66.9 CLASS 2 OBESITY WITH BODY MASS INDEX (BMI) OF 38.0 TO 38.9 IN ADULT, UNSPECIFIED OBESITY TYPE, UNSPECIFIED WHETHER SERIOUS COMORBIDITY PRESENT: ICD-10-CM

## 2021-11-09 DIAGNOSIS — I10 PRIMARY HYPERTENSION: Primary | ICD-10-CM

## 2021-11-09 DIAGNOSIS — Z01.818 PREOPERATIVE CLEARANCE: ICD-10-CM

## 2021-11-09 PROCEDURE — RECHECK: Performed by: DIETITIAN, REGISTERED

## 2021-12-15 ENCOUNTER — APPOINTMENT (OUTPATIENT)
Dept: LAB | Facility: CLINIC | Age: 36
End: 2021-12-15
Payer: COMMERCIAL

## 2021-12-15 DIAGNOSIS — E66.9 CLASS 2 OBESITY WITH BODY MASS INDEX (BMI) OF 38.0 TO 38.9 IN ADULT, UNSPECIFIED OBESITY TYPE, UNSPECIFIED WHETHER SERIOUS COMORBIDITY PRESENT: ICD-10-CM

## 2021-12-15 DIAGNOSIS — I10 PRIMARY HYPERTENSION: ICD-10-CM

## 2021-12-15 DIAGNOSIS — Z01.812 BLOOD TESTS PRIOR TO TREATMENT OR PROCEDURE: ICD-10-CM

## 2021-12-15 DIAGNOSIS — Z01.818 PREOPERATIVE CLEARANCE: ICD-10-CM

## 2021-12-15 DIAGNOSIS — Z02.89 ENCOUNTER FOR PHYSICAL EXAMINATION RELATED TO EMPLOYMENT: ICD-10-CM

## 2021-12-15 LAB
ALBUMIN SERPL BCP-MCNC: 4.2 G/DL (ref 3.5–5)
ALP SERPL-CCNC: 56 U/L (ref 46–116)
ALT SERPL W P-5'-P-CCNC: 31 U/L (ref 12–78)
ANION GAP SERPL CALCULATED.3IONS-SCNC: 6 MMOL/L (ref 4–13)
AST SERPL W P-5'-P-CCNC: 18 U/L (ref 5–45)
BILIRUB SERPL-MCNC: 0.72 MG/DL (ref 0.2–1)
BUN SERPL-MCNC: 20 MG/DL (ref 5–25)
CALCIUM SERPL-MCNC: 10.5 MG/DL (ref 8.3–10.1)
CHLORIDE SERPL-SCNC: 107 MMOL/L (ref 100–108)
CO2 SERPL-SCNC: 26 MMOL/L (ref 21–32)
CREAT SERPL-MCNC: 0.97 MG/DL (ref 0.6–1.3)
ERYTHROCYTE [DISTWIDTH] IN BLOOD BY AUTOMATED COUNT: 12.3 % (ref 11.6–15.1)
EST. AVERAGE GLUCOSE BLD GHB EST-MCNC: 105 MG/DL
GFR SERPL CREATININE-BSD FRML MDRD: 75 ML/MIN/1.73SQ M
GLUCOSE SERPL-MCNC: 79 MG/DL (ref 65–140)
HBA1C MFR BLD: 5.3 %
HCT VFR BLD AUTO: 47.4 % (ref 34.8–46.1)
HGB BLD-MCNC: 15.7 G/DL (ref 11.5–15.4)
MCH RBC QN AUTO: 28.9 PG (ref 26.8–34.3)
MCHC RBC AUTO-ENTMCNC: 33.1 G/DL (ref 31.4–37.4)
MCV RBC AUTO: 87 FL (ref 82–98)
PLATELET # BLD AUTO: 245 THOUSANDS/UL (ref 149–390)
PMV BLD AUTO: 11 FL (ref 8.9–12.7)
POTASSIUM SERPL-SCNC: 3.8 MMOL/L (ref 3.5–5.3)
PROT SERPL-MCNC: 7.8 G/DL (ref 6.4–8.2)
RBC # BLD AUTO: 5.43 MILLION/UL (ref 3.81–5.12)
SODIUM SERPL-SCNC: 139 MMOL/L (ref 136–145)
WBC # BLD AUTO: 7.04 THOUSAND/UL (ref 4.31–10.16)

## 2021-12-15 PROCEDURE — 80053 COMPREHEN METABOLIC PANEL: CPT

## 2021-12-15 PROCEDURE — 85027 COMPLETE CBC AUTOMATED: CPT

## 2021-12-15 PROCEDURE — 83036 HEMOGLOBIN GLYCOSYLATED A1C: CPT

## 2021-12-15 PROCEDURE — 36415 COLL VENOUS BLD VENIPUNCTURE: CPT

## 2021-12-16 ENCOUNTER — OFFICE VISIT (OUTPATIENT)
Dept: BARIATRICS | Facility: CLINIC | Age: 36
End: 2021-12-16
Payer: COMMERCIAL

## 2021-12-16 VITALS
BODY MASS INDEX: 43.49 KG/M2 | WEIGHT: 261 LBS | SYSTOLIC BLOOD PRESSURE: 130 MMHG | HEART RATE: 71 BPM | TEMPERATURE: 98.5 F | HEIGHT: 65 IN | DIASTOLIC BLOOD PRESSURE: 85 MMHG

## 2021-12-16 DIAGNOSIS — E66.01 MORBID (SEVERE) OBESITY DUE TO EXCESS CALORIES (HCC): Primary | ICD-10-CM

## 2021-12-16 PROCEDURE — 99203 OFFICE O/P NEW LOW 30 MIN: CPT | Performed by: SURGERY

## 2021-12-30 ENCOUNTER — OFFICE VISIT (OUTPATIENT)
Dept: SLEEP CENTER | Facility: CLINIC | Age: 36
End: 2021-12-30
Payer: COMMERCIAL

## 2021-12-30 VITALS
WEIGHT: 261 LBS | OXYGEN SATURATION: 97 % | SYSTOLIC BLOOD PRESSURE: 160 MMHG | DIASTOLIC BLOOD PRESSURE: 80 MMHG | TEMPERATURE: 97.7 F | BODY MASS INDEX: 43.49 KG/M2 | HEIGHT: 65 IN | HEART RATE: 76 BPM

## 2021-12-30 DIAGNOSIS — R51.9 CHRONIC NONINTRACTABLE HEADACHE, UNSPECIFIED HEADACHE TYPE: ICD-10-CM

## 2021-12-30 DIAGNOSIS — G47.26 SHIFT WORK SLEEP DISORDER: ICD-10-CM

## 2021-12-30 DIAGNOSIS — F41.1 GENERALIZED ANXIETY DISORDER: ICD-10-CM

## 2021-12-30 DIAGNOSIS — G47.33 OSA (OBSTRUCTIVE SLEEP APNEA): Primary | ICD-10-CM

## 2021-12-30 DIAGNOSIS — I50.32 CHRONIC DIASTOLIC HEART FAILURE WITH PRESERVED EJECTION FRACTION (HCC): ICD-10-CM

## 2021-12-30 DIAGNOSIS — R06.02 SOB (SHORTNESS OF BREATH) ON EXERTION: ICD-10-CM

## 2021-12-30 DIAGNOSIS — E66.01 MORBID (SEVERE) OBESITY DUE TO EXCESS CALORIES (HCC): ICD-10-CM

## 2021-12-30 DIAGNOSIS — I10 PRIMARY HYPERTENSION: ICD-10-CM

## 2021-12-30 DIAGNOSIS — G89.29 CHRONIC NONINTRACTABLE HEADACHE, UNSPECIFIED HEADACHE TYPE: ICD-10-CM

## 2021-12-30 PROCEDURE — 99244 OFF/OP CNSLTJ NEW/EST MOD 40: CPT | Performed by: INTERNAL MEDICINE

## 2022-01-10 ENCOUNTER — PREP FOR PROCEDURE (OUTPATIENT)
Dept: BARIATRICS | Facility: CLINIC | Age: 37
End: 2022-01-10

## 2022-01-10 DIAGNOSIS — E66.01 OBESITY, CLASS III, BMI 40-49.9 (MORBID OBESITY) (HCC): Primary | ICD-10-CM

## 2022-01-19 ENCOUNTER — OFFICE VISIT (OUTPATIENT)
Dept: BARIATRICS | Facility: CLINIC | Age: 37
End: 2022-01-19

## 2022-01-19 DIAGNOSIS — E66.01 OBESITY, CLASS III, BMI 40-49.9 (MORBID OBESITY) (HCC): Primary | ICD-10-CM

## 2022-01-19 PROCEDURE — RECHECK

## 2022-01-20 VITALS — WEIGHT: 257.2 LBS | BODY MASS INDEX: 42.8 KG/M2
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
## 2022-01-21 ENCOUNTER — LAB REQUISITION (OUTPATIENT)
Dept: LAB | Facility: HOSPITAL | Age: 37
End: 2022-01-21

## 2022-01-21 DIAGNOSIS — Z11.59 ENCOUNTER FOR SCREENING FOR OTHER VIRAL DISEASES: ICD-10-CM

## 2022-01-21 LAB — SARS-COV-2 RNA RESP QL NAA+PROBE: NEGATIVE

## 2022-01-21 PROCEDURE — U0003 INFECTIOUS AGENT DETECTION BY NUCLEIC ACID (DNA OR RNA); SEVERE ACUTE RESPIRATORY SYNDROME CORONAVIRUS 2 (SARS-COV-2) (CORONAVIRUS DISEASE [COVID-19]), AMPLIFIED PROBE TECHNIQUE, MAKING USE OF HIGH THROUGHPUT TECHNOLOGIES AS DESCRIBED BY CMS-2020-01-R: HCPCS | Performed by: INTERNAL MEDICINE

## 2022-01-21 PROCEDURE — U0005 INFEC AGEN DETEC AMPLI PROBE: HCPCS | Performed by: INTERNAL MEDICINE

## 2022-01-25 ENCOUNTER — LAB REQUISITION (OUTPATIENT)
Dept: LAB | Facility: HOSPITAL | Age: 37
End: 2022-01-25

## 2022-01-25 DIAGNOSIS — Z11.59 ENCOUNTER FOR SCREENING FOR OTHER VIRAL DISEASES: ICD-10-CM

## 2022-01-25 PROCEDURE — U0003 INFECTIOUS AGENT DETECTION BY NUCLEIC ACID (DNA OR RNA); SEVERE ACUTE RESPIRATORY SYNDROME CORONAVIRUS 2 (SARS-COV-2) (CORONAVIRUS DISEASE [COVID-19]), AMPLIFIED PROBE TECHNIQUE, MAKING USE OF HIGH THROUGHPUT TECHNOLOGIES AS DESCRIBED BY CMS-2020-01-R: HCPCS | Performed by: INTERNAL MEDICINE

## 2022-01-25 PROCEDURE — U0005 INFEC AGEN DETEC AMPLI PROBE: HCPCS | Performed by: INTERNAL MEDICINE

## 2022-01-26 LAB — SARS-COV-2 RNA RESP QL NAA+PROBE: NEGATIVE

## 2022-01-31 ENCOUNTER — HOSPITAL ENCOUNTER (OUTPATIENT)
Dept: SLEEP CENTER | Facility: HOSPITAL | Age: 37
Discharge: HOME | End: 2022-01-31
Attending: INTERNAL MEDICINE
Payer: COMMERCIAL

## 2022-01-31 ENCOUNTER — LAB REQUISITION (OUTPATIENT)
Dept: LAB | Facility: HOSPITAL | Age: 37
End: 2022-01-31

## 2022-01-31 DIAGNOSIS — G47.33 OSA (OBSTRUCTIVE SLEEP APNEA): ICD-10-CM

## 2022-01-31 DIAGNOSIS — Z11.59 ENCOUNTER FOR SCREENING FOR OTHER VIRAL DISEASES: ICD-10-CM

## 2022-01-31 DIAGNOSIS — R06.02 SOB (SHORTNESS OF BREATH) ON EXERTION: ICD-10-CM

## 2022-01-31 LAB — SARS-COV-2 RNA RESP QL NAA+PROBE: NEGATIVE

## 2022-01-31 PROCEDURE — U0005 INFEC AGEN DETEC AMPLI PROBE: HCPCS | Performed by: INTERNAL MEDICINE

## 2022-01-31 PROCEDURE — U0003 INFECTIOUS AGENT DETECTION BY NUCLEIC ACID (DNA OR RNA); SEVERE ACUTE RESPIRATORY SYNDROME CORONAVIRUS 2 (SARS-COV-2) (CORONAVIRUS DISEASE [COVID-19]), AMPLIFIED PROBE TECHNIQUE, MAKING USE OF HIGH THROUGHPUT TECHNOLOGIES AS DESCRIBED BY CMS-2020-01-R: HCPCS | Performed by: INTERNAL MEDICINE

## 2022-01-31 PROCEDURE — 95810 POLYSOM 6/> YRS 4/> PARAM: CPT

## 2022-02-01 NOTE — PROGRESS NOTES
Sleep Study Documentation    Pre-Sleep Study       Sleep testing procedure explained to patient:YES    Patient napped prior to study:YES- more than 30 minutes  Napped after 2PM: no    Caffeine:Dayshift worker after 12PM   Caffeine use:NO    Alcohol:Dayshift workers after 5PM: Alcohol use:NO    Typical day for patient:YES       Study Documentation    Sleep Study Indications: The patient is here for snoring and BMI>30    Sleep Study: Diagnostic   Snore:Mild  Supplemental O2: no    O2 flow rate (L/min) range N/A  O2 flow rate (L/min) final N/A  Minimum SaO2 90  Baseline SaO2 94        Mode of Therapy:N/A    EKG abnormalities: no     EEG abnormalities: no    Sleep Study Recorded < 2 hours: N/A    Sleep Study Recorded > 2 hours but incomplete study: N/A    Sleep Study Recorded 6 hours but no sleep obtained: NO    Patient classification: employed       Post-Sleep Study    Medication used at bedtime or during sleep study:YES other prescription medications    Patient reports time it took to fall asleep:20 to 30 minutes    Patient reports waking up during study:1 to 2 times  Patient reports returning to sleep without difficulty  Patient reports sleeping 6 to 8 hours without dreaming  Patient reports sleep during study:typical    Patient rated sleepiness: Not sleepy or tired    PAP treatment:no

## 2022-02-04 ENCOUNTER — CONSULT (OUTPATIENT)
Dept: CARDIOLOGY CLINIC | Facility: CLINIC | Age: 37
End: 2022-02-04
Payer: COMMERCIAL

## 2022-02-04 ENCOUNTER — TELEPHONE (OUTPATIENT)
Dept: SLEEP CENTER | Facility: CLINIC | Age: 37
End: 2022-02-04

## 2022-02-04 VITALS
HEIGHT: 65 IN | DIASTOLIC BLOOD PRESSURE: 90 MMHG | WEIGHT: 259 LBS | BODY MASS INDEX: 43.15 KG/M2 | SYSTOLIC BLOOD PRESSURE: 140 MMHG | HEART RATE: 59 BPM

## 2022-02-04 DIAGNOSIS — I50.32 CHRONIC DIASTOLIC HEART FAILURE WITH PRESERVED EJECTION FRACTION (HCC): ICD-10-CM

## 2022-02-04 DIAGNOSIS — R06.02 SOB (SHORTNESS OF BREATH): ICD-10-CM

## 2022-02-04 DIAGNOSIS — E66.01 OBESITY, CLASS III, BMI 40-49.9 (MORBID OBESITY) (HCC): ICD-10-CM

## 2022-02-04 DIAGNOSIS — I10 PRIMARY HYPERTENSION: ICD-10-CM

## 2022-02-04 DIAGNOSIS — Z01.818 PRE-OP EXAM: Primary | ICD-10-CM

## 2022-02-04 PROCEDURE — 99244 OFF/OP CNSLTJ NEW/EST MOD 40: CPT | Performed by: NURSE PRACTITIONER

## 2022-02-04 PROCEDURE — 93000 ELECTROCARDIOGRAM COMPLETE: CPT | Performed by: NURSE PRACTITIONER

## 2022-02-04 NOTE — TELEPHONE ENCOUNTER
Returned patient's call  Left message to call back to review sleep study results  Sleep study resulted and does not show WADE  Does reveal PLM  Needs follow up with Dr Elio Weiss to discuss results and treatment options

## 2022-02-04 NOTE — ASSESSMENT & PLAN NOTE
Wt Readings from Last 3 Encounters:   02/04/22 117 kg (259 lb)   01/19/22 117 kg (257 lb 3 2 oz)   12/30/21 118 kg (261 lb)     No evidence of volume overload  Continue current regimen  Check echo

## 2022-02-07 ENCOUNTER — LAB REQUISITION (OUTPATIENT)
Dept: LAB | Facility: HOSPITAL | Age: 37
End: 2022-02-07

## 2022-02-07 DIAGNOSIS — Z11.59 ENCOUNTER FOR SCREENING FOR OTHER VIRAL DISEASES: ICD-10-CM

## 2022-02-07 PROCEDURE — U0005 INFEC AGEN DETEC AMPLI PROBE: HCPCS | Performed by: INTERNAL MEDICINE

## 2022-02-07 PROCEDURE — U0003 INFECTIOUS AGENT DETECTION BY NUCLEIC ACID (DNA OR RNA); SEVERE ACUTE RESPIRATORY SYNDROME CORONAVIRUS 2 (SARS-COV-2) (CORONAVIRUS DISEASE [COVID-19]), AMPLIFIED PROBE TECHNIQUE, MAKING USE OF HIGH THROUGHPUT TECHNOLOGIES AS DESCRIBED BY CMS-2020-01-R: HCPCS | Performed by: INTERNAL MEDICINE

## 2022-02-08 LAB — SARS-COV-2 RNA RESP QL NAA+PROBE: NEGATIVE

## 2022-02-10 ENCOUNTER — LAB REQUISITION (OUTPATIENT)
Dept: LAB | Facility: HOSPITAL | Age: 37
End: 2022-02-10

## 2022-02-10 DIAGNOSIS — Z11.59 ENCOUNTER FOR SCREENING FOR OTHER VIRAL DISEASES: ICD-10-CM

## 2022-02-10 PROCEDURE — U0003 INFECTIOUS AGENT DETECTION BY NUCLEIC ACID (DNA OR RNA); SEVERE ACUTE RESPIRATORY SYNDROME CORONAVIRUS 2 (SARS-COV-2) (CORONAVIRUS DISEASE [COVID-19]), AMPLIFIED PROBE TECHNIQUE, MAKING USE OF HIGH THROUGHPUT TECHNOLOGIES AS DESCRIBED BY CMS-2020-01-R: HCPCS | Performed by: INTERNAL MEDICINE

## 2022-02-10 PROCEDURE — U0005 INFEC AGEN DETEC AMPLI PROBE: HCPCS | Performed by: INTERNAL MEDICINE

## 2022-02-11 LAB — SARS-COV-2 RNA RESP QL NAA+PROBE: NEGATIVE

## 2022-02-14 ENCOUNTER — LAB REQUISITION (OUTPATIENT)
Dept: LAB | Facility: HOSPITAL | Age: 37
End: 2022-02-14

## 2022-02-14 DIAGNOSIS — Z11.59 ENCOUNTER FOR SCREENING FOR OTHER VIRAL DISEASES: ICD-10-CM

## 2022-02-14 LAB — SARS-COV-2 RNA RESP QL NAA+PROBE: NEGATIVE

## 2022-02-14 PROCEDURE — U0005 INFEC AGEN DETEC AMPLI PROBE: HCPCS | Performed by: INTERNAL MEDICINE

## 2022-02-14 PROCEDURE — U0003 INFECTIOUS AGENT DETECTION BY NUCLEIC ACID (DNA OR RNA); SEVERE ACUTE RESPIRATORY SYNDROME CORONAVIRUS 2 (SARS-COV-2) (CORONAVIRUS DISEASE [COVID-19]), AMPLIFIED PROBE TECHNIQUE, MAKING USE OF HIGH THROUGHPUT TECHNOLOGIES AS DESCRIBED BY CMS-2020-01-R: HCPCS | Performed by: INTERNAL MEDICINE

## 2022-02-16 ENCOUNTER — OFFICE VISIT (OUTPATIENT)
Dept: BARIATRICS | Facility: CLINIC | Age: 37
End: 2022-02-16

## 2022-02-16 VITALS — BODY MASS INDEX: 41.62 KG/M2 | WEIGHT: 250.1 LBS

## 2022-02-16 DIAGNOSIS — E66.9 OBESITY: Primary | ICD-10-CM

## 2022-02-16 DIAGNOSIS — Z01.812 BLOOD TESTS PRIOR TO TREATMENT OR PROCEDURE: ICD-10-CM

## 2022-02-16 DIAGNOSIS — Z01.818 PREOPERATIVE CLEARANCE: ICD-10-CM

## 2022-02-16 PROCEDURE — RECHECK: Performed by: DIETITIAN, REGISTERED

## 2022-02-16 NOTE — PROGRESS NOTES
Bariatric Nutrition Assessment Note    Type of surgery    Preop 4 months: 3 of 4 today  Surgery Date: TBD Tentative 2022  Surgeon: Dr Lia Adrian      Nutrition Assessment   Mich Loyd  40 y o   female     Wt with BMI of 25: 150lbs  Pre-Op Excess Wt: 116 3lbs  Wt 113 kg (250 lb 1 6 oz)   BMI 41 62 kg/m²     [place]  [place] Equation:     Weight maintenance= 2285 kcal/day  Estimated calories for weight loss 1193-3708 kcal/day ( 1-2# per wk wt loss - sedentary )  Estimated protein needs 68 2-81 8 g/day (1 0-1 2 gms/kg IBW )   Estimated fluid needs 8422-9169 ml/day (30-35 ml/kg IBW )      Weight History   Onset of Obesity: Childhood: teenager  Family history of obesity: Yes  Wt Loss Attempts: Exercise  FAD Diets (Cabbage soup, Grapefruit, Cleanse, etc )  High Protein/Low CHO diets (Atkins, [place], etc )  OTC meds/supplements  Self Created Diets (Portion Control, Healthy Food Choices, etc )  Patient has tried the above for 6 months or more with insufficient weight loss or weight regain, which is why patient has requested to be evaluated for weight loss surgery today  Maximum Wt Lost: lost 60lbs from 250 to 190lbs with self-controlled diet and exercise    Review of History and Medications   Past Medical History:   Diagnosis Date    CHF (congestive heart failure) ([address] )     Chronic back pain     Generalized anxiety disorder     Hypertension     Kidney stone     Obesity     Primary osteoarthritis of left knee 2020     Past Surgical History:   Procedure Laterality Date     SECTION      x 2    KNEE ARTHROSCOPY Left     acl/meniscus repair    AL CYSTO/URETERO W/LITHOTRIPSY &INDWELL STENT INSRT Right 2018    Procedure: CYSTOSCOPY URETEROSCOPY, RETROGRADE PYELOGRAM AND INSERTION STENT URETERAL, RIGHT STONE EXTRACTION;  Surgeon: Candice Ling MD;  [facility];  Service: Urology    AL CYSTOURETHROSCOPY,URETER CATHETER Right 1/15/2018    Procedure: CYSTOSCOPY RETROGRADE PYELOGRAM WITH INSERTION STENT URETERAL;  Surgeon: Nicole Valdes MD;  [facility];  Service: Urology    TUBAL LIGATION       Social History     Socioeconomic History    Marital status: /Civil Union     Spouse name: Not on file    Number of children: Not on file    Years of education: Not on file    Highest education level: Not on file   Occupational History    Occupation: CNA   Tobacco Use    Smoking status: Former Smoker     Packs/day: 0 20     Years: 5 00     Pack years: 1 00     Types: Cigarettes     Quit date: 2020     Years since quittin 1    Smokeless tobacco: Never Used   Vaping Use    Vaping Use: Never used   Substance and Sexual Activity    Alcohol use: Yes     Comment: Rarely    Drug use: No    Sexual activity: Not on file   Other Topics Concern    Not on file   Social History Narrative        Employed Full Time -CNA     Social Determinants of Health     Financial Resource Strain: Not on file   Food Insecurity: Not on file   Transportation Needs: Not on file   Physical Activity: Not on file   Stress: Not on file   Social Connections: Not on file   Intimate Partner Violence: Not on file   Housing Stability: Not on file       Current Outpatient Medications:     albuterol (PROVENTIL HFA,VENTOLIN HFA) 90 mcg/act inhaler, Inhale 2 puffs every 6 (six) hours as needed for wheezing or shortness of breath, Disp: 3 Inhaler, Rfl: 3    amLODIPine (NORVASC) 5 mg tablet, Take 2 tablets (10 mg total) by mouth daily, Disp: 90 tablet, Rfl: 1    budesonide-formoterol (SYMBICORT) 80-4 5 MCG/ACT inhaler, Inhale 2 puffs 2 (two) times a day Rinse mouth after use , Disp: 10 2 g, Rfl: 5    labetalol (NORMODYNE) 300 mg tablet, Take 1 tablet (300 mg total) by mouth 2 (two) times a day, Disp: 30 tablet, Rfl: 5    lidocaine (XYLOCAINE) 5 % ointment, Apply topically as needed for mild pain, Disp: 35 44 g, Rfl: 5    losartan-hydrochlorothiazide (HYZAAR) 50-12 5 mg per tablet, Take 1 tablet by mouth daily, Disp: 30 tablet, Rfl: 5     Food Intake and Lifestyle Assessment   Food Intake Assessment completed via usual diet recall  Bed at 9:30am  Wake 2:30pm  Breakfast: 6-7pm: cooks at home:  Meat:  Chicken, beef, sometimes steak or pork, vegetable, potato/rice/noodles  Snack: bag of potato chips  eliminated   Lunch: 2-3am: tuna on croissant  Snack: rest of bag of chips  Pt has eliminated snacking on chips  Dinner: Healthy choice frozen entree  Snack: none  Beverage intake: Pt has eliminated drinks with sugar  Protein supplement: none currently  Estimated protein intake per day: 60g  Estimated fluid intake per day: 16 oz water x 3-4 per day, half a 2-liter soda, fruit juice maybe 1-2 times a week  Meals eaten away from home: once to twice a week: pizza/italian or wendys  Typical meal pattern: 2 meals per day and 1-2 snacks per day  Eating Behaviors: Consumption of high calorie/ high fat foods, Consumption of high calorie beverages, Large portion sizes, Frequent snacking/ grazing and craves potato chip  Since initial appointment pt has eliminated most desserts, snack foods, and fried foods  Pt is eating three meals per day, eating protein with each meal, and is portioning out her foods and no longer going back for seconds  Pt is now drinking mainly plain water and has eliminated sugary/caloric beverages  Pt is starting to practice the 30/60 rule  Food allergies or intolerances: No Known Allergies [facility]  Cultural or Mu-ism considerations: none noted  Watches her salt intake due to hypertension  Physical Assessment  Physical Activity  Types of exercise: None  Likes to walk with her kids  Current physical limitations: c/o SOB/GALLARDO, asthma:  Pt reports this is improving with her weight loss and she has not used her inhaler recently  Psychosocial Assessment   Support systems: spouse and children:  Has 6 yo twins and 13yo    Socioeconomic factors: works at [employer] facility    Nutrition Diagnosis-Continued/improving  Diagnosis: Overweight / Obesity (NC-3 3), Excessive energy intake (NI-1 5) and Undesirable food choices (NB-1 7)  Related to: Food and nutrition-related knowledge deficit, Limited adherence to nutrition-related recommendations, Physical inactivity and Excessive energy intake  As Evidenced by: BMI >25, Excessive energy intake and Unintentional weight gain     Nutrition Prescription: Recommend the following diet  Regular and low sodium    Interventions and Teaching   Discussed pre-op and post-op nutrition guidelines  Patient educated and handouts provided    Surgical changes to stomach / GI  Capacity of post-surgery stomach  Diet progression  Adequate hydration  Sugar and fat restriction to decrease "dumping syndrome"  Fat restriction to decrease steatorrhea  Expected weight loss  Weight loss plateaus/ possibility of weight regain  Exercise  Suggestions for pre-op diet  Nutrition considerations after surgery  Protein supplements  Meal planning and preparation  Appropriate carbohydrate, protein, and fat intake, and food/fluid choices to maximize safe weight loss, nutrient intake, and tolerance   Dietary and lifestyle changes  Possible problems with poor eating habits  Techniques for self monitoring and keeping daily food journal  Potential for food intolerance after surgery, and ways to deal with them including: lactose intolerance, nausea, reflux, vomiting, diarrhea, food intolerance, appetite changes, gas  Vitamin / Mineral supplementation of Multivitamin with minerals and Vitamin D  No current vitamins    Patient is not currently pregnant and doesn't desire to become pregnant a minimum of one year post-op    Education provided to: patient    Barriers to learning: No barriers identified    Readiness to change: preparation    Prior research on procedure: internet and pre-op class    Comprehension: needs reinforcement and verbalizes understanding     Expected Compliance: good    Evaluation / Monitoring  Dietitian to Monitor: Eating pattern as discussed Tolerance of nutrition prescription Body weight Lab values Physical activity Bowel pattern    Goals  Eliminate sugar sweetened beverages, Food journal, Exercise 30 minutes 5 times per week, Complete lession plans 1-6, Eat 3 meals per day and Eliminate mindless snacking     Workflow:   Bloodwork:   o Lipids+TSH done 3/12/21  Needs updated next month   o CBC+CMP done 12/15/21   Support Group: Plans on attending 2/22/22 Virtual Support Group   Weight Loss: 5% wt loss=13 3#=Day of surgery goal of 253#    EGD scheduled for 3/16/22   Cardiac Risk Assessment: nsult 2/4/22: ECHO scheduled 3/23/22     Sleep Studies: completed 1/31/22:  No WADE    Time Spent:   30 minutes

## 2022-02-17 ENCOUNTER — LAB REQUISITION (OUTPATIENT)
Dept: LAB | Facility: HOSPITAL | Age: 37
End: 2022-02-17

## 2022-02-17 DIAGNOSIS — Z11.59 ENCOUNTER FOR SCREENING FOR OTHER VIRAL DISEASES: ICD-10-CM

## 2022-02-17 PROCEDURE — U0005 INFEC AGEN DETEC AMPLI PROBE: HCPCS | Performed by: INTERNAL MEDICINE

## 2022-02-17 PROCEDURE — U0003 INFECTIOUS AGENT DETECTION BY NUCLEIC ACID (DNA OR RNA); SEVERE ACUTE RESPIRATORY SYNDROME CORONAVIRUS 2 (SARS-COV-2) (CORONAVIRUS DISEASE [COVID-19]), AMPLIFIED PROBE TECHNIQUE, MAKING USE OF HIGH THROUGHPUT TECHNOLOGIES AS DESCRIBED BY CMS-2020-01-R: HCPCS | Performed by: INTERNAL MEDICINE

## 2022-02-18 LAB — SARS-COV-2 RNA RESP QL NAA+PROBE: NEGATIVE

## 2022-02-21 ENCOUNTER — LAB REQUISITION (OUTPATIENT)
Dept: LAB | Facility: HOSPITAL | Age: 37
End: 2022-02-21

## 2022-02-21 DIAGNOSIS — Z11.59 ENCOUNTER FOR SCREENING FOR OTHER VIRAL DISEASES: ICD-10-CM

## 2022-02-21 PROCEDURE — U0003 INFECTIOUS AGENT DETECTION BY NUCLEIC ACID (DNA OR RNA); SEVERE ACUTE RESPIRATORY SYNDROME CORONAVIRUS 2 (SARS-COV-2) (CORONAVIRUS DISEASE [COVID-19]), AMPLIFIED PROBE TECHNIQUE, MAKING USE OF HIGH THROUGHPUT TECHNOLOGIES AS DESCRIBED BY CMS-2020-01-R: HCPCS | Performed by: INTERNAL MEDICINE

## 2022-02-21 PROCEDURE — U0005 INFEC AGEN DETEC AMPLI PROBE: HCPCS | Performed by: INTERNAL MEDICINE

## 2022-02-22 LAB — SARS-COV-2 RNA RESP QL NAA+PROBE: NEGATIVE

## 2022-02-24 ENCOUNTER — LAB REQUISITION (OUTPATIENT)
Dept: LAB | Facility: HOSPITAL | Age: 37
End: 2022-02-24

## 2022-02-24 DIAGNOSIS — I10 HTN (HYPERTENSION), MALIGNANT: ICD-10-CM

## 2022-02-24 DIAGNOSIS — Z20.822 CLOSE EXPOSURE TO COVID-19 VIRUS: ICD-10-CM

## 2022-02-24 DIAGNOSIS — Z11.59 ENCOUNTER FOR SCREENING FOR OTHER VIRAL DISEASES: ICD-10-CM

## 2022-02-24 DIAGNOSIS — J06.9 UPPER RESPIRATORY TRACT INFECTION, UNSPECIFIED TYPE: ICD-10-CM

## 2022-02-24 DIAGNOSIS — I10 ESSENTIAL HYPERTENSION: ICD-10-CM

## 2022-02-24 PROCEDURE — U0005 INFEC AGEN DETEC AMPLI PROBE: HCPCS | Performed by: INTERNAL MEDICINE

## 2022-02-24 PROCEDURE — U0003 INFECTIOUS AGENT DETECTION BY NUCLEIC ACID (DNA OR RNA); SEVERE ACUTE RESPIRATORY SYNDROME CORONAVIRUS 2 (SARS-COV-2) (CORONAVIRUS DISEASE [COVID-19]), AMPLIFIED PROBE TECHNIQUE, MAKING USE OF HIGH THROUGHPUT TECHNOLOGIES AS DESCRIBED BY CMS-2020-01-R: HCPCS | Performed by: INTERNAL MEDICINE

## 2022-02-24 RX ORDER — ALBUTEROL SULFATE 90 UG/1
2 AEROSOL, METERED RESPIRATORY (INHALATION) EVERY 6 HOURS PRN
Qty: 54 G | Refills: 1 | Status: SHIPPED | OUTPATIENT
Start: 2022-02-24

## 2022-02-24 RX ORDER — LABETALOL 300 MG/1
300 TABLET, FILM COATED ORAL 2 TIMES DAILY
Qty: 60 TABLET | Refills: 5 | Status: SHIPPED | OUTPATIENT
Start: 2022-02-24 | End: 2022-05-24 | Stop reason: SDUPTHER

## 2022-02-24 RX ORDER — AMLODIPINE BESYLATE 5 MG/1
10 TABLET ORAL DAILY
Qty: 180 TABLET | Refills: 1 | Status: SHIPPED | OUTPATIENT
Start: 2022-02-24 | End: 2022-05-24 | Stop reason: SDUPTHER

## 2022-02-25 LAB — SARS-COV-2 RNA RESP QL NAA+PROBE: NEGATIVE

## 2022-02-28 ENCOUNTER — LAB REQUISITION (OUTPATIENT)
Dept: LAB | Facility: HOSPITAL | Age: 37
End: 2022-02-28

## 2022-02-28 DIAGNOSIS — Z11.59 ENCOUNTER FOR SCREENING FOR OTHER VIRAL DISEASES: ICD-10-CM

## 2022-02-28 LAB — SARS-COV-2 RNA RESP QL NAA+PROBE: NEGATIVE

## 2022-02-28 PROCEDURE — U0003 INFECTIOUS AGENT DETECTION BY NUCLEIC ACID (DNA OR RNA); SEVERE ACUTE RESPIRATORY SYNDROME CORONAVIRUS 2 (SARS-COV-2) (CORONAVIRUS DISEASE [COVID-19]), AMPLIFIED PROBE TECHNIQUE, MAKING USE OF HIGH THROUGHPUT TECHNOLOGIES AS DESCRIBED BY CMS-2020-01-R: HCPCS | Performed by: INTERNAL MEDICINE

## 2022-02-28 PROCEDURE — U0005 INFEC AGEN DETEC AMPLI PROBE: HCPCS | Performed by: INTERNAL MEDICINE

## 2022-03-03 ENCOUNTER — LAB REQUISITION (OUTPATIENT)
Dept: LAB | Facility: HOSPITAL | Age: 37
End: 2022-03-03

## 2022-03-03 DIAGNOSIS — Z11.59 ENCOUNTER FOR SCREENING FOR OTHER VIRAL DISEASES: ICD-10-CM

## 2022-03-03 PROCEDURE — U0003 INFECTIOUS AGENT DETECTION BY NUCLEIC ACID (DNA OR RNA); SEVERE ACUTE RESPIRATORY SYNDROME CORONAVIRUS 2 (SARS-COV-2) (CORONAVIRUS DISEASE [COVID-19]), AMPLIFIED PROBE TECHNIQUE, MAKING USE OF HIGH THROUGHPUT TECHNOLOGIES AS DESCRIBED BY CMS-2020-01-R: HCPCS | Performed by: INTERNAL MEDICINE

## 2022-03-03 PROCEDURE — U0005 INFEC AGEN DETEC AMPLI PROBE: HCPCS | Performed by: INTERNAL MEDICINE

## 2022-03-04 LAB — SARS-COV-2 RNA RESP QL NAA+PROBE: NEGATIVE

## 2022-03-07 ENCOUNTER — LAB REQUISITION (OUTPATIENT)
Dept: LAB | Facility: HOSPITAL | Age: 37
End: 2022-03-07

## 2022-03-07 DIAGNOSIS — Z11.59 ENCOUNTER FOR SCREENING FOR OTHER VIRAL DISEASES: ICD-10-CM

## 2022-03-07 PROCEDURE — U0005 INFEC AGEN DETEC AMPLI PROBE: HCPCS | Performed by: INTERNAL MEDICINE

## 2022-03-07 PROCEDURE — U0003 INFECTIOUS AGENT DETECTION BY NUCLEIC ACID (DNA OR RNA); SEVERE ACUTE RESPIRATORY SYNDROME CORONAVIRUS 2 (SARS-COV-2) (CORONAVIRUS DISEASE [COVID-19]), AMPLIFIED PROBE TECHNIQUE, MAKING USE OF HIGH THROUGHPUT TECHNOLOGIES AS DESCRIBED BY CMS-2020-01-R: HCPCS | Performed by: INTERNAL MEDICINE

## 2022-03-08 LAB — SARS-COV-2 RNA RESP QL NAA+PROBE: NEGATIVE

## 2022-03-10 ENCOUNTER — LAB REQUISITION (OUTPATIENT)
Dept: LAB | Facility: HOSPITAL | Age: 37
End: 2022-03-10

## 2022-03-10 DIAGNOSIS — Z11.59 ENCOUNTER FOR SCREENING FOR OTHER VIRAL DISEASES: ICD-10-CM

## 2022-03-10 PROCEDURE — U0003 INFECTIOUS AGENT DETECTION BY NUCLEIC ACID (DNA OR RNA); SEVERE ACUTE RESPIRATORY SYNDROME CORONAVIRUS 2 (SARS-COV-2) (CORONAVIRUS DISEASE [COVID-19]), AMPLIFIED PROBE TECHNIQUE, MAKING USE OF HIGH THROUGHPUT TECHNOLOGIES AS DESCRIBED BY CMS-2020-01-R: HCPCS | Performed by: INTERNAL MEDICINE

## 2022-03-10 PROCEDURE — U0005 INFEC AGEN DETEC AMPLI PROBE: HCPCS | Performed by: INTERNAL MEDICINE

## 2022-03-11 LAB — SARS-COV-2 RNA RESP QL NAA+PROBE: NEGATIVE

## 2022-03-14 ENCOUNTER — LAB REQUISITION (OUTPATIENT)
Dept: LAB | Facility: HOSPITAL | Age: 37
End: 2022-03-14

## 2022-03-14 DIAGNOSIS — Z11.59 ENCOUNTER FOR SCREENING FOR OTHER VIRAL DISEASES: ICD-10-CM

## 2022-03-14 PROCEDURE — U0005 INFEC AGEN DETEC AMPLI PROBE: HCPCS | Performed by: INTERNAL MEDICINE

## 2022-03-14 PROCEDURE — U0003 INFECTIOUS AGENT DETECTION BY NUCLEIC ACID (DNA OR RNA); SEVERE ACUTE RESPIRATORY SYNDROME CORONAVIRUS 2 (SARS-COV-2) (CORONAVIRUS DISEASE [COVID-19]), AMPLIFIED PROBE TECHNIQUE, MAKING USE OF HIGH THROUGHPUT TECHNOLOGIES AS DESCRIBED BY CMS-2020-01-R: HCPCS | Performed by: INTERNAL MEDICINE

## 2022-03-15 LAB — SARS-COV-2 RNA RESP QL NAA+PROBE: NEGATIVE

## 2022-03-16 ENCOUNTER — ANESTHESIA (OUTPATIENT)
Dept: GASTROENTEROLOGY | Facility: HOSPITAL | Age: 37
End: 2022-03-16

## 2022-03-16 ENCOUNTER — ANESTHESIA EVENT (OUTPATIENT)
Dept: GASTROENTEROLOGY | Facility: HOSPITAL | Age: 37
End: 2022-03-16

## 2022-03-16 ENCOUNTER — HOSPITAL ENCOUNTER (OUTPATIENT)
Dept: GASTROENTEROLOGY | Facility: HOSPITAL | Age: 37
Setting detail: OUTPATIENT SURGERY
Discharge: HOME | End: 2022-03-16
Attending: SURGERY
Payer: COMMERCIAL

## 2022-03-16 VITALS
OXYGEN SATURATION: 100 % | DIASTOLIC BLOOD PRESSURE: 67 MMHG | WEIGHT: 255 LBS | TEMPERATURE: 98 F | RESPIRATION RATE: 22 BRPM | SYSTOLIC BLOOD PRESSURE: 135 MMHG | BODY MASS INDEX: 40.98 KG/M2 | HEIGHT: 66 IN | HEART RATE: 73 BPM

## 2022-03-16 DIAGNOSIS — E66.01 OBESITY, CLASS III, BMI 40-49.9 (MORBID OBESITY) (HCC): ICD-10-CM

## 2022-03-16 LAB
EXT PREGNANCY TEST URINE: NEGATIVE
EXT. CONTROL: NORMAL

## 2022-03-16 PROCEDURE — 88305 TISSUE EXAM BY PATHOLOGIST: CPT | Performed by: PATHOLOGY

## 2022-03-16 PROCEDURE — 81025 URINE PREGNANCY TEST: CPT | Performed by: ANESTHESIOLOGY

## 2022-03-16 PROCEDURE — 43239 EGD BIOPSY SINGLE/MULTIPLE: CPT | Performed by: SURGERY

## 2022-03-16 RX ORDER — PROPOFOL 10 MG/ML
INJECTION, EMULSION INTRAVENOUS AS NEEDED
Status: DISCONTINUED | OUTPATIENT
Start: 2022-03-16 | End: 2022-03-16

## 2022-03-16 RX ORDER — SODIUM CHLORIDE 9 MG/ML
125 INJECTION, SOLUTION INTRAVENOUS CONTINUOUS
Status: DISCONTINUED | OUTPATIENT
Start: 2022-03-16 | End: 2022-03-20 | Stop reason: HOSPADM

## 2022-03-16 RX ORDER — LIDOCAINE HYDROCHLORIDE 20 MG/ML
INJECTION, SOLUTION EPIDURAL; INFILTRATION; INTRACAUDAL; PERINEURAL AS NEEDED
Status: DISCONTINUED | OUTPATIENT
Start: 2022-03-16 | End: 2022-03-16

## 2022-03-16 RX ADMIN — PROPOFOL 200 MG: 10 INJECTION, EMULSION INTRAVENOUS at 10:09

## 2022-03-16 RX ADMIN — SODIUM CHLORIDE 125 ML/HR: 0.9 INJECTION, SOLUTION INTRAVENOUS at 09:46

## 2022-03-16 RX ADMIN — PROPOFOL 50 MG: 10 INJECTION, EMULSION INTRAVENOUS at 10:12

## 2022-03-16 RX ADMIN — LIDOCAINE HYDROCHLORIDE 100 MG: 20 INJECTION, SOLUTION EPIDURAL; INFILTRATION; INTRACAUDAL; PERINEURAL at 10:08

## 2022-03-16 RX ADMIN — PROPOFOL 50 MG: 10 INJECTION, EMULSION INTRAVENOUS at 10:14

## 2022-03-16 RX ADMIN — PROPOFOL 50 MG: 10 INJECTION, EMULSION INTRAVENOUS at 10:10

## 2022-03-16 NOTE — ANESTHESIA POSTPROCEDURE EVALUATION
Post-Op Assessment Note    CV Status:  Stable    Pain management: adequate     Mental Status:  Alert and awake   Hydration Status:  Euvolemic   PONV Controlled:  Controlled   Airway Patency:  Patent      Post Op Vitals Reviewed: Yes      Staff: Anesthesiologist         No complications documented      /67 (03/16/22 1033)    Temp      Pulse 73 (03/16/22 1033)   Resp 22 (03/16/22 1033)    SpO2 100 % (03/16/22 1033)

## 2022-03-16 NOTE — ANESTHESIA PREPROCEDURE EVALUATION
Procedure:  EGD    Relevant Problems   CARDIO   (+) Congestive heart failure (HCC)   (+) Hypertension   (+) SOB (shortness of breath) on exertion      /RENAL   (+) Nephrolithiasis      MUSCULOSKELETAL   (+) Primary osteoarthritis of left knee      NEURO/PSYCH   (+) Chronic headaches   (+) Generalized anxiety disorder      PULMONARY   (+) WADE (obstructive sleep apnea)   (+) SOB (shortness of breath) on exertion      Other   (+) Obesity        Physical Exam    Airway    Mallampati score: III  TM Distance: >3 FB  Neck ROM: full     Dental   No notable dental hx     Cardiovascular  Rhythm: regular, Rate: normal, Cardiovascular exam normal    Pulmonary  Pulmonary exam normal Breath sounds clear to auscultation,     Other Findings        Anesthesia Plan  ASA Score- 3     Anesthesia Type- general with ASA Monitors  Additional Monitors:   Airway Plan:           Plan Factors-    Chart reviewed  Imaging results reviewed  Patient summary reviewed  Induction-     Postoperative Plan-     Informed Consent- Anesthetic plan and risks discussed with patient

## 2022-03-17 ENCOUNTER — LAB REQUISITION (OUTPATIENT)
Dept: LAB | Facility: HOSPITAL | Age: 37
End: 2022-03-17
Payer: COMMERCIAL

## 2022-03-17 DIAGNOSIS — Z11.59 ENCOUNTER FOR SCREENING FOR OTHER VIRAL DISEASES: ICD-10-CM

## 2022-03-17 PROCEDURE — U0005 INFEC AGEN DETEC AMPLI PROBE: HCPCS | Performed by: INTERNAL MEDICINE

## 2022-03-17 PROCEDURE — U0003 INFECTIOUS AGENT DETECTION BY NUCLEIC ACID (DNA OR RNA); SEVERE ACUTE RESPIRATORY SYNDROME CORONAVIRUS 2 (SARS-COV-2) (CORONAVIRUS DISEASE [COVID-19]), AMPLIFIED PROBE TECHNIQUE, MAKING USE OF HIGH THROUGHPUT TECHNOLOGIES AS DESCRIBED BY CMS-2020-01-R: HCPCS | Performed by: INTERNAL MEDICINE

## 2022-03-18 LAB — SARS-COV-2 RNA RESP QL NAA+PROBE: NEGATIVE

## 2022-03-18 NOTE — PROGRESS NOTES
Virtual Regular Visit      Assessment/Plan:    Problem List Items Addressed This Visit        Genitourinary    Acute cystitis with hematuria - Primary     · Continue with bactrim            Other    SOB (shortness of breath) on exertion     · Patient is using her proventil inhaler  · Will start symbicort and prednisone as well                    Reason for visit is   Chief Complaint   Patient presents with    Follow-up     octaviano duo [id number]  f/u UTI pt did not get the bactrim yet, pt c/o chills, sob, headache, diarrhea, cough, bodyaches        Encounter provider TANGELA Funk    Provider located at [address]  [address] [place] [id number]      Recent Visits  Date Type Provider Dept   03/11/21 [facility], [address]  [facility]  recent visits within past 7 days and meeting all other requirements     Today's Visits  Date Type Provider Dept   03/12/21 Telemedicine TANGELA Ann Pg today's visits and meeting all other requirements     Future Appointments  No visits were found meeting these conditions  Showing future appointments within next 150 days and meeting all other requirements        The patient was identified by name and date of birth  Dwight Perez was informed that this is a telemedicine visit and that the visit is being conducted through [employer] and patient was informed that this is not a secure, HIPAA-compliant platform  She agrees to proceed     My office door was closed  No one else was in the room  She acknowledged consent and understanding of privacy and security of the video platform  The patient has agreed to participate and understands they can discontinue the visit at any time  Patient is aware this is a billable service       Subjective  Dwight Perez is a 29 y o  female       Urinary Tract Infection  Patient complains of no symptoms, but UA was positive  She has had symptoms for no days  Patient also complains of URI issues but was found to have a positive UA  Patient denies back pain, congestion, cough, headache and sorethroat  Patient does not have a history of recurrent UTI  Patient does not have a history of pyelonephritis  Patient also with some shortness of breath on exertion  She has continued to use her proventil inhaler, but we will add prednisone and symbicort at this time  Patient's lab data was reviewed during this visit        Past Medical History:   Diagnosis Date    CHF (congestive heart failure) (HCC)     Chronic back pain     Generalized anxiety disorder     Hypertension     Kidney stone     Obesity     Primary osteoarthritis of left knee 2020       Past Surgical History:   Procedure Laterality Date     SECTION      x 2    KNEE ARTHROSCOPY Left     acl/meniscus repair    [place] CYSTO/URETERO W/LITHOTRIPSY &INDWELL STENT INSRT Right 2018    Procedure: CYSTOSCOPY URETEROSCOPY, RETROGRADE PYELOGRAM AND INSERTION STENT URETERAL, RIGHT STONE EXTRACTION;  Surgeon: Inderjit Valle MD;  [facility];  Service: Urology    IL CYSTOURETHROSCOPY,URETER CATHETER Right 1/15/2018    Procedure: CYSTOSCOPY RETROGRADE PYELOGRAM WITH INSERTION STENT URETERAL;  Surgeon: Elena Sahu MD;  [facility];  Service: Urology    TUBAL LIGATION         Current Outpatient Medications   Medication Sig Dispense Refill    albuterol (PROVENTIL HFA,VENTOLIN HFA) 90 mcg/act inhaler Inhale 2 puffs every 6 (six) hours as needed for wheezing or shortness of breath 3 Inhaler 3    amLODIPine (NORVASC) 10 mg tablet Take 1 tablet (10 mg total) by mouth daily 90 tablet 1    labetalol (NORMODYNE) 300 mg tablet TAKE 1 TABLET (300 MG TOTAL) BY MOUTH 3 (THREE) TIMES A DAY 90 tablet 0    losartan-hydrochlorothiazide (HYZAAR) 50-12 5 mg per tablet Take 1 tablet by mouth daily 30 tablet 5    fluticasone (FLONASE) 50 mcg/act nasal spray 1 spray into each nostril daily (Patient not taking: Reported on 1/27/2021) 16 g 0    guaiFENesin (Mucinex) 600 mg 12 hr tablet Take 1 tablet (600 mg total) by mouth every 12 (twelve) hours (Patient not taking: Reported on 3/11/2021) 20 tablet 0    labetalol (NORMODYNE) 300 mg tablet Take 1 tablet (300 mg total) by mouth 2 (two) times a day (Patient not taking: Reported on 3/11/2021) 30 tablet 5    levocetirizine (XYZAL) 5 MG tablet Take 1 tablet (5 mg total) by mouth every evening (Patient not taking: Reported on 1/11/2021) 30 tablet 0    lidocaine (LIDODERM) 5 % APPLY 2 PATCHES TOPICALLY DAILY FOR 15 DAYS REMOVE & DISCARD PATCH WITHIN 12 HOURS OR AS DIRECTED BY MD 30 patch 0    sulfamethoxazole-trimethoprim (BACTRIM DS) 800-160 mg per tablet Take 1 tablet by mouth every 12 (twelve) hours for 7 days (Patient not taking: Reported on 3/12/2021) 14 tablet 0     No current facility-administered medications for this visit  No Known Allergies    Review of Systems    Video Exam    Vitals:    03/12/21 1509   Temp: 99 8 °F (37 7 °C)   Weight: 108 kg (237 lb)   Height: 5' 6" (1 676 m)       Physical Exam     I spent 20 minutes with patient today in which greater than 50% of the time was spent in counseling/coordination of care regarding medications, and treatment plan  VIRTUAL VISIT DISCLAIMER    Angel Marie acknowledges that she has consented to an online visit or consultation  She understands that the online visit is based solely on information provided by her, and that, in the absence of a face-to-face physical evaluation by the physician, the diagnosis she receives is both limited and provisional in terms of accuracy and completeness  This is not intended to replace a full medical face-to-face evaluation by the physician  Angel Marie understands and accepts these terms  None

## 2022-03-21 ENCOUNTER — LAB REQUISITION (OUTPATIENT)
Dept: LAB | Facility: HOSPITAL | Age: 37
End: 2022-03-21
Payer: COMMERCIAL

## 2022-03-21 ENCOUNTER — APPOINTMENT (OUTPATIENT)
Dept: LAB | Facility: CLINIC | Age: 37
End: 2022-03-21
Payer: COMMERCIAL

## 2022-03-21 DIAGNOSIS — Z01.812 BLOOD TESTS PRIOR TO TREATMENT OR PROCEDURE: ICD-10-CM

## 2022-03-21 DIAGNOSIS — Z01.818 PREOPERATIVE CLEARANCE: ICD-10-CM

## 2022-03-21 DIAGNOSIS — E66.9 OBESITY: ICD-10-CM

## 2022-03-21 DIAGNOSIS — Z11.59 ENCOUNTER FOR SCREENING FOR OTHER VIRAL DISEASES: ICD-10-CM

## 2022-03-21 LAB
CHOLEST SERPL-MCNC: 110 MG/DL
HDLC SERPL-MCNC: 34 MG/DL
LDLC SERPL CALC-MCNC: 60 MG/DL (ref 0–100)
NONHDLC SERPL-MCNC: 76 MG/DL
TRIGL SERPL-MCNC: 79 MG/DL
TSH SERPL DL<=0.05 MIU/L-ACNC: 1.36 UIU/ML (ref 0.36–3.74)

## 2022-03-21 PROCEDURE — U0003 INFECTIOUS AGENT DETECTION BY NUCLEIC ACID (DNA OR RNA); SEVERE ACUTE RESPIRATORY SYNDROME CORONAVIRUS 2 (SARS-COV-2) (CORONAVIRUS DISEASE [COVID-19]), AMPLIFIED PROBE TECHNIQUE, MAKING USE OF HIGH THROUGHPUT TECHNOLOGIES AS DESCRIBED BY CMS-2020-01-R: HCPCS | Performed by: INTERNAL MEDICINE

## 2022-03-21 PROCEDURE — 36415 COLL VENOUS BLD VENIPUNCTURE: CPT

## 2022-03-21 PROCEDURE — 84443 ASSAY THYROID STIM HORMONE: CPT

## 2022-03-21 PROCEDURE — 80061 LIPID PANEL: CPT

## 2022-03-21 PROCEDURE — U0005 INFEC AGEN DETEC AMPLI PROBE: HCPCS | Performed by: INTERNAL MEDICINE

## 2022-03-22 LAB — SARS-COV-2 RNA RESP QL NAA+PROBE: NEGATIVE

## 2022-03-23 ENCOUNTER — HOSPITAL ENCOUNTER (OUTPATIENT)
Dept: NON INVASIVE DIAGNOSTICS | Facility: CLINIC | Age: 37
Discharge: HOME | End: 2022-03-23
Payer: COMMERCIAL

## 2022-03-23 VITALS
HEART RATE: 62 BPM | BODY MASS INDEX: 41.1 KG/M2 | HEIGHT: 66 IN | WEIGHT: 255.73 LBS | SYSTOLIC BLOOD PRESSURE: 160 MMHG | DIASTOLIC BLOOD PRESSURE: 94 MMHG

## 2022-03-23 DIAGNOSIS — R06.02 SOB (SHORTNESS OF BREATH): ICD-10-CM

## 2022-03-23 LAB
AORTIC ROOT: 3.6 CM
APICAL FOUR CHAMBER EJECTION FRACTION: 55 %
E WAVE DECELERATION TIME: 188 MS
FRACTIONAL SHORTENING: 35 % (ref 28–44)
INTERVENTRICULAR SEPTUM IN DIASTOLE (PARASTERNAL SHORT AXIS VIEW): 1.6 CM
INTERVENTRICULAR SEPTUM: 1.6 CM (ref 0.57–1.07)
LAAS-AP2: 22.5 CM2
LAAS-AP4: 28.4 CM2
LEFT ATRIUM SIZE: 5.5 CM
LEFT INTERNAL DIMENSION IN SYSTOLE: 3.4 CM (ref 5.1–7.73)
LEFT VENTRICULAR INTERNAL DIMENSION IN DIASTOLE: 5.2 CM (ref 8.57–12.78)
LEFT VENTRICULAR POSTERIOR WALL IN END DIASTOLE: 1.6 CM (ref 0.56–1.06)
LEFT VENTRICULAR STROKE VOLUME: 78 ML
LVSV (TEICH): 78 ML
MV E'TISSUE VEL-LAT: 7 CM/S
MV E'TISSUE VEL-SEP: 6 CM/S
MV PEAK A VEL: 0.57 M/S
MV PEAK E VEL: 82 CM/S
MV STENOSIS PRESSURE HALF TIME: 55 MS
MV VALVE AREA P 1/2 METHOD: 4 CM2
RIGHT VENTRICLE ID DIMENSION: 2.9 CM
SL CV LEFT ATRIUM LENGTH A2C: 5.7 CM
SL CV LV EF: 55
SL CV PED ECHO LEFT VENTRICLE DIASTOLIC VOLUME (MOD BIPLANE) 2D: 127 ML
SL CV PED ECHO LEFT VENTRICLE SYSTOLIC VOLUME (MOD BIPLANE) 2D: 49 ML
TR MAX PG: 18 MMHG
TR PEAK VELOCITY: 2.1 M/S
TRICUSPID VALVE PEAK E WAVE VELOCITY: 0.17 M/S
TRICUSPID VALVE PEAK REGURGITATION VELOCITY: 2.1 M/S
Z-SCORE OF INTERVENTRICULAR SEPTUM IN END DIASTOLE: 6.12
Z-SCORE OF LEFT VENTRICULAR DIMENSION IN END DIASTOLE: -7
Z-SCORE OF LEFT VENTRICULAR DIMENSION IN END SYSTOLE: -4.85
Z-SCORE OF LEFT VENTRICULAR POSTERIOR WALL IN END DIASTOLE: 6.23

## 2022-03-23 PROCEDURE — 93306 TTE W/DOPPLER COMPLETE: CPT | Performed by: INTERNAL MEDICINE

## 2022-03-23 PROCEDURE — 93306 TTE W/DOPPLER COMPLETE: CPT

## 2022-03-24 ENCOUNTER — OFFICE VISIT (OUTPATIENT)
Dept: BARIATRICS | Facility: CLINIC | Age: 37
End: 2022-03-24

## 2022-03-24 ENCOUNTER — LAB REQUISITION (OUTPATIENT)
Dept: LAB | Facility: HOSPITAL | Age: 37
End: 2022-03-24

## 2022-03-24 DIAGNOSIS — E66.9 OBESITY, CLASS I, BMI 30-34.9: Primary | ICD-10-CM

## 2022-03-24 DIAGNOSIS — Z11.59 ENCOUNTER FOR SCREENING FOR OTHER VIRAL DISEASES: ICD-10-CM

## 2022-03-24 PROCEDURE — RECHECK

## 2022-03-24 PROCEDURE — U0005 INFEC AGEN DETEC AMPLI PROBE: HCPCS | Performed by: INTERNAL MEDICINE

## 2022-03-24 PROCEDURE — U0003 INFECTIOUS AGENT DETECTION BY NUCLEIC ACID (DNA OR RNA); SEVERE ACUTE RESPIRATORY SYNDROME CORONAVIRUS 2 (SARS-COV-2) (CORONAVIRUS DISEASE [COVID-19]), AMPLIFIED PROBE TECHNIQUE, MAKING USE OF HIGH THROUGHPUT TECHNOLOGIES AS DESCRIBED BY CMS-2020-01-R: HCPCS | Performed by: INTERNAL MEDICINE

## 2022-03-24 NOTE — PROGRESS NOTES
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  Tracking her steps through her phone  But has to remember to turn tracker on  Feels she is pushing herself to do more (walking)  Has goals of making it up a hill on her walking path  Eating behaviors - making good choices  Mindful of eating slow and not over eating  Watching portion sizes  Activity -  Started walking - nicer weather  Challenges herself    Progress toward program requirements    Workflow:  · Psych and/or D+A Clearance: n/a  · PCP Letter: 10/1/2021  · Support Group: 12/16/2021  · Surgeon Appt : 12/16/2021  · EGD : 3/16/2022  · Cardiac Risk Assessment: 3/23/2022  · Sleep Studies: 2/4/2022  No WADE  · Bloodwork: 12/28/2021       Goals:  Continue following the recommendations for bariatric life style

## 2022-03-25 LAB — SARS-COV-2 RNA RESP QL NAA+PROBE: NEGATIVE

## 2022-03-28 ENCOUNTER — LAB REQUISITION (OUTPATIENT)
Dept: LAB | Facility: HOSPITAL | Age: 37
End: 2022-03-28

## 2022-03-28 DIAGNOSIS — Z11.59 ENCOUNTER FOR SCREENING FOR OTHER VIRAL DISEASES: ICD-10-CM

## 2022-03-28 PROCEDURE — U0005 INFEC AGEN DETEC AMPLI PROBE: HCPCS | Performed by: INTERNAL MEDICINE

## 2022-03-28 PROCEDURE — U0003 INFECTIOUS AGENT DETECTION BY NUCLEIC ACID (DNA OR RNA); SEVERE ACUTE RESPIRATORY SYNDROME CORONAVIRUS 2 (SARS-COV-2) (CORONAVIRUS DISEASE [COVID-19]), AMPLIFIED PROBE TECHNIQUE, MAKING USE OF HIGH THROUGHPUT TECHNOLOGIES AS DESCRIBED BY CMS-2020-01-R: HCPCS | Performed by: INTERNAL MEDICINE

## 2022-03-29 LAB — SARS-COV-2 RNA RESP QL NAA+PROBE: NEGATIVE

## 2022-03-30 ENCOUNTER — TELEPHONE (OUTPATIENT)
Dept: CARDIOLOGY CLINIC | Facility: CLINIC | Age: 37
End: 2022-03-30

## 2022-03-30 ENCOUNTER — DOCUMENTATION (OUTPATIENT)
Dept: CARDIOLOGY CLINIC | Facility: CLINIC | Age: 37
End: 2022-03-30

## 2022-03-30 ENCOUNTER — TELEPHONE (OUTPATIENT)
Dept: BARIATRICS | Facility: CLINIC | Age: 37
End: 2022-03-30

## 2022-03-30 NOTE — TELEPHONE ENCOUNTER
I called spoke with cardiology requesting a cardiac clearance be written since she had a echo completed to whether the patient was cleared to proceed with Bariatric surgery either under letters or an addendum to the consult note

## 2022-03-30 NOTE — TELEPHONE ENCOUNTER
I called left a message for patient to call me with what procedure she wants preauthorized  Once I receive the clearance from her cardiologist I can submit her case, She may have to update labs if  by surgery date

## 2022-03-30 NOTE — TELEPHONE ENCOUNTER
Patrick Morris from weight management called to see if you can addend your office consult or put in a note stating if you are clearing [place] or not  [place] had her echo on 3/23/2022

## 2022-03-30 NOTE — PROGRESS NOTES
Echo reviewed and essentially unchanged from prior in 2019  Eladia Trevino is stable to proceed with her planned bariatric surgery without further cardiac testing  She is higher than average risk given her htn and known diastolic dysfunction, through this risk is not prohibitive  The benefit of weight loss will likely improve her overall health and associated risks  She had a workup for htn in the past including renal artery US, carotid US, and  metanephrine studies; all of which were unremarkable  Caution with fluids to avoid a volume overloaded state  Attempted to review with pt by phone, but unable to leave message

## 2022-04-04 ENCOUNTER — LAB REQUISITION (OUTPATIENT)
Dept: LAB | Facility: HOSPITAL | Age: 37
End: 2022-04-04

## 2022-04-04 DIAGNOSIS — Z11.59 ENCOUNTER FOR SCREENING FOR OTHER VIRAL DISEASES: ICD-10-CM

## 2022-04-04 PROCEDURE — U0003 INFECTIOUS AGENT DETECTION BY NUCLEIC ACID (DNA OR RNA); SEVERE ACUTE RESPIRATORY SYNDROME CORONAVIRUS 2 (SARS-COV-2) (CORONAVIRUS DISEASE [COVID-19]), AMPLIFIED PROBE TECHNIQUE, MAKING USE OF HIGH THROUGHPUT TECHNOLOGIES AS DESCRIBED BY CMS-2020-01-R: HCPCS | Performed by: INTERNAL MEDICINE

## 2022-04-04 PROCEDURE — U0005 INFEC AGEN DETEC AMPLI PROBE: HCPCS | Performed by: INTERNAL MEDICINE

## 2022-04-05 LAB — SARS-COV-2 RNA RESP QL NAA+PROBE: NEGATIVE

## 2022-04-08 ENCOUNTER — PREP FOR PROCEDURE (OUTPATIENT)
Dept: BARIATRICS | Facility: CLINIC | Age: 37
End: 2022-04-08

## 2022-04-08 DIAGNOSIS — I10 ESSENTIAL HYPERTENSION, BENIGN: ICD-10-CM

## 2022-04-08 DIAGNOSIS — E66.01 MORBID OBESITY (HCC): Primary | ICD-10-CM

## 2022-04-11 ENCOUNTER — LAB REQUISITION (OUTPATIENT)
Dept: LAB | Facility: HOSPITAL | Age: 37
End: 2022-04-11

## 2022-04-11 DIAGNOSIS — Z11.59 ENCOUNTER FOR SCREENING FOR OTHER VIRAL DISEASES: ICD-10-CM

## 2022-04-11 PROCEDURE — U0003 INFECTIOUS AGENT DETECTION BY NUCLEIC ACID (DNA OR RNA); SEVERE ACUTE RESPIRATORY SYNDROME CORONAVIRUS 2 (SARS-COV-2) (CORONAVIRUS DISEASE [COVID-19]), AMPLIFIED PROBE TECHNIQUE, MAKING USE OF HIGH THROUGHPUT TECHNOLOGIES AS DESCRIBED BY CMS-2020-01-R: HCPCS | Performed by: INTERNAL MEDICINE

## 2022-04-11 PROCEDURE — U0005 INFEC AGEN DETEC AMPLI PROBE: HCPCS | Performed by: INTERNAL MEDICINE

## 2022-04-12 LAB — SARS-COV-2 RNA RESP QL NAA+PROBE: NEGATIVE

## 2022-04-18 ENCOUNTER — LAB REQUISITION (OUTPATIENT)
Dept: LAB | Facility: HOSPITAL | Age: 37
End: 2022-04-18

## 2022-04-18 DIAGNOSIS — Z11.59 ENCOUNTER FOR SCREENING FOR OTHER VIRAL DISEASES: ICD-10-CM

## 2022-04-18 PROCEDURE — U0005 INFEC AGEN DETEC AMPLI PROBE: HCPCS | Performed by: INTERNAL MEDICINE

## 2022-04-18 PROCEDURE — U0003 INFECTIOUS AGENT DETECTION BY NUCLEIC ACID (DNA OR RNA); SEVERE ACUTE RESPIRATORY SYNDROME CORONAVIRUS 2 (SARS-COV-2) (CORONAVIRUS DISEASE [COVID-19]), AMPLIFIED PROBE TECHNIQUE, MAKING USE OF HIGH THROUGHPUT TECHNOLOGIES AS DESCRIBED BY CMS-2020-01-R: HCPCS | Performed by: INTERNAL MEDICINE

## 2022-04-19 LAB — SARS-COV-2 RNA RESP QL NAA+PROBE: NEGATIVE

## 2022-04-20 ENCOUNTER — OFFICE VISIT (OUTPATIENT)
Dept: BARIATRICS | Facility: CLINIC | Age: 37
End: 2022-04-20

## 2022-04-20 VITALS — WEIGHT: 246.6 LBS | BODY MASS INDEX: 39.8 KG/M2

## 2022-04-20 DIAGNOSIS — E66.01 OBESITY, CLASS III, BMI 40-49.9 (MORBID OBESITY) (HCC): Primary | ICD-10-CM

## 2022-04-20 PROCEDURE — RECHECK: Performed by: DIETITIAN, REGISTERED

## 2022-04-20 NOTE — PROGRESS NOTES
Bariatric Nutrition Follow-Up Note    Type of surgery    Preop 4 months completed  Surgery Date: Scheduled for R[facility] GBP 2022  Surgeon: Dr Pat Barillas  Nutrition Assessment   Angel Marie  40 y o   female     Wt with BMI of 25: 150lbs  Pre-Op Excess Wt: 116 3lbs  Wt 112 kg (246 lb 9 6 oz)   BMI 39 80 kg/m²    0 5lb wt loss since last month  Total 19 7lb wt loss since starting program   Pt has exceeded pre-operative goal weight    5% wt loss=13 3#=Day of surgery goal of 253#     [place]  [place] Equation:     Weight maintenance= 2285 kcal/day  Estimated calories for weight loss 9093-5358 kcal/day ( 1-2# per wk wt loss - sedentary )  Estimated protein needs 68 2-81 8 g/day (1 0-1 2 gms/kg IBW )   Estimated fluid needs 4392-8744 ml/day (30-35 ml/kg IBW )      Review of History and Medications   Past Medical History:   Diagnosis Date    CHF (congestive heart failure) (HCC)     Chronic back pain     Generalized anxiety disorder     Hypertension     Kidney stone     Obesity     Primary osteoarthritis of left knee 2020     Past Surgical History:   Procedure Laterality Date     SECTION      x 2    KNEE ARTHROSCOPY Left     acl/meniscus repair    [place] CYSTO/URETERO W/LITHOTRIPSY &INDWELL STENT INSRT Right 2018    Procedure: CYSTOSCOPY URETEROSCOPY, RETROGRADE PYELOGRAM AND INSERTION STENT URETERAL, RIGHT STONE EXTRACTION;  Surgeon: Gala Nguyen MD;  [facility];  Service: Urology    [place] CYSTOURETHROSCOPY,URETER CATHETER Right 1/15/2018    Procedure: CYSTOSCOPY RETROGRADE PYELOGRAM WITH INSERTION STENT URETERAL;  Surgeon: Jo-Ann Camacho MD;  [facility];  Service: Urology    TUBAL LIGATION       Social History     Socioeconomic History    Marital status: /Civil Union     Spouse name: Not on file    Number of children: Not on file    Years of education: Not on file    Highest education level: Not on file   Occupational History    Occupation: CNA Tobacco Use    Smoking status: Former Smoker     Packs/day: 0 20     Years: 5 00     Pack years: 1 00     Types: Cigarettes     Quit date: 2020     Years since quittin 2    Smokeless tobacco: Never Used   Vaping Use    Vaping Use: Never used   Substance and Sexual Activity    Alcohol use: Yes     Comment: Rarely    Drug use: No    Sexual activity: Not on file   Other Topics Concern    Not on file   Social History Narrative        Employed Full Time -CNA     Social Determinants of Health     Financial Resource Strain: Not on file   Food Insecurity: Not on file   Transportation Needs: Not on file   Physical Activity: Not on file   Stress: Not on file   Social Connections: Not on file   Intimate Partner Violence: Not on file   Housing Stability: Not on file       Current Outpatient Medications:     albuterol (PROVENTIL HFA,VENTOLIN HFA) 90 mcg/act inhaler, Inhale 2 puffs every 6 (six) hours as needed for wheezing or shortness of breath, Disp: 54 g, Rfl: 1    amLODIPine (NORVASC) 5 mg tablet, Take 2 tablets (10 mg total) by mouth daily, Disp: 180 tablet, Rfl: 1    budesonide-formoterol (SYMBICORT) 80-4 5 MCG/ACT inhaler, Inhale 2 puffs 2 (two) times a day Rinse mouth after use , Disp: 10 2 g, Rfl: 5    labetalol (NORMODYNE) 300 mg tablet, Take 1 tablet (300 mg total) by mouth 2 (two) times a day, Disp: 60 tablet, Rfl: 5    lidocaine (XYLOCAINE) 5 % ointment, Apply topically as needed for mild pain, Disp: 35 44 g, Rfl: 5    losartan-hydrochlorothiazide (HYZAAR) 50-12 5 mg per tablet, Take 1 tablet by mouth daily, Disp: 30 tablet, Rfl: 5     Food Intake and Lifestyle Assessment   Food Intake Assessment completed via usual diet recall  Bed at 9:30am  Wake 2:30pm  Breakfast: 6-7pm: cooks at home:  Meat:  Chicken, beef, sometimes steak or pork, vegetable, potato/rice/noodles  Lunch: 2-3am: tuna on croissant  Dinner: Healthy choice frozen entree    Snack: none  Beverage intake: Pt has eliminated drinks with sugar  Protein supplement: none currently  Estimated protein intake per day: 60g  Estimated fluid intake per day: 16 oz water x 3-4 per day  Meals eaten away from home: once to twice a week: pizza/italian or wendys:  Pt has eliminated eating out/fast food  Typical meal pattern: 3 meals per day and 0-1 snacks per day  Eating Behaviors: Consumption of high calorie/ high fat foods, Consumption of high calorie beverages, Large portion sizes, Frequent snacking/ grazing and craves potato chip  Since initial appointment pt has eliminated most desserts, snack foods, and fried foods  Pt is eating three meals per day, eating protein with each meal, and is portioning out her foods and no longer going back for seconds  Pt is now drinking mainly plain water and has eliminated sugary/caloric beverages  Pt is  practicing the 30/60 rule  Food allergies or intolerances: No Known Allergies NKFA  Cultural or Confucianism considerations: none noted  Watches her salt intake due to hypertension  Physical Assessment  Physical Activity  Types of exercise:  Likes to walk with her kids  Pt is taking her kids out to walk on the trail behind her house  Current physical limitations: c/o SOB/GALLARDO, asthma:  Pt reports this is improving with her weight loss and she has not used her inhaler recently  Psychosocial Assessment   Support systems: spouse and children:  Has 4 yo twins and 13yo    Socioeconomic factors: works at [employer] facility    Nutrition Diagnosis-Continued/improving  Diagnosis: Overweight / Obesity (NC-3 3), Excessive energy intake (NI-1 5) and Undesirable food choices (NB-1 7)  Related to: Food and nutrition-related knowledge deficit, Limited adherence to nutrition-related recommendations, Physical inactivity and Excessive energy intake  As Evidenced by: BMI >25, Excessive energy intake and Unintentional weight gain     Nutrition Prescription: Recommend the following diet  Regular and low sodium    Interventions and Teaching   Discussed pre-op and post-op nutrition guidelines  Patient educated and handouts provided    Surgical changes to stomach / GI  Capacity of post-surgery stomach  Diet progression  Adequate hydration  Sugar and fat restriction to decrease "dumping syndrome"  Fat restriction to decrease steatorrhea  Expected weight loss  Weight loss plateaus/ possibility of weight regain  Exercise  Suggestions for pre-op diet  Nutrition considerations after surgery  Protein supplements  Meal planning and preparation  Appropriate carbohydrate, protein, and fat intake, and food/fluid choices to maximize safe weight loss, nutrient intake, and tolerance   Dietary and lifestyle changes  Possible problems with poor eating habits  Techniques for self monitoring and keeping daily food journal  Potential for food intolerance after surgery, and ways to deal with them including: lactose intolerance, nausea, reflux, vomiting, diarrhea, food intolerance, appetite changes, gas  Vitamin / Mineral supplementation of Multivitamin with minerals and Vitamin D  No current vitamins    Education provided to: patient    Barriers to learning: No barriers identified    Readiness to change: preparation    Prior research on procedure: internet and pre-op class    Comprehension: needs reinforcement and verbalizes understanding     Expected Compliance: good    Evaluation / Monitoring  Dietitian to Monitor: Eating pattern as discussed Tolerance of nutrition prescription Body weight Lab values Physical activity Bowel pattern    Goals  Eliminate sugar sweetened beverages, Food journal, Exercise 30 minutes 5 times per week, Complete lession plans 1-6, Eat 3 meals per day and Eliminate mindless snacking     Workflow: Completed   Weight Loss: 5% wt loss=13 3#=Day of surgery goal of 253#     Time Spent:   30 minutes

## 2022-04-25 ENCOUNTER — LAB REQUISITION (OUTPATIENT)
Dept: LAB | Facility: HOSPITAL | Age: 37
End: 2022-04-25

## 2022-04-25 DIAGNOSIS — Z11.59 ENCOUNTER FOR SCREENING FOR OTHER VIRAL DISEASES: ICD-10-CM

## 2022-04-25 PROCEDURE — U0003 INFECTIOUS AGENT DETECTION BY NUCLEIC ACID (DNA OR RNA); SEVERE ACUTE RESPIRATORY SYNDROME CORONAVIRUS 2 (SARS-COV-2) (CORONAVIRUS DISEASE [COVID-19]), AMPLIFIED PROBE TECHNIQUE, MAKING USE OF HIGH THROUGHPUT TECHNOLOGIES AS DESCRIBED BY CMS-2020-01-R: HCPCS | Performed by: INTERNAL MEDICINE

## 2022-04-25 PROCEDURE — U0005 INFEC AGEN DETEC AMPLI PROBE: HCPCS | Performed by: INTERNAL MEDICINE

## 2022-04-26 LAB — SARS-COV-2 RNA RESP QL NAA+PROBE: NEGATIVE

## 2022-05-05 ENCOUNTER — CLINICAL SUPPORT (OUTPATIENT)
Dept: BARIATRICS | Facility: CLINIC | Age: 37
End: 2022-05-05

## 2022-05-05 ENCOUNTER — OFFICE VISIT (OUTPATIENT)
Dept: BARIATRICS | Facility: CLINIC | Age: 37
End: 2022-05-05
Payer: COMMERCIAL

## 2022-05-05 VITALS
WEIGHT: 247.5 LBS | BODY MASS INDEX: 41.23 KG/M2 | TEMPERATURE: 98.2 F | HEIGHT: 65 IN | DIASTOLIC BLOOD PRESSURE: 90 MMHG | SYSTOLIC BLOOD PRESSURE: 138 MMHG | HEART RATE: 82 BPM

## 2022-05-05 DIAGNOSIS — E66.01 OBESITY, CLASS III, BMI 40-49.9 (MORBID OBESITY) (HCC): Primary | ICD-10-CM

## 2022-05-05 PROCEDURE — 99213 OFFICE O/P EST LOW 20 MIN: CPT | Performed by: STUDENT IN AN ORGANIZED HEALTH CARE EDUCATION/TRAINING PROGRAM

## 2022-05-05 PROCEDURE — RECHECK: Performed by: DIETITIAN, REGISTERED

## 2022-05-05 RX ORDER — HEPARIN SODIUM 5000 [USP'U]/ML
5000 INJECTION, SOLUTION INTRAVENOUS; SUBCUTANEOUS
Status: CANCELLED | OUTPATIENT
Start: 2022-05-23 | End: 2022-05-24

## 2022-05-05 RX ORDER — METRONIDAZOLE 500 MG/100ML
500 INJECTION, SOLUTION INTRAVENOUS ONCE
Status: CANCELLED | OUTPATIENT
Start: 2022-05-23 | End: 2022-05-05

## 2022-05-05 RX ORDER — CEFAZOLIN SODIUM 2 G/50ML
2000 SOLUTION INTRAVENOUS ONCE
Status: CANCELLED | OUTPATIENT
Start: 2022-05-23 | End: 2022-05-05

## 2022-05-05 RX ORDER — ACETAMINOPHEN 325 MG/1
975 TABLET ORAL ONCE
Status: CANCELLED | OUTPATIENT
Start: 2022-05-23 | End: 2022-05-05

## 2022-05-05 RX ORDER — CELECOXIB 200 MG/1
200 CAPSULE ORAL ONCE
Status: CANCELLED | OUTPATIENT
Start: 2022-05-23 | End: 2022-05-05

## 2022-05-05 RX ORDER — SCOLOPAMINE TRANSDERMAL SYSTEM 1 MG/1
1 PATCH, EXTENDED RELEASE TRANSDERMAL ONCE
Status: CANCELLED | OUTPATIENT
Start: 2022-05-23 | End: 2022-05-05

## 2022-05-05 RX ORDER — GABAPENTIN 300 MG/1
600 CAPSULE ORAL ONCE
Status: CANCELLED | OUTPATIENT
Start: 2022-05-23 | End: 2022-05-05

## 2022-05-05 NOTE — H&P (VIEW-ONLY)
BARIATRIC HISTORY AND PHYSICAL - BARIATRIC SURGERY  Giancarlo Mckee 40 y o  female MRN: [id number]  Unit/Bed#:  Encounter: [id number]      HPI:  Giancarlo Mckee is a 40 y o  female who presents to review their preoperative workup and see if they are a good candidate to undergo a bariatric procedure  Review of Systems   Constitutional: Negative for chills and fever  HENT: Negative for ear pain and sore throat  Eyes: Negative for pain and visual disturbance  Respiratory: Negative for cough and shortness of breath  Cardiovascular: Negative for chest pain and palpitations  Gastrointestinal: Negative for abdominal pain and vomiting  Genitourinary: Negative for dysuria and hematuria  Musculoskeletal: Negative for arthralgias and back pain  Skin: Negative for color change and rash  Neurological: Negative for seizures and syncope  All other systems reviewed and are negative        Historical Information   Past Medical History:   Diagnosis Date    CHF (congestive heart failure) (HCC)     Chronic back pain     Generalized anxiety disorder     Hypertension     Kidney stone     Obesity     Primary osteoarthritis of left knee 2020     Past Surgical History:   Procedure Laterality Date     SECTION      x 2    KNEE ARTHROSCOPY Left     acl/meniscus repair    [place] CYSTO/URETERO W/LITHOTRIPSY &INDWELL STENT INSRT Right 2018    Procedure: CYSTOSCOPY URETEROSCOPY, RETROGRADE PYELOGRAM AND INSERTION STENT URETERAL, RIGHT STONE EXTRACTION;  Surgeon: Delgado Whitman MD;  [facility];  Service: Urology    ND CYSTOURETHROSCOPY,URETER CATHETER Right 1/15/2018    Procedure: CYSTOSCOPY RETROGRADE PYELOGRAM WITH INSERTION STENT URETERAL;  Surgeon: Mirtha Dorsey MD;  [facility];  Service: Urology    TUBAL LIGATION       Social History   Social History     Substance and Sexual Activity   Alcohol Use Yes    Comment: Rarely     Social History     Substance and Sexual Activity   Drug Use No     Social History     Tobacco Use   Smoking Status Former Smoker    Packs/day: 0 20    Years: 5 00    Pack years: 1 00    Types: Cigarettes    Quit date: 2020    Years since quittin 3   Smokeless Tobacco Never Used     Family History: non-contributory    Meds/Allergies   all medications and allergies reviewed  No Known Allergies    Objective     Current Vitals:   Blood Pressure: 138/90 (22 1256)  Pulse: 82 (22 1256)  Temperature: 98 2 °F (36 8 °C) (22 1256)  Temp Source: Tympanic (22 125)  Height: 5' 5" (165 1 cm) (22 125)  Weight - Scale: 112 kg (247 lb 8 oz) (22)  bowel movement  [unfilled]    Invasive Devices  Report    None                 Physical Exam  Constitutional:       Appearance: Normal appearance  She is obese  HENT:      Head: Normocephalic and atraumatic  Cardiovascular:      Rate and Rhythm: Normal rate and regular rhythm  Pulmonary:      Effort: Pulmonary effort is normal       Breath sounds: Normal breath sounds  Abdominal:      Palpations: Abdomen is soft  Comments: Well healed pfannenstiel incision   Musculoskeletal:         General: Normal range of motion  Neurological:      General: No focal deficit present  Mental Status: She is alert and oriented to person, place, and time  Psychiatric:         Mood and Affect: Mood normal          Behavior: Behavior normal          Lab Results: I have personally reviewed pertinent lab results  Imaging: I have personally reviewed pertinent reports  EKG, Pathology, and Other Studies: I have personally reviewed pertinent reports        Code Status: [unfilled]  Advance Directive and Living Will:      Power of :    POLST:        Assessment/Plan:    The patient presented to review the preoperative workup and see if bariatric surgery is appropriate and indicated following the extensive preoperative workup and the enrollment in our weight loss program   Preoperative workup was complete  Results were reviewed with the patient including the blood work results and the endoscopy findings and the biopsy results  We also reviewed the cardiology evaluation  The patient was determined to be a good candidate for laparoscopic-robot assisted RNYGB   ----------------------------------------------------------------------  Smoking: Quit years ago  Blood thinner use: No  Home pain medication use and who manages it: No  CPAP use: No (If yes, sleep study obtained and reminded pt to bring CPAP to hospital)  History of blood clots: No  Previous abdominal surgeries: C-sections x2  Cardiac clearance obtained: Yes, on 3/30/22  EKG performed: Yes, sinus flora  EGD prior to surgery: Yes, small sliding hiatal hernia  Screening Labs obtained (CBC, CMP): Yes, 12/15/21  H  Pylori status: Negative  Medication allergies: NKDA  SLIM consult Post-Op: Yes, HTN  Reminded patient about 2 week pre-op liquid diet: Yes  Consent signed: Yes  Pre-Op ERAS Orders placed: Yes  -----------------------------------------------------------------------  Risks and benefits explained one more time to the patient  Alternatives to surgery and alternative forms of surgery were also explained  Post-surgical commitment and after care programs were explained  We also discussed that the [employer] robot may be available to us to use on the day of the patient procedure and that the procedure may be performed robotically  I discussed in details the advantages and disadvantages of this approach including the potential decrease in postoperative pain because of the remote center technology  I also mentioned the lack of strong evidence for the use of robot in bariatric patients and the potential disadvantage to patients because of the prolonged operative time  Consent was signed  Questions were answered and concerns were addressed  Patient will need to start the 2 week liquid diet prior to surgery    Patient wishes to proceed  As per [facility] guidelines, I had a discussion with the patient regarding their CODE STATUS in the perioperative period and the patient is level 1 or FULL CODE STATUS      Jennifer Smith MD  Bariatric Surgery   5/5/2022  1:11 PM

## 2022-05-05 NOTE — H&P (VIEW-ONLY)
BARIATRIC HISTORY AND PHYSICAL - BARIATRIC SURGERY  Lucita Apley 40 y o  female MRN: [id number]  Unit/Bed#:  Encounter: [id number]      HPI:  Lucita Apley is a 40 y o  female who presents to review their preoperative workup and see if they are a good candidate to undergo a bariatric procedure  Review of Systems   Constitutional: Negative for chills and fever  HENT: Negative for ear pain and sore throat  Eyes: Negative for pain and visual disturbance  Respiratory: Negative for cough and shortness of breath  Cardiovascular: Negative for chest pain and palpitations  Gastrointestinal: Negative for abdominal pain and vomiting  Genitourinary: Negative for dysuria and hematuria  Musculoskeletal: Negative for arthralgias and back pain  Skin: Negative for color change and rash  Neurological: Negative for seizures and syncope  All other systems reviewed and are negative        Historical Information   Past Medical History:   Diagnosis Date    CHF (congestive heart failure) (HCC)     Chronic back pain     Generalized anxiety disorder     Hypertension     Kidney stone     Obesity     Primary osteoarthritis of left knee 2020     Past Surgical History:   Procedure Laterality Date     SECTION      x 2    KNEE ARTHROSCOPY Left     acl/meniscus repair    [place] CYSTO/URETERO W/LITHOTRIPSY &INDWELL STENT INSRT Right 2018    Procedure: CYSTOSCOPY URETEROSCOPY, RETROGRADE PYELOGRAM AND INSERTION STENT URETERAL, RIGHT STONE EXTRACTION;  Surgeon: Bernadette Prescott MD;  [facility];  Service: Urology    NH CYSTOURETHROSCOPY,URETER CATHETER Right 1/15/2018    Procedure: CYSTOSCOPY RETROGRADE PYELOGRAM WITH INSERTION STENT URETERAL;  Surgeon: Aparna Almanza MD;  [facility];  Service: Urology    TUBAL LIGATION       Social History   Social History     Substance and Sexual Activity   Alcohol Use Yes    Comment: Rarely     Social History     Substance and Sexual Activity   Drug Use No     Social History     Tobacco Use   Smoking Status Former Smoker    Packs/day: 0 20    Years: 5 00    Pack years: 1 00    Types: Cigarettes    Quit date: 2020    Years since quittin 3   Smokeless Tobacco Never Used     Family History: non-contributory    Meds/Allergies   all medications and allergies reviewed  No Known Allergies    Objective     Current Vitals:   Blood Pressure: 138/90 (22 1256)  Pulse: 82 (22 1256)  Temperature: 98 2 °F (36 8 °C) (22 1256)  Temp Source: Tympanic (22 125)  Height: 5' 5" (165 1 cm) (22 125)  Weight - Scale: 112 kg (247 lb 8 oz) (22)  bowel movement  [unfilled]    Invasive Devices  Report    None                 Physical Exam  Constitutional:       Appearance: Normal appearance  She is obese  HENT:      Head: Normocephalic and atraumatic  Cardiovascular:      Rate and Rhythm: Normal rate and regular rhythm  Pulmonary:      Effort: Pulmonary effort is normal       Breath sounds: Normal breath sounds  Abdominal:      Palpations: Abdomen is soft  Comments: Well healed pfannenstiel incision   Musculoskeletal:         General: Normal range of motion  Neurological:      General: No focal deficit present  Mental Status: She is alert and oriented to person, place, and time  Psychiatric:         Mood and Affect: Mood normal          Behavior: Behavior normal          Lab Results: I have personally reviewed pertinent lab results  Imaging: I have personally reviewed pertinent reports  EKG, Pathology, and Other Studies: I have personally reviewed pertinent reports        Code Status: [unfilled]  Advance Directive and Living Will:      Power of :    POLST:        Assessment/Plan:    The patient presented to review the preoperative workup and see if bariatric surgery is appropriate and indicated following the extensive preoperative workup and the enrollment in our weight loss program   Preoperative workup was complete  Results were reviewed with the patient including the blood work results and the endoscopy findings and the biopsy results  We also reviewed the cardiology evaluation  The patient was determined to be a good candidate for laparoscopic-robot assisted RNYGB   ----------------------------------------------------------------------  Smoking: Quit years ago  Blood thinner use: No  Home pain medication use and who manages it: No  CPAP use: No (If yes, sleep study obtained and reminded pt to bring CPAP to hospital)  History of blood clots: No  Previous abdominal surgeries: C-sections x2  Cardiac clearance obtained: Yes, on 3/30/22  EKG performed: Yes, sinus flora  EGD prior to surgery: Yes, small sliding hiatal hernia  Screening Labs obtained (CBC, CMP): Yes, 12/15/21  H  Pylori status: Negative  Medication allergies: NKDA  SLIM consult Post-Op: Yes, HTN  Reminded patient about 2 week pre-op liquid diet: Yes  Consent signed: Yes  Pre-Op ERAS Orders placed: Yes  -----------------------------------------------------------------------  Risks and benefits explained one more time to the patient  Alternatives to surgery and alternative forms of surgery were also explained  Post-surgical commitment and after care programs were explained  We also discussed that the [employer] robot may be available to us to use on the day of the patient procedure and that the procedure may be performed robotically  I discussed in details the advantages and disadvantages of this approach including the potential decrease in postoperative pain because of the remote center technology  I also mentioned the lack of strong evidence for the use of robot in bariatric patients and the potential disadvantage to patients because of the prolonged operative time  Consent was signed  Questions were answered and concerns were addressed  Patient will need to start the 2 week liquid diet prior to surgery    Patient wishes to proceed  As per [facility] guidelines, I had a discussion with the patient regarding their CODE STATUS in the perioperative period and the patient is level 1 or FULL CODE STATUS      Valerie Conte MD  Bariatric Surgery   5/5/2022  1:11 PM

## 2022-05-05 NOTE — PROGRESS NOTES
BARIATRIC HISTORY AND PHYSICAL - BARIATRIC SURGERY  Lucita Apley 40 y o  female MRN: [id number]  Unit/Bed#:  Encounter: [id number]      HPI:  Lucita Apley is a 40 y o  female who presents to review their preoperative workup and see if they are a good candidate to undergo a bariatric procedure  Review of Systems   Constitutional: Negative for chills and fever  HENT: Negative for ear pain and sore throat  Eyes: Negative for pain and visual disturbance  Respiratory: Negative for cough and shortness of breath  Cardiovascular: Negative for chest pain and palpitations  Gastrointestinal: Negative for abdominal pain and vomiting  Genitourinary: Negative for dysuria and hematuria  Musculoskeletal: Negative for arthralgias and back pain  Skin: Negative for color change and rash  Neurological: Negative for seizures and syncope  All other systems reviewed and are negative        Historical Information   Past Medical History:   Diagnosis Date    CHF (congestive heart failure) (HCC)     Chronic back pain     Generalized anxiety disorder     Hypertension     Kidney stone     Obesity     Primary osteoarthritis of left knee 2020     Past Surgical History:   Procedure Laterality Date     SECTION      x 2    KNEE ARTHROSCOPY Left     acl/meniscus repair    [place] CYSTO/URETERO W/LITHOTRIPSY &INDWELL STENT INSRT Right 2018    Procedure: CYSTOSCOPY URETEROSCOPY, RETROGRADE PYELOGRAM AND INSERTION STENT URETERAL, RIGHT STONE EXTRACTION;  Surgeon: Bernadette Prescott MD;  [facility];  Service: Urology    [place] CYSTOURETHROSCOPY,URETER CATHETER Right 1/15/2018    Procedure: CYSTOSCOPY RETROGRADE PYELOGRAM WITH INSERTION STENT URETERAL;  Surgeon: Aparna Almanza MD;  [facility];  Service: Urology    TUBAL LIGATION       Social History   Social History     Substance and Sexual Activity   Alcohol Use Yes    Comment: Rarely     Social History     Substance and Sexual Activity   Drug Use No     Social History     Tobacco Use   Smoking Status Former Smoker    Packs/day: 0 20    Years: 5 00    Pack years: 1 00    Types: Cigarettes    Quit date: 2020    Years since quittin 3   Smokeless Tobacco Never Used     Family History: non-contributory    Meds/Allergies   all medications and allergies reviewed  No Known Allergies    Objective     Current Vitals:   Blood Pressure: 138/90 (22 1256)  Pulse: 82 (22 1256)  Temperature: 98 2 °F (36 8 °C) (22 1256)  Temp Source: Tympanic (22 125)  Height: 5' 5" (165 1 cm) (22 125)  Weight - Scale: 112 kg (247 lb 8 oz) (22)  bowel movement  [unfilled]    Invasive Devices  Report    None                 Physical Exam  Constitutional:       Appearance: Normal appearance  She is obese  HENT:      Head: Normocephalic and atraumatic  Cardiovascular:      Rate and Rhythm: Normal rate and regular rhythm  Pulmonary:      Effort: Pulmonary effort is normal       Breath sounds: Normal breath sounds  Abdominal:      Palpations: Abdomen is soft  Comments: Well healed pfannenstiel incision   Musculoskeletal:         General: Normal range of motion  Neurological:      General: No focal deficit present  Mental Status: She is alert and oriented to person, place, and time  Psychiatric:         Mood and Affect: Mood normal          Behavior: Behavior normal          Lab Results: I have personally reviewed pertinent lab results  Imaging: I have personally reviewed pertinent reports  EKG, Pathology, and Other Studies: I have personally reviewed pertinent reports        Code Status: [unfilled]  Advance Directive and Living Will:      Power of :    POLST:        Assessment/Plan:    The patient presented to review the preoperative workup and see if bariatric surgery is appropriate and indicated following the extensive preoperative workup and the enrollment in our weight loss program   Preoperative workup was complete  Results were reviewed with the patient including the blood work results and the endoscopy findings and the biopsy results  We also reviewed the cardiology evaluation  The patient was determined to be a good candidate for laparoscopic-robot assisted RNYGB   ----------------------------------------------------------------------  Smoking: Quit years ago  Blood thinner use: No  Home pain medication use and who manages it: No  CPAP use: No (If yes, sleep study obtained and reminded pt to bring CPAP to hospital)  History of blood clots: No  Previous abdominal surgeries: C-sections x2  Cardiac clearance obtained: Yes, on 3/30/22  EKG performed: Yes, sinus flora  EGD prior to surgery: Yes, small sliding hiatal hernia  Screening Labs obtained (CBC, CMP): Yes, 12/15/21  H  Pylori status: Negative  Medication allergies: NKDA  SLIM consult Post-Op: Yes, HTN  Reminded patient about 2 week pre-op liquid diet: Yes  Consent signed: Yes  Pre-Op ERAS Orders placed: Yes  -----------------------------------------------------------------------  Risks and benefits explained one more time to the patient  Alternatives to surgery and alternative forms of surgery were also explained  Post-surgical commitment and after care programs were explained  We also discussed that the [employer] robot may be available to us to use on the day of the patient procedure and that the procedure may be performed robotically  I discussed in details the advantages and disadvantages of this approach including the potential decrease in postoperative pain because of the remote center technology  I also mentioned the lack of strong evidence for the use of robot in bariatric patients and the potential disadvantage to patients because of the prolonged operative time  Consent was signed  Questions were answered and concerns were addressed  Patient will need to start the 2 week liquid diet prior to surgery    Patient wishes to proceed  As per [facility] guidelines, I had a discussion with the patient regarding their CODE STATUS in the perioperative period and the patient is level 1 or FULL CODE STATUS      Natasha Echevarria MD  Bariatric Surgery   5/5/2022  1:11 PM

## 2022-05-06 DIAGNOSIS — E66.01 OBESITY, CLASS III, BMI 40-49.9 (MORBID OBESITY) (HCC): Primary | ICD-10-CM

## 2022-05-06 RX ORDER — OMEPRAZOLE 20 MG/1
20 CAPSULE, DELAYED RELEASE ORAL DAILY
Qty: 30 CAPSULE | Refills: 3 | Status: SHIPPED | OUTPATIENT
Start: 2022-05-06 | End: 2022-06-06 | Stop reason: SDUPTHER

## 2022-05-06 RX ORDER — OXYCODONE HYDROCHLORIDE 5 MG/1
5 TABLET ORAL EVERY 4 HOURS PRN
Qty: 10 TABLET | Refills: 0 | Status: SHIPPED | OUTPATIENT
Start: 2022-05-06 | End: 2022-06-08 | Stop reason: SDUPTHER

## 2022-05-10 ENCOUNTER — ANESTHESIA EVENT (OUTPATIENT)
Dept: PERIOP | Facility: HOSPITAL | Age: 37
End: 2022-05-10
Payer: COMMERCIAL

## 2022-05-10 RX ORDER — MULTIVITAMIN
1 TABLET ORAL DAILY
COMMUNITY

## 2022-05-10 RX ORDER — OMEGA-3S/DHA/EPA/FISH OIL/D3 300MG-1000
400 CAPSULE ORAL DAILY
COMMUNITY

## 2022-05-10 NOTE — PRE-PROCEDURE INSTRUCTIONS
Pre-Surgery Instructions:   Medication Instructions    albuterol (PROVENTIL HFA,VENTOLIN HFA) 90 mcg/act inhaler Uses PRN- OK to take day of surgery    amLODIPine (NORVASC) 5 mg tablet Take day of surgery   budesonide-formoterol (SYMBICORT) 80-4 5 MCG/ACT inhaler Take day of surgery   cholecalciferol (VITAMIN D3) 400 units tablet prescribed by surgeon, will continue but hold on DOS    labetalol (NORMODYNE) 300 mg tablet Take day of surgery   lidocaine (XYLOCAINE) 5 % ointment Hold day of surgery   losartan-hydrochlorothiazide (HYZAAR) 50-12 5 mg per tablet Hold day of surgery   Multiple Vitamin (multivitamin) tablet prescribed by surgeon, will continue but hold on DOS    All pre-op instructions reviewed as per [facility] w/ pt verb understanding  Inst NPO post MN night before sx except sips water w/ meds am of sx  Instructed to avoid all ASA/NSAIDs and OTC Vit/Supp from now until after surgery per anesthesia guidelines except for those prescribed by surgeon  Tylenol ok prn   Has 3 carb drinks from surgeon office, reviewed process at time of call  Received pre-op showering instructions & CHG from surgeon office, reviewed process at time of call  Current hospital visitor & masking policy reviewed  Inst will receive phone call w/ time to report on DOS btwn 2-8 pm afternoon/evening before surgery from hospital nursing staff  Pt verbalized an understanding of all instructions reviewed and offers no concerns at this time

## 2022-05-11 ENCOUNTER — VBI (OUTPATIENT)
Dept: ADMINISTRATIVE | Facility: OTHER | Age: 37
End: 2022-05-11

## 2022-05-17 ENCOUNTER — TELEPHONE (OUTPATIENT)
Dept: BARIATRICS | Facility: CLINIC | Age: 37
End: 2022-05-17

## 2022-05-17 ENCOUNTER — LAB REQUISITION (OUTPATIENT)
Dept: LAB | Facility: HOSPITAL | Age: 37
End: 2022-05-17

## 2022-05-17 DIAGNOSIS — Z11.59 ENCOUNTER FOR SCREENING FOR OTHER VIRAL DISEASES: ICD-10-CM

## 2022-05-17 PROCEDURE — U0005 INFEC AGEN DETEC AMPLI PROBE: HCPCS | Performed by: INTERNAL MEDICINE

## 2022-05-17 PROCEDURE — U0003 INFECTIOUS AGENT DETECTION BY NUCLEIC ACID (DNA OR RNA); SEVERE ACUTE RESPIRATORY SYNDROME CORONAVIRUS 2 (SARS-COV-2) (CORONAVIRUS DISEASE [COVID-19]), AMPLIFIED PROBE TECHNIQUE, MAKING USE OF HIGH THROUGHPUT TECHNOLOGIES AS DESCRIBED BY CMS-2020-01-R: HCPCS | Performed by: INTERNAL MEDICINE

## 2022-05-17 NOTE — TELEPHONE ENCOUNTER
Pre-op call was made to patient  to follow up on how they are doing and to remind them to continue with all medical and dietary directions that were given at pre-op class regarding liver shrinking diet and hydration  They were encouraged to purchase all vitamins and protein shakes for post op use as well as to begin Miralax three days prior to surgery as directed in Section 6 of their manual   They were reminded of the Ensure Pre-surgery drinks protocol and to bring their completed yellow form with them to surgery as well as their CPAP-BiPAP machine if they use one  Lastly, they were informed that they would be weighed the morning of surgery and to give the office a call if they had any further questions or concerns

## 2022-05-18 LAB — SARS-COV-2 RNA RESP QL NAA+PROBE: NEGATIVE

## 2022-05-20 RX ORDER — ONDANSETRON 2 MG/ML
4 INJECTION INTRAMUSCULAR; INTRAVENOUS ONCE AS NEEDED
Status: CANCELLED | OUTPATIENT
Start: 2022-05-20

## 2022-05-20 RX ORDER — ALBUTEROL SULFATE 2.5 MG/3ML
2.5 SOLUTION RESPIRATORY (INHALATION) ONCE AS NEEDED
Status: CANCELLED | OUTPATIENT
Start: 2022-05-20

## 2022-05-20 RX ORDER — HYDROMORPHONE HCL/PF 1 MG/ML
0.5 SYRINGE (ML) INJECTION
Status: CANCELLED | OUTPATIENT
Start: 2022-05-20

## 2022-05-20 RX ORDER — FENTANYL CITRATE/PF 50 MCG/ML
25 SYRINGE (ML) INJECTION
Status: CANCELLED | OUTPATIENT
Start: 2022-05-20

## 2022-05-20 RX ORDER — PROMETHAZINE HYDROCHLORIDE 25 MG/ML
12.5 INJECTION, SOLUTION INTRAMUSCULAR; INTRAVENOUS ONCE AS NEEDED
Status: CANCELLED | OUTPATIENT
Start: 2022-05-20

## 2022-05-22 NOTE — ANESTHESIA PREPROCEDURE EVALUATION
Procedure:  ROBOTIC ONDINA-EN-Y;  INTRAOP EGD (N/A Abdomen)    Relevant Problems   CARDIO   (+) Hypertension   (+) SOB (shortness of breath) on exertion      /RENAL   (+) Nephrolithiasis      MUSCULOSKELETAL   (+) Primary osteoarthritis of left knee      NEURO/PSYCH   (+) Chronic headaches   (+) Generalized anxiety disorder      PULMONARY   (+) WADE (obstructive sleep apnea)   (+) SOB (shortness of breath) on exertion      Cardiovascular and Mediastinum   (+) Chronic diastolic heart failure with preserved ejection fraction (HCC)      Other   (+) Obesity, Class III, BMI 40-49 9 (morbid obesity) ([facility] [address] )   Echo 4/2022:   Left Ventricle: Left ventricular cavity size is normal  Wall thickness is moderately increased  There is severe concentric hypertrophy  The left ventricular ejection fraction is 55%  Systolic function is normal  Although no diagnostic regional wall motion abnormality was identified, this possibility cannot be completely excluded on the basis of this study  Diastolic function is moderately abnormal, consistent with grade II (pseudonormal) relaxation    Left Atrium: The atrium is mild-moderately dilated    Right Atrium: The atrium is mildly dilated    Mitral Valve: There is mild regurgitation    Tricuspid Valve: There is mild regurgitation  Physical Exam    Airway    Mallampati score: III  TM Distance: <3 FB  Neck ROM: full     Dental   No notable dental hx     Cardiovascular  Rhythm: regular, Rate: normal,     Pulmonary  Breath sounds clear to auscultation,     Other Findings        Anesthesia Plan  ASA Score- 3     Anesthesia Type- general with ASA Monitors  Additional Monitors:   Airway Plan: ETT  Comment:   /120, labetalol 300 mg PO aat home, IV ordered here   Plan Factors-Exercise tolerance (METS): >4 METS  Chart reviewed  Patient is not a current smoker  Obstructive sleep apnea risk education given perioperatively      Induction- intravenous  Postoperative Plan- Plan for postoperative opioid use  Informed Consent- Anesthetic plan and risks discussed with patient

## 2022-05-23 ENCOUNTER — PATIENT MESSAGE (OUTPATIENT)
Dept: BARIATRICS | Facility: CLINIC | Age: 37
End: 2022-05-23

## 2022-05-23 ENCOUNTER — HOSPITAL ENCOUNTER (EMERGENCY)
Facility: HOSPITAL | Age: 37
Discharge: HOME | End: 2022-05-23
Attending: EMERGENCY MEDICINE
Payer: COMMERCIAL

## 2022-05-23 ENCOUNTER — ANESTHESIA (OUTPATIENT)
Dept: PERIOP | Facility: HOSPITAL | Age: 37
End: 2022-05-23
Payer: COMMERCIAL

## 2022-05-23 ENCOUNTER — HOSPITAL ENCOUNTER (OUTPATIENT)
Facility: HOSPITAL | Age: 37
Setting detail: OUTPATIENT SURGERY
End: 2022-05-23
Attending: SURGERY | Admitting: SURGERY
Payer: COMMERCIAL

## 2022-05-23 ENCOUNTER — TELEPHONE (OUTPATIENT)
Dept: BARIATRICS | Facility: CLINIC | Age: 37
End: 2022-05-23

## 2022-05-23 VITALS
HEIGHT: 65 IN | HEART RATE: 78 BPM | SYSTOLIC BLOOD PRESSURE: 230 MMHG | TEMPERATURE: 98.2 F | WEIGHT: 235.23 LBS | BODY MASS INDEX: 39.19 KG/M2 | DIASTOLIC BLOOD PRESSURE: 120 MMHG | OXYGEN SATURATION: 96 % | RESPIRATION RATE: 16 BRPM

## 2022-05-23 VITALS
TEMPERATURE: 98.3 F | DIASTOLIC BLOOD PRESSURE: 95 MMHG | HEART RATE: 69 BPM | SYSTOLIC BLOOD PRESSURE: 174 MMHG | OXYGEN SATURATION: 97 % | RESPIRATION RATE: 18 BRPM

## 2022-05-23 DIAGNOSIS — I10 PRIMARY HYPERTENSION: Primary | ICD-10-CM

## 2022-05-23 DIAGNOSIS — I10 ESSENTIAL HYPERTENSION: ICD-10-CM

## 2022-05-23 LAB
ANION GAP SERPL CALCULATED.3IONS-SCNC: 10 MMOL/L (ref 4–13)
ATRIAL RATE: 64 BPM
BASOPHILS # BLD AUTO: 0.03 THOUSANDS/ΜL (ref 0–0.1)
BASOPHILS NFR BLD AUTO: 1 % (ref 0–1)
BUN SERPL-MCNC: 20 MG/DL (ref 5–25)
CALCIUM SERPL-MCNC: 9.2 MG/DL (ref 8.3–10.1)
CHLORIDE SERPL-SCNC: 107 MMOL/L (ref 100–108)
CO2 SERPL-SCNC: 21 MMOL/L (ref 21–32)
CREAT SERPL-MCNC: 0.84 MG/DL (ref 0.6–1.3)
EOSINOPHIL # BLD AUTO: 0.06 THOUSAND/ΜL (ref 0–0.61)
EOSINOPHIL NFR BLD AUTO: 1 % (ref 0–6)
ERYTHROCYTE [DISTWIDTH] IN BLOOD BY AUTOMATED COUNT: 12.2 % (ref 11.6–15.1)
EXT PREGNANCY TEST URINE: NEGATIVE
EXT. CONTROL: NORMAL
FLUAV RNA RESP QL NAA+PROBE: NEGATIVE
FLUBV RNA RESP QL NAA+PROBE: NEGATIVE
GFR SERPL CREATININE-BSD FRML MDRD: 89 ML/MIN/1.73SQ M
GLUCOSE SERPL-MCNC: 75 MG/DL (ref 65–140)
HCT VFR BLD AUTO: 45.9 % (ref 34.8–46.1)
HGB BLD-MCNC: 15.7 G/DL (ref 11.5–15.4)
IMM GRANULOCYTES # BLD AUTO: 0.01 THOUSAND/UL (ref 0–0.2)
IMM GRANULOCYTES NFR BLD AUTO: 0 % (ref 0–2)
LYMPHOCYTES # BLD AUTO: 1.69 THOUSANDS/ΜL (ref 0.6–4.47)
LYMPHOCYTES NFR BLD AUTO: 29 % (ref 14–44)
MCH RBC QN AUTO: 29.8 PG (ref 26.8–34.3)
MCHC RBC AUTO-ENTMCNC: 34.2 G/DL (ref 31.4–37.4)
MCV RBC AUTO: 87 FL (ref 82–98)
MONOCYTES # BLD AUTO: 0.44 THOUSAND/ΜL (ref 0.17–1.22)
MONOCYTES NFR BLD AUTO: 8 % (ref 4–12)
NEUTROPHILS # BLD AUTO: 3.66 THOUSANDS/ΜL (ref 1.85–7.62)
NEUTS SEG NFR BLD AUTO: 61 % (ref 43–75)
NRBC BLD AUTO-RTO: 0 /100 WBCS
P AXIS: 14 DEGREES
PLATELET # BLD AUTO: 207 THOUSANDS/UL (ref 149–390)
PMV BLD AUTO: 11.8 FL (ref 8.9–12.7)
POTASSIUM SERPL-SCNC: 3.7 MMOL/L (ref 3.5–5.3)
PR INTERVAL: 152 MS
QRS AXIS: 80 DEGREES
QRSD INTERVAL: 92 MS
QT INTERVAL: 438 MS
QTC INTERVAL: 451 MS
RBC # BLD AUTO: 5.27 MILLION/UL (ref 3.81–5.12)
RSV RNA RESP QL NAA+PROBE: NEGATIVE
SARS-COV-2 RNA RESP QL NAA+PROBE: NEGATIVE
SODIUM SERPL-SCNC: 138 MMOL/L (ref 136–145)
T WAVE AXIS: 4 DEGREES
VENTRICULAR RATE: 64 BPM
WBC # BLD AUTO: 5.89 THOUSAND/UL (ref 4.31–10.16)

## 2022-05-23 PROCEDURE — 93010 ELECTROCARDIOGRAM REPORT: CPT

## 2022-05-23 PROCEDURE — 93005 ELECTROCARDIOGRAM TRACING: CPT

## 2022-05-23 PROCEDURE — 81025 URINE PREGNANCY TEST: CPT | Performed by: STUDENT IN AN ORGANIZED HEALTH CARE EDUCATION/TRAINING PROGRAM

## 2022-05-23 PROCEDURE — 96365 THER/PROPH/DIAG IV INF INIT: CPT

## 2022-05-23 PROCEDURE — 85025 COMPLETE CBC W/AUTO DIFF WBC: CPT | Performed by: EMERGENCY MEDICINE

## 2022-05-23 PROCEDURE — 99283 EMERGENCY DEPT VISIT LOW MDM: CPT

## 2022-05-23 PROCEDURE — 96375 TX/PRO/DX INJ NEW DRUG ADDON: CPT

## 2022-05-23 PROCEDURE — 36415 COLL VENOUS BLD VENIPUNCTURE: CPT | Performed by: EMERGENCY MEDICINE

## 2022-05-23 PROCEDURE — 99285 EMERGENCY DEPT VISIT HI MDM: CPT | Performed by: EMERGENCY MEDICINE

## 2022-05-23 PROCEDURE — 80048 BASIC METABOLIC PNL TOTAL CA: CPT | Performed by: EMERGENCY MEDICINE

## 2022-05-23 PROCEDURE — 0241U HB NFCT DS VIR RESP RNA 4 TRGT: CPT | Performed by: SURGERY

## 2022-05-23 RX ORDER — SODIUM CHLORIDE, SODIUM LACTATE, POTASSIUM CHLORIDE, CALCIUM CHLORIDE 600; 310; 30; 20 MG/100ML; MG/100ML; MG/100ML; MG/100ML
125 INJECTION, SOLUTION INTRAVENOUS CONTINUOUS
Status: DISCONTINUED | OUTPATIENT
Start: 2022-05-23 | End: 2022-05-23 | Stop reason: HOSPADM

## 2022-05-23 RX ORDER — HYDROCHLOROTHIAZIDE 12.5 MG/1
12.5 TABLET ORAL DAILY
Status: DISCONTINUED | OUTPATIENT
Start: 2022-05-23 | End: 2022-05-23 | Stop reason: HOSPADM

## 2022-05-23 RX ORDER — HEPARIN SODIUM 5000 [USP'U]/ML
5000 INJECTION, SOLUTION INTRAVENOUS; SUBCUTANEOUS
Status: COMPLETED | OUTPATIENT
Start: 2022-05-23 | End: 2022-05-23

## 2022-05-23 RX ORDER — CEFAZOLIN SODIUM 2 G/50ML
2000 SOLUTION INTRAVENOUS ONCE
Status: DISCONTINUED | OUTPATIENT
Start: 2022-05-23 | End: 2022-05-23 | Stop reason: HOSPADM

## 2022-05-23 RX ORDER — CELECOXIB 200 MG/1
200 CAPSULE ORAL ONCE
Status: COMPLETED | OUTPATIENT
Start: 2022-05-23 | End: 2022-05-23

## 2022-05-23 RX ORDER — GABAPENTIN 300 MG/1
600 CAPSULE ORAL ONCE
Status: COMPLETED | OUTPATIENT
Start: 2022-05-23 | End: 2022-05-23

## 2022-05-23 RX ORDER — SCOLOPAMINE TRANSDERMAL SYSTEM 1 MG/1
1 PATCH, EXTENDED RELEASE TRANSDERMAL ONCE
Status: DISCONTINUED | OUTPATIENT
Start: 2022-05-23 | End: 2022-05-23 | Stop reason: HOSPADM

## 2022-05-23 RX ORDER — ACETAMINOPHEN 325 MG/1
975 TABLET ORAL ONCE
Status: COMPLETED | OUTPATIENT
Start: 2022-05-23 | End: 2022-05-23

## 2022-05-23 RX ORDER — LOSARTAN POTASSIUM 50 MG/1
50 TABLET ORAL ONCE
Status: COMPLETED | OUTPATIENT
Start: 2022-05-23 | End: 2022-05-23

## 2022-05-23 RX ORDER — LOSARTAN POTASSIUM AND HYDROCHLOROTHIAZIDE 12.5; 5 MG/1; MG/1
1 TABLET ORAL DAILY
Qty: 30 TABLET | Refills: 0 | Status: SHIPPED | OUTPATIENT
Start: 2022-05-23 | End: 2022-05-24 | Stop reason: SDUPTHER

## 2022-05-23 RX ORDER — LABETALOL HYDROCHLORIDE 5 MG/ML
10 INJECTION, SOLUTION INTRAVENOUS
Status: DISCONTINUED | OUTPATIENT
Start: 2022-05-23 | End: 2022-05-23 | Stop reason: HOSPADM

## 2022-05-23 RX ORDER — METOCLOPRAMIDE HYDROCHLORIDE 5 MG/ML
10 INJECTION INTRAMUSCULAR; INTRAVENOUS ONCE
Status: COMPLETED | OUTPATIENT
Start: 2022-05-23 | End: 2022-05-23

## 2022-05-23 RX ORDER — METRONIDAZOLE 500 MG/100ML
500 INJECTION, SOLUTION INTRAVENOUS ONCE
Status: DISCONTINUED | OUTPATIENT
Start: 2022-05-23 | End: 2022-05-23 | Stop reason: HOSPADM

## 2022-05-23 RX ADMIN — METOCLOPRAMIDE 10 MG: 5 INJECTION, SOLUTION INTRAMUSCULAR; INTRAVENOUS at 08:42

## 2022-05-23 RX ADMIN — HYDROCHLOROTHIAZIDE 12.5 MG: 12.5 TABLET ORAL at 08:42

## 2022-05-23 RX ADMIN — HEPARIN SODIUM 5000 UNITS: 5000 INJECTION INTRAVENOUS; SUBCUTANEOUS at 06:13

## 2022-05-23 RX ADMIN — SCOPALAMINE 1 PATCH: 1 PATCH, EXTENDED RELEASE TRANSDERMAL at 06:10

## 2022-05-23 RX ADMIN — SODIUM CHLORIDE, SODIUM LACTATE, POTASSIUM CHLORIDE, AND CALCIUM CHLORIDE 125 ML/HR: .6; .31; .03; .02 INJECTION, SOLUTION INTRAVENOUS at 06:17

## 2022-05-23 RX ADMIN — GABAPENTIN 600 MG: 300 CAPSULE ORAL at 06:13

## 2022-05-23 RX ADMIN — CELECOXIB 200 MG: 200 CAPSULE ORAL at 06:10

## 2022-05-23 RX ADMIN — LOSARTAN POTASSIUM 50 MG: 50 TABLET, FILM COATED ORAL at 08:42

## 2022-05-23 RX ADMIN — LABETALOL HYDROCHLORIDE 10 MG: 5 INJECTION, SOLUTION INTRAVENOUS at 06:08

## 2022-05-23 RX ADMIN — SODIUM CHLORIDE, SODIUM LACTATE, POTASSIUM CHLORIDE, AND CALCIUM CHLORIDE 1000 ML: .6; .31; .03; .02 INJECTION, SOLUTION INTRAVENOUS at 09:19

## 2022-05-23 RX ADMIN — ACETAMINOPHEN 325MG 975 MG: 325 TABLET ORAL at 06:10

## 2022-05-23 NOTE — ED PROVIDER NOTES
History  Chief Complaint   Patient presents with    Hypertension     Pt here from same day surgery, was scheduled for bariatric surgery today  Pt took amlodipine 10mg and labetalol 300mg at 0430, pt hypertensive in same day surgery  Was given labetalol 10mg IV at 0600, bp 220/118 after  Pt denies chest pain, reports headache and dizziness  A 52-year-old female with past medical history of hypertension, congestive heart failure and anxiety; presents from same-day surgery for evaluation of elevated blood pressures  Patient was scheduled for bariatric surgery today, however on arrival to preop her blood pressure was noted to be >220/110  Patient states she did take her PO amlodipine and labetalol this morning, she was also given a dose of IV labetalol without improvement  Patient states she felt well this morning, however since being in preop has developed a headache and lightheadedness  Patient states she has not had anything to eat since last evening, reporting a limited diet over the past several weeks  Patient otherwise denies fever, chills, chest pain, shortness of breath, abdominal pain, nausea, vomiting, diarrhea, peripheral edema and rashes  Patient states she has been compliant with her amlodipine and labetalol at home, however due to insurance issues has not taken her losartan-hydrochlorothiazide over the past several weeks  Assessment & plan:  Uncontrolled hypertension, patient resting comfortably and nontoxic  Suspect headache and lightheadedness are more due to NPO status and not her blood pressure  Will check basic labs and treat symptomatically  Will give a dose of patient's losartan and hydrochlorothiazide, will continue to monitor patient's BP in the department  History provided by:  Patient and medical records      Prior to Admission Medications   Prescriptions Last Dose Informant Patient Reported? Taking?    Multiple Vitamin (multivitamin) tablet   Yes No   Sig: Take 1 tablet by mouth daily   albuterol (PROVENTIL HFA,VENTOLIN HFA) 90 mcg/act inhaler   No No   Sig: Inhale 2 puffs every 6 (six) hours as needed for wheezing or shortness of breath   amLODIPine (NORVASC) 5 mg tablet   No No   Sig: Take 2 tablets (10 mg total) by mouth daily   budesonide-formoterol (SYMBICORT) 80-4 5 MCG/ACT inhaler   No No   Sig: Inhale 2 puffs 2 (two) times a day Rinse mouth after use  cholecalciferol (VITAMIN D3) 400 units tablet   Yes No   Sig: Take 400 Units by mouth daily   labetalol (NORMODYNE) 300 mg tablet   No No   Sig: Take 1 tablet (300 mg total) by mouth 2 (two) times a day   lidocaine (XYLOCAINE) 5 % ointment  Self No No   Sig: Apply topically as needed for mild pain   losartan-hydrochlorothiazide (HYZAAR) 50-12 5 mg per tablet  Self No No   Sig: Take 1 tablet by mouth daily   losartan-hydrochlorothiazide (HYZAAR) 50-12 5 mg per tablet   No Yes   Sig: Take 1 tablet by mouth in the morning     omeprazole (PriLOSEC) 20 mg delayed release capsule   No No   Sig: Take 1 capsule (20 mg total) by mouth daily   oxyCODONE (Roxicodone) 5 immediate release tablet   No No   Sig: Take 1 tablet (5 mg total) by mouth every 4 (four) hours as needed for moderate pain Max Daily Amount: 30 mg      Facility-Administered Medications: None       Past Medical History:   Diagnosis Date    Anxiety     Arthritis     CHF (congestive heart failure) (HCC)     Chronic back pain     Generalized anxiety disorder     Hiatal hernia     Hypertension     Kidney stone     Obesity     Primary osteoarthritis of left knee 2020    Transient ischemic attack     pt states not certain if actually had a TIA, states related to severe HTN episodes       Past Surgical History:   Procedure Laterality Date     SECTION      x 2    EGD      KNEE ARTHROSCOPY Left     acl/meniscus repair    MS CYSTO/URETERO W/LITHOTRIPSY &INDWELL STENT INSRT Right 2018    Procedure: CYSTOSCOPY URETEROSCOPY, RETROGRADE PYELOGRAM AND INSERTION STENT URETERAL, RIGHT STONE EXTRACTION;  Surgeon: Isabel Pimentel MD;  Location: [facility];  Service: Urology    CO CYSTOURETHROSCOPY,URETER CATHETER Right 1/15/2018    Procedure: CYSTOSCOPY RETROGRADE PYELOGRAM WITH INSERTION STENT URETERAL;  Surgeon: Deann Pedro MD;  [facility];  Service: Urology    TUBAL LIGATION         Family History   Problem Relation Age of Onset    Hypertension Mother     Cancer Mother         Thyroid    Hypertension Father      I have reviewed and agree with the history as documented  E-Cigarette/Vaping    E-Cigarette Use Never User      E-Cigarette/Vaping Substances    Nicotine No     THC No     CBD No     Flavoring No     Other No     Unknown No      Social History     Tobacco Use    Smoking status: Former Smoker     Packs/day: 0 20     Years: 5 00     Pack years: 1 00     Types: Cigarettes     Quit date: 2020     Years since quittin 3    Smokeless tobacco: Never Used   Vaping Use    Vaping Use: Never used   Substance Use Topics    Alcohol use: Yes     Comment: Rare social occasion , a few times a year    Drug use: No       Review of Systems   Neurological: Positive for light-headedness and headaches  All other systems reviewed and are negative  Physical Exam  Physical Exam  General Appearance: alert and oriented, nad, non toxic appearing  Skin:  Warm, dry, intact  No cyanosis  HEENT: Atraumatic, normocephalic  No eye drainage  Normal hearing  Moist mucous membranes  Neck: Supple, trachea midline  Cardiac: RRR; no murmurs, rub, gallops  No pedal edema, 2+ pulses  Pulmonary: lungs CTAB; no wheezes, rales, rhonchi  Gastrointestinal: abdomen soft, nontender, nondistended; no guarding or rebound tenderness; good bowel sounds, no mass or bruits  Extremities:  No deformities    No calf tenderness, no clubbing  Neuro:  no focal motor or sensory deficits, CN 2-12 grossly intact  Psych:  Normal mood and affect, normal judgement and insight      Vital Signs  ED Triage Vitals [05/23/22 0820]   Temperature Pulse Respirations Blood Pressure SpO2   98 3 °F (36 8 °C) 72 18 (!) 203/122 94 %      Temp Source Heart Rate Source Patient Position - Orthostatic VS BP Location FiO2 (%)   Oral Monitor Sitting Right arm --      Pain Score       --           Vitals:    05/23/22 0820 05/23/22 0931   BP: (!) 203/122 (!) 174/95   Pulse: 72 69   Patient Position - Orthostatic VS: Sitting Sitting         Visual Acuity      ED Medications  Medications   lactated ringers bolus 1,000 mL (0 mL Intravenous Stopped 5/23/22 1005)   metoclopramide (REGLAN) injection 10 mg (10 mg Intravenous Given 5/23/22 0842)   losartan (COZAAR) tablet 50 mg (50 mg Oral Given 5/23/22 0842)       Diagnostic Studies  Results Reviewed     Procedure Component Value Units Date/Time    Basic metabolic panel [[id number]] Collected: 05/23/22 0843    Lab Status: Final result Specimen: Blood from Arm, Right Updated: 05/23/22 0909     Sodium 138 mmol/L      Potassium 3 7 mmol/L      Chloride 107 mmol/L      CO2 21 mmol/L      ANION GAP 10 mmol/L      BUN 20 mg/dL      Creatinine 0 84 mg/dL      Glucose 75 mg/dL      Calcium 9 2 mg/dL      eGFR 89 ml/min/1 73sq m     Narrative:      [place] guidelines for Chronic Kidney Disease (CKD):     Stage 1 with normal or high GFR (GFR > 90 mL/min/1 73 square meters)    Stage 2 Mild CKD (GFR = 60-89 mL/min/1 73 square meters)    Stage 3A Moderate CKD (GFR = 45-59 mL/min/1 73 square meters)    Stage 3B Moderate CKD (GFR = 30-44 mL/min/1 73 square meters)    Stage 4 Severe CKD (GFR = 15-29 mL/min/1 73 square meters)    Stage 5 End Stage CKD (GFR <15 mL/min/1 73 square meters)  Note: GFR calculation is accurate only with a steady state creatinine    CBC and differential [[id number]]  (Abnormal) Collected: 05/23/22 0843    Lab Status: Final result Specimen: Blood from Arm, Right Updated: 05/23/22 0855     WBC 5 89 Thousand/uL RBC 5 27 Million/uL      Hemoglobin 15 7 g/dL      Hematocrit 45 9 %      MCV 87 fL      MCH 29 8 pg      MCHC 34 2 g/dL      RDW 12 2 %      MPV 11 8 fL      Platelets 086 Thousands/uL      nRBC 0 /100 WBCs      Neutrophils Relative 61 %      Immat GRANS % 0 %      Lymphocytes Relative 29 %      Monocytes Relative 8 %      Eosinophils Relative 1 %      Basophils Relative 1 %      Neutrophils Absolute 3 66 Thousands/µL      Immature Grans Absolute 0 01 Thousand/uL      Lymphocytes Absolute 1 69 Thousands/µL      Monocytes Absolute 0 44 Thousand/µL      Eosinophils Absolute 0 06 Thousand/µL      Basophils Absolute 0 03 Thousands/µL                  No orders to display              Procedures  Procedures   ECG 12 Lead Documentation  Date/Time: today/date: 5/23/2022  Performed by: Carole Schroeder    ECG reviewed by me, the ED Provider: yes    Patient location:  ED   Previous ECG:  Compared to current, no change   Rate:  64  ECG rate assessment: normal    Rhythm: sinus rhythm    Ectopy:  none    QRS axis:  Normal  Intervals: normal   Q waves: None   ST segments:  Normal  T waves: inverted in III      Impression: NSR with nonspecific T wave changes          ED Course  ED Course as of 05/23/22 1254   Mon May 23, 2022   0933 Blood Pressure(!): 174/95  Improving from arrival   0933 Labs WNL  Pt reports headache is improving  Updated on lab results  Discussed importance of taking losartan-HCTZ, offering separate prescriptions in hopes of better insurance coverage  Pt believes that [address] [place] [address] will be able to fill the combo pill, will send new prescription  Recommend pt follow up with her PCP, return precautions discussed                                               MDM    Disposition  Final diagnoses:   Primary hypertension     Time reflects when diagnosis was documented in both MDM as applicable and the Disposition within this note     Time User Action Codes Description Comment    5/23/2022  9:43 RAS Harris Bettina Add [I10] Primary hypertension     5/23/2022  9:43 AM Lucius Harrisse 6 Primary hypertension     5/23/2022  9:43 AM Benton Harris Add [I10] Essential hypertension     5/23/2022  9:44 AM Tuleta Loach Modify [I10] Primary hypertension     5/23/2022  9:44 AM Tuleta Loach Modify [I10] Primary hypertension       ED Disposition     ED Disposition   Discharge    Condition   Stable    Date/Time   Mon May 23, 2022  9:43 AM    Comment   Lupe Velásquez discharge to home/self care                 Follow-up Information     Follow up With Specialties Details Why Contact Info Additional Information    Renay Wise,  Internal Medicine Schedule an appointment as soon as possible for a visit in 2 days For re-evaluation Claudia Pena [address] Emergency Department Emergency Medicine Go to  If symptoms worsen [facility] [id number]  [address] [place] [address] Emergency Department, [address] [place] [address], [facility]          Discharge Medication List as of 5/23/2022  9:46 AM      CONTINUE these medications which have CHANGED    Details   losartan-hydrochlorothiazide (HYZAAR) 50-12 5 mg per tablet Take 1 tablet by mouth in the morning , Starting Mon 5/23/2022, Until Wed 6/22/2022, Normal         CONTINUE these medications which have NOT CHANGED    Details   albuterol (PROVENTIL HFA,VENTOLIN HFA) 90 mcg/act inhaler Inhale 2 puffs every 6 (six) hours as needed for wheezing or shortness of breath, Starting Thu 2/24/2022, Normal      amLODIPine (NORVASC) 5 mg tablet Take 2 tablets (10 mg total) by mouth daily, Starting Thu 2/24/2022, Normal      budesonide-formoterol (SYMBICORT) 80-4 5 MCG/ACT inhaler Inhale 2 puffs 2 (two) times a day Rinse mouth after use , Starting Tue 9/14/2021, Normal      cholecalciferol (VITAMIN D3) 400 units tablet Take 400 Units by mouth daily, Historical Med labetalol (NORMODYNE) 300 mg tablet Take 1 tablet (300 mg total) by mouth 2 (two) times a day, Starting Thu 2/24/2022, Normal      lidocaine (XYLOCAINE) 5 % ointment Apply topically as needed for mild pain, Starting Wed 4/28/2021, Normal      Multiple Vitamin (multivitamin) tablet Take 1 tablet by mouth daily, Historical Med      omeprazole (PriLOSEC) 20 mg delayed release capsule Take 1 capsule (20 mg total) by mouth daily, Starting Fri 5/6/2022, Normal      oxyCODONE (Roxicodone) 5 immediate release tablet Take 1 tablet (5 mg total) by mouth every 4 (four) hours as needed for moderate pain Max Daily Amount: 30 mg, Starting Fri 5/6/2022, Normal             No discharge procedures on file      PDMP Review       Value Time User    PDMP Reviewed  Yes 5/6/2022 10:31 AM Ray Lane PA-C          ED Provider  Electronically Signed by           Elliott Avila DO  05/23/22 3207

## 2022-05-23 NOTE — INTERVAL H&P NOTE
H&P reviewed  After examining the patient I find no changes in the patients condition since the H&P had been written      Vitals:    05/23/22 0540   BP: (!) 187/114   Pulse: 78   Resp: 16   Temp: 98 2 °F (36 8 °C)   SpO2: 96%

## 2022-05-23 NOTE — TELEPHONE ENCOUNTER
Sent e mail message with attachment of modified pre op diet for patient to follow prior to surgery   Surgery postponed due to elevated BP    To maintain BMI of 40, patient weight should be 241# or above     Also sent message via my chart

## 2022-05-23 NOTE — TELEPHONE ENCOUNTER
Received following email from patient :  Matheus Goodrich, I am just trying to reach out to you because my surgery was postponed this morning due to my blood pressure being too high to go under anesthesia  While I was there I got conflicting thoughts on what should happen next  The surgeon said about getting the blood pressure under control and he would make it a point to get me in even if he had to add an extra case on one day in the next possibly 2 weeks  I'm unsure if the surgeon wants me to see my doctor or just start taking my third blood pressure med that I hadn't been taking which they believe is the reason why my bP was up high  I did leave same day and go straight to the ER where they gave me the dose of the meds I would have taken before I came in today before surgery  That brought it down too much lower  Just trying to figure out what the next step is in this process  I have no more special soap or drinks for before the surgery and I don't have a vehicle to drive 45 minutes one way to pick these things back up again  Wanted to figure out what options I have and how to go about everything  If you could please email me back I would appreciate it so I can get the ball rolling on what needs to be done so we can get this scheduled again  Thanks so much    Responded with the following email :  I left you a message  Before your surgery can be rescheduled, you need to follow up with your primary care provider to ensure that you BP is under good control     As far as the soap - you can purchase at any pharmacy - [employer] Antiseptic soap , Hibiclens antiseptic soap, or [facility] or [facility] brand   Since you wont be able to  the drinks - nothing to eat or drink after midnight  When you get to the hospital the morning of your surgery you can request a bottle of the Ensure Pre Surgery Drink to have at that time  You may experience some increased post op nausea or vomiting, but it will not be harmful     So nothing to eat or drink after midnight the night before surgery  This what patients with a strawberry allergy do also  Once you have followed up with your primary care provider - contact Kelly Baez to get a new surgery date  I also sent a separate email with your modified pre op diet instructions      You can call me with any questions pertaining to your diet     Take care     Roney HOFFN

## 2022-05-23 NOTE — TELEPHONE ENCOUNTER
Left message in response to email  Instructed patient to follow up with her primary care provider to ensure that her bp is under good control  Then she can call and get a new surgery date  Name and contact information left

## 2022-05-23 NOTE — DISCHARGE INSTRUCTIONS
Your Hyzaar (losartan-hydrochlorothiazide) prescription has been sent to [address] in [facility]  If they are unable to fill the prescription, ask them to call the ED I will send separate prescriptions

## 2022-05-23 NOTE — PROGRESS NOTES
Pt is hypertensive, surgery canceled in holding,  Pt sent to ER, report given to receiving nurse Nate Javed  Pt denies any complaint

## 2022-05-24 ENCOUNTER — OFFICE VISIT (OUTPATIENT)
Dept: INTERNAL MEDICINE CLINIC | Facility: CLINIC | Age: 37
End: 2022-05-24
Payer: COMMERCIAL

## 2022-05-24 VITALS
HEIGHT: 65 IN | DIASTOLIC BLOOD PRESSURE: 104 MMHG | OXYGEN SATURATION: 93 % | BODY MASS INDEX: 39.55 KG/M2 | WEIGHT: 237.38 LBS | TEMPERATURE: 97.7 F | SYSTOLIC BLOOD PRESSURE: 170 MMHG | HEART RATE: 73 BPM

## 2022-05-24 DIAGNOSIS — I10 HTN (HYPERTENSION), MALIGNANT: ICD-10-CM

## 2022-05-24 DIAGNOSIS — I10 PRIMARY HYPERTENSION: Primary | ICD-10-CM

## 2022-05-24 PROCEDURE — 99213 OFFICE O/P EST LOW 20 MIN: CPT | Performed by: INTERNAL MEDICINE

## 2022-05-24 RX ORDER — LOSARTAN POTASSIUM AND HYDROCHLOROTHIAZIDE 12.5; 5 MG/1; MG/1
1 TABLET ORAL DAILY
Qty: 30 TABLET | Refills: 0
Start: 2022-05-24 | End: 2022-05-27 | Stop reason: SDUPTHER

## 2022-05-24 RX ORDER — AMLODIPINE BESYLATE 5 MG/1
10 TABLET ORAL DAILY
Qty: 180 TABLET | Refills: 1
Start: 2022-05-24

## 2022-05-24 RX ORDER — LABETALOL 300 MG/1
300 TABLET, FILM COATED ORAL 3 TIMES DAILY
Qty: 60 TABLET | Refills: 5
Start: 2022-05-24

## 2022-05-24 NOTE — PATIENT INSTRUCTIONS
Chronic Hypertension   AMBULATORY CARE:   Hypertension  is high blood pressure  Your blood pressure is the force of your blood moving against the walls of your arteries  Hypertension causes your blood pressure to get so high that your heart has to work much harder than normal  This can damage your heart  Even if you have hypertension for years, lifestyle changes, medicines, or both can help bring your blood pressure to normal   Call your local emergency number (911 in the [facility]) or have someone call if:   You have chest pain  You have any of the following signs of a heart attack:      Squeezing, pressure, or pain in your chest    You may  also have any of the following:     Discomfort or pain in your back, neck, jaw, stomach, or arm    Shortness of breath    Nausea or vomiting    Lightheadedness or a sudden cold sweat    You become confused or have difficulty speaking  You suddenly feel lightheaded or have trouble breathing  Seek care immediately if:   You have a severe headache or vision loss  You have weakness in an arm or leg  Call your doctor or cardiologist if:   You feel faint, dizzy, confused, or drowsy  You have been taking your blood pressure medicine but your pressure is higher than your provider says it should be  You have questions or concerns about your condition or care  Treatment for chronic hypertension  may include medicine to lower your blood pressure and cholesterol levels  A low cholesterol level helps prevent heart disease and makes it easier to control your blood pressure  Heart disease can make your blood pressure harder to control  You may also need to make lifestyle changes  What you need to know about the stages of hypertension:       Normal blood pressure is 119/79 or lower   Your healthcare provider may only check your blood pressure each year if it stays at a normal level  Elevated blood pressure is 120/79 to 129/79   This is sometimes called prehypertension  Your healthcare provider may suggest lifestyle changes to help lower your blood pressure to a normal level  He or she may then check it again in 3 to 6 months  Stage 1 hypertension is 130/80  to 139/89   Your provider may recommend lifestyle changes, medication, and checks every 3 to 6 months until your blood pressure is controlled  Stage 2 hypertension is 140/90 or higher   Your provider will recommend lifestyle changes and have you take 2 kinds of hypertension medicines  You will also need to have your blood pressure checked monthly until it is controlled  Manage chronic hypertension:   Check your blood pressure at home  Avoid smoking, caffeine, and exercise at least 30 minutes before checking your blood pressure  Sit and rest for 5 minutes before you take your blood pressure  Extend your arm and support it on a flat surface  Your arm should be at the same level as your heart  Follow the directions that came with your blood pressure monitor  Check your blood pressure 2 times, 1 minute apart, before you take your medicine in the morning  Also check your blood pressure before your evening meal  Keep a record of your readings and bring it to your follow-up visits  Ask your healthcare provider what your blood pressure should be  Manage any other health conditions you have  Health conditions such as diabetes can increase your risk for hypertension  Follow your healthcare provider's instructions and take all your medicines as directed  Talk to your healthcare provider about any new health conditions you have recently developed  Ask about all medicines  Certain medicines can increase your blood pressure  Examples include oral birth control pills, decongestants, herbal supplements, and NSAIDs, such as ibuprofen  Your healthcare provider can tell you which medicines are safe for you to take  This includes prescription and over-the-counter medicines      Lifestyle changes you can make to lower your blood pressure: Your provider may want you to make more lifestyle changes if you are having trouble controlling your blood pressure  This may feel difficult over time, especially if you think you are making good changes but your pressure is still high  It might help to focus on one new change at a time  For example, try to add 1 more day of exercise, or exercise for an extra 10 minutes on 2 days  Small changes can make a big difference  Your healthcare provider can also refer you to specialists such as a dietitian who can help you make small changes  Your family members may be included in helping you learn to create lifestyle changes, such as the following:     Limit sodium (salt) as directed  Too much sodium can affect your fluid balance  Check labels to find low-sodium or no-salt-added foods  Some low-sodium foods use potassium salts for flavor  Too much potassium can also cause health problems  Your healthcare provider will tell you how much sodium and potassium are safe for you to have in a day  He or she may recommend that you limit sodium to 2,300 mg a day  Follow the meal plan recommended by your healthcare provider  A dietitian or your provider can give you more information on low-sodium plans or the DASH (Dietary Approaches to Stop Hypertension) eating plan  The DASH plan is low in sodium, processed sugar, unhealthy fats, and total fat  It is high in potassium, calcium, and fiber  These can be found in vegetables, fruit, and whole-grain foods  Be physically active throughout the day  Physical activity, such as exercise, can help control your blood pressure and your weight  Be physically active for at least 30 minutes per day, on most days of the week  Include aerobic activity, such as walking or riding a bicycle  Also include strength training at least 2 times each week  Your healthcare providers can help you create a physical activity plan  Decrease stress    This may help lower your blood pressure  Learn ways to relax, such as deep breathing or listening to music  Limit alcohol as directed  Alcohol can increase your blood pressure  A drink of alcohol is 12 ounces of beer, 5 ounces of wine, or 1½ ounces of liquor  Do not smoke  Nicotine and other chemicals in cigarettes and cigars can increase your blood pressure and also cause lung damage  Ask your healthcare provider for information if you currently smoke and need help to quit  E-cigarettes or smokeless tobacco still contain nicotine  Talk to your healthcare provider before you use these products  Follow up with your doctor or cardiologist as directed: You will need to return to have your blood pressure checked and to have other lab tests done  Write down your questions so you remember to ask them during your visits  © Copyright [employer] 2022 Information is for End User's use only and may not be sold, redistributed or otherwise used for commercial purposes  All illustrations and images included in CareNotes® are the copyrighted property of A [employer] , Inc  or [facility] Jesús Bright   The above information is an  only  It is not intended as medical advice for individual conditions or treatments  Talk to your doctor, nurse or pharmacist before following any medical regimen to see if it is safe and effective for you

## 2022-05-24 NOTE — PROGRESS NOTES
Assessment/Plan:  Problem List Items Addressed This Visit        Cardiovascular and Mediastinum    Hypertension - Primary    Relevant Medications    losartan-hydrochlorothiazide (HYZAAR) 50-12 5 mg per tablet    labetalol (NORMODYNE) 300 mg tablet    amLODIPine (NORVASC) 5 mg tablet      Other Visit Diagnoses     Malignant hypertension on admission         Relevant Medications    losartan-hydrochlorothiazide (HYZAAR) 50-12 5 mg per tablet    labetalol (NORMODYNE) 300 mg tablet    amLODIPine (NORVASC) 5 mg tablet           Diagnoses and all orders for this visit:    Primary hypertension  -     losartan-hydrochlorothiazide (HYZAAR) 50-12 5 mg per tablet; Take 1 tablet by mouth in the morning   -     labetalol (NORMODYNE) 300 mg tablet; Take 1 tablet (300 mg total) by mouth in the morning and 1 tablet (300 mg total) in the evening and 1 tablet (300 mg total) before bedtime  Malignant hypertension on admission   -     amLODIPine (NORVASC) 5 mg tablet; Take 2 tablets (10 mg total) by mouth in the morning  No problem-specific Assessment & Plan notes found for this encounter  A/P: Pt is stable and asymptomatic, but BP is elevated and personally got 168/102  ?due to pt missing her meds  Just restarted her ARB/hctz and increase labetolol today  Will hold on further adjustment as the meds need to kick in  RTC three days for f/u  May need to increase the labetolol or consider starting clonodine or hydralazine  If bp persists, would check for LESLIE  No s/s of pheo  Subjective:      Patient ID: Naina Duarte is a 40 y o  female  WF with longstanding HTN presents after her bariatric sx was cancelled yesterday and pt taken from pre-op to the ER for elevated BP  Pt totally asymptomatic w/o CP, SOB,edema, palpitations, orthopnea or PND  NO headaches  Pt reports her insurance switched and was unable to get several of her bp meds filled several weeks ago   Was seen by cards in the recent past and cleared for sx with bp 140/90  IN the ER, pt restarted on her losartan hctz and her third daily dose of labetolol  D/c'd to home  Doing ok and no s/s overnight  Pt is on ARB but had a tubal        The following portions of the patient's history were reviewed and updated as appropriate:   She has a past medical history of Anxiety, Arthritis, CHF (congestive heart failure) ([address] ), Chronic back pain, Generalized anxiety disorder, Hiatal hernia, Hypertension, Kidney stone, Obesity, Primary osteoarthritis of left knee (2020), and Transient ischemic attack  ,  does not have any pertinent problems on file  ,   has a past surgical history that includes pr cystourethroscopy,ureter catheter (Right, 1/15/2018); Knee arthroscopy (Left);  section; Tubal ligation; pr cysto/uretero w/lithotripsy &indwell stent insrt (Right, 2018); and EGD ,  family history includes Cancer in her mother; Hypertension in her father and mother  ,   reports that she quit smoking about 2 years ago  Her smoking use included cigarettes  She has a 1 00 pack-year smoking history  She has never used smokeless tobacco  She reports current alcohol use  She reports that she does not use drugs  ,  has No Known Allergies     Current Outpatient Medications   Medication Sig Dispense Refill    albuterol (PROVENTIL HFA,VENTOLIN HFA) 90 mcg/act inhaler Inhale 2 puffs every 6 (six) hours as needed for wheezing or shortness of breath 54 g 1    amLODIPine (NORVASC) 5 mg tablet Take 2 tablets (10 mg total) by mouth in the morning  180 tablet 1    budesonide-formoterol (SYMBICORT) 80-4 5 MCG/ACT inhaler Inhale 2 puffs 2 (two) times a day Rinse mouth after use  10 2 g 5    cholecalciferol (VITAMIN D3) 400 units tablet Take 400 Units by mouth daily      labetalol (NORMODYNE) 300 mg tablet Take 1 tablet (300 mg total) by mouth in the morning and 1 tablet (300 mg total) in the evening and 1 tablet (300 mg total) before bedtime   60 tablet 5    lidocaine (XYLOCAINE) 5 % ointment Apply topically as needed for mild pain 35 44 g 5    losartan-hydrochlorothiazide (HYZAAR) 50-12 5 mg per tablet Take 1 tablet by mouth in the morning  30 tablet 0    Multiple Vitamin (multivitamin) tablet Take 1 tablet by mouth daily      omeprazole (PriLOSEC) 20 mg delayed release capsule Take 1 capsule (20 mg total) by mouth daily (Patient not taking: Reported on 5/24/2022) 30 capsule 3    oxyCODONE (Roxicodone) 5 immediate release tablet Take 1 tablet (5 mg total) by mouth every 4 (four) hours as needed for moderate pain Max Daily Amount: 30 mg (Patient not taking: Reported on 5/24/2022) 10 tablet 0     No current facility-administered medications for this visit  Review of Systems   Constitutional: Negative for activity change, chills, diaphoresis, fatigue and fever  Respiratory: Negative for cough, chest tightness, shortness of breath and wheezing  Cardiovascular: Negative for chest pain, palpitations and leg swelling  Gastrointestinal: Negative for abdominal pain, constipation, diarrhea, nausea and vomiting  Genitourinary: Negative for difficulty urinating, dysuria and frequency  Musculoskeletal: Negative for arthralgias, gait problem and myalgias  Neurological: Negative for light-headedness and headaches  Psychiatric/Behavioral: Negative for confusion  The patient is not nervous/anxious  PHQ-2/9 Depression Screening    Little interest or pleasure in doing things: 0 - not at all  Feeling down, depressed, or hopeless: 0 - not at all  PHQ-2 Score: 0  PHQ-2 Interpretation: Negative depression screen        Objective:  Vitals:    05/24/22 1524   BP: (!) 170/104   Pulse: 73   Temp: 97 7 °F (36 5 °C)   SpO2: 93%   Weight: 108 kg (237 lb 6 oz)   Height: 5' 5" (1 651 m)     Body mass index is 39 5 kg/m²  Physical Exam  Vitals and nursing note reviewed  Constitutional:       General: She is not in acute distress  Appearance: Normal appearance  She is not ill-appearing  HENT:      Head: Normocephalic and atraumatic  Mouth/Throat:      Mouth: Mucous membranes are moist    Eyes:      Extraocular Movements: Extraocular movements intact  Conjunctiva/sclera: Conjunctivae normal       Pupils: Pupils are equal, round, and reactive to light  Cardiovascular:      Rate and Rhythm: Normal rate and regular rhythm  Heart sounds: Normal heart sounds  Pulmonary:      Effort: Pulmonary effort is normal  No respiratory distress  Breath sounds: Normal breath sounds  No wheezing or rales  Abdominal:      General: Bowel sounds are normal  There is no distension  Tenderness: There is no abdominal tenderness  Musculoskeletal:      Right lower leg: No edema  Left lower leg: No edema  Neurological:      General: No focal deficit present  Mental Status: She is alert and oriented to person, place, and time  Mental status is at baseline  Psychiatric:         Mood and Affect: Mood normal          Behavior: Behavior normal          Thought Content:  Thought content normal          Judgment: Judgment normal

## 2022-05-27 ENCOUNTER — OFFICE VISIT (OUTPATIENT)
Dept: INTERNAL MEDICINE CLINIC | Facility: CLINIC | Age: 37
End: 2022-05-27
Payer: COMMERCIAL

## 2022-05-27 VITALS
BODY MASS INDEX: 39.02 KG/M2 | SYSTOLIC BLOOD PRESSURE: 160 MMHG | OXYGEN SATURATION: 99 % | HEIGHT: 65 IN | TEMPERATURE: 98.2 F | WEIGHT: 234.2 LBS | HEART RATE: 75 BPM | DIASTOLIC BLOOD PRESSURE: 108 MMHG

## 2022-05-27 DIAGNOSIS — I10 PRIMARY HYPERTENSION: Primary | ICD-10-CM

## 2022-05-27 PROCEDURE — 99213 OFFICE O/P EST LOW 20 MIN: CPT | Performed by: INTERNAL MEDICINE

## 2022-05-27 RX ORDER — LOSARTAN POTASSIUM AND HYDROCHLOROTHIAZIDE 12.5; 5 MG/1; MG/1
1 TABLET ORAL 2 TIMES DAILY
Qty: 30 TABLET | Refills: 0
Start: 2022-05-27 | End: 2022-06-02

## 2022-05-27 NOTE — PROGRESS NOTES
Assessment/Plan:  Problem List Items Addressed This Visit        Cardiovascular and Mediastinum    Hypertension - Primary    Relevant Medications    losartan-hydrochlorothiazide (HYZAAR) 50-12 5 mg per tablet           Diagnoses and all orders for this visit:    Primary hypertension  -     losartan-hydrochlorothiazide (HYZAAR) 50-12 5 mg per tablet; Take 1 tablet by mouth 2 (two) times a day      No problem-specific Assessment & Plan notes found for this encounter  A/P: Doing better, but not at goal  Will continue 10mg norvasc and losartan 50/12 5 in the AM  Labetolol tid  Will add a second dose of losartan/HCTZ at bedtime and hopefully cover the AM better  Would like to switch the labetolol to coreg, but need to get pt controlled ASAP for sx  RTC four days for f/u  Subjective:      Patient ID: Tejas Jenkins is a 40 y o  female  WF RTC for f/u uncontrolled HTN  Back on her prior meds  Reports BP are high upon awakening, but then improve throughout the day to 140's over 90's  No s/s  Taking labetolol tid, norvasc 10 and losartan in the AM        The following portions of the patient's history were reviewed and updated as appropriate:   She has a past medical history of Anxiety, Arthritis, CHF (congestive heart failure) ([address] ), Chronic back pain, Generalized anxiety disorder, Hiatal hernia, Hypertension, Kidney stone, Obesity, Primary osteoarthritis of left knee (2020), and Transient ischemic attack  ,  does not have any pertinent problems on file  ,   has a past surgical history that includes pr cystourethroscopy,ureter catheter (Right, 1/15/2018); Knee arthroscopy (Left);  section; Tubal ligation; pr cysto/uretero w/lithotripsy &indwell stent insrt (Right, 2018); and EGD ,  family history includes Cancer in her mother; Hypertension in her father and mother  ,   reports that she quit smoking about 2 years ago  Her smoking use included cigarettes  She has a 1 00 pack-year smoking history  She has never used smokeless tobacco  She reports current alcohol use  She reports that she does not use drugs  ,  has No Known Allergies     Current Outpatient Medications   Medication Sig Dispense Refill    albuterol (PROVENTIL HFA,VENTOLIN HFA) 90 mcg/act inhaler Inhale 2 puffs every 6 (six) hours as needed for wheezing or shortness of breath 54 g 1    amLODIPine (NORVASC) 5 mg tablet Take 2 tablets (10 mg total) by mouth in the morning  180 tablet 1    budesonide-formoterol (SYMBICORT) 80-4 5 MCG/ACT inhaler Inhale 2 puffs 2 (two) times a day Rinse mouth after use  10 2 g 5    cholecalciferol (VITAMIN D3) 400 units tablet Take 400 Units by mouth daily      labetalol (NORMODYNE) 300 mg tablet Take 1 tablet (300 mg total) by mouth in the morning and 1 tablet (300 mg total) in the evening and 1 tablet (300 mg total) before bedtime  60 tablet 5    lidocaine (XYLOCAINE) 5 % ointment Apply topically as needed for mild pain 35 44 g 5    losartan-hydrochlorothiazide (HYZAAR) 50-12 5 mg per tablet Take 1 tablet by mouth 2 (two) times a day 30 tablet 0    Multiple Vitamin (multivitamin) tablet Take 1 tablet by mouth daily      omeprazole (PriLOSEC) 20 mg delayed release capsule Take 1 capsule (20 mg total) by mouth daily (Patient not taking: No sig reported) 30 capsule 3    oxyCODONE (Roxicodone) 5 immediate release tablet Take 1 tablet (5 mg total) by mouth every 4 (four) hours as needed for moderate pain Max Daily Amount: 30 mg (Patient not taking: No sig reported) 10 tablet 0     No current facility-administered medications for this visit  Review of Systems   Constitutional: Negative for activity change, chills, diaphoresis, fatigue and fever  Respiratory: Negative for cough, chest tightness, shortness of breath and wheezing  Cardiovascular: Negative for chest pain, palpitations and leg swelling  Gastrointestinal: Negative for abdominal pain, constipation, diarrhea, nausea and vomiting  Genitourinary: Negative for difficulty urinating, dysuria and frequency  Musculoskeletal: Negative for arthralgias, gait problem and myalgias  Neurological: Negative for light-headedness and headaches  Psychiatric/Behavioral: Negative for confusion  The patient is not nervous/anxious  PHQ-2/9 Depression Screening          Objective:  Vitals:    05/27/22 0845   BP: (!) 160/108   Pulse: 75   Temp: 98 2 °F (36 8 °C)   TempSrc: Tympanic   SpO2: 99%   Weight: 106 kg (234 lb 3 2 oz)   Height: 5' 5" (1 651 m)     Body mass index is 38 97 kg/m²  Physical Exam  Vitals and nursing note reviewed  Constitutional:       General: She is not in acute distress  Appearance: Normal appearance  She is obese  She is not ill-appearing  HENT:      Head: Normocephalic and atraumatic  Mouth/Throat:      Mouth: Mucous membranes are moist    Eyes:      Extraocular Movements: Extraocular movements intact  Conjunctiva/sclera: Conjunctivae normal       Pupils: Pupils are equal, round, and reactive to light  Cardiovascular:      Rate and Rhythm: Normal rate and regular rhythm  Heart sounds: Normal heart sounds  Pulmonary:      Effort: Pulmonary effort is normal  No respiratory distress  Breath sounds: Normal breath sounds  No wheezing or rales  Musculoskeletal:      Right lower leg: No edema  Left lower leg: No edema  Neurological:      General: No focal deficit present  Mental Status: She is alert and oriented to person, place, and time  Mental status is at baseline  Psychiatric:         Mood and Affect: Mood normal          Behavior: Behavior normal          Thought Content:  Thought content normal          Judgment: Judgment normal

## 2022-05-27 NOTE — PATIENT INSTRUCTIONS
Chronic Hypertension   AMBULATORY CARE:   Hypertension  is high blood pressure  Your blood pressure is the force of your blood moving against the walls of your arteries  Hypertension causes your blood pressure to get so high that your heart has to work much harder than normal  This can damage your heart  Even if you have hypertension for years, lifestyle changes, medicines, or both can help bring your blood pressure to normal   Call your local emergency number (911 in the [address] [facility]) or have someone call if:   You have chest pain  You have any of the following signs of a heart attack:      Squeezing, pressure, or pain in your chest    You may  also have any of the following:     Discomfort or pain in your back, neck, jaw, stomach, or arm    Shortness of breath    Nausea or vomiting    Lightheadedness or a sudden cold sweat    You become confused or have difficulty speaking  You suddenly feel lightheaded or have trouble breathing  Seek care immediately if:   You have a severe headache or vision loss  You have weakness in an arm or leg  Call your doctor or cardiologist if:   You feel faint, dizzy, confused, or drowsy  You have been taking your blood pressure medicine but your pressure is higher than your provider says it should be  You have questions or concerns about your condition or care  Treatment for chronic hypertension  may include medicine to lower your blood pressure and cholesterol levels  A low cholesterol level helps prevent heart disease and makes it easier to control your blood pressure  Heart disease can make your blood pressure harder to control  You may also need to make lifestyle changes  What you need to know about the stages of hypertension:       Normal blood pressure is 119/79 or lower   Your healthcare provider may only check your blood pressure each year if it stays at a normal level  Elevated blood pressure is 120/79 to 129/79   This is sometimes called prehypertension  Your healthcare provider may suggest lifestyle changes to help lower your blood pressure to a normal level  He or she may then check it again in 3 to 6 months  Stage 1 hypertension is 130/80  to 139/89   Your provider may recommend lifestyle changes, medication, and checks every 3 to 6 months until your blood pressure is controlled  Stage 2 hypertension is 140/90 or higher   Your provider will recommend lifestyle changes and have you take 2 kinds of hypertension medicines  You will also need to have your blood pressure checked monthly until it is controlled  Manage chronic hypertension:   Check your blood pressure at home  Avoid smoking, caffeine, and exercise at least 30 minutes before checking your blood pressure  Sit and rest for 5 minutes before you take your blood pressure  Extend your arm and support it on a flat surface  Your arm should be at the same level as your heart  Follow the directions that came with your blood pressure monitor  Check your blood pressure 2 times, 1 minute apart, before you take your medicine in the morning  Also check your blood pressure before your evening meal  Keep a record of your readings and bring it to your follow-up visits  Ask your healthcare provider what your blood pressure should be  Manage any other health conditions you have  Health conditions such as diabetes can increase your risk for hypertension  Follow your healthcare provider's instructions and take all your medicines as directed  Talk to your healthcare provider about any new health conditions you have recently developed  Ask about all medicines  Certain medicines can increase your blood pressure  Examples include oral birth control pills, decongestants, herbal supplements, and NSAIDs, such as ibuprofen  Your healthcare provider can tell you which medicines are safe for you to take  This includes prescription and over-the-counter medicines      Lifestyle changes you can make to lower your blood pressure: Your provider may want you to make more lifestyle changes if you are having trouble controlling your blood pressure  This may feel difficult over time, especially if you think you are making good changes but your pressure is still high  It might help to focus on one new change at a time  For example, try to add 1 more day of exercise, or exercise for an extra 10 minutes on 2 days  Small changes can make a big difference  Your healthcare provider can also refer you to specialists such as a dietitian who can help you make small changes  Your family members may be included in helping you learn to create lifestyle changes, such as the following:     Limit sodium (salt) as directed  Too much sodium can affect your fluid balance  Check labels to find low-sodium or no-salt-added foods  Some low-sodium foods use potassium salts for flavor  Too much potassium can also cause health problems  Your healthcare provider will tell you how much sodium and potassium are safe for you to have in a day  He or she may recommend that you limit sodium to 2,300 mg a day  Follow the meal plan recommended by your healthcare provider  A dietitian or your provider can give you more information on low-sodium plans or the DASH (Dietary Approaches to Stop Hypertension) eating plan  The DASH plan is low in sodium, processed sugar, unhealthy fats, and total fat  It is high in potassium, calcium, and fiber  These can be found in vegetables, fruit, and whole-grain foods  Be physically active throughout the day  Physical activity, such as exercise, can help control your blood pressure and your weight  Be physically active for at least 30 minutes per day, on most days of the week  Include aerobic activity, such as walking or riding a bicycle  Also include strength training at least 2 times each week  Your healthcare providers can help you create a physical activity plan  Decrease stress    This may help lower your blood pressure  Learn ways to relax, such as deep breathing or listening to music  Limit alcohol as directed  Alcohol can increase your blood pressure  A drink of alcohol is 12 ounces of beer, 5 ounces of wine, or 1½ ounces of liquor  Do not smoke  Nicotine and other chemicals in cigarettes and cigars can increase your blood pressure and also cause lung damage  Ask your healthcare provider for information if you currently smoke and need help to quit  E-cigarettes or smokeless tobacco still contain nicotine  Talk to your healthcare provider before you use these products  Follow up with your doctor or cardiologist as directed: You will need to return to have your blood pressure checked and to have other lab tests done  Write down your questions so you remember to ask them during your visits  © Copyright [employer] 2022 Information is for End User's use only and may not be sold, redistributed or otherwise used for commercial purposes  All illustrations and images included in CareNotes® are the copyrighted property of A D [employer] M [employer] Inc  or [facility]   The above information is an  only  It is not intended as medical advice for individual conditions or treatments  Talk to your doctor, nurse or pharmacist before following any medical regimen to see if it is safe and effective for you

## 2022-05-31 ENCOUNTER — TELEMEDICINE (OUTPATIENT)
Dept: INTERNAL MEDICINE CLINIC | Facility: CLINIC | Age: 37
End: 2022-05-31
Payer: COMMERCIAL

## 2022-05-31 ENCOUNTER — ANESTHESIA EVENT (OUTPATIENT)
Dept: PERIOP | Facility: HOSPITAL | Age: 37
DRG: 620 | End: 2022-05-31
Payer: COMMERCIAL

## 2022-05-31 VITALS
HEART RATE: 69 BPM | BODY MASS INDEX: 38.07 KG/M2 | SYSTOLIC BLOOD PRESSURE: 135 MMHG | WEIGHT: 228.5 LBS | HEIGHT: 65 IN | DIASTOLIC BLOOD PRESSURE: 84 MMHG

## 2022-05-31 DIAGNOSIS — I10 PRIMARY HYPERTENSION: Primary | ICD-10-CM

## 2022-05-31 DIAGNOSIS — Z01.818 PRE-OP EXAM: ICD-10-CM

## 2022-05-31 PROCEDURE — 99213 OFFICE O/P EST LOW 20 MIN: CPT | Performed by: INTERNAL MEDICINE

## 2022-05-31 NOTE — PRE-PROCEDURE INSTRUCTIONS
Pre-Surgery Instructions:   Medication Instructions    albuterol (PROVENTIL HFA,VENTOLIN HFA) 90 mcg/act inhaler Uses PRN- OK to take day of surgery    amLODIPine (NORVASC) 5 mg tablet Take day of surgery   budesonide-formoterol (SYMBICORT) 80-4 5 MCG/ACT inhaler Take day of surgery   cholecalciferol (VITAMIN D3) 400 units tablet Hold day of surgery   labetalol (NORMODYNE) 300 mg tablet Take day of surgery   lidocaine (XYLOCAINE) 5 % ointment Hold day of surgery   losartan-hydrochlorothiazide (HYZAAR) 50-12 5 mg per tablet Hold day of surgery   Multiple Vitamin (multivitamin) tablet Hold day of surgery  All pre-op instructions reviewed as per [facility] Experience w/ pt verb understanding  Inst NPO post MN night before sx except sips water w/ meds am of sx (metoprolol & amlodipine)   Has Carb Drinks & reviewed process at time of call  Reviewed bowel prep instructions as well  Received pre-op showering instructions & CHG from surgeon office, reviewed process at time of call  Current hospital visitor & masking policy reviewed  Inst will receive phone call w/ time to report on DOS btwn 2-8 pm afternoon/evening before surgery from hospital nursing staff  Pt verbalized an understanding of all instructions reviewed and offers no concerns at this time except to clarify her preop BP med instructions - anes messaged,awaiting their response currently

## 2022-05-31 NOTE — PROGRESS NOTES
Virtual Regular Visit    Verification of patient location:    Patient is located in the following state in which I hold an active license [place]      Assessment/Plan:    Problem List Items Addressed This Visit        Cardiovascular and Mediastinum    Hypertension - Primary       Other    Pre-op exam           A/P: Doing well with BP's now under good control  Tolerating the meds  Will continue current doses and is ok for sx  Unless otherwise advised by sx and/or anesthesia, would continue current BP doses with only taking the AM dose of ARB on the day prior to sx and no ARB on the day of sx  Will inform sx  RTC as scheduled  Reason for visit is   Chief Complaint   Patient presents with    Virtual Brief Visit     BP check: Pt states averaging 110-125/72-84  Pt's Losartan increased to 2 a day   Virtual Regular Visit        Encounter provider Yuan Rather, DO    Provider located at [address] [place] [id number]      Recent Visits  Date Type Provider Dept   05/27/22 Office Visit Yuan Rather, DO [address] [facility]   05/24/22 Office Visit Yuan Rather, DO [facility] recent visits within past 7 days and meeting all other requirements  Today's Visits  Date Type Provider Dept   05/31/22 Telemedicine Yuan Rather, DO [facility] today's visits and meeting all other requirements  Future Appointments  No visits were found meeting these conditions  Showing future appointments within next 150 days and meeting all other requirements       The patient was identified by name and date of birth  Van Buitrago was informed that this is a telemedicine visit and that the visit is being conducted through [facility] and patient was informed this is a secure, HIPAA-complaint platform  She agrees to proceed     My office door was closed  No one else was in the room    She acknowledged consent and understanding of privacy and security of the video platform  The patient has agreed to participate and understands they can discontinue the visit at any time  Patient is aware this is a billable service  Subjective  Jared Vázquez is a 40 y o  female    [facility] RTC for f/u uncontrolled HTN due to pre-maturely d/c'ing her meds in anticipation of up coming gastric sx  Meds have been adjusted with last med schedule with norvasc 10mg q d, labetolol 300 tid, and losarten/HCTZ 50/12 5 bid  Reports BP's are much better  sBP mostly less than 130  Denies CP, SOB, palpitations, edema, lightheadedness, orthopnea, or PND  Tolerating the meds  No new issues          Past Medical History:   Diagnosis Date    Anxiety     Arthritis     CHF (congestive heart failure) (HCC)     Chronic back pain     Generalized anxiety disorder     Hiatal hernia     Hypertension     Kidney stone     Obesity     Primary osteoarthritis of left knee 2020    Transient ischemic attack     pt states not certain if actually had a TIA, states related to severe HTN episodes       Past Surgical History:   Procedure Laterality Date     SECTION      x 2    EGD      KNEE ARTHROSCOPY Left     acl/meniscus repair    NH CYSTO/URETERO W/LITHOTRIPSY &INDWELL STENT INSRT Right 2018    Procedure: CYSTOSCOPY URETEROSCOPY, RETROGRADE PYELOGRAM AND INSERTION STENT URETERAL, RIGHT STONE EXTRACTION;  Surgeon: Cali Adams MD;  [facility]: [facility];  Service: Urology    NH CYSTOURETHROSCOPY,URETER CATHETER Right 1/15/2018    Procedure: CYSTOSCOPY RETROGRADE PYELOGRAM WITH INSERTION STENT URETERAL;  Surgeon: Abdelrahman Hardwick MD;  [facility];  Service: Urology    TUBAL LIGATION         Current Outpatient Medications   Medication Sig Dispense Refill    albuterol (PROVENTIL HFA,VENTOLIN HFA) 90 mcg/act inhaler Inhale 2 puffs every 6 (six) hours as needed for wheezing or shortness of breath 54 g 1    amLODIPine (NORVASC) 5 mg tablet Take 2 tablets (10 mg total) by mouth in the morning  180 tablet 1    budesonide-formoterol (SYMBICORT) 80-4 5 MCG/ACT inhaler Inhale 2 puffs 2 (two) times a day Rinse mouth after use  10 2 g 5    cholecalciferol (VITAMIN D3) 400 units tablet Take 400 Units by mouth daily      labetalol (NORMODYNE) 300 mg tablet Take 1 tablet (300 mg total) by mouth in the morning and 1 tablet (300 mg total) in the evening and 1 tablet (300 mg total) before bedtime  60 tablet 5    lidocaine (XYLOCAINE) 5 % ointment Apply topically as needed for mild pain 35 44 g 5    losartan-hydrochlorothiazide (HYZAAR) 50-12 5 mg per tablet Take 1 tablet by mouth 2 (two) times a day 30 tablet 0    Multiple Vitamin (multivitamin) tablet Take 1 tablet by mouth daily      omeprazole (PriLOSEC) 20 mg delayed release capsule Take 1 capsule (20 mg total) by mouth daily (Patient not taking: Reported on 5/31/2022) 30 capsule 3    oxyCODONE (Roxicodone) 5 immediate release tablet Take 1 tablet (5 mg total) by mouth every 4 (four) hours as needed for moderate pain Max Daily Amount: 30 mg (Patient not taking: No sig reported) 10 tablet 0     No current facility-administered medications for this visit  No Known Allergies    Review of Systems   Constitutional: Negative for activity change, chills, diaphoresis, fatigue and fever  Respiratory: Negative for cough, chest tightness, shortness of breath and wheezing  Cardiovascular: Negative for chest pain, palpitations and leg swelling  Gastrointestinal: Negative for abdominal pain, constipation, diarrhea, nausea and vomiting  Genitourinary: Negative for difficulty urinating, dysuria and frequency  Musculoskeletal: Negative for arthralgias, gait problem and myalgias  Neurological: Negative for dizziness, seizures, syncope, weakness, light-headedness and headaches  Psychiatric/Behavioral: Negative for confusion, dysphoric mood and sleep disturbance  The patient is not nervous/anxious  Video Exam    Vitals:    05/31/22 0713   BP: 135/84   Pulse: 69   Weight: 104 kg (228 lb 8 oz)   Height: 5' 5" (1 651 m)       Physical Exam  Vitals and nursing note reviewed  Constitutional:       General: She is not in acute distress  Appearance: Normal appearance  She is not ill-appearing  HENT:      Head: Normocephalic and atraumatic  Mouth/Throat:      Mouth: Mucous membranes are moist    Eyes:      Extraocular Movements: Extraocular movements intact  Conjunctiva/sclera: Conjunctivae normal       Pupils: Pupils are equal, round, and reactive to light  Pulmonary:      Effort: Pulmonary effort is normal  No respiratory distress  Neurological:      General: No focal deficit present  Mental Status: She is alert and oriented to person, place, and time  Mental status is at baseline  Psychiatric:         Mood and Affect: Mood normal          Behavior: Behavior normal          Thought Content: Thought content normal          Judgment: Judgment normal           I spent 20 minutes directly with the patient during this visit    VIRTUAL VISIT DISCLAIMER      [place] Valentino verbally agrees to participate in [employer]  Pt is aware that [employer] could be limited without vital signs or the ability to perform a full hands-on physical Pallavimonty Baumann understands she or the provider may request at any time to terminate the video visit and request the patient to seek care or treatment in person

## 2022-06-01 ENCOUNTER — ANESTHESIA (OUTPATIENT)
Dept: PERIOP | Facility: HOSPITAL | Age: 37
DRG: 620 | End: 2022-06-01
Payer: COMMERCIAL

## 2022-06-01 ENCOUNTER — HOSPITAL ENCOUNTER (INPATIENT)
Facility: HOSPITAL | Age: 37
LOS: 1 days | Discharge: HOME | DRG: 620 | End: 2022-06-02
Attending: SURGERY | Admitting: SURGERY
Payer: COMMERCIAL

## 2022-06-01 DIAGNOSIS — I10 PRIMARY HYPERTENSION: Primary | ICD-10-CM

## 2022-06-01 DIAGNOSIS — E66.01 OBESITY, CLASS III, BMI 40-49.9 (MORBID OBESITY) (HCC): ICD-10-CM

## 2022-06-01 LAB
EXT PREGNANCY TEST URINE: NEGATIVE
EXT. CONTROL: NORMAL
FLUAV RNA RESP QL NAA+PROBE: NEGATIVE
FLUBV RNA RESP QL NAA+PROBE: NEGATIVE
RSV RNA RESP QL NAA+PROBE: NEGATIVE
SARS-COV-2 RNA RESP QL NAA+PROBE: NEGATIVE

## 2022-06-01 PROCEDURE — 0D164ZA BYPASS STOMACH TO JEJUNUM, PERCUTANEOUS ENDOSCOPIC APPROACH: ICD-10-PCS | Performed by: SURGERY

## 2022-06-01 PROCEDURE — 43644 LAP GASTRIC BYPASS/ROUX-EN-Y: CPT | Performed by: SURGERY

## 2022-06-01 PROCEDURE — C1781 MESH (IMPLANTABLE): HCPCS | Performed by: SURGERY

## 2022-06-01 PROCEDURE — 8E0W4CZ ROBOTIC ASSISTED PROCEDURE OF TRUNK REGION, PERCUTANEOUS ENDOSCOPIC APPROACH: ICD-10-PCS | Performed by: SURGERY

## 2022-06-01 PROCEDURE — 0DJ08ZZ INSPECTION OF UPPER INTESTINAL TRACT, VIA NATURAL OR ARTIFICIAL OPENING ENDOSCOPIC: ICD-10-PCS | Performed by: SURGERY

## 2022-06-01 PROCEDURE — 81025 URINE PREGNANCY TEST: CPT | Performed by: SURGERY

## 2022-06-01 PROCEDURE — C9290 INJ, BUPIVACAINE LIPOSOME: HCPCS | Performed by: SURGERY

## 2022-06-01 PROCEDURE — S2900 ROBOTIC SURGICAL SYSTEM: HCPCS | Performed by: SURGERY

## 2022-06-01 PROCEDURE — 0241U HB NFCT DS VIR RESP RNA 4 TRGT: CPT | Performed by: SURGERY

## 2022-06-01 DEVICE — SEAMGUARD STPL REINF INTUITIVE SUREFORM 60 GREEN: Type: IMPLANTABLE DEVICE | Status: FUNCTIONAL

## 2022-06-01 RX ORDER — MIDAZOLAM HYDROCHLORIDE 2 MG/2ML
INJECTION, SOLUTION INTRAMUSCULAR; INTRAVENOUS AS NEEDED
Status: DISCONTINUED | OUTPATIENT
Start: 2022-06-01 | End: 2022-06-01

## 2022-06-01 RX ORDER — ACETAMINOPHEN 325 MG/1
975 TABLET ORAL ONCE
Status: COMPLETED | OUTPATIENT
Start: 2022-06-01 | End: 2022-06-01

## 2022-06-01 RX ORDER — DEXAMETHASONE SODIUM PHOSPHATE 10 MG/ML
INJECTION, SOLUTION INTRAMUSCULAR; INTRAVENOUS AS NEEDED
Status: DISCONTINUED | OUTPATIENT
Start: 2022-06-01 | End: 2022-06-01

## 2022-06-01 RX ORDER — ONDANSETRON 2 MG/ML
4 INJECTION INTRAMUSCULAR; INTRAVENOUS ONCE AS NEEDED
Status: COMPLETED | OUTPATIENT
Start: 2022-06-01 | End: 2022-06-01

## 2022-06-01 RX ORDER — SODIUM CHLORIDE, SODIUM LACTATE, POTASSIUM CHLORIDE, CALCIUM CHLORIDE 600; 310; 30; 20 MG/100ML; MG/100ML; MG/100ML; MG/100ML
125 INJECTION, SOLUTION INTRAVENOUS CONTINUOUS
Status: DISCONTINUED | OUTPATIENT
Start: 2022-06-01 | End: 2022-06-02 | Stop reason: HOSPADM

## 2022-06-01 RX ORDER — ONDANSETRON 2 MG/ML
INJECTION INTRAMUSCULAR; INTRAVENOUS AS NEEDED
Status: DISCONTINUED | OUTPATIENT
Start: 2022-06-01 | End: 2022-06-01

## 2022-06-01 RX ORDER — NEOSTIGMINE METHYLSULFATE 1 MG/ML
INJECTION INTRAVENOUS AS NEEDED
Status: DISCONTINUED | OUTPATIENT
Start: 2022-06-01 | End: 2022-06-01

## 2022-06-01 RX ORDER — ALBUTEROL SULFATE 90 UG/1
2 AEROSOL, METERED RESPIRATORY (INHALATION) EVERY 6 HOURS PRN
Status: DISCONTINUED | OUTPATIENT
Start: 2022-06-01 | End: 2022-06-02 | Stop reason: HOSPADM

## 2022-06-01 RX ORDER — SODIUM CHLORIDE 9 MG/ML
INJECTION, SOLUTION INTRAVENOUS CONTINUOUS PRN
Status: DISCONTINUED | OUTPATIENT
Start: 2022-06-01 | End: 2022-06-01

## 2022-06-01 RX ORDER — LIDOCAINE HYDROCHLORIDE 20 MG/ML
INJECTION, SOLUTION EPIDURAL; INFILTRATION; INTRACAUDAL; PERINEURAL AS NEEDED
Status: DISCONTINUED | OUTPATIENT
Start: 2022-06-01 | End: 2022-06-01

## 2022-06-01 RX ORDER — CEFAZOLIN SODIUM 2 G/50ML
2000 SOLUTION INTRAVENOUS EVERY 8 HOURS
Status: COMPLETED | OUTPATIENT
Start: 2022-06-01 | End: 2022-06-02

## 2022-06-01 RX ORDER — BUPIVACAINE HYDROCHLORIDE 5 MG/ML
INJECTION, SOLUTION PERINEURAL AS NEEDED
Status: DISCONTINUED | OUTPATIENT
Start: 2022-06-01 | End: 2022-06-01 | Stop reason: HOSPADM

## 2022-06-01 RX ORDER — FAMOTIDINE 20 MG/1
20 TABLET, FILM COATED ORAL 2 TIMES DAILY
Status: DISCONTINUED | OUTPATIENT
Start: 2022-06-01 | End: 2022-06-02 | Stop reason: HOSPADM

## 2022-06-01 RX ORDER — INDOCYANINE GREEN AND WATER 25 MG
25 KIT INJECTION ONCE
Status: COMPLETED | OUTPATIENT
Start: 2022-06-01 | End: 2022-06-01

## 2022-06-01 RX ORDER — METRONIDAZOLE 500 MG/100ML
500 INJECTION, SOLUTION INTRAVENOUS ONCE
Status: COMPLETED | OUTPATIENT
Start: 2022-06-01 | End: 2022-06-02

## 2022-06-01 RX ORDER — PROMETHAZINE HYDROCHLORIDE 25 MG/ML
25 INJECTION, SOLUTION INTRAMUSCULAR; INTRAVENOUS EVERY 6 HOURS PRN
Status: DISCONTINUED | OUTPATIENT
Start: 2022-06-01 | End: 2022-06-02 | Stop reason: HOSPADM

## 2022-06-01 RX ORDER — AMLODIPINE BESYLATE 10 MG/1
10 TABLET ORAL DAILY
Status: DISCONTINUED | OUTPATIENT
Start: 2022-06-02 | End: 2022-06-02 | Stop reason: HOSPADM

## 2022-06-01 RX ORDER — ONDANSETRON 2 MG/ML
4 INJECTION INTRAMUSCULAR; INTRAVENOUS EVERY 6 HOURS PRN
Status: DISCONTINUED | OUTPATIENT
Start: 2022-06-01 | End: 2022-06-02 | Stop reason: HOSPADM

## 2022-06-01 RX ORDER — PROMETHAZINE HYDROCHLORIDE 25 MG/ML
12.5 INJECTION, SOLUTION INTRAMUSCULAR; INTRAVENOUS ONCE AS NEEDED
Status: COMPLETED | OUTPATIENT
Start: 2022-06-01 | End: 2022-06-01

## 2022-06-01 RX ORDER — BUDESONIDE AND FORMOTEROL FUMARATE DIHYDRATE 80; 4.5 UG/1; UG/1
2 AEROSOL RESPIRATORY (INHALATION) 2 TIMES DAILY
Status: DISCONTINUED | OUTPATIENT
Start: 2022-06-01 | End: 2022-06-02 | Stop reason: HOSPADM

## 2022-06-01 RX ORDER — FENTANYL CITRATE/PF 50 MCG/ML
25 SYRINGE (ML) INJECTION
Status: DISCONTINUED | OUTPATIENT
Start: 2022-06-01 | End: 2022-06-01 | Stop reason: HOSPADM

## 2022-06-01 RX ORDER — CELECOXIB 200 MG/1
200 CAPSULE ORAL ONCE
Status: COMPLETED | OUTPATIENT
Start: 2022-06-01 | End: 2022-06-01

## 2022-06-01 RX ORDER — HEPARIN SODIUM 5000 [USP'U]/ML
5000 INJECTION, SOLUTION INTRAVENOUS; SUBCUTANEOUS
Status: COMPLETED | OUTPATIENT
Start: 2022-06-02 | End: 2022-06-01

## 2022-06-01 RX ORDER — OXYCODONE HCL 5 MG/5 ML
5 SOLUTION, ORAL ORAL EVERY 4 HOURS PRN
Status: DISCONTINUED | OUTPATIENT
Start: 2022-06-01 | End: 2022-06-02 | Stop reason: HOSPADM

## 2022-06-01 RX ORDER — GLYCOPYRROLATE 0.2 MG/ML
INJECTION INTRAMUSCULAR; INTRAVENOUS AS NEEDED
Status: DISCONTINUED | OUTPATIENT
Start: 2022-06-01 | End: 2022-06-01

## 2022-06-01 RX ORDER — CEFAZOLIN SODIUM 2 G/50ML
2000 SOLUTION INTRAVENOUS ONCE
Status: COMPLETED | OUTPATIENT
Start: 2022-06-01 | End: 2022-06-01

## 2022-06-01 RX ORDER — SCOLOPAMINE TRANSDERMAL SYSTEM 1 MG/1
1 PATCH, EXTENDED RELEASE TRANSDERMAL ONCE
Status: DISCONTINUED | OUTPATIENT
Start: 2022-06-01 | End: 2022-06-01

## 2022-06-01 RX ORDER — ALBUTEROL SULFATE 2.5 MG/3ML
2.5 SOLUTION RESPIRATORY (INHALATION) ONCE AS NEEDED
Status: DISCONTINUED | OUTPATIENT
Start: 2022-06-01 | End: 2022-06-01 | Stop reason: HOSPADM

## 2022-06-01 RX ORDER — EPHEDRINE SULFATE 50 MG/ML
INJECTION INTRAVENOUS AS NEEDED
Status: DISCONTINUED | OUTPATIENT
Start: 2022-06-01 | End: 2022-06-01

## 2022-06-01 RX ORDER — HYDROMORPHONE HCL/PF 1 MG/ML
0.5 SYRINGE (ML) INJECTION
Status: DISCONTINUED | OUTPATIENT
Start: 2022-06-01 | End: 2022-06-01 | Stop reason: HOSPADM

## 2022-06-01 RX ORDER — HEPARIN SODIUM 5000 [USP'U]/ML
5000 INJECTION, SOLUTION INTRAVENOUS; SUBCUTANEOUS EVERY 8 HOURS SCHEDULED
Status: DISCONTINUED | OUTPATIENT
Start: 2022-06-01 | End: 2022-06-02

## 2022-06-01 RX ORDER — GABAPENTIN 300 MG/1
600 CAPSULE ORAL ONCE
Status: COMPLETED | OUTPATIENT
Start: 2022-06-01 | End: 2022-06-01

## 2022-06-01 RX ORDER — SODIUM CHLORIDE 9 MG/ML
INJECTION INTRAVENOUS AS NEEDED
Status: DISCONTINUED | OUTPATIENT
Start: 2022-06-01 | End: 2022-06-01 | Stop reason: HOSPADM

## 2022-06-01 RX ORDER — DIPHENHYDRAMINE HCL 25 MG
25 TABLET ORAL
Status: DISCONTINUED | OUTPATIENT
Start: 2022-06-01 | End: 2022-06-02 | Stop reason: HOSPADM

## 2022-06-01 RX ORDER — PROPOFOL 10 MG/ML
INJECTION, EMULSION INTRAVENOUS AS NEEDED
Status: DISCONTINUED | OUTPATIENT
Start: 2022-06-01 | End: 2022-06-01

## 2022-06-01 RX ORDER — HYDROMORPHONE HCL/PF 1 MG/ML
SYRINGE (ML) INJECTION AS NEEDED
Status: DISCONTINUED | OUTPATIENT
Start: 2022-06-01 | End: 2022-06-01

## 2022-06-01 RX ORDER — METRONIDAZOLE 500 MG/100ML
500 INJECTION, SOLUTION INTRAVENOUS ONCE
Status: COMPLETED | OUTPATIENT
Start: 2022-06-01 | End: 2022-06-01

## 2022-06-01 RX ORDER — ACETAMINOPHEN 160 MG/5ML
975 SUSPENSION, ORAL (FINAL DOSE FORM) ORAL EVERY 8 HOURS
Status: DISCONTINUED | OUTPATIENT
Start: 2022-06-01 | End: 2022-06-02 | Stop reason: HOSPADM

## 2022-06-01 RX ORDER — VECURONIUM BROMIDE 1 MG/ML
INJECTION, POWDER, LYOPHILIZED, FOR SOLUTION INTRAVENOUS AS NEEDED
Status: DISCONTINUED | OUTPATIENT
Start: 2022-06-01 | End: 2022-06-01

## 2022-06-01 RX ORDER — METOCLOPRAMIDE HYDROCHLORIDE 5 MG/ML
10 INJECTION INTRAMUSCULAR; INTRAVENOUS EVERY 6 HOURS PRN
Status: DISCONTINUED | OUTPATIENT
Start: 2022-06-01 | End: 2022-06-02 | Stop reason: HOSPADM

## 2022-06-01 RX ORDER — ACETAMINOPHEN 325 MG/1
975 TABLET ORAL EVERY 8 HOURS
Status: DISCONTINUED | OUTPATIENT
Start: 2022-06-01 | End: 2022-06-02 | Stop reason: HOSPADM

## 2022-06-01 RX ORDER — OXYCODONE HCL 5 MG/5 ML
10 SOLUTION, ORAL ORAL EVERY 4 HOURS PRN
Status: DISCONTINUED | OUTPATIENT
Start: 2022-06-01 | End: 2022-06-02 | Stop reason: HOSPADM

## 2022-06-01 RX ORDER — FENTANYL CITRATE 50 UG/ML
INJECTION, SOLUTION INTRAMUSCULAR; INTRAVENOUS AS NEEDED
Status: DISCONTINUED | OUTPATIENT
Start: 2022-06-01 | End: 2022-06-01

## 2022-06-01 RX ADMIN — DEXAMETHASONE SODIUM PHOSPHATE 10 MG: 10 INJECTION INTRAMUSCULAR; INTRAVENOUS at 16:30

## 2022-06-01 RX ADMIN — SODIUM CHLORIDE, SODIUM LACTATE, POTASSIUM CHLORIDE, AND CALCIUM CHLORIDE 100 ML/HR: .6; .31; .03; .02 INJECTION, SOLUTION INTRAVENOUS at 19:36

## 2022-06-01 RX ADMIN — MIDAZOLAM 2 MG: 1 INJECTION INTRAMUSCULAR; INTRAVENOUS at 16:00

## 2022-06-01 RX ADMIN — PROMETHAZINE HYDROCHLORIDE 12.5 MG: 25 INJECTION INTRAMUSCULAR; INTRAVENOUS at 19:32

## 2022-06-01 RX ADMIN — VECURONIUM BROMIDE 2 MG: 1 INJECTION, POWDER, LYOPHILIZED, FOR SOLUTION INTRAVENOUS at 17:18

## 2022-06-01 RX ADMIN — HYDROMORPHONE HYDROCHLORIDE 0.5 MG: 1 INJECTION, SOLUTION INTRAMUSCULAR; INTRAVENOUS; SUBCUTANEOUS at 18:52

## 2022-06-01 RX ADMIN — SODIUM CHLORIDE: 0.9 INJECTION, SOLUTION INTRAVENOUS at 15:48

## 2022-06-01 RX ADMIN — OXYCODONE HYDROCHLORIDE 5 MG: 5 SOLUTION ORAL at 23:08

## 2022-06-01 RX ADMIN — ACETAMINOPHEN 975 MG: 325 SUSPENSION ORAL at 20:46

## 2022-06-01 RX ADMIN — HEPARIN SODIUM 5000 UNITS: 5000 INJECTION INTRAVENOUS; SUBCUTANEOUS at 23:08

## 2022-06-01 RX ADMIN — VECURONIUM BROMIDE 1 MG: 1 INJECTION, POWDER, LYOPHILIZED, FOR SOLUTION INTRAVENOUS at 18:07

## 2022-06-01 RX ADMIN — FENTANYL CITRATE 100 MCG: 50 INJECTION INTRAMUSCULAR; INTRAVENOUS at 16:36

## 2022-06-01 RX ADMIN — METRONIDAZOLE 500 MG: 500 INJECTION, SOLUTION INTRAVENOUS at 23:08

## 2022-06-01 RX ADMIN — PROPOFOL 200 MG: 10 INJECTION, EMULSION INTRAVENOUS at 16:11

## 2022-06-01 RX ADMIN — ACETAMINOPHEN 975 MG: 325 TABLET, FILM COATED ORAL at 14:58

## 2022-06-01 RX ADMIN — SCOPALAMINE 1 PATCH: 1 PATCH, EXTENDED RELEASE TRANSDERMAL at 15:00

## 2022-06-01 RX ADMIN — EPHEDRINE SULFATE 10 MG: 50 INJECTION, SOLUTION INTRAVENOUS at 18:22

## 2022-06-01 RX ADMIN — EPHEDRINE SULFATE 10 MG: 50 INJECTION, SOLUTION INTRAVENOUS at 18:15

## 2022-06-01 RX ADMIN — LABETALOL HYDROCHLORIDE 300 MG: 100 TABLET, FILM COATED ORAL at 20:47

## 2022-06-01 RX ADMIN — HEPARIN SODIUM 5000 UNITS: 5000 INJECTION INTRAVENOUS; SUBCUTANEOUS at 15:15

## 2022-06-01 RX ADMIN — HYDROMORPHONE HYDROCHLORIDE 0.5 MG: 1 INJECTION, SOLUTION INTRAMUSCULAR; INTRAVENOUS; SUBCUTANEOUS at 18:53

## 2022-06-01 RX ADMIN — VECURONIUM BROMIDE 7 MG: 1 INJECTION, POWDER, LYOPHILIZED, FOR SOLUTION INTRAVENOUS at 16:12

## 2022-06-01 RX ADMIN — CEFAZOLIN SODIUM 2000 MG: 2 SOLUTION INTRAVENOUS at 23:43

## 2022-06-01 RX ADMIN — METRONIDAZOLE: 500 INJECTION, SOLUTION INTRAVENOUS at 16:25

## 2022-06-01 RX ADMIN — FENTANYL CITRATE 100 MCG: 50 INJECTION INTRAMUSCULAR; INTRAVENOUS at 16:12

## 2022-06-01 RX ADMIN — ONDANSETRON 4 MG: 2 INJECTION INTRAMUSCULAR; INTRAVENOUS at 16:30

## 2022-06-01 RX ADMIN — GLYCOPYRROLATE 0.8 MG: 0.2 INJECTION, SOLUTION INTRAMUSCULAR; INTRAVENOUS at 18:48

## 2022-06-01 RX ADMIN — NEOSTIGMINE METHYLSULFATE 5 MG: 1 INJECTION INTRAVENOUS at 18:48

## 2022-06-01 RX ADMIN — GABAPENTIN 600 MG: 300 CAPSULE ORAL at 14:58

## 2022-06-01 RX ADMIN — CEFAZOLIN SODIUM 2000 MG: 2 SOLUTION INTRAVENOUS at 16:00

## 2022-06-01 RX ADMIN — INDOCYANINE GREEN AND WATER 7.5 MG: KIT at 18:25

## 2022-06-01 RX ADMIN — ONDANSETRON 4 MG: 2 INJECTION INTRAMUSCULAR; INTRAVENOUS at 19:22

## 2022-06-01 RX ADMIN — LIDOCAINE HYDROCHLORIDE 100 MG: 20 INJECTION, SOLUTION EPIDURAL; INFILTRATION; INTRACAUDAL; PERINEURAL at 16:11

## 2022-06-01 RX ADMIN — FAMOTIDINE 20 MG: 20 TABLET, FILM COATED ORAL at 20:47

## 2022-06-01 RX ADMIN — CELECOXIB 200 MG: 200 CAPSULE ORAL at 14:59

## 2022-06-01 NOTE — ANESTHESIA PREPROCEDURE EVALUATION
Procedure:  BYPASS GASTRIC ONDINA-EN-Y LAPAROSCOPIC W ROBOTICS AND INTRAOPERATIVE EGD (N/A Abdomen)    Relevant Problems   CARDIO   (+) Congestive heart failure (HCC)   (+) Hypertension   (+) SOB (shortness of breath) on exertion      /RENAL   (+) Nephrolithiasis      MUSCULOSKELETAL   (+) Primary osteoarthritis of left knee      NEURO/PSYCH   (+) Chronic headaches   (+) Generalized anxiety disorder      PULMONARY   (+) WADE (obstructive sleep apnea)   (+) SOB (shortness of breath) on exertion

## 2022-06-01 NOTE — INTERVAL H&P NOTE
H&P reviewed  After examining the patient I find no changes in the patients condition since the H&P had been written      Vitals:    06/01/22 1448   BP: 150/81   Pulse:    Resp:    Temp:    SpO2:

## 2022-06-01 NOTE — ANESTHESIA PREPROCEDURE EVALUATION
Procedure:  BYPASS GASTRIC ONDINA-EN-Y LAPAROSCOPIC W ROBOTICS AND INTRAOPERATIVE EGD (N/A Abdomen)    Relevant Problems   CARDIO   (+) Congestive heart failure (HCC)   (+) Hypertension   (+) SOB (shortness of breath) on exertion      /RENAL   (+) Nephrolithiasis      MUSCULOSKELETAL   (+) Primary osteoarthritis of left knee      NEURO/PSYCH   (+) Chronic headaches   (+) Generalized anxiety disorder      PULMONARY   (+) WADE (obstructive sleep apnea)   (+) SOB (shortness of breath) on exertion      Other   (+) Obesity, Class III, BMI 40-49 9 (morbid obesity) (HCC)        Physical Exam    Airway    Mallampati score: II  TM Distance: >3 FB  Neck ROM: full     Dental       Cardiovascular  Rhythm: regular, Rate: normal, Cardiovascular exam normal    Pulmonary  Pulmonary exam normal     Other Findings        Anesthesia Plan  ASA Score- 3     Anesthesia Type- general with ASA Monitors  Additional Monitors:   Airway Plan: ETT  Plan Factors-Exercise tolerance (METS): >4 METS  Chart reviewed  Existing labs reviewed  Patient summary reviewed  Patient is not a current smoker  Patient did not smoke on day of surgery  Obstructive sleep apnea risk education given perioperatively  Induction- intravenous  Postoperative Plan-     Informed Consent- Anesthetic plan and risks discussed with patient

## 2022-06-01 NOTE — PROGRESS NOTES
Spoke with DO Goode about pt's BP  DO stated to watch BP and if needed she would prefer PO medication, but pre op BP was high so she is not that worried at this time

## 2022-06-01 NOTE — OP NOTE
OPERATIVE REPORT  PATIENT NAME: Indira Fu    :  1985  MRN: [id number]  Pt Location: [facility] 08    SURGERY DATE: 2022    Surgeon(s) and Role:     * Tina Alva MD - Primary     * Gabriela Bazzi PA-C - Assisting     * Gladys Tabares DO - Fellow    Preop Diagnosis:  Morbid obesity ([facility] ) [E66 01]  Hypertension [I10]    Post-Op Diagnosis Codes:     * Morbid obesity ([facility] ) [E66 01]     * Hypertension [I10]    Procedure(s) (LRB):  BYPASS GASTRIC MICHELLE-EN-Y LAPAROSCOPIC W ROBOTICS AND INTRAOPERATIVE EGD (N/A)    Specimen(s):  * No specimens in log *    Estimated Blood Loss:   20 ml    Drains:  * No LDAs found *    Anesthesia Type:   General    Operative Indications: Morbid obesity ([facility] ) [E66 01]  Hypertension [I10]      Operative Findings:  Normal Findings    Complications:   None    Procedure and Technique:  Robotic Michelle-en-Y gastric bypass with intraop endoscopy   I was present for the entire procedure    Patient Disposition:  hemodynamically stable      No qualified resident was available  Assistant was necessary for instrument exchange and counter traction and assistance with stapling and intraop endoscopy    Indication:   Patient suffers from morbid obesity and associated co-morbidities  and failed to achieve any meaningful or sustainable weight loss and was therefore consented to undergo a laparoscopic Michelle en Y Gastric Bypass  Risks and benefits were explained to the patient, patient consented for the procedure  The patient was brought to the operating room and placed in a supine position  The patient received a dose of IV antibiotics and a dose of subcutaneous Heparin prior to the procedure  The patient was induced under general endotracheal anesthesia  The abdominal wall was prepped and draped under sterile conditions in the usual fashion   The procedure was started by obtaining access to the abdominal cavity using a Veress needle to the left side of the midline around 6 inches from the xiphoid the abdominal cavity was insufflated with CO2 to a pressure of 15 mmHg  After that, the abdomen was entered with an 8 mm trocar using an Optiview trocar under direct visualization  At that point, an 8 and 12 mm robotic trocars were placed on the right side of the abdominal wall, under direct visualization, and another 8 mm robotic trocar and 12 mm assistant trocar were placed on the left side of the abdominal wall, also under direct visualization  A TAP block was performed using 20 ml of Exparel mixed with saline and 0 5 % Marcaine for a total of 100 ml  The patient was then placed in a reverse Trendelenburg position  A Raman retractor was placed through a small stab incision below the xiphoid and was used to retract the left lobe of the liver in a medial fashion, the robot was then docked and locked in place  An OG tube was placed to decompress the stomach and then removed immediately  The procedure was started by lifting the omentum in a cephalad fashion  The omentum was then split in half using the vessel sealer  The ligament of Treitz was identified and then the small bowel was transected with a 60 mm stapler using a white load  The mesentery of the small bowel was then transected using the vessel sealer,   After that, the distal small bowel was run for 150 cm in a way to obtain a 150 cm long Michelle limb  At that point, two enterotomies were made in the BP limb and the Michelle limb with hot scissors l, and the jejunojejunostomy was fashioned using a 60 mm stapler with a white load  After firing the stapler, the staple line was inspected and there was no evidence of bleeding and the staple line was well formed  The common enterotomy of the jejunojejunostomy was closed in two layers using 2-0 Vicryl running suture for the first layer and  2-0 V LOC in a running fashion for the second layer  The mesenteric defect of the small bowel was approximated using nonabsorbable suture in a running fashion    At that point, we turned our attention to the upper portion of the abdomen  The pars flaccida was opened and a gastric pouch was created with serial firings of the Sureform 60 mm intuitive  stapler with a green cartridge reinforced with Seamguard  After the formation of the gastric pouch, the staple lines of the pouch and the gastric remnant were inspected and appeared to be well formed and also appeared to be hemostatic  A new gastrojejunostomy anastomosis was then fashioned in an antecolic antegastric fashion in 2 layers  Outer layer was a running layer of 2-0 V lock suture and the inner  layer was a running 2-0 Vicryl suture  The Josephine Cordia defect was closed with a simple nonabsorbable 2 0 Ethibond suture in a figure-of-eight  Fashion  Upper endoscopy was then performed and showed no evidence of bubbles or bleeding at the suture line of the anastomosis or the staple line of the gastric pouch  The gastrojejunostomy was covered with an omental flap that was secured in place with an absorbable suture in a simple fashion  The [facility] liver retractor was removed  The 12 mm port was closed  with 1-0 Vicryl in a simple fashion  All the skin edges of the trocar sites were approximated with 4-0 Monocryl in a subcuticular inverted fashion  The patient was extubated and transferred to the PACU in stable condition      SIGNATURE: Arlin Webber MD  DATE: June 1, 2022  TIME: 6:51 PM

## 2022-06-02 VITALS
BODY MASS INDEX: 38.09 KG/M2 | WEIGHT: 228.62 LBS | OXYGEN SATURATION: 96 % | HEART RATE: 68 BPM | SYSTOLIC BLOOD PRESSURE: 135 MMHG | RESPIRATION RATE: 18 BRPM | DIASTOLIC BLOOD PRESSURE: 77 MMHG | HEIGHT: 65 IN | TEMPERATURE: 98.6 F

## 2022-06-02 PROBLEM — N17.9 AKI (ACUTE KIDNEY INJURY) (HCC): Status: ACTIVE | Noted: 2022-06-02

## 2022-06-02 LAB
ANION GAP SERPL CALCULATED.3IONS-SCNC: 12 MMOL/L (ref 4–13)
BUN SERPL-MCNC: 32 MG/DL (ref 5–25)
CALCIUM SERPL-MCNC: 8.2 MG/DL (ref 8.3–10.1)
CHLORIDE SERPL-SCNC: 108 MMOL/L (ref 100–108)
CO2 SERPL-SCNC: 21 MMOL/L (ref 21–32)
CREAT SERPL-MCNC: 1.32 MG/DL (ref 0.6–1.3)
ERYTHROCYTE [DISTWIDTH] IN BLOOD BY AUTOMATED COUNT: 12 % (ref 11.6–15.1)
ERYTHROCYTE [DISTWIDTH] IN BLOOD BY AUTOMATED COUNT: 12 % (ref 11.6–15.1)
GFR SERPL CREATININE-BSD FRML MDRD: 51 ML/MIN/1.73SQ M
GLUCOSE SERPL-MCNC: 131 MG/DL (ref 65–140)
HCT VFR BLD AUTO: 37.3 % (ref 34.8–46.1)
HCT VFR BLD AUTO: 39.6 % (ref 34.8–46.1)
HGB BLD-MCNC: 12.1 G/DL (ref 11.5–15.4)
HGB BLD-MCNC: 13 G/DL (ref 11.5–15.4)
MCH RBC QN AUTO: 29.7 PG (ref 26.8–34.3)
MCH RBC QN AUTO: 30.1 PG (ref 26.8–34.3)
MCHC RBC AUTO-ENTMCNC: 32.4 G/DL (ref 31.4–37.4)
MCHC RBC AUTO-ENTMCNC: 32.8 G/DL (ref 31.4–37.4)
MCV RBC AUTO: 91 FL (ref 82–98)
MCV RBC AUTO: 92 FL (ref 82–98)
PLATELET # BLD AUTO: 190 THOUSANDS/UL (ref 149–390)
PLATELET # BLD AUTO: 222 THOUSANDS/UL (ref 149–390)
PMV BLD AUTO: 11.3 FL (ref 8.9–12.7)
PMV BLD AUTO: 11.5 FL (ref 8.9–12.7)
POTASSIUM SERPL-SCNC: 4 MMOL/L (ref 3.5–5.3)
RBC # BLD AUTO: 4.08 MILLION/UL (ref 3.81–5.12)
RBC # BLD AUTO: 4.32 MILLION/UL (ref 3.81–5.12)
SODIUM SERPL-SCNC: 141 MMOL/L (ref 136–145)
WBC # BLD AUTO: 11.49 THOUSAND/UL (ref 4.31–10.16)
WBC # BLD AUTO: 6.49 THOUSAND/UL (ref 4.31–10.16)

## 2022-06-02 PROCEDURE — 99253 IP/OBS CNSLTJ NEW/EST LOW 45: CPT | Performed by: INTERNAL MEDICINE

## 2022-06-02 PROCEDURE — 85027 COMPLETE CBC AUTOMATED: CPT | Performed by: SURGERY

## 2022-06-02 PROCEDURE — NC001 PR NO CHARGE: Performed by: SURGERY

## 2022-06-02 PROCEDURE — 99024 POSTOP FOLLOW-UP VISIT: CPT | Performed by: SURGERY

## 2022-06-02 PROCEDURE — 80048 BASIC METABOLIC PNL TOTAL CA: CPT | Performed by: SURGERY

## 2022-06-02 RX ORDER — ACETAMINOPHEN 160 MG/5ML
975 SUSPENSION, ORAL (FINAL DOSE FORM) ORAL EVERY 8 HOURS
Qty: 638.4 ML | Refills: 0 | Status: SHIPPED | OUTPATIENT
Start: 2022-06-02 | End: 2022-06-09

## 2022-06-02 RX ADMIN — ACETAMINOPHEN 975 MG: 325 TABLET, FILM COATED ORAL at 05:52

## 2022-06-02 RX ADMIN — AMLODIPINE BESYLATE 10 MG: 10 TABLET ORAL at 08:11

## 2022-06-02 RX ADMIN — LABETALOL HYDROCHLORIDE 300 MG: 100 TABLET, FILM COATED ORAL at 08:11

## 2022-06-02 RX ADMIN — FAMOTIDINE 20 MG: 20 TABLET, FILM COATED ORAL at 08:11

## 2022-06-02 RX ADMIN — OXYCODONE HYDROCHLORIDE 5 MG: 5 SOLUTION ORAL at 07:14

## 2022-06-02 RX ADMIN — FAMOTIDINE 20 MG: 20 TABLET, FILM COATED ORAL at 17:00

## 2022-06-02 RX ADMIN — LABETALOL HYDROCHLORIDE 300 MG: 100 TABLET, FILM COATED ORAL at 15:21

## 2022-06-02 RX ADMIN — OXYCODONE HYDROCHLORIDE 10 MG: 5 SOLUTION ORAL at 17:03

## 2022-06-02 RX ADMIN — OXYCODONE HYDROCHLORIDE 10 MG: 5 SOLUTION ORAL at 13:14

## 2022-06-02 RX ADMIN — HEPARIN SODIUM 5000 UNITS: 5000 INJECTION INTRAVENOUS; SUBCUTANEOUS at 05:52

## 2022-06-02 RX ADMIN — ACETAMINOPHEN 975 MG: 325 TABLET, FILM COATED ORAL at 13:14

## 2022-06-02 RX ADMIN — METOCLOPRAMIDE 10 MG: 5 INJECTION, SOLUTION INTRAMUSCULAR; INTRAVENOUS at 02:05

## 2022-06-02 RX ADMIN — SODIUM CHLORIDE, SODIUM LACTATE, POTASSIUM CHLORIDE, AND CALCIUM CHLORIDE 100 ML/HR: .6; .31; .03; .02 INJECTION, SOLUTION INTRAVENOUS at 07:15

## 2022-06-02 RX ADMIN — CEFAZOLIN SODIUM 2000 MG: 2 SOLUTION INTRAVENOUS at 08:11

## 2022-06-02 NOTE — PROGRESS NOTES
Progress Note - Bariatric Surgery   Dwight Columbus 40 y o  female MRN: [id number]  Unit/Bed#: E5 -01 Encounter: [id number]      Subjective/Objective     Subjective:  Patient with morbid obesity, s/p Robot Assisted Gastric Bypass, POD1    Patient has been tolerating a liquid diet without nausea or vomiting today  Ambulating without assistance, voiding well, and using incentive spirometer  Pain is adequately controlled on oral pain medication  Patient denies fevers, chills, sweats, SOB, CP, calf pain  Objective:    /79   Pulse 76   Temp 97 9 °F (36 6 °C)   Resp 18   Ht 5' 5" (1 651 m)   Wt 104 kg (228 lb 9 9 oz)   LMP 05/27/2022 (Approximate)   SpO2 97%   BMI 38 04 kg/m²       Intake/Output Summary (Last 24 hours) at 6/2/2022 0948  Last data filed at 6/2/2022 0227  Gross per 24 hour   Intake 3800 ml   Output 1415 ml   Net 2385 ml       Invasive Devices  Report    Peripheral Intravenous Line  Duration           Peripheral IV 06/01/22 Right Hand <1 day                ROS: 10-point system completed  All negative except see HPI  Physical Exam    General Appearance:    Alert, cooperative, no distress, appears stated age   Head:    Normocephalic, without obvious abnormality, atraumatic   Lungs:     Respirations unlabored   Heart:    Regular rate and rhythm   Abdomen:     Soft, appropriate incisional tenderness, no masses, no organomegaly, non-distended   Extremities:   Extremities normal, atraumatic, no cyanosis or edema   Neurologic:  Incision:      Psych:   Normal strength and sensation    Abdominal incisions are clean, dry, and intact, without   redness, bleeding, or drainage  There is stable echymosis of R lateral incision     Normal mood and affect       Lab, Imaging and other studies:  I have personally reviewed pertinent lab results    , CBC:   Lab Results   Component Value Date    WBC 6 49 06/02/2022    HGB 13 0 06/02/2022    HCT 39 6 06/02/2022    MCV 92 06/02/2022     06/02/2022    MCH 30 1 06/02/2022    MCHC 32 8 06/02/2022    RDW 12 0 06/02/2022    MPV 11 3 06/02/2022   , CMP:   Lab Results   Component Value Date    SODIUM 141 06/02/2022    K 4 0 06/02/2022     06/02/2022    CO2 21 06/02/2022    BUN 32 (H) 06/02/2022    CREATININE 1 32 (H) 06/02/2022    CALCIUM 8 2 (L) 06/02/2022    EGFR 51 06/02/2022        VTE Mechanical Prophylaxis: sequential compression device  VTE Chemical Prophylaxis: PreOp heparin given    Assessment/Plan  1)  Patient with morbid obesity s/p Robot Assisted Gastric Bypass with stable post op course  Patient afebrile and hemodynamically stable  - Encourage PO fluids  - Encourage ambulation and use of SCDs when not ambulating  - Incentive spirometry encouraged  - Repeat labs this AM  - SLIM consult Appreciated  - Apply ice packs to ecchymosis site  - Patient to remain on PPI upon discharge  - Plan to D/C patient home today pending anticipated progression    Patient seen and examined with Dr Celsa Roblero on rounds  Plan of care was discussed with patient

## 2022-06-02 NOTE — DISCHARGE INSTRUCTIONS
Bariatric/Weight Loss Surgery  Hospital Discharge Instructions  ACTIVITY:  Progress as feels comfortable - a good rule is:  if you are doing something and it begins to hurt, stop doing the activity  Walk every hour while at home  You may walk stairs if you do so slowly  You may shower 48 hours after surgery  Do not scrub incision sites  Blot gently with clean towel to dry incisions  (see #4 below)   Use your incentive spirometer 10 times per hour while awake for 1 week after surgery  Do NOT drive for 48 hours after surgery  No driving 24 hours after taking certain prescription pain medications  Examples of such medication are Percocet, Darvocet, Oxycodone, Tylenol #3, and Tylenol with Codeine  DIET  Stay on a liquid diet for 7 days after your surgery date, sipping slowly  Refer to your manual for examples of choices  Remember to keep your liquids sugar free or low calorie  You may have protein drinks  Make sure to drink 48 to 64 ounces per day of fluids  You may advance to a pureed diet one week after surgery as instructed by your diet progression pamphlet  Once you get approval from your surgeon at your first post operative visit, you may advance to the soft diet and remain on soft diet for 8 weeks unless otherwise instructed  MEDICATIONS:  The abdominal nerve block will wear off during the first 1-2 days that you are home, and you may become sore (especially over incision site/sites where abdominal wall is sutured)  This may create a pulling sensation, especially while moving around, and will fade over time  Continue to take your Tylenol and your pain medication as instructed  Start vitamins and minerals one week after surgery or when you start stage 3/puree diet  Anti-acid Medication as per prescription  Other medications as indicated on the Physician Patient Discharge Instructions form given to you at the time of discharge    Make sure that you are splitting your pill or tablet medications in halves or fourths or even crushing them before you take them  Capsules should be opened and mixed with water or jello  You need to do this for at least 4 weeks after surgery  Eventually you will be able to take your medications the regular way as they were prescribed  You will need to consult with your Family Doctor in regards to all your prescribed medication, particularly those for blood pressure and diabetes  As you lose weight, medical conditions may change, requiring an alteration or elimination of the drug dose  Monitor blood pressure closely and call PCP with any concerns  Sleeve Gastrectomy patients ONLY:  Complete full course of lovenox injections! DO NOT TAKE BIRTH CONTROL(BC) MEDICATIONS, INSERT BC VAGINAL RINGS, OR PLACE IUD OR ANY OTHER BC METHODS UNTIL 31 DAYS FROM DAY OF DISCHARGE FROM HOSPITAL  THIS PLACES YOU AT HIGH RISK FOR A POTENTIALLY LIFE THREATENING BLOOD CLOT  Remember to always use barrier methods for birth control and speak to your GYN about using two forms of birth control to start 31 days after surgery  It is very important to avoid pregnancy until at least 18-24 months after surgery  INCISION CARE  You may shower and get incisions wet 2 days after surgery  No soaking tub baths or swimming for 30 days after surgery  Keep abdominal area and incisions clean  Use soap and water to create a good lather and rinse off  Do not scrub incisions  If you have a drain, empty the drain as the nurses instructed  FOLLOW-UP APPOINTMENT should be made for one week after discharge  Call surgeons office at [phone] to schedule an appointment      CALL YOUR DOCTOR FOR:  pain not controlled by pain medications, a temperature greater than 101 5° F, any increase or change in drainage or redness from any incision, any vomiting or inability to keep liquids down, shortness of breath, shoulder pain, or bleeding

## 2022-06-02 NOTE — CONSULTS
[address] [facility] 1985, 40 y o  female MRN: [id number]  Unit/Bed#: E5 -01 Encounter: [id number]  Primary Care Provider: Zaina Bishop DO   Date and time admitted to hospital: 6/1/2022  2:14 PM    Consults    * Obesity  Assessment & Plan  · Known history of obesity   · Postop day 1 status post Michelle-en-Y gastric bypass  · Management per primary service    Hypertension  Assessment & Plan  · History of hypertension with diastolic dysfunction and severe concentric LV hypertrophy on echo  · Continue labetalol 300 mg t i d  And amlodipine 10 mg daily with hold parameters  · Close follow-up with PCP after discharge for further management  · Hold losartan HCTZ due to PAIGE    PAIGE (acute kidney injury) ([facility] [address] )  Assessment & Plan  · Multifactorial, prerenal due to diminished intravascular volume depletion, recent Celebrex, previous use of losartan and HCTZ  · DC HCTZ due to gastric bypass status  · Hold losartan  · Continue IV fluid hydration  · Repeat BMP today or tomorrow  · Avoid hypotension        VTE Prophylaxis: Heparin  / sequential compression device     Counseling / Coordination of Care Time: 30 minutes  Greater than 50% of total time spent on patient counseling and coordination of care  Collaboration of Care: Were Recommendations Directly Discussed with Primary Treatment Team? - No     History of Present Illness:    Shaniqua Irving is a 40 y o  female who is originally admitted to the bariatric surgery service due to elective weight loss surgery  She has known history of obesity with hypertension  She recalled having a sleep study done and did not qualify for CPAP  We are consulted for hypertension after her surgery  She has known history of hypertension requiring for agents:  Amlodipine 10 mg daily, labetalol 300 mg t i d , losartan-HCTZ 50-12 5 mg b i d  Prior to admission    Per her last outpatient visit, her blood pressure was controlled on this regimen  Overnight her blood pressures were stable  In fact this morning, her blood pressure was 118/69  Her most recent blood pressure was 144/79  Her last labetalol dose was just before 9:00 p m  Last night  She did not take her losartan HCTZ yesterday  Today she feels well except for mild pain from the postoperative sites  She was able to tolerate her liquids  She has no shortness of breath, chest pain or wheezing  She is burping but has not passed gas or had a bowel movement yet  Review of Systems:    Review of Systems   Constitutional: Negative  HENT: Negative  Eyes: Negative  Respiratory: Negative  Cardiovascular: Negative  Gastrointestinal: Positive for abdominal pain  Genitourinary: Negative  Musculoskeletal: Negative  Neurological: Negative  Psychiatric/Behavioral: Negative  All other systems reviewed and are negative        Past Medical and Surgical History:     Past Medical History:   Diagnosis Date    Anxiety     Arthritis     [facility]est[facility])     Chronic back pain     Generalized anxiety disorder     Hiatal hernia     Hypertension     Kidney stone     Obesity     Primary osteoarthritis of left knee 2020    Transient ischemic attack     pt states not certain if actually had a TIA, states related to severe HTN episodes       Past Surgical History:   Procedure Laterality Date     SECTION      x 2    EGD      KNEE ARTHROSCOPY Left     acl/meniscus repair    IN CYSTO/URETERO W/LITHOTRIPSY &INDWELL STENT INSRT Right 2018    Procedure: CYSTOSCOPY URETEROSCOPY, RETROGRADE PYELOGRAM AND INSERTION STENT URETERAL, RIGHT STONE EXTRACTION;  Surgeon: Ashia Frey MD;  Location: [facility];  Service: Urology    IN CYSTOURETHROSCOPY,URETER CATHETER Right 1/15/2018    Procedure: CYSTOSCOPY RETROGRADE PYELOGRAM WITH INSERTION STENT URETERAL;  Surgeon: Moris Rodriguez MD;  Location: [facility];  Service: Urology    TUBAL LIGATION         Meds/Allergies:    all medications and allergies reviewed    Allergies: No Known Allergies    Social History:     Marital Status: /Civil Union    Substance Use History:   Social History     Substance and Sexual Activity   Alcohol Use Yes    Comment: Rare social occasion , a few times a year     Social History     Tobacco Use   Smoking Status Former Smoker    Packs/day: 0 20    Years: 5 00    Pack years: 1 00    Types: Cigarettes    Quit date: 2020    Years since quittin 3   Smokeless Tobacco Never Used     Social History     Substance and Sexual Activity   Drug Use No       Family History:    Family History   Problem Relation Age of Onset    Hypertension Mother     Cancer Mother         Thyroid    Hypertension Father        Physical Exam:     Vitals:   Blood Pressure: 144/79 (22 0756)  Pulse: 76 (22 0756)  Temperature: 97 9 °F (36 6 °C) (22 075)  Temp Source: Temporal (22)  Respirations: 18 (22 0326)  Height: 5' 5" (165 1 cm) (22 144)  Weight - Scale: 104 kg (228 lb 9 9 oz) (22 1441)  SpO2: 97 % (22 0756)    Physical Exam  Constitutional:       Appearance: She is obese  She is not ill-appearing or diaphoretic  HENT:      Head: Normocephalic  Nose: No rhinorrhea  Eyes:      General: No scleral icterus  Cardiovascular:      Rate and Rhythm: Regular rhythm  Heart sounds: No murmur heard  No gallop  Pulmonary:      Effort: Pulmonary effort is normal  No respiratory distress  Breath sounds: No wheezing or rales  Abdominal:      General: Abdomen is flat  Palpations: Abdomen is soft  Tenderness: There is abdominal tenderness  Musculoskeletal:      Cervical back: Neck supple  Skin:     Findings: Bruising present  Comments: Ecchymoses in lower quadrants near the trocar sites   Neurological:      Mental Status: She is alert and oriented to person, place, and time     Psychiatric: Mood and Affect: Mood normal          Behavior: Behavior normal        Additional Data:     Lab Results: I have personally reviewed pertinent reports  Results from last 7 days   Lab Units 06/02/22  0618   WBC Thousand/uL 6 49   HEMOGLOBIN g/dL 13 0   HEMATOCRIT % 39 6   PLATELETS Thousands/uL 190     Results from last 7 days   Lab Units 06/02/22  0618   SODIUM mmol/L 141   POTASSIUM mmol/L 4 0   CHLORIDE mmol/L 108   CO2 mmol/L 21   BUN mg/dL 32*   CREATININE mg/dL 1 32*   ANION GAP mmol/L 12   CALCIUM mg/dL 8 2*   GLUCOSE RANDOM mg/dL 131             Lab Results   Component Value Date/Time    HGBA1C 5 3 12/15/2021 11:05 AM               Imaging:     No orders to display       EKG, Pathology, and Other Studies Reviewed on Admission:   · Echo-with grade 2 diastolic dysfunction and severe LV concentric hypertrophy    ** Please Note: This note has been constructed using a voice recognition system   **

## 2022-06-02 NOTE — UTILIZATION REVIEW
Inpatient Admission Authorization Request   NOTIFICATION OF INPATIENT ADMISSION/INPATIENT AUTHORIZATION REQUEST   SERVICING FACILITY:   [address] [place] [address], [facility], [facility]  Tax ID: [id number]  NPI: [id number]  Place of Service: Inpatient [address] [facility]  [address]  Place of Service Code: 24     ATTENDING PROVIDER:  Attending Name and NPI#: PamelaLansing, [place] [[id number]]  Address: [address] [place] [address], [facility], [facility]  Phone: [phone]     UTILIZATION REVIEW CONTACT:  Alicia De La Garza, Utilization   Network Utilization Review Department  Phone: [phone]  Fax: [phone]  Email: [email or web address]     PHYSICIAN ADVISORY SERVICES:  FOR VQUC-TA-ZGBD REVIEW - MEDICAL NECESSITY DENIAL  Phone: [phone]  Fax: [phone]  Email: [email or web address]     TYPE OF REQUEST:  Inpatient Status     ADMISSION INFORMATION:  ADMISSION DATE/TIME: 6/1/22  8:14 PM  PATIENT DIAGNOSIS CODE/DESCRIPTION:  Morbid obesity ([facility] [address] ) [E66 01]  Hypertension [I10]  DISCHARGE DATE/TIME: No discharge date for patient encounter  IMPORTANT INFORMATION:  Please contact the Alicia De La Garza directly with any questions or concerns regarding this request  Department voicemails are confidential     Send requests for admission clinical reviews, concurrent reviews, approvals, and administrative denials due to lack of clinical to fax [phone]

## 2022-06-02 NOTE — DISCHARGE SUMMARY
Discharge Summary - Lucita Apley 40 y o  female MRN: [id number]    Unit/Bed#: E5 -01 Encounter: [id number]      Pre-Operative Diagnosis: Pre-Op Diagnosis Codes:     * Morbid obesity ([facility] [address] ) [E66 01]     * Hypertension [I10]    Post-Operative Diagnosis: Post-Op Diagnosis Codes:     * Morbid obesity ([facility] [address] ) [E66 01]     * Hypertension [I10]    Procedures Performed:  Procedure(s):  BYPASS GASTRIC ONDINA-EN-Y LAPAROSCOPIC W ROBOTICS AND INTRAOPERATIVE EGD    Surgeon: Margot Laureano MD    See H & P for full details of admission and Operative Note for full details of operations performed  Patient tolerated surgery well without complications  In the morning postoperative Day 1, the patient had mild nausea and abdominal pain  Tolerated a clear liquid diet without vomiting  Able to ambulate and voiding independently  Patient was deemed ready for discharge home  SLIM consulted for home medication management  Patient was seen and examined prior to discharge  Provisions for Follow-Up Care:  See After Visit Summary/Discharge Instructions for information related to follow-up care and home orders  Disposition: Home, in stable condition  Planned Readmission: No    Discharge Medications:  See After Visit Summary/Discharge Instructions for reconciled discharge medications provided to patient and family  Post Operative instructions: Reviewed with patient and/or family      Signature:   Kenya Winslow PA-C  Date: 6/2/2022 Time: 1:17 PM

## 2022-06-02 NOTE — DISCHARGE INSTR - AVS FIRST PAGE
your medications from [address] in [facility] [address] or cut your pills and open capsules, mix with liquid to drink  Take Tylenol every 8 hours around the clock, unless instructed otherwise  Take your omeprazole daily  It is important to stay hydrated and follow your discharge diet progression   Mild nausea is ok as long as you can drink fluids, sip very slowly and get up and walk during any periods of nausea  You may shower normally after 48 hours, but do not scrub incision sites, blot gently with clean towel to dry incisions  Take home medications as usual unless instructed otherwise while in hospital  Continue ice to site of redness and bruising  Follow up with Dr Love Tucker and your PCP within the next week  Sleeve gastrectomy patients ONLY: Complete full course of lovenox injections!

## 2022-06-02 NOTE — UTILIZATION REVIEW
Initial Clinical Review    Elective    IP     surgical procedure    Age/Sex: 40 y o  female     Surgery Date:    6/1/22    Procedure: BYPASS GASTRIC ONDINA-EN-Y LAPAROSCOPIC W ROBOTICS AND INTRAOPERATIVE EGD (N/A)      Anesthesia:    general    Operative Findings:    normal    POD#1 Progress Note:   6/2    Continue post op care  Continue pain control/antiemetics as needed  Encourage ambulation/incentive spirometry        Admission Orders: Date/Time/Statement:   Admission Orders (From admission, onward)     Ordered        06/01/22 2014  Inpatient Admission  Once                      Orders Placed This Encounter   Procedures    Inpatient Admission     Standing Status:   Standing     Number of Occurrences:   1     Order Specific Question:   Level of Care     Answer:   Med Surg [16]     Order Specific Question:   Bed Type     Answer:   Bariatric [1]     Order Specific Question:   Estimated length of stay     Answer:   Inpatient Only Surgery     Vital Signs: /79   Pulse 76   Temp 97 9 °F (36 6 °C)   Resp 18   Ht 5' 5" (1 651 m)   Wt 104 kg (228 lb 9 9 oz)   LMP 05/27/2022 (Approximate)   SpO2 97%   BMI 38 04 kg/m²     Pertinent Labs/Diagnostic Test Results:   Results from last 7 days   Lab Units 06/01/22  1439   SARS-COV-2  Negative     Results from last 7 days   Lab Units 06/02/22  0618   WBC Thousand/uL 6 49   HEMOGLOBIN g/dL 13 0   HEMATOCRIT % 39 6   PLATELETS Thousands/uL 190         Results from last 7 days   Lab Units 06/02/22  0618   SODIUM mmol/L 141   POTASSIUM mmol/L 4 0   CHLORIDE mmol/L 108   CO2 mmol/L 21   ANION GAP mmol/L 12   BUN mg/dL 32*   CREATININE mg/dL 1 32*   EGFR ml/min/1 73sq m 51   CALCIUM mg/dL 8 2*             Results from last 7 days   Lab Units 06/02/22  0618   GLUCOSE RANDOM mg/dL 131           Results from last 7 days   Lab Units 06/01/22  1439   INFLUENZA A PCR  Negative   INFLUENZA B PCR  Negative   RSV PCR  Negative         Diet:   Bariatric cl liq    Mobility:    OOB as tolerated    DVT Prophylaxis:    SCD'S    Medications/Pain Control:   Scheduled Medications:  acetaminophen, 975 mg, Oral, Q8H   Or  acetaminophen, 975 mg, Oral, Q8H  amLODIPine, 10 mg, Oral, Daily  budesonide-formoterol, 2 puff, Inhalation, BID  famotidine, 20 mg, Oral, BID  heparin (porcine), 5,000 Units, Subcutaneous, Q8H KANDI  labetalol, 300 mg, Oral, TID      Continuous IV Infusions:  lactated ringers, 125 mL/hr, Intravenous, Continuous      PRN Meds:  albuterol, 2 puff, Inhalation, Q6H PRN  diphenhydrAMINE, 25 mg, Oral, HS PRN  metoclopramide, 10 mg, Intravenous, Q6H PRN  ( x1  6/2 thus far)  morphine injection, 2 mg, Intravenous, Q4H PRN  ondansetron, 4 mg, Intravenous, Q6H PRN  oxyCODONE, 10 mg, Oral, Q4H PRN  oxyCODONE, 5 mg, Oral, Q4H PRN  phenol, 1 spray, Mouth/Throat, Q2H PRN  promethazine, 25 mg, Intramuscular, Q6H PRN        Network Utilization Review Department  ATTENTION: Please call with any questions or concerns to [phone] and carefully listen to the prompts so that you are directed to the right person  All voicemails are confidential   Vijay Bhatia all requests for admission clinical reviews, approved or denied determinations and any other requests to dedicated fax number below belonging to the campus where the patient is receiving treatment   List of dedicated fax numbers for the Facilities:  [address] DENIALS (Administrative/Medical Necessity) [phone]   [address] (Maternity/NICU/Pediatrics) [phone]   [address] [place] [address] [address] [address] [facility] [phone]   Tayo Maciel [address] [place] [address] [phone]

## 2022-06-02 NOTE — PLAN OF CARE
Problem: PAIN - ADULT  Goal: Verbalizes/displays adequate comfort level or baseline comfort level  Description: Interventions:  - Encourage patient to monitor pain and request assistance  - Assess pain using appropriate pain scale  - Administer analgesics based on type and severity of pain and evaluate response  - Implement non-pharmacological measures as appropriate and evaluate response  - Consider cultural and social influences on pain and pain management  - Notify physician/advanced practitioner if interventions unsuccessful or patient reports new pain  Outcome: Progressing     Problem: INFECTION - ADULT  Goal: Absence or prevention of progression during hospitalization  Description: INTERVENTIONS:  - Assess and monitor for signs and symptoms of infection  - Monitor lab/diagnostic results  - Monitor all insertion sites, i e  indwelling lines, tubes, and drains  - Monitor endotracheal if appropriate and nasal secretions for changes in amount and color  - [place] appropriate cooling/warming therapies per order  - Administer medications as ordered  - Instruct and encourage patient and family to use good hand hygiene technique  - Identify and instruct in appropriate isolation precautions for identified infection/condition  Outcome: Progressing  Goal: Absence of fever/infection during neutropenic period  Description: INTERVENTIONS:  - Monitor WBC    Outcome: Progressing     Problem: SAFETY ADULT  Goal: Patient will remain free of falls  Description: INTERVENTIONS:  - Educate patient/family on patient safety including physical limitations  - Instruct patient to call for assistance with activity   - Consult OT/PT to assist with strengthening/mobility   - Keep Call bell within reach  - Keep bed low and locked with side rails adjusted as appropriate  - Keep care items and personal belongings within reach  - Initiate and maintain comfort rounds  - Make Fall Risk Sign visible to staff  - Offer Toileting every  Hours, in advance of need  - Initiate/Maintain alarm  - Obtain necessary fall risk management equipment:   - Apply yellow socks and bracelet for high fall risk patients  - Consider moving patient to room near nurses station  Outcome: Progressing  Goal: Maintain or return to baseline ADL function  Description: INTERVENTIONS:  -  Assess patient's ability to carry out ADLs; assess patient's baseline for ADL function and identify physical deficits which impact ability to perform ADLs (bathing, care of mouth/teeth, toileting, grooming, dressing, etc )  - Assess/evaluate cause of self-care deficits   - Assess range of motion  - Assess patient's mobility; develop plan if impaired  - Assess patient's need for assistive devices and provide as appropriate  - Encourage maximum independence but intervene and supervise when necessary  - Involve family in performance of ADLs  - Assess for home care needs following discharge   - Consider OT consult to assist with ADL evaluation and planning for discharge  - Provide patient education as appropriate  Outcome: Progressing  Goal: Maintains/Returns to pre admission functional level  Description: INTERVENTIONS:  - Perform BMAT or MOVE assessment daily    - Set and communicate daily mobility goal to care team and patient/family/caregiver  - Collaborate with rehabilitation services on mobility goals if consulted  - Perform Range of Motion  times a day  - Reposition patient every  hours    - Dangle patient  times a day  - Stand patient  times a day  - Ambulate patient  times a day  - Out of bed to chair  times a day   - Out of bed for mendoza times a day  - Out of bed for toileting  - Record patient progress and toleration of activity level   Outcome: Progressing     Problem: DISCHARGE PLANNING  Goal: Discharge to home or other facility with appropriate resources  Description: INTERVENTIONS:  - Identify barriers to discharge w/patient and caregiver  - Arrange for needed discharge resources and transportation as appropriate  - Identify discharge learning needs (meds, wound care, etc )  - Arrange for interpretive services to assist at discharge as needed  - Refer to Case Management Department for coordinating discharge planning if the patient needs post-hospital services based on physician/advanced practitioner order or complex needs related to functional status, cognitive ability, or social support system  Outcome: Progressing     Problem: Knowledge Deficit  Goal: Patient/family/caregiver demonstrates understanding of disease process, treatment plan, medications, and discharge instructions  Description: Complete learning assessment and assess knowledge base    Interventions:  - Provide teaching at level of understanding  - Provide teaching via preferred learning methods  Outcome: Progressing

## 2022-06-02 NOTE — ASSESSMENT & PLAN NOTE
· History of hypertension with diastolic dysfunction and severe concentric LV hypertrophy on echo  · Continue labetalol 300 mg t i d   And amlodipine 10 mg daily with hold parameters  · Close follow-up with PCP after discharge for further management  · Hold losartan HCTZ due to PAIGE

## 2022-06-02 NOTE — PLAN OF CARE
Problem: PAIN - ADULT  Goal: Verbalizes/displays adequate comfort level or baseline comfort level  Description: Interventions:  - Encourage patient to monitor pain and request assistance  - Assess pain using appropriate pain scale  - Administer analgesics based on type and severity of pain and evaluate response  - Implement non-pharmacological measures as appropriate and evaluate response  - Consider cultural and social influences on pain and pain management  - Notify physician/advanced practitioner if interventions unsuccessful or patient reports new pain  Outcome: Progressing     Problem: INFECTION - ADULT  Goal: Absence or prevention of progression during hospitalization  Description: INTERVENTIONS:  - Assess and monitor for signs and symptoms of infection  - Monitor lab/diagnostic results  - Monitor all insertion sites, i e  indwelling lines, tubes, and drains  - Monitor endotracheal if appropriate and nasal secretions for changes in amount and color  - [place] appropriate cooling/warming therapies per order  - Administer medications as ordered  - Instruct and encourage patient and family to use good hand hygiene technique  - Identify and instruct in appropriate isolation precautions for identified infection/condition  Outcome: Progressing  Goal: Absence of fever/infection during neutropenic period  Description: INTERVENTIONS:  - Monitor WBC    Outcome: Progressing     Problem: DISCHARGE PLANNING  Goal: Discharge to home or other facility with appropriate resources  Description: INTERVENTIONS:  - Identify barriers to discharge w/patient and caregiver  - Arrange for needed discharge resources and transportation as appropriate  - Identify discharge learning needs (meds, wound care, etc )  - Arrange for interpretive services to assist at discharge as needed  - Refer to Case Management Department for coordinating discharge planning if the patient needs post-hospital services based on physician/advanced practitioner order or complex needs related to functional status, cognitive ability, or social support system  Outcome: Progressing     Problem: Knowledge Deficit  Goal: Patient/family/caregiver demonstrates understanding of disease process, treatment plan, medications, and discharge instructions  Description: Complete learning assessment and assess knowledge base    Interventions:  - Provide teaching at level of understanding  - Provide teaching via preferred learning methods  Outcome: Progressing     Problem: SKIN/TISSUE INTEGRITY - ADULT  Goal: Incision(s), wounds(s) or drain site(s) healing without S/S of infection  Description: INTERVENTIONS  - Assess and document dressing, incision, wound bed, drain sites and surrounding tissue  - Provide patient and family education  - Perform skin care/dressing changes every shift  Outcome: Progressing     Problem: HEMATOLOGIC - ADULT  Goal: Maintains hematologic stability  Description: INTERVENTIONS  - Assess for signs and symptoms of bleeding or hemorrhage  - Monitor labs  - Administer supportive blood products/factors as ordered and appropriate  Outcome: Progressing

## 2022-06-02 NOTE — ASSESSMENT & PLAN NOTE
· Multifactorial, prerenal due to diminished intravascular volume depletion, recent Celebrex, previous use of losartan and HCTZ  · DC HCTZ due to gastric bypass status  · Hold losartan  · Continue IV fluid hydration  · Repeat BMP today or tomorrow  · Avoid hypotension

## 2022-06-02 NOTE — ANESTHESIA POSTPROCEDURE EVALUATION
Post-Op Assessment Note    CV Status:  Stable    Pain management: adequate     Mental Status:  Alert and awake   Hydration Status:  Euvolemic   PONV Controlled:  Controlled   Airway Patency:  Patent      Post Op Vitals Reviewed: Yes      Staff: Anesthesiologist, CRNA         No complications documented      /82 (06/01/22 2314)    Temp (!) 97 4 °F (36 3 °C) (06/01/22 2314)    Pulse 68 (06/01/22 2314)   Resp      SpO2 96 % (06/01/22 2314)

## 2022-06-03 ENCOUNTER — TELEPHONE (OUTPATIENT)
Dept: MEDSURG UNIT | Facility: HOSPITAL | Age: 37
End: 2022-06-03

## 2022-06-06 ENCOUNTER — HOSPITAL ENCOUNTER (EMERGENCY)
Facility: HOSPITAL | Age: 37
Discharge: HOME | End: 2022-06-06
Payer: COMMERCIAL

## 2022-06-06 ENCOUNTER — TRANSITIONAL CARE MANAGEMENT (OUTPATIENT)
Dept: INTERNAL MEDICINE CLINIC | Facility: CLINIC | Age: 37
End: 2022-06-06

## 2022-06-06 ENCOUNTER — APPOINTMENT (EMERGENCY)
Dept: CT IMAGING | Facility: HOSPITAL | Age: 37
End: 2022-06-06
Payer: COMMERCIAL

## 2022-06-06 ENCOUNTER — TELEPHONE (OUTPATIENT)
Dept: INTERNAL MEDICINE CLINIC | Facility: CLINIC | Age: 37
End: 2022-06-06

## 2022-06-06 ENCOUNTER — APPOINTMENT (EMERGENCY)
Dept: RADIOLOGY | Facility: HOSPITAL | Age: 37
End: 2022-06-06
Payer: COMMERCIAL

## 2022-06-06 VITALS
DIASTOLIC BLOOD PRESSURE: 91 MMHG | WEIGHT: 233.69 LBS | RESPIRATION RATE: 18 BRPM | BODY MASS INDEX: 38.93 KG/M2 | HEART RATE: 83 BPM | OXYGEN SATURATION: 100 % | TEMPERATURE: 97.9 F | HEIGHT: 65 IN | SYSTOLIC BLOOD PRESSURE: 171 MMHG

## 2022-06-06 DIAGNOSIS — K57.90 DIVERTICULOSIS: ICD-10-CM

## 2022-06-06 DIAGNOSIS — G89.18 POST-OPERATIVE PAIN: ICD-10-CM

## 2022-06-06 DIAGNOSIS — S30.1XXA ABDOMINAL WALL HEMATOMA, INITIAL ENCOUNTER: ICD-10-CM

## 2022-06-06 DIAGNOSIS — N39.0 URINARY TRACT INFECTION: ICD-10-CM

## 2022-06-06 DIAGNOSIS — R16.1 SPLENOMEGALY: ICD-10-CM

## 2022-06-06 DIAGNOSIS — R11.2 NAUSEA AND VOMITING: Primary | ICD-10-CM

## 2022-06-06 DIAGNOSIS — E66.01 OBESITY, CLASS III, BMI 40-49.9 (MORBID OBESITY) (HCC): ICD-10-CM

## 2022-06-06 DIAGNOSIS — S36.892A MESENTERIC HEMATOMA, INITIAL ENCOUNTER: ICD-10-CM

## 2022-06-06 LAB
2HR DELTA HS TROPONIN: -1 NG/L
ALBUMIN SERPL BCP-MCNC: 3.8 G/DL (ref 3.5–5)
ALP SERPL-CCNC: 60 U/L (ref 46–116)
ALT SERPL W P-5'-P-CCNC: 109 U/L (ref 12–78)
ANION GAP SERPL CALCULATED.3IONS-SCNC: 17 MMOL/L (ref 4–13)
APTT PPP: 33 SECONDS (ref 23–37)
AST SERPL W P-5'-P-CCNC: 37 U/L (ref 5–45)
ATRIAL RATE: 80 BPM
BACTERIA UR QL AUTO: ABNORMAL /HPF
BASOPHILS # BLD AUTO: 0.02 THOUSANDS/ΜL (ref 0–0.1)
BASOPHILS NFR BLD AUTO: 0 % (ref 0–1)
BILIRUB SERPL-MCNC: 1.47 MG/DL (ref 0.2–1)
BILIRUB UR QL STRIP: NEGATIVE
BUN SERPL-MCNC: 17 MG/DL (ref 5–25)
CALCIUM SERPL-MCNC: 9.5 MG/DL (ref 8.3–10.1)
CARDIAC TROPONIN I PNL SERPL HS: 11 NG/L
CARDIAC TROPONIN I PNL SERPL HS: 12 NG/L
CHLORIDE SERPL-SCNC: 102 MMOL/L (ref 100–108)
CLARITY UR: ABNORMAL
CO2 SERPL-SCNC: 20 MMOL/L (ref 21–32)
COLOR UR: YELLOW
CREAT SERPL-MCNC: 0.93 MG/DL (ref 0.6–1.3)
D DIMER PPP FEU-MCNC: 5.67 UG/ML FEU
EOSINOPHIL # BLD AUTO: 0.08 THOUSAND/ΜL (ref 0–0.61)
EOSINOPHIL NFR BLD AUTO: 1 % (ref 0–6)
ERYTHROCYTE [DISTWIDTH] IN BLOOD BY AUTOMATED COUNT: 11.7 % (ref 11.6–15.1)
GFR SERPL CREATININE-BSD FRML MDRD: 79 ML/MIN/1.73SQ M
GLUCOSE SERPL-MCNC: 77 MG/DL (ref 65–140)
GLUCOSE SERPL-MCNC: 85 MG/DL (ref 65–140)
GLUCOSE UR STRIP-MCNC: NEGATIVE MG/DL
HCT VFR BLD AUTO: 35.8 % (ref 34.8–46.1)
HGB BLD-MCNC: 12.2 G/DL (ref 11.5–15.4)
HGB UR QL STRIP.AUTO: ABNORMAL
IMM GRANULOCYTES # BLD AUTO: 0.02 THOUSAND/UL (ref 0–0.2)
IMM GRANULOCYTES NFR BLD AUTO: 0 % (ref 0–2)
INR PPP: 1.21 (ref 0.84–1.19)
KETONES UR STRIP-MCNC: ABNORMAL MG/DL
LEUKOCYTE ESTERASE UR QL STRIP: ABNORMAL
LIPASE SERPL-CCNC: 286 U/L (ref 73–393)
LYMPHOCYTES # BLD AUTO: 1.09 THOUSANDS/ΜL (ref 0.6–4.47)
LYMPHOCYTES NFR BLD AUTO: 14 % (ref 14–44)
MCH RBC QN AUTO: 29.9 PG (ref 26.8–34.3)
MCHC RBC AUTO-ENTMCNC: 34.1 G/DL (ref 31.4–37.4)
MCV RBC AUTO: 88 FL (ref 82–98)
MONOCYTES # BLD AUTO: 0.49 THOUSAND/ΜL (ref 0.17–1.22)
MONOCYTES NFR BLD AUTO: 6 % (ref 4–12)
NEUTROPHILS # BLD AUTO: 6.24 THOUSANDS/ΜL (ref 1.85–7.62)
NEUTS SEG NFR BLD AUTO: 79 % (ref 43–75)
NITRITE UR QL STRIP: POSITIVE
NON-SQ EPI CELLS URNS QL MICRO: ABNORMAL /HPF
NRBC BLD AUTO-RTO: 0 /100 WBCS
P AXIS: 39 DEGREES
PH UR STRIP.AUTO: 6 [PH] (ref 4.5–8)
PLATELET # BLD AUTO: 247 THOUSANDS/UL (ref 149–390)
PMV BLD AUTO: 11.1 FL (ref 8.9–12.7)
POTASSIUM SERPL-SCNC: 3.5 MMOL/L (ref 3.5–5.3)
PR INTERVAL: 134 MS
PROT SERPL-MCNC: 7.6 G/DL (ref 6.4–8.2)
PROT UR STRIP-MCNC: ABNORMAL MG/DL
PROTHROMBIN TIME: 15 SECONDS (ref 11.6–14.5)
QRS AXIS: 65 DEGREES
QRSD INTERVAL: 92 MS
QT INTERVAL: 398 MS
QTC INTERVAL: 459 MS
RBC # BLD AUTO: 4.08 MILLION/UL (ref 3.81–5.12)
RBC #/AREA URNS AUTO: ABNORMAL /HPF
SODIUM SERPL-SCNC: 139 MMOL/L (ref 136–145)
SP GR UR STRIP.AUTO: 1.01 (ref 1–1.03)
T WAVE AXIS: 24 DEGREES
UROBILINOGEN UR QL STRIP.AUTO: 1 E.U./DL
VENTRICULAR RATE: 80 BPM
WBC # BLD AUTO: 7.94 THOUSAND/UL (ref 4.31–10.16)
WBC #/AREA URNS AUTO: ABNORMAL /HPF

## 2022-06-06 PROCEDURE — 85379 FIBRIN DEGRADATION QUANT: CPT | Performed by: PHYSICIAN ASSISTANT

## 2022-06-06 PROCEDURE — G1004 CDSM NDSC: HCPCS

## 2022-06-06 PROCEDURE — 87077 CULTURE AEROBIC IDENTIFY: CPT

## 2022-06-06 PROCEDURE — 83690 ASSAY OF LIPASE: CPT | Performed by: PHYSICIAN ASSISTANT

## 2022-06-06 PROCEDURE — 96361 HYDRATE IV INFUSION ADD-ON: CPT

## 2022-06-06 PROCEDURE — 85730 THROMBOPLASTIN TIME PARTIAL: CPT | Performed by: PHYSICIAN ASSISTANT

## 2022-06-06 PROCEDURE — 84484 ASSAY OF TROPONIN QUANT: CPT | Performed by: PHYSICIAN ASSISTANT

## 2022-06-06 PROCEDURE — 99285 EMERGENCY DEPT VISIT HI MDM: CPT

## 2022-06-06 PROCEDURE — 96376 TX/PRO/DX INJ SAME DRUG ADON: CPT

## 2022-06-06 PROCEDURE — 36415 COLL VENOUS BLD VENIPUNCTURE: CPT | Performed by: PHYSICIAN ASSISTANT

## 2022-06-06 PROCEDURE — C9113 INJ PANTOPRAZOLE SODIUM, VIA: HCPCS | Performed by: PHYSICIAN ASSISTANT

## 2022-06-06 PROCEDURE — 87186 SC STD MICRODIL/AGAR DIL: CPT

## 2022-06-06 PROCEDURE — 71275 CT ANGIOGRAPHY CHEST: CPT

## 2022-06-06 PROCEDURE — 71045 X-RAY EXAM CHEST 1 VIEW: CPT

## 2022-06-06 PROCEDURE — 85025 COMPLETE CBC W/AUTO DIFF WBC: CPT | Performed by: PHYSICIAN ASSISTANT

## 2022-06-06 PROCEDURE — 96375 TX/PRO/DX INJ NEW DRUG ADDON: CPT

## 2022-06-06 PROCEDURE — 74177 CT ABD & PELVIS W/CONTRAST: CPT

## 2022-06-06 PROCEDURE — 99285 EMERGENCY DEPT VISIT HI MDM: CPT | Performed by: PHYSICIAN ASSISTANT

## 2022-06-06 PROCEDURE — 85610 PROTHROMBIN TIME: CPT | Performed by: PHYSICIAN ASSISTANT

## 2022-06-06 PROCEDURE — 82948 REAGENT STRIP/BLOOD GLUCOSE: CPT

## 2022-06-06 PROCEDURE — 87086 URINE CULTURE/COLONY COUNT: CPT

## 2022-06-06 PROCEDURE — 80053 COMPREHEN METABOLIC PANEL: CPT | Performed by: PHYSICIAN ASSISTANT

## 2022-06-06 PROCEDURE — 93010 ELECTROCARDIOGRAM REPORT: CPT | Performed by: INTERNAL MEDICINE

## 2022-06-06 PROCEDURE — 93005 ELECTROCARDIOGRAM TRACING: CPT

## 2022-06-06 PROCEDURE — 96374 THER/PROPH/DIAG INJ IV PUSH: CPT

## 2022-06-06 PROCEDURE — 81001 URINALYSIS AUTO W/SCOPE: CPT

## 2022-06-06 RX ORDER — SUCRALFATE 1 G/1
1 TABLET ORAL 4 TIMES DAILY
Qty: 28 TABLET | Refills: 0 | Status: SHIPPED | OUTPATIENT
Start: 2022-06-06 | End: 2022-06-13

## 2022-06-06 RX ORDER — MAGNESIUM HYDROXIDE/ALUMINUM HYDROXICE/SIMETHICONE 120; 1200; 1200 MG/30ML; MG/30ML; MG/30ML
30 SUSPENSION ORAL ONCE
Status: COMPLETED | OUTPATIENT
Start: 2022-06-06 | End: 2022-06-06

## 2022-06-06 RX ORDER — FENTANYL CITRATE 50 UG/ML
50 INJECTION, SOLUTION INTRAMUSCULAR; INTRAVENOUS ONCE
Status: COMPLETED | OUTPATIENT
Start: 2022-06-06 | End: 2022-06-06

## 2022-06-06 RX ORDER — PANTOPRAZOLE SODIUM 40 MG/1
40 INJECTION, POWDER, FOR SOLUTION INTRAVENOUS ONCE
Status: COMPLETED | OUTPATIENT
Start: 2022-06-06 | End: 2022-06-06

## 2022-06-06 RX ORDER — OXYCODONE HYDROCHLORIDE 5 MG/1
5 TABLET ORAL EVERY 6 HOURS PRN
Qty: 10 TABLET | Refills: 0 | Status: SHIPPED | OUTPATIENT
Start: 2022-06-06

## 2022-06-06 RX ORDER — ONDANSETRON 2 MG/ML
4 INJECTION INTRAMUSCULAR; INTRAVENOUS ONCE
Status: COMPLETED | OUTPATIENT
Start: 2022-06-06 | End: 2022-06-06

## 2022-06-06 RX ORDER — OMEPRAZOLE 20 MG/1
20 CAPSULE, DELAYED RELEASE ORAL 2 TIMES DAILY
Qty: 30 CAPSULE | Refills: 0 | Status: SHIPPED | OUTPATIENT
Start: 2022-06-06 | End: 2022-08-02 | Stop reason: SDUPTHER

## 2022-06-06 RX ORDER — SULFAMETHOXAZOLE AND TRIMETHOPRIM 800; 160 MG/1; MG/1
1 TABLET ORAL 2 TIMES DAILY
Qty: 14 TABLET | Refills: 0 | Status: SHIPPED | OUTPATIENT
Start: 2022-06-06 | End: 2022-06-13

## 2022-06-06 RX ORDER — MORPHINE SULFATE 4 MG/ML
4 INJECTION, SOLUTION INTRAMUSCULAR; INTRAVENOUS ONCE
Status: COMPLETED | OUTPATIENT
Start: 2022-06-06 | End: 2022-06-06

## 2022-06-06 RX ORDER — ONDANSETRON 4 MG/1
4 TABLET, ORALLY DISINTEGRATING ORAL EVERY 6 HOURS PRN
Qty: 20 TABLET | Refills: 0 | Status: SHIPPED | OUTPATIENT
Start: 2022-06-06

## 2022-06-06 RX ADMIN — PANTOPRAZOLE SODIUM 40 MG: 40 INJECTION, POWDER, FOR SOLUTION INTRAVENOUS at 14:47

## 2022-06-06 RX ADMIN — MORPHINE SULFATE 4 MG: 4 INJECTION INTRAVENOUS at 14:44

## 2022-06-06 RX ADMIN — IOHEXOL 65 ML: 350 INJECTION, SOLUTION INTRAVENOUS at 14:02

## 2022-06-06 RX ADMIN — SODIUM CHLORIDE 1000 ML: 0.9 INJECTION, SOLUTION INTRAVENOUS at 17:00

## 2022-06-06 RX ADMIN — ONDANSETRON 4 MG: 2 INJECTION INTRAMUSCULAR; INTRAVENOUS at 17:01

## 2022-06-06 RX ADMIN — ONDANSETRON 4 MG: 2 INJECTION INTRAMUSCULAR; INTRAVENOUS at 12:11

## 2022-06-06 RX ADMIN — FENTANYL CITRATE 50 MCG: 50 INJECTION, SOLUTION INTRAMUSCULAR; INTRAVENOUS at 12:12

## 2022-06-06 RX ADMIN — ALUMINA, MAGNESIA, AND SIMETHICONE ORAL SUSPENSION REGULAR STRENGTH 30 ML: 1200; 1200; 120 SUSPENSION ORAL at 17:01

## 2022-06-06 RX ADMIN — DIATRIZOATE MEGLUMINE AND DIATRIZOATE SODIUM 15 ML: 660; 100 LIQUID ORAL; RECTAL at 14:02

## 2022-06-06 RX ADMIN — SODIUM CHLORIDE 500 ML: 0.9 INJECTION, SOLUTION INTRAVENOUS at 12:10

## 2022-06-06 NOTE — TELEPHONE ENCOUNTER
Spoke with pt and she states that she hasn't really been keeping track of her BP readings  She takes them here and there when she doesn't feel right  She stated that the systolic has been running between 424-764 and her diastolic has been over the mid 90"s  She checked it yesterday and it was 140/90  She called to make a follow up  Asked her to keep a record from today till her follow visit

## 2022-06-06 NOTE — ED NOTES
Patient states having relief from medications   Provider notified     Marii Eduardo RN  06/06/22 [phone]

## 2022-06-06 NOTE — DISCHARGE INSTRUCTIONS
Please refer to the attached information for strict return instructions  If symptoms worsen or new symptoms develop please return to the ER  Please follow up with Dr Rubi Cornejo surgery as soon as possible  Drink plenty of fluids to stay hydrated

## 2022-06-06 NOTE — ED NOTES
Patient states feeling better except for the consistent side pain   No current nausea     Ml Mayorga RN  06/06/22 [id number]

## 2022-06-06 NOTE — TELEPHONE ENCOUNTER
----- Message from Huong Zendejas DO sent at 5/27/2022  8:53 AM EDT -----  Call pt in one week for bp readings

## 2022-06-07 LAB
ATRIAL RATE: 84 BPM
P AXIS: 60 DEGREES
PR INTERVAL: 148 MS
QRS AXIS: 61 DEGREES
QRSD INTERVAL: 90 MS
QT INTERVAL: 388 MS
QTC INTERVAL: 458 MS
T WAVE AXIS: 27 DEGREES
VENTRICULAR RATE: 84 BPM

## 2022-06-07 PROCEDURE — 93010 ELECTROCARDIOGRAM REPORT: CPT | Performed by: INTERNAL MEDICINE

## 2022-06-08 ENCOUNTER — OFFICE VISIT (OUTPATIENT)
Dept: INTERNAL MEDICINE CLINIC | Facility: CLINIC | Age: 37
End: 2022-06-08
Payer: COMMERCIAL

## 2022-06-08 ENCOUNTER — TELEPHONE (OUTPATIENT)
Dept: MEDSURG UNIT | Facility: HOSPITAL | Age: 37
End: 2022-06-08

## 2022-06-08 VITALS
DIASTOLIC BLOOD PRESSURE: 90 MMHG | TEMPERATURE: 97.3 F | HEIGHT: 65 IN | BODY MASS INDEX: 38.24 KG/M2 | HEART RATE: 84 BPM | SYSTOLIC BLOOD PRESSURE: 144 MMHG | WEIGHT: 229.5 LBS | OXYGEN SATURATION: 99 %

## 2022-06-08 DIAGNOSIS — Z98.84 HISTORY OF ROUX-EN-Y GASTRIC BYPASS: Primary | ICD-10-CM

## 2022-06-08 DIAGNOSIS — Z09 HOSPITAL DISCHARGE FOLLOW-UP: ICD-10-CM

## 2022-06-08 DIAGNOSIS — N30.01 ACUTE CYSTITIS WITH HEMATURIA: ICD-10-CM

## 2022-06-08 DIAGNOSIS — E66.01 OBESITY, CLASS III, BMI 40-49.9 (MORBID OBESITY) (HCC): ICD-10-CM

## 2022-06-08 DIAGNOSIS — I10 PRIMARY HYPERTENSION: ICD-10-CM

## 2022-06-08 LAB — BACTERIA UR CULT: ABNORMAL

## 2022-06-08 PROCEDURE — 99496 TRANSJ CARE MGMT HIGH F2F 7D: CPT | Performed by: NURSE PRACTITIONER

## 2022-06-08 RX ORDER — OXYCODONE HYDROCHLORIDE 5 MG/1
5 TABLET ORAL EVERY 4 HOURS PRN
Qty: 60 TABLET | Refills: 0 | Status: SHIPPED | OUTPATIENT
Start: 2022-06-08

## 2022-06-08 RX ORDER — SUCRALFATE ORAL 1 G/10ML
1 SUSPENSION ORAL
Qty: 414 ML | Refills: 0 | Status: SHIPPED | OUTPATIENT
Start: 2022-06-08

## 2022-06-08 NOTE — PATIENT INSTRUCTIONS
Problem List Items Addressed This Visit          Cardiovascular and Mediastinum    Hypertension     Continue norvasc, labetalol              Genitourinary    Acute cystitis with hematuria     6/8/2022  Started on bactrim on 6/6/2022  Will monitor               Other    Obesity, Class III, BMI 40-49 9 (morbid obesity) (HCC)    Relevant Medications    oxyCODONE (Roxicodone) 5 immediate release tablet    History of Michelle-en-Y gastric bypass - Primary     6/1/2022 Dr Osborne Pill  Will change the patients carafate to liquid to assist with eating and her ability to eat  Will continue oxy ir to assist in her with her pain in her abdomen  Relevant Medications    sucralfate (CARAFATE) 1 g/10 mL suspension    Hospital discharge follow-up     The patient was hospitalized from 6/1/2022 to 6/2/2022 for gastric bypass michelle-en-Y  Surgery discharge summary: "Patient tolerated surgery well without complications  In the morning postoperative Day 1, the patient had mild nausea and abdominal pain  Tolerated a clear liquid diet without vomiting  Able to ambulate and voiding independently  Patient was deemed ready for discharge home  [facility] consulted for home medication management "  Patient returned to the ED on 6/6/2022 with dysuria and was discharged home    Was diagnoses with a kidney infection and UTI  Was started on bactrim  Is improving   Will have her follow up on 6/17/2022

## 2022-06-08 NOTE — TELEPHONE ENCOUNTER
Called pt to check on status  She stated  she is feeling much better after starting carafate and bactrim  Pt also stated she is doing better with drinking  Stated she is not as nauseous any longer  Pt also stated the pain in her rib/flank area has improved  Had PCP appointment this am   [address] will change carafate to liquid  Encouraged pt to call office with any return of symptoms  Pt stated understanding and will comply

## 2022-06-08 NOTE — ASSESSMENT & PLAN NOTE
· 6/1/2022 Dr Erinn Silva  · Will change the patients carafate to liquid to assist with eating and her ability to eat  · Will continue oxy ir to assist in her with her pain in her abdomen

## 2022-06-08 NOTE — ED PROVIDER NOTES
History  Chief Complaint   Patient presents with    Post-Op Problem - Major     Patient arrives via EMS from  home, s/p gastric bypass 6/1 by Dr Patti Justice, pt with stabbing abd pain, distention, significant bruising to left abdomen, +nausea     Isa Chaudhari is a 39 yo F, s/p vicenta-en-y on 6/1, presenting with nausea, vomiting, upper abdominal pain as well as sharp L sided rib pain  She reports the abdominal pain feels like a moderate "sore" pain and worsens with palpation  She also reports the L rib pain is sharp, well localized, and worsens with certain movements  She does not feel shortness of breath  No coughing/wheezing  The vomiting has been largely after eating/drinking  No diarrhea, she does report having bowel movements since surgery  No melena or BRBPR  No fevers/chills  She additionally reports significant bruising, discoloration to L abdominal wall over the past few days  She did reportedly receive lovenox injections to L abdomen but notes bruising exceeded what she expected  No significant pain to this area however  She also notes she has had some hesitation and urgency with urination since surgery, but attributed this to post-operative anesthesia effect  No dysuria  History provided by:  Patient   used: No        Prior to Admission Medications   Prescriptions Last Dose Informant Patient Reported? Taking? Multiple Vitamin (multivitamin) tablet   Yes Yes   Sig: Take 1 tablet by mouth daily   acetaminophen (TYLENOL) 160 mg/5 mL suspension   No Yes   Sig: Take 30 4 mL (975 mg total) by mouth every 8 (eight) hours for 7 days   albuterol (PROVENTIL HFA,VENTOLIN HFA) 90 mcg/act inhaler   No Yes   Sig: Inhale 2 puffs every 6 (six) hours as needed for wheezing or shortness of breath   amLODIPine (NORVASC) 5 mg tablet Past Week at Unknown time  No Yes   Sig: Take 2 tablets (10 mg total) by mouth in the morning     budesonide-formoterol (SYMBICORT) 80-4 5 MCG/ACT inhaler   No Yes Sig: Inhale 2 puffs 2 (two) times a day Rinse mouth after use  cholecalciferol (VITAMIN D3) 400 units tablet   Yes Yes   Sig: Take 400 Units by mouth daily   labetalol (NORMODYNE) 300 mg tablet Past Week at Unknown time  No Yes   Sig: Take 1 tablet (300 mg total) by mouth in the morning and 1 tablet (300 mg total) in the evening and 1 tablet (300 mg total) before bedtime     lidocaine (XYLOCAINE) 5 % ointment  Self No No   Sig: Apply topically as needed for mild pain   omeprazole (PriLOSEC) 20 mg delayed release capsule   No Yes   Sig: Take 1 capsule (20 mg total) by mouth daily   omeprazole (PriLOSEC) 20 mg delayed release capsule   No Yes   Sig: Take 1 capsule (20 mg total) by mouth 2 (two) times a day      Facility-Administered Medications: None       Past Medical History:   Diagnosis Date    Anxiety     Arthritis     [facility]     Chronic back pain     Generalized anxiety disorder     Hiatal hernia     Hypertension     Kidney stone     Obesity     Primary osteoarthritis of left knee 2020    Transient ischemic attack     pt states not certain if actually had a TIA, states related to severe HTN episodes       Past Surgical History:   Procedure Laterality Date     SECTION      x 2    EGD      KNEE ARTHROSCOPY Left     acl/meniscus repair    TN CYSTO/URETERO W/LITHOTRIPSY &INDWELL STENT INSRT Right 2018    Procedure: CYSTOSCOPY URETEROSCOPY, RETROGRADE PYELOGRAM AND INSERTION STENT URETERAL, RIGHT STONE EXTRACTION;  Surgeon: Bg Blanton MD;  [facility];  Service: Urology    TN CYSTOURETHROSCOPY,URETER CATHETER Right 1/15/2018    Procedure: CYSTOSCOPY RETROGRADE PYELOGRAM WITH INSERTION STENT URETERAL;  Surgeon: Pan Solorzano MD;  [facility];  Service: Urology    TN LAP GASTRIC BYPASS/ONDINA-EN-Y N/A 2022    Procedure: BYPASS GASTRIC ONDINA-EN-Y LAPAROSCOPIC W ROBOTICS AND INTRAOPERATIVE EGD;  Surgeon: Jersey Alanis MD; Location: [facility];  Service: Bariatrics    TUBAL LIGATION         Family History   Problem Relation Age of Onset    Hypertension Mother     Cancer Mother         Thyroid    Hypertension Father      I have reviewed and agree with the history as documented  E-Cigarette/Vaping    E-Cigarette Use Never User      E-Cigarette/Vaping Substances    Nicotine No     THC No     CBD No     Flavoring No     Other No     Unknown No      Social History     Tobacco Use    Smoking status: Former Smoker     Packs/day: 0 20     Years: 5 00     Pack years: 1 00     Types: Cigarettes     Quit date: 2020     Years since quittin 4    Smokeless tobacco: Never Used   Vaping Use    Vaping Use: Never used   Substance Use Topics    Alcohol use: Yes     Comment: Rare social occasion , a few times a year    Drug use: No       Review of Systems   Constitutional: Negative for chills and fever  HENT: Negative for congestion, rhinorrhea and sore throat  Eyes: Negative for pain and visual disturbance  Respiratory: Negative for cough, shortness of breath and wheezing  Cardiovascular: Negative for chest pain and palpitations  Gastrointestinal: Positive for abdominal pain, nausea and vomiting  Negative for constipation and diarrhea  Genitourinary: Positive for difficulty urinating, flank pain and urgency  Negative for dysuria, frequency, vaginal bleeding and vaginal discharge  Musculoskeletal: Negative for back pain, neck pain and neck stiffness  Skin: Positive for rash and wound (post-surgical)  Neurological: Negative for dizziness, weakness, light-headedness and numbness  Physical Exam  Physical Exam  Constitutional:       General: She is not in acute distress  Appearance: She is well-developed  She is not diaphoretic  HENT:      Head: Normocephalic and atraumatic        Right Ear: External ear normal       Left Ear: External ear normal    Eyes:      Conjunctiva/sclera: Conjunctivae normal  Pupils: Pupils are equal, round, and reactive to light  Cardiovascular:      Rate and Rhythm: Normal rate and regular rhythm  Heart sounds: Normal heart sounds  No murmur heard  No friction rub  No gallop  Pulmonary:      Effort: Pulmonary effort is normal  No respiratory distress  Breath sounds: Normal breath sounds  No wheezing  Abdominal:      General: There is no distension  Palpations: Abdomen is soft  Tenderness: There is abdominal tenderness  There is no right CVA tenderness, left CVA tenderness or guarding  Comments: TTP to epigastrium and LUQ of abdomen without rigidity, rebound, or guarding  L CVAT also noted  Surgical site wounds healing appropriately without dehiscence of drainage/bleeding  Musculoskeletal:      Cervical back: Normal range of motion and neck supple  Lymphadenopathy:      Cervical: No cervical adenopathy  Skin:     General: Skin is warm and dry  Capillary Refill: Capillary refill takes less than 2 seconds  Findings: Bruising present  No erythema or rash  Comments: Bruising to abdominal wall over sites of lovenox injections extending from periumbilical region to L flank  Neurological:      Mental Status: She is alert and oriented to person, place, and time  Motor: No abnormal muscle tone  Coordination: Coordination normal    Psychiatric:         Behavior: Behavior normal          Thought Content:  Thought content normal          Judgment: Judgment normal          Vital Signs  ED Triage Vitals   Temperature Pulse Respirations Blood Pressure SpO2   06/06/22 1142 06/06/22 1140 06/06/22 1140 06/06/22 1142 06/06/22 1140   97 9 °F (36 6 °C) 85 22 (!) 194/102 100 %      Temp Source Heart Rate Source Patient Position - Orthostatic VS BP Location FiO2 (%)   06/06/22 1142 06/06/22 1705 -- 06/06/22 1140 --   Oral Monitor  Right arm       Pain Score       06/06/22 1140       10 - Worst Possible Pain           Vitals:    06/06/22 1215 06/06/22 1315 06/06/22 1500 06/06/22 1705   BP: (!) 173/90 157/79 161/91 (!) 171/91   Pulse: 76 74 84 83         Visual Acuity      ED Medications  Medications   fentanyl citrate (PF) 100 MCG/2ML 50 mcg (50 mcg Intravenous Given 6/6/22 1212)   ondansetron (ZOFRAN) injection 4 mg (4 mg Intravenous Given 6/6/22 1211)   sodium chloride 0 9 % bolus 500 mL (0 mL Intravenous Stopped 6/6/22 1247)   iohexol (OMNIPAQUE) 350 MG/ML injection (MULTI-DOSE) 65 mL (65 mL Intravenous Given 6/6/22 1402)   diatrizoate meglumine-sodium (GASTROGRAFIN) solution 15 mL (15 mL Oral Given 6/6/22 1402)   pantoprazole (PROTONIX) injection 40 mg (40 mg Intravenous Given 6/6/22 1447)   morphine (PF) 4 mg/mL injection 4 mg (4 mg Intravenous Given 6/6/22 1444)   aluminum-magnesium hydroxide-simethicone (MYLANTA) oral suspension 30 mL (30 mL Oral Given 6/6/22 1701)   ondansetron (ZOFRAN) injection 4 mg (4 mg Intravenous Given 6/6/22 1701)   sodium chloride 0 9 % bolus 1,000 mL (0 mL Intravenous Stopped 6/6/22 1832)       Diagnostic Studies  Results Reviewed     Procedure Component Value Units Date/Time    Urine culture [[id number]]  (Abnormal)  (Susceptibility) Collected: 06/06/22 1526    Lab Status: Final result Specimen: Urine, Clean Catch Updated: 06/08/22 1756     Urine Culture >100,000 cfu/ml Escherichia coli    Susceptibility     Escherichia coli (1)     Antibiotic Interpretation Microscan   Method Status    ZID Performed  Yes  JESU Final    Ampicillin ($$) Susceptible <=8 00 ug/ml JESU Final    Aztreonam ($$$)  Susceptible <=4 ug/ml JESU Final    Cefazolin ($) Susceptible <=2 00 ug/ml JESU Final    Ciprofloxacin ($)  Resistant >2 00 ug/ml JESU Final    Gentamicin ($$) Susceptible <=2 ug/ml JESU Final    Levofloxacin ($) Resistant >4 00 ug/ml JESU Final    Nitrofurantoin Susceptible <=32 ug/ml JESU Final    Tetracycline Susceptible <=4 ug/ml JESU Final    Tobramycin ($) Susceptible <=2 ug/ml JESU Final    Trimethoprim + Sulfamethoxazole ($$$) Susceptible <=0 5/9 5 ug/ml JESU Final                   Urine Microscopic [[id number]]  (Abnormal) Collected: 06/06/22 1526    Lab Status: Final result Specimen: Urine, Clean Catch Updated: 06/06/22 1609     RBC, UA 0-1 /hpf      WBC, UA Innumerable /hpf      Epithelial Cells Occasional /hpf      Bacteria, UA Innumerable /hpf     Urine Macroscopic, POC [[id number]]  (Abnormal) Collected: 06/06/22 1526    Lab Status: Final result Specimen: Urine Updated: 06/06/22 1528     Color, UA Yellow     Clarity, UA Cloudy     pH, UA 6 0     Leukocytes, UA Moderate     Nitrite, UA Positive     Protein,  (2+) mg/dl      Glucose, UA Negative mg/dl      Ketones, UA >=160 (4+) mg/dl      Urobilinogen, UA 1 0 E U /dl      Bilirubin, UA Negative     Blood, UA Trace     Specific [place], UA 1 015    Narrative:      CLINITEK RESULT    HS Troponin I 2hr [[id number]]  (Normal) Collected: 06/06/22 1450    Lab Status: Final result Specimen: Blood from Arm, Right Updated: 06/06/22 1521     hs TnI 2hr 11 ng/L      Delta 2hr hsTnI -1 ng/L     Fingerstick Glucose (POCT) [[id number]]  (Normal) Collected: 06/06/22 1506    Lab Status: Final result Updated: 06/06/22 1508     POC Glucose 77 mg/dl     HS Troponin 0hr (reflex protocol) [[id number]]  (Normal) Collected: 06/06/22 1208    Lab Status: Final result Specimen: Blood from Arm, Right Updated: 06/06/22 1430     hs TnI 0hr 12 ng/L     Comprehensive metabolic panel [[id number]]  (Abnormal) Collected: 06/06/22 1208    Lab Status: Final result Specimen: Blood from Arm, Right Updated: 06/06/22 1246     Sodium 139 mmol/L      Potassium 3 5 mmol/L      Chloride 102 mmol/L      CO2 20 mmol/L      ANION GAP 17 mmol/L      BUN 17 mg/dL      Creatinine 0 93 mg/dL      Glucose 85 mg/dL      Calcium 9 5 mg/dL      AST 37 U/L       U/L      Alkaline Phosphatase 60 U/L      Total Protein 7 6 g/dL      Albumin 3 8 g/dL      Total Bilirubin 1 47 mg/dL      eGFR 79 ml/min/1 73sq m     Narrative: National Kidney Disease Foundation guidelines for Chronic Kidney Disease (CKD):     Stage 1 with normal or high GFR (GFR > 90 mL/min/1 73 square meters)    Stage 2 Mild CKD (GFR = 60-89 mL/min/1 73 square meters)    Stage 3A Moderate CKD (GFR = 45-59 mL/min/1 73 square meters)    Stage 3B Moderate CKD (GFR = 30-44 mL/min/1 73 square meters)    Stage 4 Severe CKD (GFR = 15-29 mL/min/1 73 square meters)    Stage 5 End Stage CKD (GFR <15 mL/min/1 73 square meters)  Note: GFR calculation is accurate only with a steady state creatinine    Lipase [[id number]]  (Normal) Collected: 06/06/22 1208    Lab Status: Final result Specimen: Blood from Arm, Right Updated: 06/06/22 1246     Lipase 286 u/L     D-dimer, quantitative [[id number]]  (Abnormal) Collected: 06/06/22 1208    Lab Status: Final result Specimen: Blood from Arm, Right Updated: 06/06/22 1239     D-Dimer, Quant 5 67 ug/ml FEU     APTT [[id number]]  (Normal) Collected: 06/06/22 1208    Lab Status: Final result Specimen: Blood from Arm, Right Updated: 06/06/22 1232     PTT 33 seconds     Protime-INR [[id number]]  (Abnormal) Collected: 06/06/22 1208    Lab Status: Final result Specimen: Blood from Arm, Right Updated: 06/06/22 1232     Protime 15 0 seconds      INR 1 21    CBC and differential [[id number]]  (Abnormal) Collected: 06/06/22 1208    Lab Status: Final result Specimen: Blood from Arm, Right Updated: 06/06/22 1215     WBC 7 94 Thousand/uL      RBC 4 08 Million/uL      Hemoglobin 12 2 g/dL      Hematocrit 35 8 %      MCV 88 fL      MCH 29 9 pg      MCHC 34 1 g/dL      RDW 11 7 %      MPV 11 1 fL      Platelets 095 Thousands/uL      nRBC 0 /100 WBCs      Neutrophils Relative 79 %      Immat GRANS % 0 %      Lymphocytes Relative 14 %      Monocytes Relative 6 %      Eosinophils Relative 1 %      Basophils Relative 0 %      Neutrophils Absolute 6 24 Thousands/µL      Immature Grans Absolute 0 02 Thousand/uL      Lymphocytes Absolute 1 09 Thousands/µL Monocytes Absolute 0 49 Thousand/µL      Eosinophils Absolute 0 08 Thousand/µL      Basophils Absolute 0 02 Thousands/µL                  PE Study with CT abdomen & pelvis with contrast   Final Result by Nader Navarro DO (06/06 1511)      1  No convincing pulmonary emboli to the segmental level in this study compromised by poor contrast bolus and mild respiratory motion  Small segmental and/or subsegmental emboli in the left lower lobe could be missed  2  Small amount of right pelvic mesenteric hematoma measuring about 8 2 x 2 4 cm (series 605 image 66)  No discernible evidence for active bleeding  3  Status post Michelle-en-Y gastric bypass  No evidence of oral contrast leak or abscess  4  Mildly dilatation of small bowel loop along the distal gastrojejunostomy upstream from the jejunal anastomosis  No convincing transition to suggest mechanical obstruction  Delayed imaging to allow passage of oral contrast into the distal small bowel    may be considered for confirmation  5  Postsurgical changes in the ventral abdominal wall, asymmetric on the left and presumed secondary to seroma/hematoma  No rim-enhancing collection  6  Findings suggesting left ventricular hypertrophy  7  Mild splenomegaly  8  Colonic diverticulosis  Workstation performed: [id number]         XR chest 1 view portable   Final Result by Han Rivera MD (06/06 1329)      No acute cardiopulmonary disease  Workstation performed: [id number]                    Procedures  Procedures         ED Course                               SBIRT 22yo+    Flowsheet Row Most Recent Value   SBIRT (25 yo +)    In order to provide better care to our patients, we are screening all of our patients for alcohol and drug use  Would it be okay to ask you these screening questions?  Unable to answer at this time Filed at: 06/06/2022 1501                    MDM  Number of Diagnoses or Management Options  Abdominal wall hematoma, initial encounter  Diverticulosis  Mesenteric hematoma, initial encounter  Nausea and vomiting  Post-operative pain  Splenomegaly  Urinary tract infection  Diagnosis management comments: Nausea, vomiting, epigastric pain, and L rib pain following vicenta-en-y on 6/1  TTP to epigastrium and LUQ noted on exam as well as L CVAT  She also notes urinary urgency  Surgical sites appear to be healing appropriately  Will provide Zofran for nausea, fentanyl for pain, IV fluids  Will check EKG, d-dimer for exclusion of PE given L rib pain, XR chest, check CBC for WBC count, CMP for electrolytes, lipase for pancreatitis  Check urine dip for UTI given urinary urgency and L CVAT  Plan for CT imaging of abdomen pending d-dimer given possible CTA PE study requirement   -----------------------------  Pain well controlled initially with fentanyl however did quickly return - given morphine with some relief of pain  Nausea controlled with Zofran, tolerating PO  CTA PE study obtained given elevated D-dimer in addition to CT abd/pelvis with bariatric protocol  Numerous incidental findings noted on CT as well as R pelvic mesenteric hematoma and post-operative changes  Case discussed with Ajit Balderas, on call bariatric surgery - recommends as patient is tolerating PO and pain is controlled may follow up outpatient with bariatric surgery  Recommends addition of carafate  Will discharge with percocet PRN severe pain, Zofran PRN nausea/vomiting, start on carafate  Prompt follow up with bariatric surgery recommended, return to ED indications discussed          Amount and/or Complexity of Data Reviewed  Clinical lab tests: ordered and reviewed  Tests in the radiology section of CPT®: ordered and reviewed    Patient Progress  Patient progress: stable      Disposition  Final diagnoses:   Nausea and vomiting   Post-operative pain   Mesenteric hematoma, initial encounter   Abdominal wall hematoma, initial encounter   Urinary tract infection   Splenomegaly   Diverticulosis     Time reflects when diagnosis was documented in both MDM as applicable and the Disposition within this note     Time User Action Codes Description Comment    6/6/2022  5:45 PM Dickie Blitz Add [R11 2] Nausea and vomiting     6/6/2022  5:45 PM Dickie Blitz Add [G89 18] Post-operative pain     6/6/2022  5:46 PM Dickie Blitz Add [O53 463C] Mesenteric hematoma, initial encounter     6/6/2022  5:46 PM Dickie Blitz Add [S30 1XXA] Abdominal wall hematoma, initial encounter     6/6/2022  5:47 PM Dickie Blitz Add [E66 01] Obesity, Class III, BMI 40-49 9 (morbid obesity) ([facility] [address] )     6/6/2022  5:47 PM Dickie Blitz Modify [E66 01] Obesity, Class III, BMI 40-49 9 (morbid obesity) [facility]     6/6/2022  5:48 PM Dickie Blitz Modify [E66 01] Obesity, Class III, BMI 40-49 9 (morbid obesity) ([facility] )     6/6/2022  6:41 PM Dickie Blitz Add [N39 0] Urinary tract infection     6/8/2022 11:59 PM Dickie Blitz Add [R16 1] Splenomegaly     6/8/2022 11:59 PM Dickie Blitz Add [K57 90] Diverticulosis       ED Disposition     ED Disposition   Discharge    Condition   Stable    Date/Time   Mon Jun 6, 2022  5:45 PM    Comment   99 Mikki Cueto discharge to home/self care                 Follow-up Information     Follow up With Specialties Details Why Contact Info Additional [address], MD Bariatrics, General Surgery Schedule an appointment as soon as possible for a visit   [address] [place] [address] Emergency Department Emergency Medicine  If symptoms worsen [facility] [id number]  [address] [place] [address] Emergency Department, [address] [place] [address], [id number]          Discharge Medication List as of 6/6/2022  6:36 PM      START taking these medications    Details ondansetron (Zofran ODT) 4 mg disintegrating tablet Take 1 tablet (4 mg total) by mouth every 6 (six) hours as needed for nausea or vomiting, Starting Mon 6/6/2022, Normal      !! oxyCODONE (Roxicodone) 5 immediate release tablet Take 1 tablet (5 mg total) by mouth every 6 (six) hours as needed for severe pain for up to 10 doses Max Daily Amount: 20 mg, Starting Mon 6/6/2022, Normal      sucralfate (CARAFATE) 1 g tablet Take 1 tablet (1 g total) by mouth 4 (four) times a day for 7 days, Starting Mon 6/6/2022, Until Mon 6/13/2022, Normal       !! - Potential duplicate medications found  Please discuss with provider  CONTINUE these medications which have CHANGED    Details   omeprazole (PriLOSEC) 20 mg delayed release capsule Take 1 capsule (20 mg total) by mouth 2 (two) times a day, Starting Mon 6/6/2022, Normal         CONTINUE these medications which have NOT CHANGED    Details   acetaminophen (TYLENOL) 160 mg/5 mL suspension Take 30 4 mL (975 mg total) by mouth every 8 (eight) hours for 7 days, Starting Thu 6/2/2022, Until Thu 6/9/2022, Normal      albuterol (PROVENTIL HFA,VENTOLIN HFA) 90 mcg/act inhaler Inhale 2 puffs every 6 (six) hours as needed for wheezing or shortness of breath, Starting Thu 2/24/2022, Normal      amLODIPine (NORVASC) 5 mg tablet Take 2 tablets (10 mg total) by mouth in the morning , Starting Tue 5/24/2022, No Print      budesonide-formoterol (SYMBICORT) 80-4 5 MCG/ACT inhaler Inhale 2 puffs 2 (two) times a day Rinse mouth after use , Starting Tue 9/14/2021, Normal      cholecalciferol (VITAMIN D3) 400 units tablet Take 400 Units by mouth daily, Historical Med      labetalol (NORMODYNE) 300 mg tablet Take 1 tablet (300 mg total) by mouth in the morning and 1 tablet (300 mg total) in the evening and 1 tablet (300 mg total) before bedtime  , Starting Tue 5/24/2022, No Print      Multiple Vitamin (multivitamin) tablet Take 1 tablet by mouth daily, Historical Med      !! oxyCODONE (Roxicodone) 5 immediate release tablet Take 1 tablet (5 mg total) by mouth every 4 (four) hours as needed for moderate pain Max Daily Amount: 30 mg, Starting Fri 5/6/2022, Normal      lidocaine (XYLOCAINE) 5 % ointment Apply topically as needed for mild pain, Starting Wed 4/28/2021, Normal       !! - Potential duplicate medications found  Please discuss with provider  No discharge procedures on file      PDMP Review       Value Time User    PDMP Reviewed  Yes 6/8/2022  9:57 AM Steph Smalls [place]          ED Provider  Electronically Signed by           Nader Larson PA-C  06/09/22 0007

## 2022-06-08 NOTE — ASSESSMENT & PLAN NOTE
· The patient was hospitalized from 6/1/2022 to 6/2/2022 for gastric bypass vicenta-en-Y   · Surgery discharge summary: "Patient tolerated surgery well without complications  In the morning postoperative Day 1, the patient had mild nausea and abdominal pain  Tolerated a clear liquid diet without vomiting  Able to ambulate and voiding independently  Patient was deemed ready for discharge home  SLIM consulted for home medication management "  · Patient returned to the ED on 6/6/2022 with dysuria and was discharged home    · Was diagnoses with a kidney infection and UTI  · Was started on bactrim  · Is improving   · Will have her follow up on 6/17/2022

## 2022-06-08 NOTE — PROGRESS NOTES
Assessment/Plan:     History of Michelle-en-Y gastric bypass  6/1/2022 Dr Antonio Combs  Will change the patients carafate to liquid to assist with eating and her ability to eat  Will continue oxy ir to assist in her with her pain in her abdomen  Hospital discharge follow-up  The patient was hospitalized from 6/1/2022 to 6/2/2022 for gastric bypass michelle-en-Y  Surgery discharge summary: "Patient tolerated surgery well without complications  In the morning postoperative Day 1, the patient had mild nausea and abdominal pain  Tolerated a clear liquid diet without vomiting  Able to ambulate and voiding independently  Patient was deemed ready for discharge home  SLIM consulted for home medication management "  Patient returned to the ED on 6/6/2022 with dysuria and was discharged home  Was diagnoses with a kidney infection and UTI  Was started on bactrim  Is improving   Will have her follow up on 6/17/2022    Hypertension  Continue norvasc, labetalol    Acute cystitis with hematuria  6/8/2022  Started on bactrim on 6/6/2022  Will monitor          Diagnoses and all orders for this visit:    History of Michelle-en-Y gastric bypass  -     sucralfate (CARAFATE) 1 g/10 mL suspension; Take 10 mL (1 g total) by mouth 4 (four) times a day (with meals and at bedtime)    Hospital discharge follow-up    Primary hypertension    Acute cystitis with hematuria    Obesity, Class III, BMI 40-49 9 (morbid obesity) (HCC)  -     oxyCODONE (Roxicodone) 5 immediate release tablet; Take 1 tablet (5 mg total) by mouth every 4 (four) hours as needed for moderate pain Max Daily Amount: 30 mg       Subjective:     Patient ID: Jacquiline Alt is a 40 y o  female  The patient is here for a TCM  Review of Systems   Constitutional: Negative for activity change, chills, fatigue and fever  HENT: Negative for rhinorrhea and sore throat  Eyes: Negative for pain  Respiratory: Negative for cough and shortness of breath      Cardiovascular: Negative for chest pain, palpitations and leg swelling  Gastrointestinal: Positive for abdominal distention and abdominal pain  Negative for constipation, diarrhea, nausea and vomiting  Genitourinary: Positive for difficulty urinating  Negative for flank pain, frequency and urgency  Musculoskeletal: Negative for gait problem, joint swelling and myalgias  Skin: Negative for color change  Neurological: Negative for dizziness, weakness, light-headedness and headaches  Psychiatric/Behavioral: Negative for sleep disturbance  The patient is not nervous/anxious  All other systems reviewed and are negative  Objective:     Physical Exam  Vitals reviewed  Constitutional:       General: She is awake  Appearance: Normal appearance  She is well-developed and normal weight  HENT:      Head: Normocephalic and atraumatic  Nose: Nose normal       Mouth/Throat:      Mouth: Mucous membranes are moist    Eyes:      Extraocular Movements: Extraocular movements intact  Cardiovascular:      Rate and Rhythm: Normal rate and regular rhythm  Heart sounds: Normal heart sounds  Pulmonary:      Effort: Pulmonary effort is normal    Abdominal:      General: Bowel sounds are normal       Palpations: Abdomen is soft  Musculoskeletal:         General: Normal range of motion  Cervical back: Normal range of motion  Right lower leg: No edema  Left lower leg: No edema  Skin:     General: Skin is warm and dry  Neurological:      Mental Status: She is alert and oriented to person, place, and time  Psychiatric:         Attention and Perception: Attention normal          Mood and Affect: Mood normal          Speech: Speech normal          Behavior: Behavior normal  Behavior is cooperative             Vitals:    06/08/22 0922   BP: 144/90   Pulse: 84   Temp: (!) 97 3 °F (36 3 °C)   SpO2: 99%   Weight: 104 kg (229 lb 8 oz)   Height: 5' 5" (1 651 m)       Transitional Care Management Review:  Horacio Prader is a 40 y o  female here for TCM follow up  During the TCM phone call patient stated:    TCM Call (since 5/8/2022)     Date and time call was made  6/6/2022  1:51 PM    Hospital care reviewed  Records reviewed    Patient was hospitialized at  [facility]    Date of Admission  06/01/22    Date of discharge  06/02/22    Diagnosis  obesity    Disposition  Home    Were the patients medications reviewed and updated  Yes    Current Symptoms  None      TCM Call (since 5/8/2022)     Post hospital issues  None    Should patient be enrolled in anticoag monitoring? No    Scheduled for follow up? Yes    Did you obtain your prescribed medications  Yes    Do you need help managing your prescriptions or medications  No    Is transportation to your appointment needed  No    I have advised the patient to call PCP with any new or worsening symptoms  nichol oleary    Are you recieving any outpatient services  No    Are you recieving home care services  No    Are you using any community resources  No    Current waiver services  No    Have you fallen in the last 12 months  No    Interperter language line needed  No    Counseling  Patient    Counseling topics  Diagnostic results; Prognosis; Importance of RX compliance; Activities of daily living; patient and family education; instructions for management;  Risk factor reduction          TANGELA Monterroso

## 2022-06-15 ENCOUNTER — OFFICE VISIT (OUTPATIENT)
Dept: BARIATRICS | Facility: CLINIC | Age: 37
End: 2022-06-15

## 2022-06-15 VITALS
DIASTOLIC BLOOD PRESSURE: 88 MMHG | WEIGHT: 222 LBS | TEMPERATURE: 97.8 F | SYSTOLIC BLOOD PRESSURE: 130 MMHG | BODY MASS INDEX: 36.99 KG/M2 | HEART RATE: 73 BPM | HEIGHT: 65 IN

## 2022-06-15 DIAGNOSIS — Z98.84 HISTORY OF ROUX-EN-Y GASTRIC BYPASS: ICD-10-CM

## 2022-06-15 DIAGNOSIS — E66.01 OBESITY, CLASS III, BMI 40-49.9 (MORBID OBESITY) (HCC): Primary | ICD-10-CM

## 2022-06-15 DIAGNOSIS — Z98.84 BARIATRIC SURGERY STATUS: Primary | ICD-10-CM

## 2022-06-15 PROCEDURE — RECHECK: Performed by: DIETITIAN, REGISTERED

## 2022-06-15 PROCEDURE — 99024 POSTOP FOLLOW-UP VISIT: CPT | Performed by: SURGERY

## 2022-06-15 NOTE — PROGRESS NOTES
Patient ID: Shawn Cm is a 40 y o  female  Subjective:      40 y o  female  s/p Robot Assisted Ondina-En-Y Gastric Bypass with Dr Lopez Or on 22 presents to the office today for post-op follow up  Patient has been tolerating pureed diet without N/V, dysphagia  Denies reflux symptoms and has been compliant with PPI therapy, currently taking it BID  Patient is having regular BMs and passing flatus  She has sustained a 6lb weight loss since the procedure  She is taking 64oz of liquid and getting 45grams of protein per day  Patient is also compliant with MVI therapy  She has seen her PCP      Historical Information   Past Medical History:   Diagnosis Date    Anxiety     Arthritis     CHF (congestive heart failure) (HCC)     Chronic back pain     Generalized anxiety disorder     Hiatal hernia     Hypertension     Kidney stone     Obesity     Primary osteoarthritis of left knee 2020    Transient ischemic attack     pt states not certain if actually had a TIA, states related to severe HTN episodes     Past Surgical History:   Procedure Laterality Date     SECTION      x 2    EGD      KNEE ARTHROSCOPY Left     acl/meniscus repair    LA CYSTO/URETERO W/LITHOTRIPSY &INDWELL STENT INSRT Right 2018    Procedure: CYSTOSCOPY URETEROSCOPY, RETROGRADE PYELOGRAM AND INSERTION STENT URETERAL, RIGHT STONE EXTRACTION;  Surgeon: Sharon Xiao MD;  Location: [facility];  Service: Urology    LA CYSTOURETHROSCOPY,URETER CATHETER Right 1/15/2018    Procedure: CYSTOSCOPY RETROGRADE PYELOGRAM WITH INSERTION STENT URETERAL;  Surgeon: Marli Varela MD;  Location[facility];  Service: Urology    LA LAP GASTRIC BYPASS/ONDINA-EN-Y N/A 2022    Procedure: BYPASS GASTRIC ONDINA-EN-Y LAPAROSCOPIC W ROBOTICS AND INTRAOPERATIVE EGD;  Surgeon: Florence Brown MD;  Location: [facility];  Service: Stephen O[address] Dr       Social History   Social History     Substance and Sexual Activity   Alcohol Use Yes    Comment: Rare social occasion , a few times a year     Social History     Substance and Sexual Activity   Drug Use No     Social History     Tobacco Use   Smoking Status Former Smoker    Packs/day: 0 20    Years: 5 00    Pack years: 1 00    Types: Cigarettes    Quit date: 2020    Years since quittin 4   Smokeless Tobacco Never Used       Meds/Allergies   all medications and allergies reviewed  No Known Allergies    Review of Systems   Constitutional: Negative  HENT: Negative  Eyes: Negative  Respiratory: Negative  Cardiovascular: Negative  Gastrointestinal: Negative  Endocrine: Negative  Genitourinary: Negative  Musculoskeletal: Negative  Skin: Negative  Allergic/Immunologic: Negative  Neurological: Negative  Hematological: Negative  Psychiatric/Behavioral: Negative  All other systems reviewed and are negative  Objective:    /88 (BP Location: Left arm, Patient Position: Sitting, Cuff Size: Standard)   Pulse 73   Temp 97 8 °F (36 6 °C) (Tympanic)   Ht 5' 5" (1 651 m)   Wt 101 kg (222 lb)   LMP 2022 (Approximate)   BMI 36 94 kg/m²       Physical Exam  Vitals and nursing note reviewed  Constitutional:       Appearance: Normal appearance  HENT:      Head: Normocephalic and atraumatic  Nose: Nose normal       Mouth/Throat:      Mouth: Mucous membranes are moist       Pharynx: Oropharynx is clear  Eyes:      Extraocular Movements: Extraocular movements intact  Pupils: Pupils are equal, round, and reactive to light  Cardiovascular:      Rate and Rhythm: Normal rate and regular rhythm  Pulses: Normal pulses  Pulmonary:      Effort: Pulmonary effort is normal    Abdominal:      General: Abdomen is flat  Bowel sounds are normal       Palpations: Abdomen is soft  Comments: Incisions are C/D/I  Healing well, without erythema or drainage  No bulges noted      Musculoskeletal: General: Normal range of motion  Cervical back: Normal range of motion and neck supple  Skin:     General: Skin is warm and dry  Neurological:      General: No focal deficit present  Mental Status: She is alert and oriented to person, place, and time  Mental status is at baseline  Psychiatric:         Mood and Affect: Mood normal          Behavior: Behavior normal          Thought Content: Thought content normal          Judgment: Judgment normal            Intraoperative Pathology reviewed with patient  Assessment/Plan:     There are no diagnoses linked to this encounter  40 y o  female s/p Michelle-En-Y Gastric Bypass with Dr Carlos Heart on 6/1/22, overall doing Well  Patient's has lost 33% of EBL since their initial visit with us  · Continue PPI therapy  · Continue vitamins as directed, with routine blood work follow up  · Patient to follow up with dietician per Bariatric protocols  · Continued/Maintain healthy weight loss with good nutrition intakes  · Adequate hydration with at least 64oz  fluid intake  · Follow diet as discussed  · Follow vitamin and mineral recommendations as reviewed with you  · Exercise as tolerated

## 2022-06-15 NOTE — PROGRESS NOTES
Weight Management Nutrition Class     Diagnosis: Morbid Obesity    Bariatric Surgeon: Dr Candice Montes    Surgery: Gastric Bypass Laparoscopic    Class: first post op note    Topics discussed today include:     fluid goals post op, protein goals post op, constipation, chew food well, exercise, avoidance of alcohol, PPI use, diet progression, hypoglycemia, dumping syndrome, protein supplems, vitamin/mineral supplements and calcium supplements    Patient was able to verbalize basic diet (protein, fluid, vitamin and mineral) recommendations and possible nutrition-related complications   Yes

## 2022-08-02 DIAGNOSIS — G89.29 CHRONIC BILATERAL LOW BACK PAIN WITHOUT SCIATICA: ICD-10-CM

## 2022-08-02 DIAGNOSIS — M54.50 CHRONIC BILATERAL LOW BACK PAIN WITHOUT SCIATICA: ICD-10-CM

## 2022-08-02 DIAGNOSIS — M17.12 PRIMARY OSTEOARTHRITIS OF LEFT KNEE: Primary | ICD-10-CM

## 2022-08-02 DIAGNOSIS — E66.01 OBESITY, CLASS III, BMI 40-49.9 (MORBID OBESITY) (HCC): ICD-10-CM

## 2022-08-02 RX ORDER — OMEPRAZOLE 20 MG/1
20 CAPSULE, DELAYED RELEASE ORAL 2 TIMES DAILY
Qty: 180 CAPSULE | Refills: 2 | Status: SHIPPED | OUTPATIENT
Start: 2022-08-02

## 2022-08-02 RX ORDER — METHOCARBAMOL 500 MG/1
500 TABLET, FILM COATED ORAL 4 TIMES DAILY PRN
Qty: 120 TABLET | Refills: 0 | Status: SHIPPED | OUTPATIENT
Start: 2022-08-02

## 2022-09-18 NOTE — ASSESSMENT & PLAN NOTE
· Known history of obesity   · Postop day 1 status post Michelle-en-Y gastric bypass  · Management per primary service normal...

## 2022-10-07 DIAGNOSIS — I10 HTN (HYPERTENSION), MALIGNANT: ICD-10-CM

## 2022-10-07 RX ORDER — AMLODIPINE BESYLATE 5 MG/1
10 TABLET ORAL DAILY
Qty: 180 TABLET | Refills: 1 | Status: SHIPPED | OUTPATIENT
Start: 2022-10-07

## 2022-10-12 PROBLEM — N30.01 ACUTE CYSTITIS WITH HEMATURIA: Status: RESOLVED | Noted: 2021-03-12 | Resolved: 2022-10-12

## 2022-10-12 PROBLEM — R50.9 FEVER OF UNKNOWN ORIGIN: Status: RESOLVED | Noted: 2021-03-11 | Resolved: 2022-10-12

## 2022-12-07 ENCOUNTER — TELEPHONE (OUTPATIENT)
Dept: BARIATRICS | Facility: CLINIC | Age: 37
End: 2022-12-07

## 2022-12-07 NOTE — TELEPHONE ENCOUNTER
Attempted to call patient to schedule her 6 month follow-up appointment, but phone number not available  Will send letter

## 2022-12-07 NOTE — TELEPHONE ENCOUNTER
----- Message from Emil Chowdhury RN sent at 2022  2:43 PM EST -----  Regardin month f/u appointment  Please contact patient BOTH, by phone AND send letter to schedule 6 month follow up appointment  Thank You

## 2023-05-24 DIAGNOSIS — I10 PRIMARY HYPERTENSION: ICD-10-CM

## 2023-05-24 DIAGNOSIS — I10 HTN (HYPERTENSION), MALIGNANT: ICD-10-CM

## 2023-05-24 DIAGNOSIS — E55.9 VITAMIN D DEFICIENCY: Primary | ICD-10-CM

## 2023-05-24 RX ORDER — LABETALOL 300 MG/1
300 TABLET, FILM COATED ORAL 3 TIMES DAILY
Qty: 60 TABLET | Refills: 0 | OUTPATIENT
Start: 2023-05-24

## 2023-05-24 RX ORDER — AMLODIPINE BESYLATE 5 MG/1
10 TABLET ORAL DAILY
Qty: 180 TABLET | Refills: 0 | OUTPATIENT
Start: 2023-05-24

## 2023-05-24 RX ORDER — LABETALOL 300 MG/1
300 TABLET, FILM COATED ORAL 3 TIMES DAILY
Qty: 90 TABLET | Refills: 0 | Status: SHIPPED | OUTPATIENT
Start: 2023-05-24

## 2023-05-24 RX ORDER — OMEGA-3S/DHA/EPA/FISH OIL/D3 300MG-1000
400 CAPSULE ORAL DAILY
Refills: 0 | OUTPATIENT
Start: 2023-05-24

## 2023-05-24 RX ORDER — AMLODIPINE BESYLATE 10 MG/1
10 TABLET ORAL DAILY
Qty: 30 TABLET | Refills: 0 | Status: SHIPPED | OUTPATIENT
Start: 2023-05-24

## 2023-05-24 RX ORDER — OMEGA-3S/DHA/EPA/FISH OIL/D3 300MG-1000
400 CAPSULE ORAL DAILY
Qty: 30 TABLET | Refills: 0 | Status: SHIPPED | OUTPATIENT
Start: 2023-05-24

## 2023-05-24 NOTE — TELEPHONE ENCOUNTER
Patient called back and scheduled Routine appointment 6/16 @ 9:15 AM  She is asking if a partial refill can be sent in to hold her over until that appointment

## 2023-05-24 NOTE — TELEPHONE ENCOUNTER
Patient aware  She is wondering if you can also send a partial refill of the labetalol (NORMODYNE) 300 mg tablet  please advise

## 2023-06-16 ENCOUNTER — OFFICE VISIT (OUTPATIENT)
Dept: INTERNAL MEDICINE CLINIC | Facility: CLINIC | Age: 38
End: 2023-06-16
Payer: COMMERCIAL

## 2023-06-16 VITALS
HEART RATE: 91 BPM | WEIGHT: 163 LBS | SYSTOLIC BLOOD PRESSURE: 162 MMHG | OXYGEN SATURATION: 96 % | DIASTOLIC BLOOD PRESSURE: 90 MMHG | TEMPERATURE: 98 F | HEIGHT: 65 IN | BODY MASS INDEX: 27.16 KG/M2

## 2023-06-16 DIAGNOSIS — I10 PRIMARY HYPERTENSION: Primary | ICD-10-CM

## 2023-06-16 DIAGNOSIS — E66.01 OBESITY, CLASS III, BMI 40-49.9 (MORBID OBESITY) (HCC): ICD-10-CM

## 2023-06-16 DIAGNOSIS — Z11.59 NEED FOR HEPATITIS C SCREENING TEST: ICD-10-CM

## 2023-06-16 DIAGNOSIS — Z98.84 HISTORY OF ROUX-EN-Y GASTRIC BYPASS: ICD-10-CM

## 2023-06-16 DIAGNOSIS — R00.2 PALPITATIONS: ICD-10-CM

## 2023-06-16 DIAGNOSIS — I50.32 CHRONIC DIASTOLIC HEART FAILURE WITH PRESERVED EJECTION FRACTION (HCC): ICD-10-CM

## 2023-06-16 DIAGNOSIS — Z63.0 MARITAL RELATIONSHIP PROBLEM: ICD-10-CM

## 2023-06-16 DIAGNOSIS — Z23 ENCOUNTER FOR VACCINATION: ICD-10-CM

## 2023-06-16 DIAGNOSIS — Z13.1 SCREENING FOR DIABETES MELLITUS (DM): ICD-10-CM

## 2023-06-16 DIAGNOSIS — Z13.220 SCREENING FOR LIPID DISORDERS: ICD-10-CM

## 2023-06-16 DIAGNOSIS — F41.1 GENERALIZED ANXIETY DISORDER: ICD-10-CM

## 2023-06-16 DIAGNOSIS — E55.9 VITAMIN D DEFICIENCY: ICD-10-CM

## 2023-06-16 DIAGNOSIS — I10 HTN (HYPERTENSION), MALIGNANT: ICD-10-CM

## 2023-06-16 DIAGNOSIS — R80.9 PROTEINURIA, UNSPECIFIED TYPE: ICD-10-CM

## 2023-06-16 PROBLEM — Z09 HOSPITAL DISCHARGE FOLLOW-UP: Status: RESOLVED | Noted: 2022-06-08 | Resolved: 2023-06-16

## 2023-06-16 PROBLEM — N17.9 AKI (ACUTE KIDNEY INJURY) (HCC): Status: RESOLVED | Noted: 2022-06-02 | Resolved: 2023-06-16

## 2023-06-16 PROBLEM — R06.02 SOB (SHORTNESS OF BREATH) ON EXERTION: Status: RESOLVED | Noted: 2021-03-12 | Resolved: 2023-06-16

## 2023-06-16 PROBLEM — Z01.818 PRE-OP EXAM: Status: RESOLVED | Noted: 2022-02-04 | Resolved: 2023-06-16

## 2023-06-16 PROCEDURE — 90471 IMMUNIZATION ADMIN: CPT | Performed by: INTERNAL MEDICINE

## 2023-06-16 PROCEDURE — 99214 OFFICE O/P EST MOD 30 MIN: CPT | Performed by: INTERNAL MEDICINE

## 2023-06-16 PROCEDURE — 90715 TDAP VACCINE 7 YRS/> IM: CPT | Performed by: INTERNAL MEDICINE

## 2023-06-16 RX ORDER — OMEGA-3S/DHA/EPA/FISH OIL/D3 300MG-1000
400 CAPSULE ORAL DAILY
Qty: 30 TABLET | Refills: 3 | Status: SHIPPED | OUTPATIENT
Start: 2023-06-16

## 2023-06-16 RX ORDER — LORAZEPAM 0.5 MG/1
0.5 TABLET ORAL EVERY 6 HOURS PRN
Qty: 120 TABLET | Refills: 0 | Status: SHIPPED | OUTPATIENT
Start: 2023-06-16

## 2023-06-16 RX ORDER — LORAZEPAM 0.5 MG/1
0.5 TABLET ORAL EVERY 6 HOURS PRN
Qty: 60 TABLET | Refills: 0 | Status: SHIPPED | OUTPATIENT
Start: 2023-06-16 | End: 2023-06-16 | Stop reason: SDUPTHER

## 2023-06-16 RX ORDER — LABETALOL 300 MG/1
300 TABLET, FILM COATED ORAL 3 TIMES DAILY
Qty: 90 TABLET | Refills: 1 | Status: SHIPPED | OUTPATIENT
Start: 2023-06-16

## 2023-06-16 RX ORDER — OMEPRAZOLE 20 MG/1
20 CAPSULE, DELAYED RELEASE ORAL 2 TIMES DAILY
Qty: 180 CAPSULE | Refills: 2 | Status: SHIPPED | OUTPATIENT
Start: 2023-06-16

## 2023-06-16 RX ORDER — AMLODIPINE BESYLATE 10 MG/1
10 TABLET ORAL DAILY
Qty: 30 TABLET | Refills: 0 | Status: SHIPPED | OUTPATIENT
Start: 2023-06-16

## 2023-06-16 SDOH — SOCIAL STABILITY - SOCIAL INSECURITY: PROBLEMS IN RELATIONSHIP WITH SPOUSE OR PARTNER: Z63.0

## 2023-06-16 NOTE — PATIENT INSTRUCTIONS
Chronic Hypertension   AMBULATORY CARE:   Hypertension is considered chronic  when it continues for 3 months or longer  Hypertension that continues causes your heart to work much harder than normal, which may lead to heart damage  Even if you have hypertension for years, lifestyle changes, medicines, or both may help lower your blood pressure  Call your local emergency number [phone] in the [address] [facility] [facility] or have someone call if:   You have chest pain  You have any of the following signs of a heart attack:      Squeezing, pressure, or pain in your chest    You may  also have any of the following:     Discomfort or pain in your back, neck, jaw, stomach, or arm    Shortness of breath    Nausea or vomiting    Lightheadedness or a sudden cold sweat    You become confused or have difficulty speaking  You suddenly feel lightheaded or have trouble breathing  Seek care immediately if:   You have a severe headache or vision loss  You have weakness in an arm or leg  Call your doctor or cardiologist if:   You feel faint, dizzy, confused, or drowsy  You have been taking your blood pressure medicine but your pressure is higher than your provider says it should be  You have questions or concerns about your condition or care  Treatment for chronic hypertension  may include medicine to lower your blood pressure and cholesterol levels  A low cholesterol level helps prevent heart disease and makes it easier to control your blood pressure  Heart disease can make your blood pressure harder to control  You may also need to make lifestyle changes  What you need to know about the stages of hypertension:  Your healthcare provider will give you a blood pressure goal based on your age, health, and risk for cardiovascular disease  The following are general guidelines on the stages of hypertension:  Normal blood pressure is 119/79 or lower    Your provider may only check your blood pressure each year if it stays at a normal level     Elevated blood pressure is 120/79 to 129/79   This is sometimes called prehypertension  Your provider may suggest lifestyle changes to help lower your blood pressure to a normal level  He or she may then check it again in 3 to 6 months  Stage 1 hypertension is 130/80  to 139/89   Your provider may recommend lifestyle changes, medication, and checks every 3 to 6 months until your blood pressure is controlled  Stage 2 hypertension is 140/90 or higher   Your provider will recommend lifestyle changes and have you take 2 kinds of hypertension medicines  You will also need to have your blood pressure checked monthly until it is controlled  Manage chronic hypertension:   Check your blood pressure at home  Do not smoke, have caffeine, or exercise for at least 30 minutes before you check your blood pressure  Sit and rest for 5 minutes before you check your blood pressure  Extend your arm and support it on a flat surface  Your arm should be at the same level as your heart  Follow the directions that came with your blood pressure monitor  Check your blood pressure 2 times, 1 minute apart, before you take your medicine in the morning  Also check your blood pressure before your evening meal  Keep a record of your readings and bring it to your follow-up visits  Your healthcare provider may use the readings to make changes to your treatment plan  Manage any other health conditions you have  Health conditions such as diabetes can increase your risk for hypertension  Follow your provider's instructions and take all your medicines as directed  Talk to your provider about any new health conditions you have recently developed  Ask about all medicines  Certain medicines can increase your blood pressure  Examples include oral birth control pills, decongestants, herbal supplements, and NSAIDs, such as ibuprofen  Your provider can tell you which medicines are safe for you to take   This includes prescription and over-the-counter medicines  Lifestyle changes you can make to lower your blood pressure: Your provider may want you to make more lifestyle changes if you are having trouble controlling your blood pressure  This may feel difficult over time, especially if you think you are making good changes but your pressure is still high  It might help to focus on one new change at a time  For example, try to add 1 more day of exercise, or exercise for an extra 10 minutes on 2 days  Small changes can make a big difference  Your healthcare provider can also refer you to specialists such as a dietitian who can help you make small changes  Your family members may be included in helping you learn to create lifestyle changes, such as the following:     Limit sodium (salt) as directed  Too much sodium can affect your fluid balance  Check labels to find low-sodium or no-salt-added foods  Some low-sodium foods use potassium salts for flavor  Too much potassium can also cause health problems  Your provider will tell you how much sodium and potassium are safe for you to have in a day  He or she may recommend that you limit sodium to 2,300 mg a day  Follow the meal plan recommended by your provider  A dietitian or your provider can give you more information on low-sodium plans or the DASH (Dietary Approaches to Stop Hypertension) eating plan  The DASH plan is low in sodium, processed sugar, unhealthy fats, and total fat  It is high in potassium, calcium, and fiber  These can be found in vegetables, fruit, and whole-grain foods  Be physically active throughout the day  Physical activity, such as exercise, can help control your blood pressure and your weight  Be physically active for at least 30 minutes per day, on most days of the week  Include aerobic activity, such as walking or riding a bicycle  Also include strength training at least 2 times each week   Your provider can help you create a physical activity plan  Decrease stress  This may help lower your blood pressure  Learn ways to relax, such as deep breathing or listening to music  Limit alcohol as directed  Alcohol can increase your blood pressure  A drink of alcohol is 12 ounces of beer, 5 ounces of wine, or 1½ ounces of liquor  Your provider can help you set daily and weekly drink limits  He or she may recommend no alcohol if your blood pressure stays higher than goal even with medicine or other measures  Ask your provider for information if you need help to quit  Do not smoke  Nicotine and other chemicals in cigarettes and cigars can increase your blood pressure and also cause lung damage  Ask your provider for information if you currently smoke and need help to quit  E-cigarettes or smokeless tobacco still contain nicotine  Talk to your provider before you use these products  Follow up with your doctor or cardiologist as directed: You will need to return to have your blood pressure checked and to have other lab tests done  Write down your questions so you remember to ask them during your visits  © Copyright [employer] 2022 Information is for End User's use only and may not be sold, redistributed or otherwise used for commercial purposes  The above information is an  only  It is not intended as medical advice for individual conditions or treatments  Talk to your doctor, nurse or pharmacist before following any medical regimen to see if it is safe and effective for you
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
## 2023-07-16 DIAGNOSIS — I10 HTN (HYPERTENSION), MALIGNANT: ICD-10-CM

## 2023-07-16 RX ORDER — AMLODIPINE BESYLATE 10 MG/1
10 TABLET ORAL DAILY
Qty: 30 TABLET | Refills: 0 | Status: SHIPPED | OUTPATIENT
Start: 2023-07-16

## 2023-08-01 ENCOUNTER — APPOINTMENT (OUTPATIENT)
Dept: LAB | Facility: CLINIC | Age: 38
End: 2023-08-01
Payer: COMMERCIAL

## 2023-08-01 DIAGNOSIS — I10 PRIMARY HYPERTENSION: ICD-10-CM

## 2023-08-01 DIAGNOSIS — I50.32 CHRONIC DIASTOLIC HEART FAILURE WITH PRESERVED EJECTION FRACTION (HCC): ICD-10-CM

## 2023-08-01 DIAGNOSIS — Z13.220 SCREENING FOR LIPID DISORDERS: ICD-10-CM

## 2023-08-01 DIAGNOSIS — Z98.84 HISTORY OF ROUX-EN-Y GASTRIC BYPASS: ICD-10-CM

## 2023-08-01 DIAGNOSIS — Z13.1 SCREENING FOR DIABETES MELLITUS (DM): ICD-10-CM

## 2023-08-01 LAB
ALBUMIN SERPL BCP-MCNC: 4 G/DL (ref 3.5–5)
ALP SERPL-CCNC: 61 U/L (ref 46–116)
ALT SERPL W P-5'-P-CCNC: 22 U/L (ref 12–78)
ANION GAP SERPL CALCULATED.3IONS-SCNC: 4 MMOL/L
AST SERPL W P-5'-P-CCNC: 17 U/L (ref 5–45)
BASOPHILS # BLD AUTO: 0.04 THOUSANDS/ÂΜL (ref 0–0.1)
BASOPHILS NFR BLD AUTO: 1 % (ref 0–1)
BILIRUB SERPL-MCNC: 0.72 MG/DL (ref 0.2–1)
BUN SERPL-MCNC: 17 MG/DL (ref 5–25)
CALCIUM SERPL-MCNC: 9.5 MG/DL (ref 8.3–10.1)
CHLORIDE SERPL-SCNC: 109 MMOL/L (ref 96–108)
CHOLEST SERPL-MCNC: 129 MG/DL
CO2 SERPL-SCNC: 27 MMOL/L (ref 21–32)
CREAT SERPL-MCNC: 0.91 MG/DL (ref 0.6–1.3)
EOSINOPHIL # BLD AUTO: 0.15 THOUSAND/ÂΜL (ref 0–0.61)
EOSINOPHIL NFR BLD AUTO: 3 % (ref 0–6)
ERYTHROCYTE [DISTWIDTH] IN BLOOD BY AUTOMATED COUNT: 12.8 % (ref 11.6–15.1)
EST. AVERAGE GLUCOSE BLD GHB EST-MCNC: 97 MG/DL
GFR SERPL CREATININE-BSD FRML MDRD: 80 ML/MIN/1.73SQ M
GLUCOSE P FAST SERPL-MCNC: 92 MG/DL (ref 65–99)
HBA1C MFR BLD: 5 %
HCT VFR BLD AUTO: 44.4 % (ref 34.8–46.1)
HDLC SERPL-MCNC: 59 MG/DL
HGB BLD-MCNC: 14.9 G/DL (ref 11.5–15.4)
IMM GRANULOCYTES # BLD AUTO: 0.01 THOUSAND/UL (ref 0–0.2)
IMM GRANULOCYTES NFR BLD AUTO: 0 % (ref 0–2)
LDLC SERPL CALC-MCNC: 62 MG/DL (ref 0–100)
LYMPHOCYTES # BLD AUTO: 1.52 THOUSANDS/ÂΜL (ref 0.6–4.47)
LYMPHOCYTES NFR BLD AUTO: 32 % (ref 14–44)
MCH RBC QN AUTO: 30 PG (ref 26.8–34.3)
MCHC RBC AUTO-ENTMCNC: 33.6 G/DL (ref 31.4–37.4)
MCV RBC AUTO: 89 FL (ref 82–98)
MONOCYTES # BLD AUTO: 0.39 THOUSAND/ÂΜL (ref 0.17–1.22)
MONOCYTES NFR BLD AUTO: 8 % (ref 4–12)
NEUTROPHILS # BLD AUTO: 2.7 THOUSANDS/ÂΜL (ref 1.85–7.62)
NEUTS SEG NFR BLD AUTO: 56 % (ref 43–75)
NRBC BLD AUTO-RTO: 0 /100 WBCS
PLATELET # BLD AUTO: 233 THOUSANDS/UL (ref 149–390)
PMV BLD AUTO: 10.6 FL (ref 8.9–12.7)
POTASSIUM SERPL-SCNC: 4.1 MMOL/L (ref 3.5–5.3)
PROT SERPL-MCNC: 7.4 G/DL (ref 6.4–8.4)
RBC # BLD AUTO: 4.97 MILLION/UL (ref 3.81–5.12)
SODIUM SERPL-SCNC: 140 MMOL/L (ref 135–147)
TRIGL SERPL-MCNC: 42 MG/DL
TSH SERPL DL<=0.05 MIU/L-ACNC: 1.13 UIU/ML (ref 0.45–4.5)
WBC # BLD AUTO: 4.81 THOUSAND/UL (ref 4.31–10.16)

## 2023-08-01 PROCEDURE — 80053 COMPREHEN METABOLIC PANEL: CPT

## 2023-08-01 PROCEDURE — 84443 ASSAY THYROID STIM HORMONE: CPT

## 2023-08-01 PROCEDURE — 80061 LIPID PANEL: CPT

## 2023-08-01 PROCEDURE — 36415 COLL VENOUS BLD VENIPUNCTURE: CPT

## 2023-08-01 PROCEDURE — 83036 HEMOGLOBIN GLYCOSYLATED A1C: CPT

## 2023-08-01 PROCEDURE — 85025 COMPLETE CBC W/AUTO DIFF WBC: CPT

## 2023-08-13 DIAGNOSIS — I10 HTN (HYPERTENSION), MALIGNANT: ICD-10-CM

## 2023-08-13 RX ORDER — AMLODIPINE BESYLATE 10 MG/1
10 TABLET ORAL DAILY
Qty: 30 TABLET | Refills: 0 | Status: SHIPPED | OUTPATIENT
Start: 2023-08-13

## 2023-08-21 ENCOUNTER — APPOINTMENT (OUTPATIENT)
Dept: LAB | Facility: CLINIC | Age: 38
End: 2023-08-21
Payer: COMMERCIAL

## 2023-08-22 ENCOUNTER — OFFICE VISIT (OUTPATIENT)
Dept: INTERNAL MEDICINE CLINIC | Facility: CLINIC | Age: 38
End: 2023-08-22
Payer: COMMERCIAL

## 2023-08-22 VITALS
OXYGEN SATURATION: 99 % | HEART RATE: 72 BPM | DIASTOLIC BLOOD PRESSURE: 90 MMHG | BODY MASS INDEX: 28.56 KG/M2 | WEIGHT: 171.6 LBS | TEMPERATURE: 97.6 F | SYSTOLIC BLOOD PRESSURE: 140 MMHG

## 2023-08-22 DIAGNOSIS — R00.2 PALPITATIONS: ICD-10-CM

## 2023-08-22 DIAGNOSIS — F41.1 GENERALIZED ANXIETY DISORDER: Primary | ICD-10-CM

## 2023-08-22 DIAGNOSIS — R80.9 PROTEINURIA, UNSPECIFIED TYPE: ICD-10-CM

## 2023-08-22 DIAGNOSIS — Z98.84 HISTORY OF ROUX-EN-Y GASTRIC BYPASS: ICD-10-CM

## 2023-08-22 DIAGNOSIS — I10 PRIMARY HYPERTENSION: ICD-10-CM

## 2023-08-22 LAB
CREAT UR-MCNC: 44.7 MG/DL
MICROALBUMIN UR-MCNC: 40.5 MG/L (ref 0–20)
MICROALBUMIN/CREAT 24H UR: 91 MG/G CREATININE (ref 0–30)

## 2023-08-22 PROCEDURE — 99214 OFFICE O/P EST MOD 30 MIN: CPT | Performed by: INTERNAL MEDICINE

## 2023-08-22 RX ORDER — DIPHENOXYLATE HYDROCHLORIDE AND ATROPINE SULFATE 2.5; .025 MG/1; MG/1
1 TABLET ORAL DAILY
COMMUNITY

## 2023-08-22 RX ORDER — HYDROCHLOROTHIAZIDE 25 MG/1
25 TABLET ORAL DAILY
Qty: 90 TABLET | Refills: 5 | Status: SHIPPED | OUTPATIENT
Start: 2023-08-22

## 2023-08-22 RX ORDER — MULTIVITAMIN
1 TABLET ORAL DAILY
Qty: 90 TABLET | Refills: 1 | Status: SHIPPED | OUTPATIENT
Start: 2023-08-22

## 2023-08-22 RX ORDER — LABETALOL 300 MG/1
300 TABLET, FILM COATED ORAL 3 TIMES DAILY
Qty: 90 TABLET | Refills: 1 | Status: SHIPPED | OUTPATIENT
Start: 2023-08-22

## 2023-08-22 NOTE — PROGRESS NOTES
Assessment/Plan:  Problem List Items Addressed This Visit        Cardiovascular and Mediastinum    Hypertension    Relevant Medications    labetalol (NORMODYNE) 300 mg tablet    hydrochlorothiazide (HYDRODIURIL) 25 mg tablet       Other    Proteinuria    History of Michelle-en-Y gastric bypass    Relevant Medications    Multiple Vitamin (multivitamin) tablet    hydrochlorothiazide (HYDRODIURIL) 25 mg tablet    Other Relevant Orders    Vitamin A    Vitamin B1, whole blood    Vitamin B12/Folate, Serum Panel    Vitamin B6    Vitamin D 25 hydroxy    Iron Panel (Includes Ferritin, Iron Sat%, Iron, and TIBC)    Generalized anxiety disorder - Primary   Other Visit Diagnoses     Palpitations               Diagnoses and all orders for this visit:    Generalized anxiety disorder    Primary hypertension  -     labetalol (NORMODYNE) 300 mg tablet; Take 1 tablet (300 mg total) by mouth 3 (three) times a day    Palpitations    Proteinuria, unspecified type    History of Michelle-en-Y gastric bypass  -     Multiple Vitamin (multivitamin) tablet; Take 1 tablet by mouth daily  -     hydrochlorothiazide (HYDRODIURIL) 25 mg tablet; Take 1 tablet (25 mg total) by mouth daily  -     Vitamin A; Future  -     Vitamin B1, whole blood; Future  -     Vitamin B12/Folate, Serum Panel; Future  -     Vitamin B6; Future  -     Vitamin D 25 hydroxy; Future  -     Iron Panel (Includes Ferritin, Iron Sat%, Iron, and TIBC); Future    Other orders  -     multivitamin (THERAGRAN) TABS; Take 1 tablet by mouth daily (Patient not taking: Reported on 8/22/2023)        No problem-specific Assessment & Plan notes found for this encounter. A/P: Stable and discussed labs. BP is slightly better, but not at goal. Will add HCTZ. FELI and palpitations better and pt not really using the benzo and will monitor. Will check gastric bypass labs. Continue current treatment and RTC three months for routine. Will call for bp readings in several weeks.      Subjective: Patient ID: Lula Mathews is a 41 y.o. female. WF RTC for f/u elevated BP after cutting her norvasc down from BID to q day, palpitations, and FELI. Doing better. FELI and palpitations are good. sBP are better, but still in the 140's with pt asymptomatic. No new issues. Recent labs ok. Tolerating meds. The following portions of the patient's history were reviewed and updated as appropriate:   She has a past medical history of Anxiety, Arthritis, CHF (congestive heart failure) ([address]), Chronic back pain, Generalized anxiety disorder, Hiatal hernia, Hypertension, Kidney stone, Obesity, Primary osteoarthritis of left knee (2020), and Transient ischemic attack. ,  does not have any pertinent problems on file. ,   has a past surgical history that includes pr cysto bladder w/ureteral catheterization (Right, 1/15/2018); Knee arthroscopy (Left);  section; Tubal ligation; pr cysto/uretero w/lithotripsy &indwell stent insrt (Right, 2018); EGD; and pr laps gstr rstcv px w/byp vicenta-en-y limb <150 cm (N/A, 2022). ,  family history includes Cancer in her mother; Hypertension in her father and mother. ,   reports that she quit smoking about 3 years ago. Her smoking use included cigarettes. She has a 1.00 pack-year smoking history. She has never used smokeless tobacco. She reports current alcohol use. She reports that she does not use drugs. ,  has No Known Allergies. .  Current Outpatient Medications   Medication Sig Dispense Refill   • amLODIPine (NORVASC) 10 mg tablet TAKE 1 TABLET (10 MG TOTAL) BY MOUTH DAILY 30 tablet 0   • cholecalciferol (VITAMIN D3) 400 units tablet Take 1 tablet (400 Units total) by mouth daily 30 tablet 3   • hydrochlorothiazide (HYDRODIURIL) 25 mg tablet Take 1 tablet (25 mg total) by mouth daily 90 tablet 5   • labetalol (NORMODYNE) 300 mg tablet Take 1 tablet (300 mg total) by mouth 3 (three) times a day 90 tablet 1   • LORazepam (Ativan) 0.5 mg tablet Take 1 tablet (0.5 mg total) by mouth every 6 (six) hours as needed for anxiety 120 tablet 0   • Multiple Vitamin (multivitamin) tablet Take 1 tablet by mouth daily 90 tablet 1   • omeprazole (PriLOSEC) 20 mg delayed release capsule Take 1 capsule (20 mg total) by mouth 2 (two) times a day 180 capsule 2   • multivitamin (THERAGRAN) TABS Take 1 tablet by mouth daily (Patient not taking: Reported on 8/22/2023)       No current facility-administered medications for this visit. Review of Systems   Constitutional: Negative for activity change, chills, diaphoresis, fatigue and fever. Respiratory: Negative for cough, chest tightness, shortness of breath and wheezing. Cardiovascular: Negative for chest pain, palpitations and leg swelling. Gastrointestinal: Negative for abdominal pain, constipation, diarrhea, nausea and vomiting. Genitourinary: Negative for difficulty urinating, dysuria and frequency. Musculoskeletal: Negative for arthralgias, gait problem and myalgias. Neurological: Negative for dizziness, seizures, syncope, weakness, light-headedness and headaches. Psychiatric/Behavioral: Negative for confusion, dysphoric mood and sleep disturbance. The patient is not nervous/anxious. PHQ-2/9 Depression Screening          Objective:  Vitals:    08/22/23 1521   BP: 140/90   BP Location: Left arm   Patient Position: Sitting   Cuff Size: Standard   Pulse: 72   Temp: 97.6 °F (36.4 °C)   SpO2: 99%   Weight: 77.8 kg (171 lb 9.6 oz)     Body mass index is 28.56 kg/m². Physical Exam  Vitals and nursing note reviewed. Constitutional:       General: She is not in acute distress. Appearance: Normal appearance. She is not ill-appearing. HENT:      Head: Normocephalic and atraumatic. Mouth/Throat:      Mouth: Mucous membranes are moist.   Eyes:      Extraocular Movements: Extraocular movements intact. Conjunctiva/sclera: Conjunctivae normal.      Pupils: Pupils are equal, round, and reactive to light. Cardiovascular:      Rate and Rhythm: Normal rate and regular rhythm. Heart sounds: Normal heart sounds. No murmur heard. Pulmonary:      Effort: Pulmonary effort is normal. No respiratory distress. Breath sounds: Normal breath sounds. No wheezing, rhonchi or rales. Abdominal:      General: Bowel sounds are normal. There is no distension. Palpations: Abdomen is soft. Tenderness: There is no abdominal tenderness. Musculoskeletal:      Right lower leg: No edema. Left lower leg: No edema. Neurological:      General: No focal deficit present. Mental Status: She is alert and oriented to person, place, and time. Mental status is at baseline. Psychiatric:         Mood and Affect: Mood normal.         Behavior: Behavior normal.         Thought Content:  Thought content normal.         Judgment: Judgment normal.

## 2023-08-22 NOTE — PATIENT INSTRUCTIONS
Chronic Hypertension   AMBULATORY CARE:   Hypertension is considered chronic  when it continues for 3 months or longer. Hypertension that continues causes your heart to work much harder than normal, which may lead to heart damage. Even if you have hypertension for years, lifestyle changes, medicines, or both may help lower your blood pressure. Call your local emergency number [phone] in the [address]) or have someone call if:   You have chest pain. You have any of the following signs of a heart attack:      Squeezing, pressure, or pain in your chest    You may  also have any of the following:     Discomfort or pain in your back, neck, jaw, stomach, or arm    Shortness of breath    Nausea or vomiting    Lightheadedness or a sudden cold sweat    You become confused or have difficulty speaking. You suddenly feel lightheaded or have trouble breathing. Seek care immediately if:   You have a severe headache or vision loss. You have weakness in an arm or leg. Call your doctor or cardiologist if:   You feel faint, dizzy, confused, or drowsy. You have been taking your blood pressure medicine but your pressure is higher than your provider says it should be. You have questions or concerns about your condition or care. Treatment for chronic hypertension  may include medicine to lower your blood pressure and cholesterol levels. A low cholesterol level helps prevent heart disease and makes it easier to control your blood pressure. Heart disease can make your blood pressure harder to control. You may also need to make lifestyle changes. What you need to know about the stages of hypertension:  Your healthcare provider will give you a blood pressure goal based on your age, health, and risk for cardiovascular disease. The following are general guidelines on the stages of hypertension:  Normal blood pressure is 119/79 or lower .  Your provider may only check your blood pressure each year if it stays at a normal level.    Elevated blood pressure is 120/79 to 129/79 . This is sometimes called prehypertension. Your provider may suggest lifestyle changes to help lower your blood pressure to a normal level. He or she may then check it again in 3 to 6 months. Stage 1 hypertension is 130/80  to 139/89 . Your provider may recommend lifestyle changes, medication, and checks every 3 to 6 months until your blood pressure is controlled. Stage 2 hypertension is 140/90 or higher . Your provider will recommend lifestyle changes and have you take 2 kinds of hypertension medicines. You will also need to have your blood pressure checked monthly until it is controlled. Manage chronic hypertension:   Check your blood pressure at home. Do not smoke, have caffeine, or exercise for at least 30 minutes before you check your blood pressure. Sit and rest for 5 minutes before you check your blood pressure. Extend your arm and support it on a flat surface. Your arm should be at the same level as your heart. Follow the directions that came with your blood pressure monitor. Check your blood pressure 2 times, 1 minute apart, before you take your medicine in the morning. Also check your blood pressure before your evening meal. Keep a record of your readings and bring it to your follow-up visits. Your healthcare provider may use the readings to make changes to your treatment plan. Manage any other health conditions you have. Health conditions such as diabetes can increase your risk for hypertension. Follow your provider's instructions and take all your medicines as directed. Talk to your provider about any new health conditions you have recently developed. Ask about all medicines. Certain medicines can increase your blood pressure. Examples include oral birth control pills, decongestants, herbal supplements, and NSAIDs, such as ibuprofen. Your provider can tell you which medicines are safe for you to take.  This includes prescription and over-the-counter medicines. Lifestyle changes you can make to lower your blood pressure: Your provider may want you to make more lifestyle changes if you are having trouble controlling your blood pressure. This may feel difficult over time, especially if you think you are making good changes but your pressure is still high. It might help to focus on one new change at a time. For example, try to add 1 more day of exercise, or exercise for an extra 10 minutes on 2 days. Small changes can make a big difference. Your healthcare provider can also refer you to specialists such as a dietitian who can help you make small changes. Your family members may be included in helping you learn to create lifestyle changes, such as the following:     Limit sodium (salt) as directed. Too much sodium can affect your fluid balance. Check labels to find low-sodium or no-salt-added foods. Some low-sodium foods use potassium salts for flavor. Too much potassium can also cause health problems. Your provider will tell you how much sodium and potassium are safe for you to have in a day. He or she may recommend that you limit sodium to 2,300 mg a day. Follow the meal plan recommended by your provider. A dietitian or your provider can give you more information on low-sodium plans or the DASH (Dietary Approaches to Stop Hypertension) eating plan. The DASH plan is low in sodium, processed sugar, unhealthy fats, and total fat. It is high in potassium, calcium, and fiber. These can be found in vegetables, fruit, and whole-grain foods. Be physically active throughout the day. Physical activity, such as exercise, can help control your blood pressure and your weight. Be physically active for at least 30 minutes per day, on most days of the week. Include aerobic activity, such as walking or riding a bicycle. Also include strength training at least 2 times each week.  Your provider can help you create a physical activity plan. Decrease stress. This may help lower your blood pressure. Learn ways to relax, such as deep breathing or listening to music. Limit alcohol as directed. Alcohol can increase your blood pressure. A drink of alcohol is 12 ounces of beer, 5 ounces of wine, or 1½ ounces of liquor. Your provider can help you set daily and weekly drink limits. He or she may recommend no alcohol if your blood pressure stays higher than goal even with medicine or other measures. Ask your provider for information if you need help to quit. Do not smoke. Nicotine and other chemicals in cigarettes and cigars can increase your blood pressure and also cause lung damage. Ask your provider for information if you currently smoke and need help to quit. E-cigarettes or smokeless tobacco still contain nicotine. Talk to your provider before you use these products. Follow up with your doctor or cardiologist as directed: You will need to return to have your blood pressure checked and to have other lab tests done. Write down your questions so you remember to ask them during your visits. © Copyright Karyn Mayer 2022 Information is for End User's use only and may not be sold, redistributed or otherwise used for commercial purposes. The above information is an  only. It is not intended as medical advice for individual conditions or treatments. Talk to your doctor, nurse or pharmacist before following any medical regimen to see if it is safe and effective for you.

## 2023-09-10 DIAGNOSIS — I10 HTN (HYPERTENSION), MALIGNANT: ICD-10-CM

## 2023-09-10 RX ORDER — AMLODIPINE BESYLATE 10 MG/1
10 TABLET ORAL DAILY
Qty: 30 TABLET | Refills: 0 | Status: SHIPPED | OUTPATIENT
Start: 2023-09-10

## 2023-09-18 ENCOUNTER — APPOINTMENT (OUTPATIENT)
Dept: LAB | Facility: CLINIC | Age: 38
End: 2023-09-18
Payer: COMMERCIAL

## 2023-09-18 DIAGNOSIS — Z98.84 BARIATRIC SURGERY STATUS: ICD-10-CM

## 2023-09-18 DIAGNOSIS — Z98.84 HISTORY OF ROUX-EN-Y GASTRIC BYPASS: Primary | ICD-10-CM

## 2023-09-18 LAB
IRON SATN MFR SERPL: 13 % (ref 15–50)
IRON SERPL-MCNC: 43 UG/DL (ref 50–212)
TIBC SERPL-MCNC: 334 UG/DL (ref 250–450)
UIBC SERPL-MCNC: 291 UG/DL (ref 155–355)

## 2023-09-18 PROCEDURE — 82728 ASSAY OF FERRITIN: CPT

## 2023-09-18 PROCEDURE — 82306 VITAMIN D 25 HYDROXY: CPT

## 2023-09-18 PROCEDURE — 84207 ASSAY OF VITAMIN B-6: CPT

## 2023-09-18 PROCEDURE — 84425 ASSAY OF VITAMIN B-1: CPT

## 2023-09-18 PROCEDURE — 83550 IRON BINDING TEST: CPT

## 2023-09-18 PROCEDURE — 84590 ASSAY OF VITAMIN A: CPT

## 2023-09-18 PROCEDURE — 83540 ASSAY OF IRON: CPT

## 2023-09-18 PROCEDURE — 36415 COLL VENOUS BLD VENIPUNCTURE: CPT

## 2023-09-18 PROCEDURE — 82607 VITAMIN B-12: CPT

## 2023-09-18 PROCEDURE — 82746 ASSAY OF FOLIC ACID SERUM: CPT

## 2023-09-19 LAB
25(OH)D3 SERPL-MCNC: 54.4 NG/ML (ref 30–100)
FERRITIN SERPL-MCNC: 13 NG/ML (ref 11–307)
FOLATE SERPL-MCNC: 10.6 NG/ML
VIT B12 SERPL-MCNC: 412 PG/ML (ref 180–914)

## 2023-09-22 LAB
VIT A SERPL-MCNC: 42.2 UG/DL (ref 18.9–57.3)
VIT B6 SERPL-MCNC: 12.1 UG/L (ref 3.4–65.2)

## 2023-09-25 DIAGNOSIS — I10 HTN (HYPERTENSION), MALIGNANT: ICD-10-CM

## 2023-09-25 RX ORDER — AMLODIPINE BESYLATE 10 MG/1
10 TABLET ORAL DAILY
Qty: 90 TABLET | Refills: 1 | Status: SHIPPED | OUTPATIENT
Start: 2023-09-25

## 2023-09-26 LAB — VIT B1 BLD-SCNC: 72.6 NMOL/L (ref 66.5–200)

## 2023-10-10 DIAGNOSIS — I10 HTN (HYPERTENSION), MALIGNANT: ICD-10-CM

## 2023-10-10 RX ORDER — AMLODIPINE BESYLATE 10 MG/1
10 TABLET ORAL DAILY
Qty: 30 TABLET | Refills: 0 | Status: SHIPPED | OUTPATIENT
Start: 2023-10-10

## 2023-10-23 DIAGNOSIS — E55.9 VITAMIN D DEFICIENCY: ICD-10-CM

## 2023-10-23 RX ORDER — OMEGA-3S/DHA/EPA/FISH OIL/D3 300MG-1000
400 CAPSULE ORAL DAILY
Qty: 30 TABLET | Refills: 3 | Status: SHIPPED | OUTPATIENT
Start: 2023-10-23

## 2023-11-01 ENCOUNTER — OFFICE VISIT (OUTPATIENT)
Dept: INTERNAL MEDICINE CLINIC | Facility: CLINIC | Age: 38
End: 2023-11-01
Payer: COMMERCIAL

## 2023-11-01 VITALS
OXYGEN SATURATION: 99 % | SYSTOLIC BLOOD PRESSURE: 136 MMHG | TEMPERATURE: 97.1 F | WEIGHT: 181.4 LBS | BODY MASS INDEX: 30.22 KG/M2 | DIASTOLIC BLOOD PRESSURE: 84 MMHG | HEART RATE: 66 BPM | HEIGHT: 65 IN

## 2023-11-01 DIAGNOSIS — M54.50 ACUTE LOW BACK PAIN, UNSPECIFIED BACK PAIN LATERALITY, UNSPECIFIED WHETHER SCIATICA PRESENT: Primary | ICD-10-CM

## 2023-11-01 DIAGNOSIS — M25.552 LEFT HIP PAIN: ICD-10-CM

## 2023-11-01 PROCEDURE — 96372 THER/PROPH/DIAG INJ SC/IM: CPT | Performed by: NURSE PRACTITIONER

## 2023-11-01 PROCEDURE — 99214 OFFICE O/P EST MOD 30 MIN: CPT | Performed by: NURSE PRACTITIONER

## 2023-11-01 RX ORDER — METHYLPREDNISOLONE 4 MG/1
TABLET ORAL
Qty: 21 EACH | Refills: 0 | Status: SHIPPED | OUTPATIENT
Start: 2023-11-01

## 2023-11-01 RX ORDER — TRIAMCINOLONE ACETONIDE 40 MG/ML
40 INJECTION, SUSPENSION INTRA-ARTICULAR; INTRAMUSCULAR ONCE
Status: COMPLETED | OUTPATIENT
Start: 2023-11-01 | End: 2023-11-01

## 2023-11-01 RX ORDER — KETOROLAC TROMETHAMINE 30 MG/ML
30 INJECTION, SOLUTION INTRAMUSCULAR; INTRAVENOUS ONCE
Status: COMPLETED | OUTPATIENT
Start: 2023-11-01 | End: 2023-11-01

## 2023-11-01 RX ORDER — METHOCARBAMOL 500 MG/1
500 TABLET, FILM COATED ORAL 4 TIMES DAILY PRN
Qty: 120 TABLET | Refills: 0 | Status: SHIPPED | OUTPATIENT
Start: 2023-11-01

## 2023-11-01 RX ADMIN — KETOROLAC TROMETHAMINE 30 MG: 30 INJECTION, SOLUTION INTRAMUSCULAR; INTRAVENOUS at 09:32

## 2023-11-01 RX ADMIN — TRIAMCINOLONE ACETONIDE 40 MG: 40 INJECTION, SUSPENSION INTRA-ARTICULAR; INTRAMUSCULAR at 09:32

## 2023-11-01 NOTE — ASSESSMENT & PLAN NOTE
11/1/2023  Left buttocks pain  Did wear heels   Will send for a lumbar spine xray  Will send for a bilateral hip xray  Will give a toradol and kenalog injections   Will give her robaxin  Will give her medrol dose pack

## 2023-11-01 NOTE — PATIENT INSTRUCTIONS
Problem List Items Addressed This Visit          Other    Acute low back pain - Primary     11/1/2023  Left buttocks pain  Did wear heels   Will send for a lumbar spine xray  Will send for a bilateral hip xray  Will give a toradol and kenalog injections   Will give her robaxin  Will give her medrol dose pack         Relevant Medications    ketorolac (TORADOL) 60 mg/2 mL IM injection 30 mg    triamcinolone acetonide (KENALOG-40) 40 mg/mL injection 40 mg    methylPREDNISolone 4 MG tablet therapy pack    methocarbamol (ROBAXIN) 500 mg tablet    Other Relevant Orders    XR spine lumbar minimum 4 views non injury    XR hips bilateral 3-4 vw w pelvis if performed    Left hip pain     11/1/2023  Left buttocks pain  Did wear heels   Will send for a lumbar spine xray  Will send for a bilateral hip xray  Will give a toradol and kenalog injections   Will give her robaxin  Will give her medrol dose pack         Relevant Medications    ketorolac (TORADOL) 60 mg/2 mL IM injection 30 mg    triamcinolone acetonide (KENALOG-40) 40 mg/mL injection 40 mg    methylPREDNISolone 4 MG tablet therapy pack    methocarbamol (ROBAXIN) 500 mg tablet    Other Relevant Orders    XR spine lumbar minimum 4 views non injury    XR hips bilateral 3-4 vw w pelvis if performed

## 2023-11-01 NOTE — PROGRESS NOTES
Name: Dallin Tran      : 1985      MRN: [id number]  Encounter Provider: TANGELA Whiting  Encounter Date: 2023   Encounter department: [address] [place] [address] [place]     1. Acute low back pain, unspecified back pain laterality, unspecified whether sciatica present  Assessment & Plan:  2023  Left buttocks pain  Did wear heels   Will send for a lumbar spine xray  Will send for a bilateral hip xray  Will give a toradol and kenalog injections   Will give her robaxin  Will give her medrol dose pack    Orders:  -     ketorolac (TORADOL) 60 mg/2 mL IM injection 30 mg  -     triamcinolone acetonide (KENALOG-40) 40 mg/mL injection 40 mg  -     XR spine lumbar minimum 4 views non injury; Future; Expected date: 2023  -     XR hips bilateral 3-4 vw w pelvis if performed; Future; Expected date: 2023  -     methylPREDNISolone 4 MG tablet therapy pack; Use as directed on package  -     methocarbamol (ROBAXIN) 500 mg tablet; Take 1 tablet (500 mg total) by mouth 4 (four) times a day as needed for muscle spasms    2. Left hip pain  Assessment & Plan:  2023  Left buttocks pain  Did wear heels   Will send for a lumbar spine xray  Will send for a bilateral hip xray  Will give a toradol and kenalog injections   Will give her robaxin  Will give her medrol dose pack    Orders:  -     ketorolac (TORADOL) 60 mg/2 mL IM injection 30 mg  -     triamcinolone acetonide (KENALOG-40) 40 mg/mL injection 40 mg  -     XR spine lumbar minimum 4 views non injury; Future; Expected date: 2023  -     XR hips bilateral 3-4 vw w pelvis if performed; Future; Expected date: 2023  -     methylPREDNISolone 4 MG tablet therapy pack; Use as directed on package  -     methocarbamol (ROBAXIN) 500 mg tablet;  Take 1 tablet (500 mg total) by mouth 4 (four) times a day as needed for muscle spasms        Depression Screening and Follow-up Plan: Patient was screened for depression during today's encounter. They screened negative with a PHQ-2 score of 0. Subjective      The patient is here today to discuss left buttocks. Please continue to the [facility] section of the note for details of today's visit. Review of Systems   Constitutional:  Negative for activity change, chills, fatigue and fever. HENT:  Negative for rhinorrhea and sore throat. Eyes:  Negative for pain. Respiratory:  Negative for cough and shortness of breath. Cardiovascular:  Negative for chest pain, palpitations and leg swelling. Gastrointestinal:  Negative for abdominal pain, constipation, diarrhea, nausea and vomiting. Genitourinary:  Negative for difficulty urinating, flank pain, frequency and urgency. Musculoskeletal:  Positive for arthralgias, back pain, gait problem and myalgias. Negative for joint swelling. Skin:  Negative for color change. Neurological:  Negative for dizziness, weakness, light-headedness and headaches. Psychiatric/Behavioral:  Negative for sleep disturbance. The patient is not nervous/anxious. All other systems reviewed and are negative.       Current Outpatient Medications on File Prior to Visit   Medication Sig   • amLODIPine (NORVASC) 10 mg tablet TAKE 1 TABLET (10 MG TOTAL) BY MOUTH DAILY   • cholecalciferol (VITAMIN D3) 400 units tablet TAKE 1 TABLET (400 UNITS TOTAL) BY MOUTH DAILY   • hydrochlorothiazide (HYDRODIURIL) 25 mg tablet Take 1 tablet (25 mg total) by mouth daily   • labetalol (NORMODYNE) 300 mg tablet Take 1 tablet (300 mg total) by mouth 3 (three) times a day (Patient taking differently: Take 300 mg by mouth 2 (two) times a day)   • omeprazole (PriLOSEC) 20 mg delayed release capsule Take 1 capsule (20 mg total) by mouth 2 (two) times a day (Patient taking differently: Take 20 mg by mouth in the morning)   • [DISCONTINUED] LORazepam (Ativan) 0.5 mg tablet Take 1 tablet (0.5 mg total) by mouth every 6 (six) hours as needed for anxiety (Patient not taking: Reported on 11/1/2023)   • [DISCONTINUED] Multiple Vitamin (multivitamin) tablet Take 1 tablet by mouth daily (Patient not taking: Reported on 11/1/2023)   • [DISCONTINUED] multivitamin (THERAGRAN) TABS Take 1 tablet by mouth daily (Patient not taking: Reported on 8/22/2023)       Objective     /84 (BP Location: Left arm, Patient Position: Sitting)   Pulse 66   Temp (!) 97.1 °F (36.2 °C) (Tympanic)   Ht 5' 5" (1.651 m)   Wt 82.3 kg (181 lb 6.4 oz)   LMP 10/18/2023 (Approximate)   SpO2 99%   BMI 30.19 kg/m²     Physical Exam  Vitals reviewed. Constitutional:       General: She is awake. Appearance: Normal appearance. She is well-developed and normal weight. HENT:      Head: Normocephalic and atraumatic. Nose: Nose normal.      Mouth/Throat:      Mouth: Mucous membranes are moist.   Eyes:      Extraocular Movements: Extraocular movements intact. Cardiovascular:      Rate and Rhythm: Normal rate and regular rhythm. Pulses: Normal pulses. Heart sounds: Normal heart sounds. Pulmonary:      Effort: Pulmonary effort is normal.      Breath sounds: Normal breath sounds. Abdominal:      General: Bowel sounds are normal.      Palpations: Abdomen is soft. Musculoskeletal:         General: Normal range of motion. Cervical back: Normal range of motion. Right lower leg: No edema. Left lower leg: No edema. Legs:    Skin:     General: Skin is warm and dry. Neurological:      Mental Status: She is alert and oriented to person, place, and time. Psychiatric:         Attention and Perception: Attention normal.         Mood and Affect: Mood normal.         Speech: Speech normal.         Behavior: Behavior normal. Behavior is cooperative.        TANGELA King

## 2023-11-01 NOTE — LETTER
November 1, 2023     Patient: Urmila Fabian  YOB: 1985  Date of Visit: 11/1/2023      To Whom it May Concern:    Ean Cristina is under my professional care. Layla Rodriguez was seen in my office on 11/1/2023. Please excuse her from work on 10/31/2023, 11/1/2023 and 11/2/2023. Layla Rodriguez may return to work on 11/3/2023 . If you have any questions or concerns, please don't hesitate to call.          Sincerely,          TANGELA Mcdainel        CC: No Recipients

## 2023-11-02 ENCOUNTER — APPOINTMENT (OUTPATIENT)
Dept: RADIOLOGY | Facility: CLINIC | Age: 38
End: 2023-11-02
Payer: COMMERCIAL

## 2023-11-02 DIAGNOSIS — M25.552 LEFT HIP PAIN: ICD-10-CM

## 2023-11-02 DIAGNOSIS — M54.50 ACUTE LOW BACK PAIN, UNSPECIFIED BACK PAIN LATERALITY, UNSPECIFIED WHETHER SCIATICA PRESENT: ICD-10-CM

## 2023-11-02 PROCEDURE — 72110 X-RAY EXAM L-2 SPINE 4/>VWS: CPT

## 2023-11-02 PROCEDURE — 73522 X-RAY EXAM HIPS BI 3-4 VIEWS: CPT

## 2023-11-03 ENCOUNTER — TELEPHONE (OUTPATIENT)
Dept: INTERNAL MEDICINE CLINIC | Facility: CLINIC | Age: 38
End: 2023-11-03

## 2023-11-03 NOTE — TELEPHONE ENCOUNTER
Patient called stating she was here 11/1 for an appointment with you. She states she is not feeling much better and wanted me to let you know she sent you a message on [employer].

## 2023-11-06 ENCOUNTER — TELEPHONE (OUTPATIENT)
Dept: BARIATRICS | Facility: CLINIC | Age: 38
End: 2023-11-06

## 2023-11-06 DIAGNOSIS — I10 HTN (HYPERTENSION), MALIGNANT: ICD-10-CM

## 2023-11-06 RX ORDER — AMLODIPINE BESYLATE 10 MG/1
10 TABLET ORAL DAILY
Qty: 30 TABLET | Refills: 0 | Status: SHIPPED | OUTPATIENT
Start: 2023-11-06

## 2023-11-14 DIAGNOSIS — M54.50 ACUTE LOW BACK PAIN, UNSPECIFIED BACK PAIN LATERALITY, UNSPECIFIED WHETHER SCIATICA PRESENT: ICD-10-CM

## 2023-11-14 DIAGNOSIS — M25.552 LEFT HIP PAIN: Primary | ICD-10-CM

## 2023-11-17 ENCOUNTER — TELEPHONE (OUTPATIENT)
Dept: INTERNAL MEDICINE CLINIC | Facility: CLINIC | Age: 38
End: 2023-11-17

## 2023-11-17 NOTE — TELEPHONE ENCOUNTER
I called but could not leave a message - the mailbox is full. Her [facility] paperwork is here for her to  and it is also scanned into her chart.

## 2023-11-28 DIAGNOSIS — M54.50 ACUTE LOW BACK PAIN, UNSPECIFIED BACK PAIN LATERALITY, UNSPECIFIED WHETHER SCIATICA PRESENT: ICD-10-CM

## 2023-11-28 DIAGNOSIS — M25.552 LEFT HIP PAIN: ICD-10-CM

## 2023-11-28 RX ORDER — METHOCARBAMOL 500 MG/1
500 TABLET, FILM COATED ORAL 4 TIMES DAILY PRN
Qty: 120 TABLET | Refills: 0 | Status: SHIPPED | OUTPATIENT
Start: 2023-11-28

## 2023-12-03 DIAGNOSIS — I10 HTN (HYPERTENSION), MALIGNANT: ICD-10-CM

## 2023-12-03 RX ORDER — AMLODIPINE BESYLATE 10 MG/1
10 TABLET ORAL DAILY
Qty: 30 TABLET | Refills: 0 | Status: SHIPPED | OUTPATIENT
Start: 2023-12-03

## 2023-12-16 DIAGNOSIS — I10 PRIMARY HYPERTENSION: ICD-10-CM

## 2023-12-18 RX ORDER — LABETALOL 300 MG/1
300 TABLET, FILM COATED ORAL 3 TIMES DAILY
Qty: 90 TABLET | Refills: 0 | Status: SHIPPED | OUTPATIENT
Start: 2023-12-18 | End: 2024-01-17

## 2023-12-31 DIAGNOSIS — I10 HTN (HYPERTENSION), MALIGNANT: ICD-10-CM

## 2023-12-31 RX ORDER — AMLODIPINE BESYLATE 10 MG/1
10 TABLET ORAL DAILY
Qty: 30 TABLET | Refills: 0 | Status: SHIPPED | OUTPATIENT
Start: 2023-12-31

## 2024-01-22 NOTE — ASSESSMENT & PLAN NOTE
Presents as referral from PCP for evaluation of low back pain  Has suffered from thoracic pain x many years and diagnosed with thoracic herniated disc in 2020.   Current low back pain since October 2023 radiating occasionally into hips and up to thoracic area with associated numbness sensation to upper back.  Takes ibuprofen occasionally which relieves pain; steroids and Robaxin have not been helpful.  On exam, noted with bilateral hip weakness (antalgic) 4/5. Otherwise motor strength intact. Bilateral Rios's, hyperreflexia throughout. Patellar DTR on right 3+, clonus to right LE.     Imaging:  Lumbar spine x-rays, 11/2/2023: There is a transitional partially sacralized L5 vertebral body left more so than right. Lumbar spondylitic degenerative change as described above. Changes have mildly progressed when compared with the prior exam.     Plan:  Discussed that although low back pain is of concern, I have more concern regarding her exam findings concerning for myelopathy. Additionally, she has sensation changes to her back below about T6.   For these reasons, I will initiate workup with cervical and thoracic MRI as she has history of thoracic herniated disc.  Would refrain from any PT or chiropractor in the interim.  She will discuss whether she can take NSAID's with her bariatric surgeon.  Follow up as joint appointment with surgeon once imaging has been completed.  If findings on cervical and thoracic MRIs are unremarkable, can consider MRI of lumbar spine to work up low back pain.

## 2024-01-23 ENCOUNTER — CONSULT (OUTPATIENT)
Dept: NEUROSURGERY | Facility: CLINIC | Age: 39
End: 2024-01-23
Payer: COMMERCIAL

## 2024-01-23 VITALS
HEART RATE: 66 BPM | WEIGHT: 194 LBS | TEMPERATURE: 98.7 F | BODY MASS INDEX: 32.32 KG/M2 | SYSTOLIC BLOOD PRESSURE: 130 MMHG | RESPIRATION RATE: 20 BRPM | OXYGEN SATURATION: 99 % | DIASTOLIC BLOOD PRESSURE: 70 MMHG | HEIGHT: 65 IN

## 2024-01-23 DIAGNOSIS — M54.50 ACUTE LOW BACK PAIN, UNSPECIFIED BACK PAIN LATERALITY, UNSPECIFIED WHETHER SCIATICA PRESENT: Primary | ICD-10-CM

## 2024-01-23 PROCEDURE — 99244 OFF/OP CNSLTJ NEW/EST MOD 40: CPT | Performed by: NURSE PRACTITIONER

## 2024-01-23 NOTE — PROGRESS NOTES
Neurosurgery Office Note  Galina Velásquez 38 y.o. female MRN: [id number]      Assessment/Plan     Acute low back pain  Presents as referral from PCP for evaluation of low back pain  Has suffered from thoracic pain x many years and diagnosed with thoracic herniated disc in 2020.   Current low back pain since October 2023 radiating occasionally into hips and up to thoracic area with associated numbness sensation to upper back.  Takes ibuprofen occasionally which relieves pain; steroids and Robaxin have not been helpful.  On exam, noted with bilateral hip weakness (antalgic) 4/5. Otherwise motor strength intact. Bilateral Rios's, hyperreflexia throughout. Patellar DTR on right 3+, clonus to right LE.     Imaging:  Lumbar spine x-rays, 11/2/2023: There is a transitional partially sacralized L5 vertebral body left more so than right. Lumbar spondylitic degenerative change as described above. Changes have mildly progressed when compared with the prior exam.     Plan:  Discussed that although low back pain is of concern, I have more concern regarding her exam findings concerning for myelopathy. Additionally, she has sensation changes to her back below about T6.   For these reasons, I will initiate workup with cervical and thoracic MRI as she has history of thoracic herniated disc.  Would refrain from any PT or chiropractor in the interim.  She will discuss whether she can take NSAID's with her bariatric surgeon.  Follow up as joint appointment with surgeon once imaging has been completed.  If findings on cervical and thoracic MRIs are unremarkable, can consider MRI of lumbar spine to work up low back pain.     Diagnoses and all orders for this visit:    Acute low back pain, unspecified back pain laterality, unspecified whether sciatica present  -     Ambulatory Referral to Neurosurgery  -     MRI cervical spine wo contrast; Future  -     MRI thoracic spine without contrast; Future          I have spent a total time  of 40 minutes on 01/23/24 in caring for this patient including Instructions for management, Patient and family education, Impressions, Counseling / Coordination of care, Documenting in the medical record, Reviewing / ordering tests, medicine, procedures  , and Obtaining or reviewing history  .      CHIEF COMPLAINT    Chief Complaint   Patient presents with    Consult       HISTORY    History of Present Illness     38 y.o. year old female     With past medical history of hypertension, s/p vicenta-en-y gastric bypass, CHF, chronic back pain, who presents as referral from PCP for evaluation of mid and low back pain. She has suffered from some degree of thoracic back pain for many years and had MRI of her thoracic spine done in 2019 indicating small herniated disc at T7-8.  She states that beginning at Halloween of last year she developed significant low back pain radiating around to her abdomen and occasionally into her hips.  She states that her hip pain has resolved somewhat but she continues to experience significant low back pain that is affecting her walking.  Her PCP prescribed her steroids which helped briefly as well as Robaxin which does not help her at all.  She describes constant pain from her bra line down to the top of her gluteal cleft.  She relates that she is not supposed to take ibuprofen because she had gastric bypass but it seems to be the only over-the-counter medication that helps her pain and so sometimes she will double up on her omeprazole and take ibuprofen on very bad days.  She denies any associated pain in her legs or arms.  She does describe numbness to her back area to the same area where she experiences pain.  She states this is also new since October.  She denies any issues with her handwriting.  She is right-hand dominant.  She denies any trouble with a clumsy gait.  She does not have issues dropping objects or with use of her hands.  She has no bowel or bladder complaints.    She works as  a behavioral health tech for the network.  She is accompanied today by her girlfriend Leola.        See Discussion    REVIEW OF SYSTEMS    Review of Systems   Constitutional: Negative.    HENT: Negative.     Eyes: Negative.    Respiratory: Negative.     Cardiovascular: Negative.    Gastrointestinal: Negative.    Endocrine: Negative.    Genitourinary: Negative.    Musculoskeletal:  Positive for arthralgias, back pain (across the lower back radiates to Bi buttocks half way. upper back pain) and gait problem (difficult to walk long period of time). Negative for joint swelling.   Skin: Negative.    Allergic/Immunologic: Negative.    Neurological:  Positive for numbness (numbness in the mid back). Negative for weakness.   Psychiatric/Behavioral:  Positive for sleep disturbance (Due to pain).        ROS obtained by MA. Reviewed. See HPI.     Meds/Allergies     Current Outpatient Medications   Medication Sig Dispense Refill    amLODIPine (NORVASC) 10 mg tablet TAKE 1 TABLET (10 MG TOTAL) BY MOUTH DAILY 30 tablet 0    cholecalciferol (VITAMIN D3) 400 units tablet TAKE 1 TABLET (400 UNITS TOTAL) BY MOUTH DAILY 30 tablet 3    hydrochlorothiazide (HYDRODIURIL) 25 mg tablet Take 1 tablet (25 mg total) by mouth daily 90 tablet 5    methylPREDNISolone 4 MG tablet therapy pack Use as directed on package 21 each 0    omeprazole (PriLOSEC) 20 mg delayed release capsule Take 1 capsule (20 mg total) by mouth 2 (two) times a day (Patient taking differently: Take 20 mg by mouth in the morning) 180 capsule 2    labetalol (NORMODYNE) 300 mg tablet Take 1 tablet (300 mg total) by mouth 3 (three) times a day 90 tablet 0    methocarbamol (ROBAXIN) 500 mg tablet TAKE 1 TABLET (500 MG TOTAL) BY MOUTH 4 (FOUR) TIMES A DAY AS NEEDED FOR MUSCLE SPASMS (Patient not taking: Reported on 1/23/2024) 120 tablet 0     No current facility-administered medications for this visit.       No Known Allergies    PAST HISTORY    Past Medical History:   Diagnosis  Date    Anxiety     Arthritis     CHF (congestive heart failure) ([facility])     Chronic back pain     Generalized anxiety disorder     Hiatal hernia     Hypertension     Kidney stone     Obesity     Primary osteoarthritis of left knee 2020    Transient ischemic attack     pt states not certain if actually had a TIA, states related to severe HTN episodes       Past Surgical History:   Procedure Laterality Date     SECTION      x 2    EGD      KNEE ARTHROSCOPY Left     acl/meniscus repair    AL CYSTO BLADDER W/URETERAL CATHETERIZATION Right 1/15/2018    Procedure: CYSTOSCOPY RETROGRADE PYELOGRAM WITH INSERTION STENT URETERAL;  Surgeon: Rahul Cook MD;  Location[facility];  Service: Urology    AL CYSTO/URETERO W/LITHOTRIPSY &INDWELL STENT INSRT Right 2018    Procedure: CYSTOSCOPY URETEROSCOPY, RETROGRADE PYELOGRAM AND INSERTION STENT URETERAL, RIGHT STONE EXTRACTION;  Surgeon: Jalen Kirk MD;  Location: [facility];  Service: Urology    AL LAPS GSTR RSTCV PX W/BYP ONDINA-EN-Y LIMB <150 CM N/A 2022    Procedure: BYPASS GASTRIC ONDINA-EN-Y LAPAROSCOPIC W ROBOTICS AND INTRAOPERATIVE EGD;  Surgeon: Magalie Storey MD;  Location: [facility];  Service: Bariatrics    TUBAL LIGATION         Social History     Tobacco Use    Smoking status: Former     Current packs/day: 0.00     Average packs/day: 0.2 packs/day for 5.0 years (1.0 ttl pk-yrs)     Types: Cigarettes     Start date: 2015     Quit date: 2020     Years since quittin.0    Smokeless tobacco: Never   Vaping Use    Vaping status: Never Used   Substance Use Topics    Alcohol use: Yes     Comment: Rare social occasion , a few times a year    Drug use: No       Family History   Problem Relation Age of Onset    Hypertension Mother     Cancer Mother         Thyroid    Hypertension Father          Above history personally reviewed.       EXAM    Vitals:Blood pressure 130/70, pulse 66, temperature 98.7 °F (37.1 °C), temperature source  "Temporal, resp. rate 20, height 5' 5\" (1.651 m), weight 88 kg (194 lb), SpO2 99%, not currently breastfeeding.,Body mass index is 32.28 kg/m².     Physical Exam  Constitutional:       Appearance: She is well-developed.   HENT:      Head: Normocephalic and atraumatic.   Eyes:      Extraocular Movements: EOM normal.      Pupils: Pupils are equal, round, and reactive to light.   Pulmonary:      Effort: Pulmonary effort is normal.   Abdominal:      Palpations: Abdomen is soft.   Musculoskeletal:         General: Normal range of motion.      Cervical back: Normal range of motion and neck supple.   Skin:     General: Skin is warm and dry.   Neurological:      Mental Status: She is alert and oriented to person, place, and time.      Cranial Nerves: No cranial nerve deficit.      Sensory: Sensory deficit present.      Motor: Weakness present.      Coordination: Coordination normal. Finger-Nose-Finger Test normal.      Deep Tendon Reflexes: Reflexes abnormal.      Reflex Scores:       Tricep reflexes are 3+ on the right side and 3+ on the left side.       Bicep reflexes are 3+ on the right side and 3+ on the left side.       Brachioradialis reflexes are 3+ on the right side and 3+ on the left side.       Patellar reflexes are 4+ on the right side and 3+ on the left side.       Achilles reflexes are 3+ on the right side and 3+ on the left side.  Psychiatric:         Speech: Speech normal.         Neurologic Exam     Mental Status   Oriented to person, place, and time.   Oriented to person.   Oriented to place.   Oriented to time. Oriented to year, month and date.   Attention: normal. Concentration: normal.   Speech: speech is normal   Level of consciousness: alert    Cranial Nerves     CN III, IV, VI   Pupils are equal, round, and reactive to light.  Extraocular motions are normal.   Right pupil: Size: 3 mm. Shape: regular. Reactivity: brisk. Consensual response: intact. Accommodation: intact.   Left pupil: Size: 3 mm. Shape: " regular. Reactivity: brisk. Consensual response: intact. Accommodation: intact.   Nystagmus: none   Diplopia: none  Conjugate gaze: present    CN V   Right facial sensation deficit: none  Left facial sensation deficit: none    CN VII   Facial expression full, symmetric.     CN VIII   Hearing: intact    CN IX, X   Palate: symmetric    CN XI   Right sternocleidomastoid strength: normal  Left sternocleidomastoid strength: normal  Right trapezius strength: normal  Left trapezius strength: normal    CN XII   Tongue: not atrophic  Fasciculations: absent  Tongue deviation: none    Motor Exam   Muscle bulk: normal  Overall muscle tone: normal  Right arm pronator drift: absent  Left arm pronator drift: absent    Strength   Right deltoid: 5/5  Left deltoid: 5/5  Right biceps: 5/5  Left biceps: 5/5  Right triceps: 5/5  Left triceps: 5/5  Right quadriceps: 5/5  Left quadriceps: 5/5  Right hamstrin/5  Left hamstrin/5  Right anterior tibial: 5/5  Left anterior tibial: 5/5  Right posterior tibial: 5/5  Left posterior tibial: 5/5  Right peroneal: 5/5  Left peroneal: 5/5  Right gastroc: 5/5  Left gastroc: 5/5    Sensory Exam   Light touch normal.   Proprioception normal.   Numbness from bra line to gluteal cleft.     Gait, Coordination, and Reflexes     Coordination   Finger to nose coordination: normal    Tremor   Resting tremor: absent  Intention tremor: absent  Action tremor: absent    Reflexes   Right brachioradialis: 3+  Left brachioradialis: 3+  Right biceps: 3+  Left biceps: 3+  Right triceps: 3+  Left triceps: 3+  Right patellar: 4+  Left patellar: 3+  Right achilles: 3+  Left achilles: 3+  Right Rios: present  Left Rios: present  Right ankle clonus: present  Left ankle clonus: absent        MEDICAL DECISION MAKING    Imaging Studies:     No results found.    I have personally reviewed pertinent reports.   and I have personally reviewed pertinent films in PACS

## 2024-01-26 DIAGNOSIS — M54.41 ACUTE BILATERAL LOW BACK PAIN WITH BILATERAL SCIATICA: Primary | ICD-10-CM

## 2024-01-26 DIAGNOSIS — M54.42 ACUTE BILATERAL LOW BACK PAIN WITH BILATERAL SCIATICA: Primary | ICD-10-CM

## 2024-01-26 DIAGNOSIS — M54.2 ACUTE NECK PAIN: ICD-10-CM

## 2024-01-26 DIAGNOSIS — M54.50 ACUTE LOW BACK PAIN, UNSPECIFIED BACK PAIN LATERALITY, UNSPECIFIED WHETHER SCIATICA PRESENT: ICD-10-CM

## 2024-01-26 DIAGNOSIS — M25.552 LEFT HIP PAIN: ICD-10-CM

## 2024-01-26 DIAGNOSIS — M54.6 ACUTE BILATERAL THORACIC BACK PAIN: ICD-10-CM

## 2024-01-26 RX ORDER — CYCLOBENZAPRINE HCL 10 MG
10 TABLET ORAL 3 TIMES DAILY PRN
Qty: 90 TABLET | Refills: 0 | Status: SHIPPED | OUTPATIENT
Start: 2024-01-26

## 2024-01-26 RX ORDER — PREDNISONE 10 MG/1
TABLET ORAL DAILY
Qty: 60 TABLET | Refills: 0 | Status: SHIPPED | OUTPATIENT
Start: 2024-01-26 | End: 2024-02-25

## 2024-01-28 DIAGNOSIS — I10 HTN (HYPERTENSION), MALIGNANT: ICD-10-CM

## 2024-01-29 RX ORDER — AMLODIPINE BESYLATE 10 MG/1
10 TABLET ORAL DAILY
Qty: 90 TABLET | Refills: 1 | Status: SHIPPED | OUTPATIENT
Start: 2024-01-29

## 2024-01-30 ENCOUNTER — HOSPITAL ENCOUNTER (OUTPATIENT)
Dept: MRI IMAGING | Facility: HOSPITAL | Age: 39
Discharge: HOME | End: 2024-01-30
Payer: COMMERCIAL

## 2024-01-30 DIAGNOSIS — M54.50 ACUTE LOW BACK PAIN, UNSPECIFIED BACK PAIN LATERALITY, UNSPECIFIED WHETHER SCIATICA PRESENT: ICD-10-CM

## 2024-01-30 PROCEDURE — G1004 CDSM NDSC: HCPCS

## 2024-01-30 PROCEDURE — 72146 MRI CHEST SPINE W/O DYE: CPT

## 2024-01-30 PROCEDURE — 72141 MRI NECK SPINE W/O DYE: CPT

## 2024-02-01 PROBLEM — Z86.16 PERSONAL HISTORY OF COVID-19: Status: ACTIVE | Noted: 2024-02-01

## 2024-02-01 PROBLEM — G47.09 OTHER INSOMNIA: Status: ACTIVE | Noted: 2024-02-01

## 2024-02-01 PROBLEM — Z11.59 NEED FOR HEPATITIS C SCREENING TEST: Status: ACTIVE | Noted: 2024-02-01

## 2024-02-01 PROBLEM — M25.562 LEFT KNEE PAIN: Status: ACTIVE | Noted: 2024-02-01

## 2024-02-01 PROBLEM — Z98.890 HISTORY OF ESOPHAGOGASTRODUODENOSCOPY (EGD): Status: ACTIVE | Noted: 2024-02-01

## 2024-02-01 PROBLEM — Z00.00 ANNUAL PHYSICAL EXAM: Status: ACTIVE | Noted: 2024-02-01

## 2024-02-01 PROBLEM — M25.551 BILATERAL HIP PAIN: Status: ACTIVE | Noted: 2024-02-01

## 2024-02-01 PROBLEM — K21.9 GASTROESOPHAGEAL REFLUX DISEASE WITHOUT ESOPHAGITIS: Status: ACTIVE | Noted: 2024-02-01

## 2024-02-01 PROBLEM — K90.9 MALABSORPTION SYNDROME: Status: ACTIVE | Noted: 2024-02-01

## 2024-02-01 PROBLEM — I50.32 CHRONIC DIASTOLIC HEART FAILURE WITH PRESERVED EJECTION FRACTION (HCC): Status: RESOLVED | Noted: 2020-10-02 | Resolved: 2024-02-01

## 2024-02-01 PROBLEM — D50.8 IRON DEFICIENCY ANEMIA SECONDARY TO INADEQUATE DIETARY IRON INTAKE: Status: ACTIVE | Noted: 2024-02-01

## 2024-02-01 PROBLEM — Z92.89 HISTORY OF ECHOCARDIOGRAM: Status: ACTIVE | Noted: 2024-02-01

## 2024-02-15 DIAGNOSIS — I10 PRIMARY HYPERTENSION: ICD-10-CM

## 2024-02-15 RX ORDER — LABETALOL 300 MG/1
300 TABLET, FILM COATED ORAL 3 TIMES DAILY
Qty: 90 TABLET | Refills: 0 | Status: SHIPPED | OUTPATIENT
Start: 2024-02-15 | End: 2024-03-16

## (undated) DEVICE — SCD SEQUENTIAL COMPRESSION COMFORT SLEEVE MEDIUM KNEE LENGTH: Brand: KENDALL SCD

## (undated) DEVICE — 2000CC GUARDIAN II: Brand: GUARDIAN

## (undated) DEVICE — MONOPOLAR CURVED SCISSORS: Brand: ENDOWRIST

## (undated) DEVICE — CANNULA SEAL

## (undated) DEVICE — GLOVE SRG BIOGEL 6

## (undated) DEVICE — ASTOUND STANDARD SURGICAL GOWN, XL: Brand: CONVERTORS

## (undated) DEVICE — 3000CC GUARDIAN II: Brand: GUARDIAN

## (undated) DEVICE — INFLATION DEVICE 6FR X 4CM

## (undated) DEVICE — VASELINE PURE ULTRA WHITE PETROLEUM JELLY: Brand: VASELINE

## (undated) DEVICE — URETERAL CATHETER ADAPTOR TIP

## (undated) DEVICE — CATH URETERAL 5FR X 70 CM FLEX TIP POLYUR BARD

## (undated) DEVICE — ADHESIVE SKIN CLSR DERMABOND NX

## (undated) DEVICE — BAG DECANTER

## (undated) DEVICE — GLOVE INDICATOR PI UNDERGLOVE SZ 6.5 BLUE

## (undated) DEVICE — GLOVE SRG BIOGEL 7.5

## (undated) DEVICE — SURGICAL GOWN, XL SMARTSLEEVE: Brand: CONVERTORS

## (undated) DEVICE — SUT MONOCRYL 4-0 PS-2 27 IN Y426H

## (undated) DEVICE — SEAL

## (undated) DEVICE — TIP COVER ACCESSORY

## (undated) DEVICE — GUIDEWIRE STRGHT TIP 0.035 IN  SOLO PLUS

## (undated) DEVICE — TUBING SUCTION 5MM X 12 FT

## (undated) DEVICE — ABSORBABLE WOUND CLOSURE DEVICE: Brand: V-LOC 90

## (undated) DEVICE — MEGA SUTURECUT ND: Brand: ENDOWRIST

## (undated) DEVICE — GLOVE INDICATOR PI UNDERGLOVE SZ 7 BLUE

## (undated) DEVICE — STRL COTTON TIP APPLCTR 6IN PK: Brand: CARDINAL HEALTH

## (undated) DEVICE — CADIERE FORCEPS: Brand: ENDOWRIST

## (undated) DEVICE — SUT ETHIBOND 0 CT-1 30 IN X424H

## (undated) DEVICE — COLUMN DRAPE

## (undated) DEVICE — INVIEW CLEAR LEGGINGS: Brand: CONVERTORS

## (undated) DEVICE — STAPLER 60 RELOAD GREEN: Brand: SUREFORM

## (undated) DEVICE — STAPLER 60 RELOAD BLUE: Brand: SUREFORM

## (undated) DEVICE — 3M™ STERI-STRIP™ COMPOUND BENZOIN TINCTURE 40 BAGS/CARTON 4 CARTONS/CASE C1544: Brand: 3M™ STERI-STRIP™

## (undated) DEVICE — BASKET STONE RTRVL ZERO TIP 2.4FR

## (undated) DEVICE — ARM DRAPE

## (undated) DEVICE — TROCAR: Brand: KII FIOS FIRST ENTRY

## (undated) DEVICE — STAPLER CANNULA SEAL: Brand: ENDOWRIST

## (undated) DEVICE — PLUMEPEN PRO 10FT

## (undated) DEVICE — VISIGI 3D®  CALIBRATION SYSTEM  SIZE 36FR BYPASS/SHORT: Brand: BOEHRINGER® VISIGI 3D®  CALIBRATION SYSTEM  SIZE 36FR BYPASS/SHORT

## (undated) DEVICE — REDUCER: Brand: ENDOWRIST

## (undated) DEVICE — DRAPE EQUIPMENT RF WAND

## (undated) DEVICE — PACK TUR

## (undated) DEVICE — GLOVE SRG BIOGEL 7

## (undated) DEVICE — SPECIMEN CONTAINER STERILE PEEL PACK

## (undated) DEVICE — ENDOPATH XCEL BLADELESS TROCARS WITH STABILITY SLEEVES: Brand: ENDOPATH XCEL

## (undated) DEVICE — TRAVELKIT CONTAINS FIRST STEP KIT (200ML EP-4 KIT) AND SOILED SCOPE BAG - 1 KIT: Brand: TRAVELKIT CONTAINS FIRST STEP KIT AND SOILED SCOPE BAG

## (undated) DEVICE — [HIGH FLOW INSUFFLATOR,  DO NOT USE IF PACKAGE IS DAMAGED,  KEEP DRY,  KEEP AWAY FROM SUNLIGHT,  PROTECT FROM HEAT AND RADIOACTIVE SOURCES.]: Brand: PNEUMOSURE

## (undated) DEVICE — UROCATCH BAG

## (undated) DEVICE — VESSEL SEALER EXTEND: Brand: ENDOWRIST

## (undated) DEVICE — LUBRICANT SURGILUBE TUBE 4 OZ  FLIP TOP

## (undated) DEVICE — VIOLET BRAIDED (POLYGLACTIN 910), SYNTHETIC ABSORBABLE SUTURE: Brand: COATED VICRYL

## (undated) DEVICE — Device: Brand: DEFENDO AIR/WATER/SUCTION AND BIOPSY VALVE

## (undated) DEVICE — PBT NON ABSORBABLE WOUND CLOSURE DEVICE: Brand: V-LOC

## (undated) DEVICE — BLADELESS OBTURATOR: Brand: WECK VISTA

## (undated) DEVICE — WEBRIL 6 IN UNSTERILE

## (undated) DEVICE — 10 MM BABCOCKS WITH RATCHET HANDLES: Brand: ENDOPATH

## (undated) DEVICE — LIGAMAX 5 MM ENDOSCOPIC MULTIPLE CLIP APPLIER: Brand: LIGAMAX

## (undated) DEVICE — KIT, ROBOTIC BARIATRIC: Brand: CARDINAL HEALTH